# Patient Record
Sex: MALE | Race: WHITE | Employment: OTHER | URBAN - METROPOLITAN AREA
[De-identification: names, ages, dates, MRNs, and addresses within clinical notes are randomized per-mention and may not be internally consistent; named-entity substitution may affect disease eponyms.]

---

## 2017-06-20 ENCOUNTER — HOSPITAL ENCOUNTER (OUTPATIENT)
Dept: MRI IMAGING | Age: 82
Discharge: HOME | End: 2017-06-20
Attending: INTERNAL MEDICINE
Payer: MEDICARE

## 2017-06-20 DIAGNOSIS — M79.604 RIGHT LEG PAIN: ICD-10-CM

## 2017-06-20 PROCEDURE — 72148 MRI LUMBAR SPINE W/O DYE: CPT

## 2017-07-03 ENCOUNTER — HOSPITAL ENCOUNTER (OUTPATIENT)
Dept: INTERVENTIONAL RADIOLOGY/VASCULAR | Age: 82
Discharge: HOME | End: 2017-07-03
Attending: INTERNAL MEDICINE
Payer: MEDICARE

## 2017-07-03 VITALS
RESPIRATION RATE: 20 BRPM | BODY MASS INDEX: 25.33 KG/M2 | OXYGEN SATURATION: 96 % | HEART RATE: 74 BPM | DIASTOLIC BLOOD PRESSURE: 76 MMHG | HEIGHT: 65 IN | SYSTOLIC BLOOD PRESSURE: 142 MMHG | WEIGHT: 152 LBS

## 2017-07-03 DIAGNOSIS — M48.00 SPINAL STENOSIS: ICD-10-CM

## 2017-07-03 PROCEDURE — 74011636320 HC RX REV CODE- 636/320: Performed by: RADIOLOGY

## 2017-07-03 PROCEDURE — 64483 NJX AA&/STRD TFRM EPI L/S 1: CPT

## 2017-07-03 PROCEDURE — 74011000250 HC RX REV CODE- 250: Performed by: RADIOLOGY

## 2017-07-03 PROCEDURE — 74011250636 HC RX REV CODE- 250/636: Performed by: RADIOLOGY

## 2017-07-03 RX ORDER — SODIUM CHLORIDE 9 MG/ML
3 INJECTION INTRAMUSCULAR; INTRAVENOUS; SUBCUTANEOUS ONCE
Status: DISCONTINUED | OUTPATIENT
Start: 2017-07-03 | End: 2017-07-03

## 2017-07-03 RX ORDER — LIDOCAINE HYDROCHLORIDE 20 MG/ML
18 INJECTION, SOLUTION INFILTRATION; PERINEURAL ONCE
Status: COMPLETED | OUTPATIENT
Start: 2017-07-03 | End: 2017-07-03

## 2017-07-03 RX ORDER — DEXAMETHASONE SODIUM PHOSPHATE 10 MG/ML
10 INJECTION INTRAMUSCULAR; INTRAVENOUS ONCE
Status: COMPLETED | OUTPATIENT
Start: 2017-07-03 | End: 2017-07-03

## 2017-07-03 RX ORDER — LIDOCAINE HYDROCHLORIDE 10 MG/ML
2 INJECTION, SOLUTION EPIDURAL; INFILTRATION; INTRACAUDAL; PERINEURAL ONCE
Status: COMPLETED | OUTPATIENT
Start: 2017-07-03 | End: 2017-07-03

## 2017-07-03 RX ADMIN — LIDOCAINE HYDROCHLORIDE 2 ML: 10 INJECTION, SOLUTION EPIDURAL; INFILTRATION; INTRACAUDAL; PERINEURAL at 07:55

## 2017-07-03 RX ADMIN — IOPAMIDOL 3 ML: 612 INJECTION, SOLUTION INTRATHECAL at 07:55

## 2017-07-03 RX ADMIN — DEXAMETHASONE SODIUM PHOSPHATE 10 MG: 10 INJECTION, SOLUTION INTRAMUSCULAR; INTRAVENOUS at 07:55

## 2017-07-03 RX ADMIN — LIDOCAINE HYDROCHLORIDE 360 MG: 20 INJECTION, SOLUTION INFILTRATION; PERINEURAL at 07:55

## 2017-07-03 NOTE — DISCHARGE INSTRUCTIONS
[facility]  Special Procedures/Radiology Department      Steroidal Injection      Go home and rest.     No vigorous physical activity today. Be aware that numbness and/or tingling can occur up to 24 hours after the injection. No driving today. Resume your previous diet. Resume your previous medications. Depending on your job, you may return to work in 25 to 48 hours. It may take up to one week after the injection to see a change or an improvement in your symptoms. Be sure to follow up with your physician. Tell your physician if the injection helped with your symptoms or if the injection did nothing for your symptoms. For minor discomfort, you can take Tylenol, as directed on the label.       If you have any questions or concerns, please call [phone] and ask for the nurse on-call

## 2017-07-03 NOTE — IP AVS SNAPSHOT
[address] [facility] 
[phone] Patient: Maria Elena Delgado MRN: [id number] BVO:[id number] You are allergic to the following Allergen Reactions Gabapentin Other (comments) He says \"It made me crazy\" Tetanus Vaccines And Toxoid Swelling Swelling at the site Tradjenta (Linagliptin) Diarrhea  
 headache Recent Documentation Height Weight BMI Smoking Status 1.651 m 68.9 kg 25.29 kg/m2 Former Smoker Emergency Contacts Name Discharge Info Relation Home Work Mobile Sonia Bautista  Spouse [3] [phone] Sonia BARBOSA  Spouse [3] [phone] About your hospitalization You were admitted on:  July 3, 2017 You last received care in the:  [facility] RAD ANGIO IR You were discharged on:  July 3, 2017 Unit phone number:  [phone] Why you were hospitalized Your primary diagnosis was:  Not on File Providers Seen During Your Hospitalizations Provider Role Specialty Primary office phone Taty Uriostegui MD Attending Provider Internal Medicine [phone] Your Primary Care Physician (PCP) Primary Care Physician Office Phone Office Fax Meryl Patino [phone] Follow-up Information None Your Appointments Monday July 10, 2017 10:30 AM EDT FOLLOW UP 10 with MD Rosanna Herrera [facility] ([facility]) [address] [address] [phone] Current Discharge Medication List  
  
ASK your doctor about these medications Dose & Instructions Dispensing Information Comments Morning Noon Evening Bedtime  
 aspirin delayed-release 81 mg tablet Your last dose was: Your next dose is:    
   
   
 Dose:  81 mg Take 81 mg by mouth daily. Refills:  0 bisacodyl 5 mg EC tablet Commonly known as:  DULCOLAX (BISACODYL) Your last dose was: Your next dose is:    
   
   
 Dose:  5 mg Take 1 Tab by mouth daily as needed for Constipation. Quantity:  30 Tab Refills:  0  
     
   
   
   
  
 DILT- mg ER capsule Generic drug:  dilTIAZem CD Your last dose was: Your next dose is:    
   
   
 Dose:  300 mg Take 300 mg by mouth daily. Refills:  0  
     
   
   
   
  
 diphenhydrAMINE 12.5 mg/5 mL syrup Commonly known as:  BENADRYL Your last dose was: Your next dose is:    
   
   
 Dose:  12.5 mg Take 12.5 mg by mouth nightly as needed. Refills:  0  
     
   
   
   
  
 ferrous sulfate 325 mg (65 mg iron) tablet Your last dose was: Your next dose is:    
   
   
 Dose:  325 mg Take 325 mg by mouth two (2) times a day. Refills:  0  
     
   
   
   
  
 glipiZIDE 10 mg tablet Commonly known as:  Rulon Bread Your last dose was: Your next dose is:    
   
   
 Dose:  10 mg Take 10 mg by mouth two (2) times a day. Refills:  0 HYDROcodone-acetaminophen 5-325 mg per tablet Commonly known as:  [address] Your last dose was: Your next dose is:    
   
   
 Dose:  1 Tab Take 1 Tab by mouth every four (4) hours as needed for Pain. Max Daily Amount: 6 Tabs. Quantity:  20 Tab Refills:  0  
     
   
   
   
  
 insulin glargine 100 unit/mL injection Commonly known as:  LANTUS Your last dose was: Your next dose is:    
   
   
 Dose:  10 Units 10 Units by SubCUTAneous route nightly. Quantity:  1 Vial  
Refills:  0  
     
   
   
   
  
 insulin lispro 100 unit/mL injection Commonly known as:  HUMALOG Your last dose was: Your next dose is:    
   
   
 Blood Sugar (mg/dl) of :140-199=2 units;  200-249=3 units; 250-299=5 units; 300-349=7 units 350 or greater = Call MD... BEDTIME CORRECTIONAL sliding scale when scheduled: 200-249=2 units; 250-299=3 units ; 300-349=4 units; 350 or greater = Call MD  Indications: DIABETES MELLITUS Quantity:  1 Vial  
Refills:  0  
     
   
   
   
  
 meclizine 25 mg tablet Commonly known as:  ANTIVERT Your last dose was: Your next dose is:    
   
   
 Dose:  12.5 mg Take 12.5 mg by mouth three (3) times daily as needed. Refills:  0  
     
   
   
   
  
 simvastatin 20 mg tablet Commonly known as:  ZOCOR Your last dose was: Your next dose is:    
   
   
 Dose:  20 mg Take 20 mg by mouth nightly. Refills:  0  
     
   
   
   
  
 tamsulosin 0.4 mg capsule Commonly known as:  FLOMAX Your last dose was: Your next dose is:    
   
   
 Dose:  0.4 mg Take 0.4 mg by mouth daily. Refills:  0  
     
   
   
   
  
 VITAMIN B-12 1,000 mcg/mL injection Generic drug:  cyanocobalamin Your last dose was: Your next dose is:    
   
   
 Dose:  1000 mcg  
1,000 mcg by IntraMUSCular route every thirty (30) days. Refills:  0  
     
   
   
   
  
 VITAMIN D3 1,000 unit Cap Generic drug:  cholecalciferol Your last dose was: Your next dose is:    
   
   
 Dose:  1000 Units Take 1,000 Units by mouth daily. Refills:  0 WELCHOL 625 mg tablet Generic drug:  colesevelam  
   
Your last dose was: Your next dose is:    
   
   
 Dose:  1875 mg Take 1,875 mg by mouth two (2) times daily (with meals). Refills:  0 ZOFRAN (AS HYDROCHLORIDE) 4 mg tablet Generic drug:  ondansetron hcl Your last dose was: Your next dose is:    
   
   
 Dose:  4 mg Take 4 mg by mouth every eight (8) hours as needed for Nausea. Refills:  0 Discharge Instructions [facility] Special Procedures/Radiology Department Steroidal Injection Go home and rest. 
 
 No vigorous physical activity today. Be aware that numbness and/or tingling can occur up to 24 hours after the injection. No driving today. Resume your previous diet. Resume your previous medications. Depending on your job, you may return to work in 25 to 48 hours. It may take up to one week after the injection to see a change or an improvement in your symptoms. Be sure to follow up with your physician. Tell your physician if the injection helped with your symptoms or if the injection did nothing for your symptoms. For minor discomfort, you can take Tylenol, as directed on the label. If you have any questions or concerns, please call [phone] and ask for the nurse on-call Discharge Orders None Introducing [facility] SERVICES! [employer] introduces [employer] patient portal. Now you can access parts of your medical record, email your doctor's office, and request medication refills online. 1. In your internet browser, go to [email or web address] 2. Click on the First Time User? Click Here link in the Sign In box. You will see the New Member Sign Up page. 3. Enter your [employer] Access Code exactly as it appears below. You will not need to use this code after youve completed the sign-up process. If you do not sign up before the expiration date, you must request a new code. · [employer] Access Code: [email or web address] Expires: 9/17/2017  2:47 PM 
 
4. Enter the last four digits of your Social Security Number (xxxx) and Date of Birth (mm/dd/yyyy) as indicated and click Submit. You will be taken to the next sign-up page. 5. Create a [employer] ID. This will be your [employer] login ID and cannot be changed, so think of one that is secure and easy to remember. 6. Create a [employer] password. You can change your password at any time. 7. Enter your Password Reset Question and Answer.  This can be used at a later time if you forget your password. 8. Enter your e-mail address. You will receive e-mail notification when new information is available in [address] 9. Click Sign Up. You can now view and download portions of your medical record. 10. Click the Download Summary menu link to download a portable copy of your medical information. If you have questions, please visit the Frequently Asked Questions section of the [employer] website. Remember, [employer] is NOT to be used for urgent needs. For medical emergencies, dial 911. Now available from your iPhone and Android! General Information Please provide this summary of care documentation to your next provider. Patient Signature:  ____________________________________________________________ Date:  ____________________________________________________________  
  
[facility] Provider Signature:  ____________________________________________________________ Date:  ____________________________________________________________

## 2017-07-03 NOTE — ROUTINE PROCESS
Discharge instructions given and reviewed with pt and pt's wife. Both voiced complete understanding of all instructions given. Pt taken out via wheelchair and discharged to home with wife in car.

## 2017-07-03 NOTE — ROUTINE PROCESS
Dr. Brook Brewster into speak with pt and procedure explained along with risks and benefits; consent obtained.

## 2017-07-11 ENCOUNTER — HOSPITAL ENCOUNTER (OUTPATIENT)
Dept: MRI IMAGING | Age: 82
Discharge: HOME | End: 2017-07-11
Attending: INTERNAL MEDICINE
Payer: MEDICARE

## 2017-07-11 ENCOUNTER — DOCUMENTATION ONLY (OUTPATIENT)
Dept: INTERNAL MEDICINE CLINIC | Age: 82
End: 2017-07-11

## 2017-07-11 DIAGNOSIS — M25.551 RIGHT HIP PAIN: ICD-10-CM

## 2017-07-11 PROCEDURE — 73721 MRI JNT OF LWR EXTRE W/O DYE: CPT

## 2017-07-24 ENCOUNTER — HOSPITAL ENCOUNTER (OUTPATIENT)
Dept: GENERAL RADIOLOGY | Age: 82
Discharge: HOME | End: 2017-07-24
Attending: ORTHOPAEDIC SURGERY
Payer: MEDICARE

## 2017-07-24 DIAGNOSIS — M16.11 PRIMARY OSTEOARTHRITIS OF RIGHT HIP: ICD-10-CM

## 2017-07-24 PROCEDURE — 20610 DRAIN/INJ JOINT/BURSA W/O US: CPT

## 2017-07-24 PROCEDURE — 74011250636 HC RX REV CODE- 250/636: Performed by: RADIOLOGY

## 2017-07-24 PROCEDURE — 74011636320 HC RX REV CODE- 636/320: Performed by: RADIOLOGY

## 2017-07-24 PROCEDURE — 74011000250 HC RX REV CODE- 250: Performed by: RADIOLOGY

## 2017-07-24 RX ORDER — TRIAMCINOLONE ACETONIDE 40 MG/ML
40 INJECTION, SUSPENSION INTRA-ARTICULAR; INTRAMUSCULAR
Status: COMPLETED | OUTPATIENT
Start: 2017-07-24 | End: 2017-07-24

## 2017-07-24 RX ORDER — BUPIVACAINE HYDROCHLORIDE 5 MG/ML
5 INJECTION, SOLUTION EPIDURAL; INTRACAUDAL
Status: COMPLETED | OUTPATIENT
Start: 2017-07-24 | End: 2017-07-24

## 2017-07-24 RX ORDER — TRIAMCINOLONE ACETONIDE 40 MG/ML
INJECTION, SUSPENSION INTRA-ARTICULAR; INTRAMUSCULAR
Status: DISPENSED
Start: 2017-07-24 | End: 2017-07-24

## 2017-07-24 RX ADMIN — IOHEXOL 20 ML: 180 INJECTION INTRAVENOUS at 11:00

## 2017-07-24 RX ADMIN — BUPIVACAINE HYDROCHLORIDE 25 MG: 5 INJECTION, SOLUTION EPIDURAL; INTRACAUDAL; PERINEURAL at 11:00

## 2017-07-24 RX ADMIN — TRIAMCINOLONE ACETONIDE 40 MG: 40 INJECTION, SUSPENSION INTRA-ARTICULAR; INTRAMUSCULAR at 11:00

## 2017-08-18 RX ORDER — COLESEVELAM HYDROCHLORIDE 625 MG/1
TABLET, FILM COATED ORAL
Qty: 180 TAB | Refills: 11 | Status: SHIPPED | OUTPATIENT
Start: 2017-08-18 | End: 2018-08-08 | Stop reason: SDUPTHER

## 2017-09-07 ENCOUNTER — HOSPITAL ENCOUNTER (OUTPATIENT)
Dept: PREADMISSION TESTING | Age: 82
Discharge: HOME | End: 2017-09-07
Attending: NEUROLOGICAL SURGERY
Payer: MEDICARE

## 2017-09-07 VITALS
TEMPERATURE: 98 F | HEART RATE: 79 BPM | OXYGEN SATURATION: 97 % | SYSTOLIC BLOOD PRESSURE: 194 MMHG | RESPIRATION RATE: 20 BRPM | WEIGHT: 156.31 LBS | DIASTOLIC BLOOD PRESSURE: 52 MMHG | BODY MASS INDEX: 26.04 KG/M2 | HEIGHT: 65 IN

## 2017-09-07 LAB
ABO + RH BLD: NORMAL
ALBUMIN SERPL-MCNC: 3.8 G/DL (ref 3.5–5)
ALBUMIN/GLOB SERPL: 1.1 {RATIO} (ref 1.1–2.2)
ALP SERPL-CCNC: 92 U/L (ref 45–117)
ALT SERPL-CCNC: 31 U/L (ref 12–78)
ANION GAP SERPL CALC-SCNC: 8 MMOL/L (ref 5–15)
APPEARANCE UR: CLEAR
APTT PPP: 26.4 SEC (ref 22.1–32.5)
AST SERPL-CCNC: 17 U/L (ref 15–37)
BACTERIA URNS QL MICRO: NEGATIVE /HPF
BASOPHILS # BLD: 0 K/UL (ref 0–0.1)
BASOPHILS NFR BLD: 0 % (ref 0–1)
BILIRUB SERPL-MCNC: 0.4 MG/DL (ref 0.2–1)
BILIRUB UR QL: NEGATIVE
BLOOD GROUP ANTIBODIES SERPL: NORMAL
BUN SERPL-MCNC: 28 MG/DL (ref 6–20)
BUN/CREAT SERPL: 17 (ref 12–20)
CALCIUM SERPL-MCNC: 8.6 MG/DL (ref 8.5–10.1)
CHLORIDE SERPL-SCNC: 105 MMOL/L (ref 97–108)
CO2 SERPL-SCNC: 26 MMOL/L (ref 21–32)
COLOR UR: ABNORMAL
CREAT SERPL-MCNC: 1.64 MG/DL (ref 0.7–1.3)
EOSINOPHIL # BLD: 0.2 K/UL (ref 0–0.4)
EOSINOPHIL NFR BLD: 2 % (ref 0–7)
EPITH CASTS URNS QL MICRO: ABNORMAL /LPF
ERYTHROCYTE [DISTWIDTH] IN BLOOD BY AUTOMATED COUNT: 14.4 % (ref 11.5–14.5)
EST. AVERAGE GLUCOSE BLD GHB EST-MCNC: 183 MG/DL
GLOBULIN SER CALC-MCNC: 3.5 G/DL (ref 2–4)
GLUCOSE SERPL-MCNC: 133 MG/DL (ref 65–100)
GLUCOSE UR STRIP.AUTO-MCNC: NEGATIVE MG/DL
HBA1C MFR BLD: 8 % (ref 4.2–6.3)
HCT VFR BLD AUTO: 37 % (ref 36.6–50.3)
HGB BLD-MCNC: 12.2 G/DL (ref 12.1–17)
HGB UR QL STRIP: NEGATIVE
INR PPP: 1 (ref 0.9–1.1)
KETONES UR QL STRIP.AUTO: NEGATIVE MG/DL
LEUKOCYTE ESTERASE UR QL STRIP.AUTO: NEGATIVE
LYMPHOCYTES # BLD: 1.9 K/UL (ref 0.8–3.5)
LYMPHOCYTES NFR BLD: 17 % (ref 12–49)
MCH RBC QN AUTO: 29 PG (ref 26–34)
MCHC RBC AUTO-ENTMCNC: 33 G/DL (ref 30–36.5)
MCV RBC AUTO: 88.1 FL (ref 80–99)
MONOCYTES # BLD: 1.1 K/UL (ref 0–1)
MONOCYTES NFR BLD: 10 % (ref 5–13)
NEUTS SEG # BLD: 7.6 K/UL (ref 1.8–8)
NEUTS SEG NFR BLD: 71 % (ref 32–75)
NITRITE UR QL STRIP.AUTO: NEGATIVE
PH UR STRIP: 5 [PH] (ref 5–8)
PLATELET # BLD AUTO: 147 K/UL (ref 150–400)
POTASSIUM SERPL-SCNC: 4.3 MMOL/L (ref 3.5–5.1)
PROT SERPL-MCNC: 7.3 G/DL (ref 6.4–8.2)
PROT UR STRIP-MCNC: 100 MG/DL
PROTHROMBIN TIME: 10.5 SEC (ref 9–11.1)
RBC # BLD AUTO: 4.2 M/UL (ref 4.1–5.7)
RBC #/AREA URNS HPF: ABNORMAL /HPF (ref 0–5)
SODIUM SERPL-SCNC: 139 MMOL/L (ref 136–145)
SP GR UR REFRACTOMETRY: 1.01 (ref 1–1.03)
SPECIMEN EXP DATE BLD: NORMAL
THERAPEUTIC RANGE,PTTT: NORMAL SECS (ref 58–77)
UA: UC IF INDICATED,UAUC: ABNORMAL
UROBILINOGEN UR QL STRIP.AUTO: 0.2 EU/DL (ref 0.2–1)
WBC # BLD AUTO: 10.8 K/UL (ref 4.1–11.1)
WBC URNS QL MICRO: ABNORMAL /HPF (ref 0–4)

## 2017-09-07 PROCEDURE — 80053 COMPREHEN METABOLIC PANEL: CPT | Performed by: NEUROLOGICAL SURGERY

## 2017-09-07 PROCEDURE — 36415 COLL VENOUS BLD VENIPUNCTURE: CPT | Performed by: NEUROLOGICAL SURGERY

## 2017-09-07 PROCEDURE — 81001 URINALYSIS AUTO W/SCOPE: CPT | Performed by: NEUROLOGICAL SURGERY

## 2017-09-07 PROCEDURE — 86900 BLOOD TYPING SEROLOGIC ABO: CPT | Performed by: NEUROLOGICAL SURGERY

## 2017-09-07 PROCEDURE — 93005 ELECTROCARDIOGRAM TRACING: CPT

## 2017-09-07 PROCEDURE — 83036 HEMOGLOBIN GLYCOSYLATED A1C: CPT | Performed by: NEUROLOGICAL SURGERY

## 2017-09-07 PROCEDURE — 85025 COMPLETE CBC W/AUTO DIFF WBC: CPT | Performed by: NEUROLOGICAL SURGERY

## 2017-09-07 PROCEDURE — 85730 THROMBOPLASTIN TIME PARTIAL: CPT | Performed by: NEUROLOGICAL SURGERY

## 2017-09-07 PROCEDURE — 85610 PROTHROMBIN TIME: CPT | Performed by: NEUROLOGICAL SURGERY

## 2017-09-07 RX ORDER — SODIUM CHLORIDE, SODIUM LACTATE, POTASSIUM CHLORIDE, CALCIUM CHLORIDE 600; 310; 30; 20 MG/100ML; MG/100ML; MG/100ML; MG/100ML
25 INJECTION, SOLUTION INTRAVENOUS CONTINUOUS
Status: CANCELLED | OUTPATIENT
Start: 2017-09-14

## 2017-09-07 RX ORDER — POLYETHYLENE GLYCOL 3350 17 G/17G
17 POWDER, FOR SOLUTION ORAL
COMMUNITY
End: 2022-06-30

## 2017-09-07 RX ORDER — OMEPRAZOLE 20 MG/1
20 CAPSULE, DELAYED RELEASE ORAL DAILY
COMMUNITY
End: 2017-09-28

## 2017-09-07 RX ORDER — SODIUM CHLORIDE 900 MG/100ML
25 INJECTION INTRAVENOUS ONCE
Status: CANCELLED | OUTPATIENT
Start: 2017-09-14 | End: 2017-09-14

## 2017-09-07 RX ORDER — CEFAZOLIN SODIUM IN 0.9 % NACL 2 G/100 ML
2 PLASTIC BAG, INJECTION (ML) INTRAVENOUS ONCE
Status: CANCELLED | OUTPATIENT
Start: 2017-09-14 | End: 2017-09-14

## 2017-09-07 NOTE — PERIOP NOTES
[facility]  Preoperative Instructions        Surgery Date 9/14/17          Time of Arrival 1100 Contact # [phone]    1. On the day of your surgery, please report to the Surgical Services Registration Desk and sign in at your designated time. The Surgery Center is located to the right of the Emergency Room. 2. You must have someone with you to drive you home. You should not drive a car for 24 hours following surgery. Please make arrangements for a friend or family member to stay with you for the first 24 hours after your surgery. 3. Do not have anything to eat or drink (including water, gum, mints, coffee, juice) after midnight 9/13/17   . ? This may not apply to medications prescribed by your physician. ?(Please note below the special instructions with medications to take the morning of your procedure.)    4. We recommend you do not drink any alcoholic beverages for 24 hours before and after your surgery. 5. Stop all Aspirin, non-steroidal anti-inflammatory drugs (i.e. Advil, Aleve), vitamins, and supplements?as directed by your surgeon's office. ? **If you are currently taking Plavix, Coumadin, or other blood-thinning agents, contact your surgeon for instructions. **    6. Wear comfortable clothes. Wear glasses instead of contacts. Do not bring any money or jewelry. Please bring picture ID, insurance card, and any prearranged co-payment or hospital payment. Do not wear make-up, particularly mascara the morning of your surgery. Do not wear nail polish, particularly if you are having foot /hand surgery. Wear your hair loose or down, no ponytails, buns, juni pins or clips. All body piercings must be removed. Please shower with antibacterial soap for three consecutive days before and on the morning of surgery, but do not apply any lotions, powders or deodorants after the shower on the day of surgery. Please use a fresh towels after each shower.  Please sleep in clean clothes and change bed linens the night before surgery. Please do not shave for 48 hours prior to surgery. Shaving of the face is acceptable. 7. You should understand that if you do not follow these instructions your surgery may be cancelled. If your physical condition changes (I.e. fever, cold or flu) please contact your surgeon as soon as possible. 8. It is important that you be on time. If a situation occurs where you may be late, please call [phone] (OR Holding Area). 9. If you have any questions and or problems, please call [phone] (Pre-admission Testing). 10. Your surgery time may be subject to change. You will receive a phone call the evening prior if your time changes. 11.  If having outpatient surgery, you must have someone to drive you here, stay with you during the duration of your stay, and to drive you home at time of discharge. 12.   In an effort to improve the efficiency, privacy, and safety for all of our Pre-op patients visitors are not allowed in the Holding area. Once you arrive and are registered your family/visitors will be asked to remain in the waiting room. The Pre-op staff will get you from the Surgical Waiting Area and will explain to you and your family/visitors that the Pre-op phase is beginning. The staff will answer any questions and provide instructions for tracking of the patient, by use of the existing tracking number and color-coded status board in the waiting room. At this time the staff will also ask for your designated spokesperson information in the event that the physician or staff need to provide an update or obtain any pertinent information. The designated spokesperson will be notified if the physician needs to speak to family during the pre-operative phase. If at any time your family/visitors has questions or concerns they may approach the volunteer desk in the waiting area for assistance.          Special Instructions: none     MEDICATIONS TO TAKE THE MORNING OF SURGERY WITH A SIP OF WATER: Diltiazem (cartia),        I understand a pre-operative phone call will be made to verify my surgery time. In the event that I am not available, I give permission for a message to be left on my answering service and/or with another person? yes     ___________________      __________   _________    (Signature of Patient)             (Witness)                (Date and Time)      Preventing Infections Before - and After - Your Surgery  IMPORTANT INSTRUCTIONS    Please read and follow these instructions carefully. Every Night for Three (3) nights before your surgery:  1. Shower with an antibacterial soap, such as Dial, or the soap provided at your preassessment appointment. A shower is better than a bath for cleaning your skin. 2. If needed, ask someone to help you reach all areas of your body. Dont forget to clean your belly button with every shower. The night before your surgery:  1. On the night before your surgery, shower with an antibacterial soap, such as Dial, or the soap provided at your preassessment appointment. 2. With one packet of Hibiclens in hand, turn water off.  3. Apply Hibiclens antiseptic skin cleanser with a clean, freshly washed washcloth. ? Gently apply to your body from chin to toes (except the genital area) and especially the area(s) where your incision(s) will be. ? Leave Hibiclens on your skin for at least 20 seconds. CAUTION: If needed, Hibiclens may be used to clean the folds of skin of the legs (such as in the area of the groin) and on your buttocks and hips. However, do not use Hibiclens above the neck or in the genital area (your bottom) or put inside any area of your body. 4. Turn the water back on and rinse. 5. Dry gently with a clean, freshly washed towel. 6. After your shower, do not use any powder, deodorant, perfumes or lotion. 7. Use clean, freshly washed towels and washcloths every time you shower.   8. Wear clean, freshly washed pajamas to bed the night before surgery. 9. Sleep on clean, freshly washed sheets. 10. Do not allow pets to sleep in your bed with you. The Morning of your surgery:  1. Shower again thoroughly with an antibacterial soap, such as Dial or the soap provided at your preassessment appointment. If needed, ask someone for help to reach all areas of your body. Dont forget to clean your belly button! Rinse. 2. Dry gently with a clean, freshly washed towel. 3. After your shower, do not use any powder, deodorant, perfumes or lotion prior to surgery. 4. Put on clean, freshly washed clothing. Tips to help prevent infections after your surgery:  1. Protect your surgical wound from germs:  ? Hand washing is the most important thing you and your caregivers can do to prevent infections. ? Keep your bandage clean and dry! ? Do not touch your surgical wound. 2. Use clean, freshly washed towels and washcloths every time you shower; do not share bath linens with others. 3. Until your surgical wound is healed, wear clothing and sleep on bed linens each day that are clean and freshly washed. 4. Do not allow pets to sleep in your bed with you or touch your surgical wound. 5. Do not smoke - smoking delays wound healing. This may be a good time to stop smoking. 6. If you have diabetes, it is important for you to manage your blood sugar levels properly before your surgery as well as after your surgery. Poorly managed blood sugar levels slow down wound healing and prevent you from healing completely.

## 2017-09-08 LAB
ATRIAL RATE: 79 BPM
BACTERIA SPEC CULT: NORMAL
BACTERIA SPEC CULT: NORMAL
CALCULATED P AXIS, ECG09: 48 DEGREES
CALCULATED R AXIS, ECG10: -37 DEGREES
CALCULATED T AXIS, ECG11: 72 DEGREES
DIAGNOSIS, 93000: NORMAL
P-R INTERVAL, ECG05: 210 MS
Q-T INTERVAL, ECG07: 364 MS
QRS DURATION, ECG06: 84 MS
QTC CALCULATION (BEZET), ECG08: 417 MS
SERVICE CMNT-IMP: NORMAL
VENTRICULAR RATE, ECG03: 79 BPM

## 2017-09-08 NOTE — PERIOP NOTES
Called Dr English's office to follow up on CBC, CMP and HGB A1C and voice message left for Shira to call back about abnormal labs.

## 2017-09-11 ENCOUNTER — TELEPHONE (OUTPATIENT)
Dept: INTERNAL MEDICINE CLINIC | Age: 82
End: 2017-09-11

## 2017-09-11 NOTE — TELEPHONE ENCOUNTER
Patient called stating that he was told that his blood sugar was elevated from labs drawn for pre op. Per Dr Rossy May Mr Marina Courtney can increase his Glipizide to 1 1/2 tab BID, and we will follow up with him after his surgery. Patient was informed of this.

## 2017-09-11 NOTE — PERIOP NOTES
Tania at  [employer] office called and said that she is going to call pt and have them get an appt prior to surgery and review [id number] and if PCP okay with pt proceeding with surgery or needs to postpone, Jaime will keep us up to date.

## 2017-09-12 ENCOUNTER — HOSPITAL ENCOUNTER (OUTPATIENT)
Dept: DIABETES SERVICES | Age: 82
Discharge: HOME | End: 2017-09-12
Payer: MEDICARE

## 2017-09-12 PROCEDURE — G0108 DIAB MANAGE TRN  PER INDIV: HCPCS | Performed by: DIETITIAN, REGISTERED

## 2017-09-12 NOTE — DIABETES MGMT
[facility] Ortho Progress Note    Recommendations/ Comments:    1. Use 3-4 oz of protein serving at meals  2. Use MyPlate method for appropriate carbohydrate intake  3. Treat hypoglycemia using 4oz of juice or 3-4 glucose tablets - use rule of 15 to check and treat  4. Utilize Ensure supplements or smaller snacks with protein if intake is poor. Pt seen in outpt [facility] pre-op on 9/12/17. Chart reviewed and initial evaluation complete on [facility]. Patient is a 80 y.o. male  With type II DM who was seen individually pre-op ortho visit (surgery scheduled for 9/14/17). Pt currently taking 15mg glipizide 2x/daily (reported taking in the morning and evening) and also taking 2.5 mg of Onglyza in the evening. Pt tests blood sugars 1x/day (fasting) and brought logs in for review. Fasting blood sugars ranged from 95mg/dL to 181mg/dL. Pt shared that he does not miss his medication and takes his medications 100 percent of the time. Pt reported having lows in the morning and treating with cheese. Pt reported that he is unable to do any physical activity due to spinal stenosis (uses rolling walker to ambulate, unable to stand straight up). Pt is awake around 4:30 am and has 1 cup fresh fruit (cantoloupe, honeydew melon mix) with 1/2 bagel and coffee. At noon, he has 1.5 sausage, egg, cheese croissant with 1 cup fruit mix (above) and coffee. Eats 1/4 cup cashews from 4-5pm and dinner at 8-9pm of string beans, 1/2 cup ptoats with 1 bowl foster's chicken soup. Pt will drink half of an Ensure before bed most nights. Educator discussed taking Glipizide before breakfast and with dinner meal.. Educator discussed treating low blood sugars of 70 - 80mg/dL with juice or 3-4 glucose tablets and rechecking/treatment using rule of 15. Educator discussed use or protein at all meals and appropriate portion size of protein for healing and nutrition after surgery.  Educator discussed use of MyPlate at meals for healing (emphasized role of protein in healing) and nutrition as well as rationale for getting enough carbohydrates at meals for best blood sugar control. Educator reviewed use of meter and number to call for assistance as pt reported getting an error at times on his meter (pt also reported having difficulty receiving appropriate supplies from pharmacy - educator suggested using pharmacy that suits pt best). Educator reviewed having extra Ensure supplements around in case of poor appetite after surgery and reviewed some snack ideas with protein for after surgery and when po intake is poor. Educator discussed role of poor blood sugar control and risk of post op infection. A1c:   Lab Results   Component Value Date/Time    Hemoglobin A1c 8.0 09/07/2017 09:05 AM     Recent Glucose Results: No results found for: GLU, GLUPOC, GLUCPOC     Lab Results   Component Value Date/Time    Creatinine 1.64 09/07/2017 09:05 AM       Thank you.      Liz Acuna RD

## 2017-09-14 ENCOUNTER — HOSPITAL ENCOUNTER (OUTPATIENT)
Age: 82
Setting detail: OBSERVATION
Discharge: HOME HEALTH | End: 2017-09-15
Attending: NEUROLOGICAL SURGERY | Admitting: NEUROLOGICAL SURGERY
Payer: MEDICARE

## 2017-09-14 ENCOUNTER — APPOINTMENT (OUTPATIENT)
Dept: GENERAL RADIOLOGY | Age: 82
End: 2017-09-14
Attending: NEUROLOGICAL SURGERY
Payer: MEDICARE

## 2017-09-14 ENCOUNTER — ANESTHESIA (OUTPATIENT)
Dept: SURGERY | Age: 82
End: 2017-09-14
Payer: MEDICARE

## 2017-09-14 ENCOUNTER — ANESTHESIA EVENT (OUTPATIENT)
Dept: SURGERY | Age: 82
End: 2017-09-14
Payer: MEDICARE

## 2017-09-14 PROBLEM — M48.062 LUMBAR STENOSIS WITH NEUROGENIC CLAUDICATION: Status: ACTIVE | Noted: 2017-09-14

## 2017-09-14 LAB
GLUCOSE BLD STRIP.AUTO-MCNC: 139 MG/DL (ref 65–100)
GLUCOSE BLD STRIP.AUTO-MCNC: 204 MG/DL (ref 65–100)
SERVICE CMNT-IMP: ABNORMAL
SERVICE CMNT-IMP: ABNORMAL

## 2017-09-14 PROCEDURE — 77030020782 HC GWN BAIR PAWS FLX 3M -B

## 2017-09-14 PROCEDURE — 74011250636 HC RX REV CODE- 250/636

## 2017-09-14 PROCEDURE — 76060000036 HC ANESTHESIA 2.5 TO 3 HR: Performed by: NEUROLOGICAL SURGERY

## 2017-09-14 PROCEDURE — 74011250636 HC RX REV CODE- 250/636: Performed by: NEUROLOGICAL SURGERY

## 2017-09-14 PROCEDURE — 74011250636 HC RX REV CODE- 250/636: Performed by: ANESTHESIOLOGY

## 2017-09-14 PROCEDURE — 77030020061 HC IV BLD WRMR ADMIN SET 3M -B: Performed by: ANESTHESIOLOGY

## 2017-09-14 PROCEDURE — 77030008684 HC TU ET CUF COVD -B: Performed by: ANESTHESIOLOGY

## 2017-09-14 PROCEDURE — 77030018846 HC SOL IRR STRL H20 ICUM -A: Performed by: NEUROLOGICAL SURGERY

## 2017-09-14 PROCEDURE — 74011000250 HC RX REV CODE- 250

## 2017-09-14 PROCEDURE — 74011000250 HC RX REV CODE- 250: Performed by: NEUROLOGICAL SURGERY

## 2017-09-14 PROCEDURE — 77030013079 HC BLNKT BAIR HGGR 3M -A: Performed by: ANESTHESIOLOGY

## 2017-09-14 PROCEDURE — 77030026438 HC STYL ET INTUB CARD -A: Performed by: ANESTHESIOLOGY

## 2017-09-14 PROCEDURE — 74011250637 HC RX REV CODE- 250/637: Performed by: NEUROLOGICAL SURGERY

## 2017-09-14 PROCEDURE — 77030002933 HC SUT MCRYL J&J -A: Performed by: NEUROLOGICAL SURGERY

## 2017-09-14 PROCEDURE — 77030004391 HC BUR FLUT MEDT -C: Performed by: NEUROLOGICAL SURGERY

## 2017-09-14 PROCEDURE — 77030014650 HC SEAL MTRX FLOSEL BAXT -C: Performed by: NEUROLOGICAL SURGERY

## 2017-09-14 PROCEDURE — 82962 GLUCOSE BLOOD TEST: CPT

## 2017-09-14 PROCEDURE — 74011000272 HC RX REV CODE- 272: Performed by: NEUROLOGICAL SURGERY

## 2017-09-14 PROCEDURE — 77030032490 HC SLV COMPR SCD KNE COVD -B: Performed by: NEUROLOGICAL SURGERY

## 2017-09-14 PROCEDURE — 77030008771 HC TU NG SALEM SUMP -A: Performed by: ANESTHESIOLOGY

## 2017-09-14 PROCEDURE — 77030019908 HC STETH ESOPH SIMS -A: Performed by: ANESTHESIOLOGY

## 2017-09-14 PROCEDURE — 77030018836 HC SOL IRR NACL ICUM -A: Performed by: NEUROLOGICAL SURGERY

## 2017-09-14 PROCEDURE — 77030003028 HC SUT VCRL J&J -A: Performed by: NEUROLOGICAL SURGERY

## 2017-09-14 PROCEDURE — 77030003029 HC SUT VCRL J&J -B: Performed by: NEUROLOGICAL SURGERY

## 2017-09-14 PROCEDURE — 77030003666 HC NDL SPINAL BD -A: Performed by: NEUROLOGICAL SURGERY

## 2017-09-14 PROCEDURE — 77030011256 HC DRSG MEPILEX <16IN NO BORD MOLN -A

## 2017-09-14 PROCEDURE — 77030029099 HC BN WAX SSPC -A: Performed by: NEUROLOGICAL SURGERY

## 2017-09-14 PROCEDURE — 77030034849: Performed by: NEUROLOGICAL SURGERY

## 2017-09-14 PROCEDURE — 76000 FLUOROSCOPY <1 HR PHYS/QHP: CPT

## 2017-09-14 PROCEDURE — 77030002987 HC SUT PROL J&J -B: Performed by: NEUROLOGICAL SURGERY

## 2017-09-14 PROCEDURE — 99218 HC RM OBSERVATION: CPT

## 2017-09-14 PROCEDURE — 74011636637 HC RX REV CODE- 636/637

## 2017-09-14 PROCEDURE — 72020 X-RAY EXAM OF SPINE 1 VIEW: CPT

## 2017-09-14 PROCEDURE — 77030013474 HC CRD BPLR DISP ADLR -A: Performed by: NEUROLOGICAL SURGERY

## 2017-09-14 PROCEDURE — 76210000006 HC OR PH I REC 0.5 TO 1 HR: Performed by: NEUROLOGICAL SURGERY

## 2017-09-14 PROCEDURE — 76010000172 HC OR TIME 2.5 TO 3 HR INTENSV-TIER 1: Performed by: NEUROLOGICAL SURGERY

## 2017-09-14 RX ORDER — CEFAZOLIN SODIUM IN 0.9 % NACL 2 G/100 ML
2 PLASTIC BAG, INJECTION (ML) INTRAVENOUS EVERY 8 HOURS
Status: COMPLETED | OUTPATIENT
Start: 2017-09-14 | End: 2017-09-15

## 2017-09-14 RX ORDER — BUPIVACAINE HYDROCHLORIDE AND EPINEPHRINE 5; 5 MG/ML; UG/ML
INJECTION, SOLUTION EPIDURAL; INTRACAUDAL; PERINEURAL AS NEEDED
Status: DISCONTINUED | OUTPATIENT
Start: 2017-09-14 | End: 2017-09-14 | Stop reason: HOSPADM

## 2017-09-14 RX ORDER — ROCURONIUM BROMIDE 10 MG/ML
INJECTION, SOLUTION INTRAVENOUS AS NEEDED
Status: DISCONTINUED | OUTPATIENT
Start: 2017-09-14 | End: 2017-09-14 | Stop reason: HOSPADM

## 2017-09-14 RX ORDER — ONDANSETRON 2 MG/ML
4 INJECTION INTRAMUSCULAR; INTRAVENOUS AS NEEDED
Status: DISCONTINUED | OUTPATIENT
Start: 2017-09-14 | End: 2017-09-14 | Stop reason: HOSPADM

## 2017-09-14 RX ORDER — MORPHINE SULFATE 10 MG/ML
2 INJECTION, SOLUTION INTRAMUSCULAR; INTRAVENOUS
Status: DISCONTINUED | OUTPATIENT
Start: 2017-09-14 | End: 2017-09-14 | Stop reason: HOSPADM

## 2017-09-14 RX ORDER — ONDANSETRON 2 MG/ML
INJECTION INTRAMUSCULAR; INTRAVENOUS AS NEEDED
Status: DISCONTINUED | OUTPATIENT
Start: 2017-09-14 | End: 2017-09-14 | Stop reason: HOSPADM

## 2017-09-14 RX ORDER — SODIUM CHLORIDE, SODIUM LACTATE, POTASSIUM CHLORIDE, CALCIUM CHLORIDE 600; 310; 30; 20 MG/100ML; MG/100ML; MG/100ML; MG/100ML
25 INJECTION, SOLUTION INTRAVENOUS CONTINUOUS
Status: DISCONTINUED | OUTPATIENT
Start: 2017-09-14 | End: 2017-09-14 | Stop reason: HOSPADM

## 2017-09-14 RX ORDER — SODIUM CHLORIDE 0.9 % (FLUSH) 0.9 %
5-10 SYRINGE (ML) INJECTION AS NEEDED
Status: DISCONTINUED | OUTPATIENT
Start: 2017-09-14 | End: 2017-09-14 | Stop reason: HOSPADM

## 2017-09-14 RX ORDER — FENTANYL CITRATE 50 UG/ML
25 INJECTION, SOLUTION INTRAMUSCULAR; INTRAVENOUS
Status: DISCONTINUED | OUTPATIENT
Start: 2017-09-14 | End: 2017-09-14 | Stop reason: HOSPADM

## 2017-09-14 RX ORDER — DEXTROSE 50 % IN WATER (D50W) INTRAVENOUS SYRINGE
12.5-25 AS NEEDED
Status: DISCONTINUED | OUTPATIENT
Start: 2017-09-14 | End: 2017-09-14

## 2017-09-14 RX ORDER — SODIUM CHLORIDE 0.9 % (FLUSH) 0.9 %
5-10 SYRINGE (ML) INJECTION EVERY 8 HOURS
Status: DISCONTINUED | OUTPATIENT
Start: 2017-09-14 | End: 2017-09-14 | Stop reason: HOSPADM

## 2017-09-14 RX ORDER — CYCLOBENZAPRINE HCL 10 MG
5 TABLET ORAL
Status: DISCONTINUED | OUTPATIENT
Start: 2017-09-14 | End: 2017-09-15 | Stop reason: HOSPADM

## 2017-09-14 RX ORDER — ONDANSETRON 2 MG/ML
4 INJECTION INTRAMUSCULAR; INTRAVENOUS
Status: DISCONTINUED | OUTPATIENT
Start: 2017-09-14 | End: 2017-09-15 | Stop reason: HOSPADM

## 2017-09-14 RX ORDER — COLESEVELAM 180 1/1
625 TABLET ORAL 2 TIMES DAILY WITH MEALS
Status: DISCONTINUED | OUTPATIENT
Start: 2017-09-14 | End: 2017-09-15 | Stop reason: HOSPADM

## 2017-09-14 RX ORDER — HYDROMORPHONE HYDROCHLORIDE 1 MG/ML
0.2 INJECTION, SOLUTION INTRAMUSCULAR; INTRAVENOUS; SUBCUTANEOUS
Status: DISCONTINUED | OUTPATIENT
Start: 2017-09-14 | End: 2017-09-14 | Stop reason: HOSPADM

## 2017-09-14 RX ORDER — FAMOTIDINE 20 MG/1
20 TABLET, FILM COATED ORAL EVERY 12 HOURS
Status: DISCONTINUED | OUTPATIENT
Start: 2017-09-14 | End: 2017-09-15 | Stop reason: HOSPADM

## 2017-09-14 RX ORDER — DIPHENHYDRAMINE HYDROCHLORIDE 50 MG/ML
12.5 INJECTION, SOLUTION INTRAMUSCULAR; INTRAVENOUS AS NEEDED
Status: DISCONTINUED | OUTPATIENT
Start: 2017-09-14 | End: 2017-09-14 | Stop reason: HOSPADM

## 2017-09-14 RX ORDER — SUCCINYLCHOLINE CHLORIDE 20 MG/ML
INJECTION INTRAMUSCULAR; INTRAVENOUS AS NEEDED
Status: DISCONTINUED | OUTPATIENT
Start: 2017-09-14 | End: 2017-09-14 | Stop reason: HOSPADM

## 2017-09-14 RX ORDER — PROPOFOL 10 MG/ML
INJECTION, EMULSION INTRAVENOUS AS NEEDED
Status: DISCONTINUED | OUTPATIENT
Start: 2017-09-14 | End: 2017-09-14 | Stop reason: HOSPADM

## 2017-09-14 RX ORDER — MIDAZOLAM HYDROCHLORIDE 1 MG/ML
1 INJECTION, SOLUTION INTRAMUSCULAR; INTRAVENOUS AS NEEDED
Status: DISCONTINUED | OUTPATIENT
Start: 2017-09-14 | End: 2017-09-14 | Stop reason: HOSPADM

## 2017-09-14 RX ORDER — MAGNESIUM SULFATE 100 %
4 CRYSTALS MISCELLANEOUS AS NEEDED
Status: DISCONTINUED | OUTPATIENT
Start: 2017-09-14 | End: 2017-09-15 | Stop reason: HOSPADM

## 2017-09-14 RX ORDER — SODIUM CHLORIDE 9 MG/ML
50 INJECTION, SOLUTION INTRAVENOUS CONTINUOUS
Status: DISCONTINUED | OUTPATIENT
Start: 2017-09-14 | End: 2017-09-14 | Stop reason: HOSPADM

## 2017-09-14 RX ORDER — POLYETHYLENE GLYCOL 3350 17 G/17G
17 POWDER, FOR SOLUTION ORAL DAILY
Status: DISCONTINUED | OUTPATIENT
Start: 2017-09-15 | End: 2017-09-15 | Stop reason: HOSPADM

## 2017-09-14 RX ORDER — TAMSULOSIN HYDROCHLORIDE 0.4 MG/1
0.4 CAPSULE ORAL DAILY
Status: DISCONTINUED | OUTPATIENT
Start: 2017-09-15 | End: 2017-09-15 | Stop reason: HOSPADM

## 2017-09-14 RX ORDER — DILTIAZEM HYDROCHLORIDE 300 MG/1
300 CAPSULE, COATED, EXTENDED RELEASE ORAL DAILY
Status: DISCONTINUED | OUTPATIENT
Start: 2017-09-15 | End: 2017-09-15 | Stop reason: HOSPADM

## 2017-09-14 RX ORDER — CEFAZOLIN SODIUM IN 0.9 % NACL 2 G/100 ML
2 PLASTIC BAG, INJECTION (ML) INTRAVENOUS ONCE
Status: COMPLETED | OUTPATIENT
Start: 2017-09-14 | End: 2017-09-14

## 2017-09-14 RX ORDER — SODIUM CHLORIDE 0.9 % (FLUSH) 0.9 %
5-10 SYRINGE (ML) INJECTION AS NEEDED
Status: DISCONTINUED | OUTPATIENT
Start: 2017-09-14 | End: 2017-09-15 | Stop reason: HOSPADM

## 2017-09-14 RX ORDER — PANTOPRAZOLE SODIUM 40 MG/1
40 TABLET, DELAYED RELEASE ORAL
Status: DISCONTINUED | OUTPATIENT
Start: 2017-09-15 | End: 2017-09-15 | Stop reason: HOSPADM

## 2017-09-14 RX ORDER — LIDOCAINE HYDROCHLORIDE 10 MG/ML
0.1 INJECTION, SOLUTION EPIDURAL; INFILTRATION; INTRACAUDAL; PERINEURAL AS NEEDED
Status: DISCONTINUED | OUTPATIENT
Start: 2017-09-14 | End: 2017-09-14 | Stop reason: HOSPADM

## 2017-09-14 RX ORDER — FENTANYL CITRATE 50 UG/ML
INJECTION, SOLUTION INTRAMUSCULAR; INTRAVENOUS AS NEEDED
Status: DISCONTINUED | OUTPATIENT
Start: 2017-09-14 | End: 2017-09-14 | Stop reason: HOSPADM

## 2017-09-14 RX ORDER — DEXAMETHASONE SODIUM PHOSPHATE 4 MG/ML
INJECTION, SOLUTION INTRA-ARTICULAR; INTRALESIONAL; INTRAMUSCULAR; INTRAVENOUS; SOFT TISSUE AS NEEDED
Status: DISCONTINUED | OUTPATIENT
Start: 2017-09-14 | End: 2017-09-14 | Stop reason: HOSPADM

## 2017-09-14 RX ORDER — NALOXONE HYDROCHLORIDE 0.4 MG/ML
0.4 INJECTION, SOLUTION INTRAMUSCULAR; INTRAVENOUS; SUBCUTANEOUS AS NEEDED
Status: DISCONTINUED | OUTPATIENT
Start: 2017-09-14 | End: 2017-09-15 | Stop reason: HOSPADM

## 2017-09-14 RX ORDER — POTASSIUM CHLORIDE AND SODIUM CHLORIDE 450; 150 MG/100ML; MG/100ML
INJECTION, SOLUTION INTRAVENOUS CONTINUOUS
Status: DISCONTINUED | OUTPATIENT
Start: 2017-09-14 | End: 2017-09-15

## 2017-09-14 RX ORDER — ACETAMINOPHEN 325 MG/1
650 TABLET ORAL
Status: DISCONTINUED | OUTPATIENT
Start: 2017-09-14 | End: 2017-09-15 | Stop reason: HOSPADM

## 2017-09-14 RX ORDER — PRAVASTATIN SODIUM 40 MG/1
40 TABLET ORAL
Status: DISCONTINUED | OUTPATIENT
Start: 2017-09-14 | End: 2017-09-15 | Stop reason: HOSPADM

## 2017-09-14 RX ORDER — LIDOCAINE HYDROCHLORIDE 20 MG/ML
INJECTION, SOLUTION EPIDURAL; INFILTRATION; INTRACAUDAL; PERINEURAL AS NEEDED
Status: DISCONTINUED | OUTPATIENT
Start: 2017-09-14 | End: 2017-09-14 | Stop reason: HOSPADM

## 2017-09-14 RX ORDER — LABETALOL HYDROCHLORIDE 5 MG/ML
INJECTION, SOLUTION INTRAVENOUS AS NEEDED
Status: DISCONTINUED | OUTPATIENT
Start: 2017-09-14 | End: 2017-09-14 | Stop reason: HOSPADM

## 2017-09-14 RX ORDER — SODIUM CHLORIDE, SODIUM LACTATE, POTASSIUM CHLORIDE, CALCIUM CHLORIDE 600; 310; 30; 20 MG/100ML; MG/100ML; MG/100ML; MG/100ML
75 INJECTION, SOLUTION INTRAVENOUS CONTINUOUS
Status: DISCONTINUED | OUTPATIENT
Start: 2017-09-14 | End: 2017-09-14 | Stop reason: HOSPADM

## 2017-09-14 RX ORDER — DOCUSATE SODIUM 100 MG/1
100 CAPSULE, LIQUID FILLED ORAL 2 TIMES DAILY
Status: DISCONTINUED | OUTPATIENT
Start: 2017-09-14 | End: 2017-09-15 | Stop reason: HOSPADM

## 2017-09-14 RX ORDER — DIPHENHYDRAMINE HCL 25 MG
25 CAPSULE ORAL
Status: DISCONTINUED | OUTPATIENT
Start: 2017-09-14 | End: 2017-09-15 | Stop reason: HOSPADM

## 2017-09-14 RX ORDER — FENTANYL CITRATE 50 UG/ML
50 INJECTION, SOLUTION INTRAMUSCULAR; INTRAVENOUS AS NEEDED
Status: DISCONTINUED | OUTPATIENT
Start: 2017-09-14 | End: 2017-09-14 | Stop reason: HOSPADM

## 2017-09-14 RX ORDER — MIDAZOLAM HYDROCHLORIDE 1 MG/ML
0.5 INJECTION, SOLUTION INTRAMUSCULAR; INTRAVENOUS
Status: DISCONTINUED | OUTPATIENT
Start: 2017-09-14 | End: 2017-09-14 | Stop reason: HOSPADM

## 2017-09-14 RX ORDER — OXYCODONE AND ACETAMINOPHEN 5; 325 MG/1; MG/1
1 TABLET ORAL AS NEEDED
Status: DISCONTINUED | OUTPATIENT
Start: 2017-09-14 | End: 2017-09-14 | Stop reason: HOSPADM

## 2017-09-14 RX ORDER — ETOMIDATE 2 MG/ML
INJECTION INTRAVENOUS AS NEEDED
Status: DISCONTINUED | OUTPATIENT
Start: 2017-09-14 | End: 2017-09-14 | Stop reason: HOSPADM

## 2017-09-14 RX ORDER — LANOLIN ALCOHOL/MO/W.PET/CERES
325 CREAM (GRAM) TOPICAL 2 TIMES DAILY
Status: DISCONTINUED | OUTPATIENT
Start: 2017-09-14 | End: 2017-09-15 | Stop reason: HOSPADM

## 2017-09-14 RX ORDER — PHENYLEPHRINE HCL IN 0.9% NACL 0.4MG/10ML
SYRINGE (ML) INTRAVENOUS AS NEEDED
Status: DISCONTINUED | OUTPATIENT
Start: 2017-09-14 | End: 2017-09-14 | Stop reason: HOSPADM

## 2017-09-14 RX ORDER — SODIUM CHLORIDE 0.9 % (FLUSH) 0.9 %
5-10 SYRINGE (ML) INJECTION EVERY 8 HOURS
Status: DISCONTINUED | OUTPATIENT
Start: 2017-09-14 | End: 2017-09-15 | Stop reason: HOSPADM

## 2017-09-14 RX ORDER — DEXTROSE MONOHYDRATE 100 MG/ML
125-250 INJECTION, SOLUTION INTRAVENOUS AS NEEDED
Status: DISCONTINUED | OUTPATIENT
Start: 2017-09-14 | End: 2017-09-15 | Stop reason: HOSPADM

## 2017-09-14 RX ORDER — INSULIN LISPRO 100 [IU]/ML
INJECTION, SOLUTION INTRAVENOUS; SUBCUTANEOUS
Status: DISCONTINUED | OUTPATIENT
Start: 2017-09-14 | End: 2017-09-15 | Stop reason: HOSPADM

## 2017-09-14 RX ORDER — HYDROMORPHONE HYDROCHLORIDE 1 MG/ML
1 INJECTION, SOLUTION INTRAMUSCULAR; INTRAVENOUS; SUBCUTANEOUS
Status: DISCONTINUED | OUTPATIENT
Start: 2017-09-14 | End: 2017-09-15 | Stop reason: HOSPADM

## 2017-09-14 RX ORDER — POTASSIUM CHLORIDE AND SODIUM CHLORIDE 450; 150 MG/100ML; MG/100ML
INJECTION, SOLUTION INTRAVENOUS
Status: COMPLETED
Start: 2017-09-14 | End: 2017-09-14

## 2017-09-14 RX ORDER — HYDROMORPHONE HYDROCHLORIDE 2 MG/ML
INJECTION, SOLUTION INTRAMUSCULAR; INTRAVENOUS; SUBCUTANEOUS AS NEEDED
Status: DISCONTINUED | OUTPATIENT
Start: 2017-09-14 | End: 2017-09-14 | Stop reason: HOSPADM

## 2017-09-14 RX ORDER — HYDROCODONE BITARTRATE AND ACETAMINOPHEN 5; 325 MG/1; MG/1
1 TABLET ORAL
Status: DISCONTINUED | OUTPATIENT
Start: 2017-09-14 | End: 2017-09-15 | Stop reason: HOSPADM

## 2017-09-14 RX ORDER — GLIPIZIDE 5 MG/1
10 TABLET ORAL 2 TIMES DAILY
Status: DISCONTINUED | OUTPATIENT
Start: 2017-09-14 | End: 2017-09-15 | Stop reason: HOSPADM

## 2017-09-14 RX ADMIN — Medication 120 MCG: at 15:00

## 2017-09-14 RX ADMIN — ROCURONIUM BROMIDE 25 MG: 10 INJECTION, SOLUTION INTRAVENOUS at 14:21

## 2017-09-14 RX ADMIN — DEXAMETHASONE SODIUM PHOSPHATE 8 MG: 4 INJECTION, SOLUTION INTRA-ARTICULAR; INTRALESIONAL; INTRAMUSCULAR; INTRAVENOUS; SOFT TISSUE at 15:04

## 2017-09-14 RX ADMIN — FENTANYL CITRATE 25 MCG: 50 INJECTION, SOLUTION INTRAMUSCULAR; INTRAVENOUS at 16:27

## 2017-09-14 RX ADMIN — SODIUM CHLORIDE, SODIUM LACTATE, POTASSIUM CHLORIDE, AND CALCIUM CHLORIDE: 600; 310; 30; 20 INJECTION, SOLUTION INTRAVENOUS at 15:00

## 2017-09-14 RX ADMIN — LABETALOL HYDROCHLORIDE 5 MG: 5 INJECTION, SOLUTION INTRAVENOUS at 16:48

## 2017-09-14 RX ADMIN — Medication 10 ML: at 21:33

## 2017-09-14 RX ADMIN — ROCURONIUM BROMIDE 5 MG: 10 INJECTION, SOLUTION INTRAVENOUS at 14:14

## 2017-09-14 RX ADMIN — HYDROMORPHONE HYDROCHLORIDE 0.2 MG: 2 INJECTION, SOLUTION INTRAMUSCULAR; INTRAVENOUS; SUBCUTANEOUS at 16:30

## 2017-09-14 RX ADMIN — ONDANSETRON 4 MG: 2 INJECTION INTRAMUSCULAR; INTRAVENOUS at 16:25

## 2017-09-14 RX ADMIN — HYDROMORPHONE HYDROCHLORIDE 0.2 MG: 2 INJECTION, SOLUTION INTRAMUSCULAR; INTRAVENOUS; SUBCUTANEOUS at 16:35

## 2017-09-14 RX ADMIN — FENTANYL CITRATE 25 MCG: 50 INJECTION, SOLUTION INTRAMUSCULAR; INTRAVENOUS at 16:04

## 2017-09-14 RX ADMIN — Medication 80 MCG: at 15:33

## 2017-09-14 RX ADMIN — FAMOTIDINE 20 MG: 20 TABLET ORAL at 21:49

## 2017-09-14 RX ADMIN — CEFAZOLIN 2 G: 10 INJECTION, POWDER, FOR SOLUTION INTRAVENOUS; PARENTERAL at 21:32

## 2017-09-14 RX ADMIN — SODIUM CHLORIDE AND POTASSIUM CHLORIDE 100 ML/HR: 4.5; 1.49 INJECTION, SOLUTION INTRAVENOUS at 17:21

## 2017-09-14 RX ADMIN — INSULIN HUMAN 4 UNITS: 100 INJECTION, SOLUTION PARENTERAL at 17:19

## 2017-09-14 RX ADMIN — FENTANYL CITRATE 50 MCG: 50 INJECTION, SOLUTION INTRAMUSCULAR; INTRAVENOUS at 14:14

## 2017-09-14 RX ADMIN — Medication 120 MCG: at 14:51

## 2017-09-14 RX ADMIN — LABETALOL HYDROCHLORIDE 5 MG: 5 INJECTION, SOLUTION INTRAVENOUS at 16:38

## 2017-09-14 RX ADMIN — PROPOFOL 50 MG: 10 INJECTION, EMULSION INTRAVENOUS at 14:14

## 2017-09-14 RX ADMIN — ETOMIDATE 20 MG: 2 INJECTION INTRAVENOUS at 14:14

## 2017-09-14 RX ADMIN — Medication 40 MCG: at 15:23

## 2017-09-14 RX ADMIN — FERROUS SULFATE TAB 325 MG (65 MG ELEMENTAL FE) 325 MG: 325 (65 FE) TAB at 20:51

## 2017-09-14 RX ADMIN — PRAVASTATIN SODIUM 40 MG: 40 TABLET ORAL at 21:32

## 2017-09-14 RX ADMIN — DOCUSATE SODIUM 100 MG: 100 CAPSULE, LIQUID FILLED ORAL at 20:51

## 2017-09-14 RX ADMIN — CEFAZOLIN 2 G: 10 INJECTION, POWDER, FOR SOLUTION INTRAVENOUS; PARENTERAL at 14:22

## 2017-09-14 RX ADMIN — SUCCINYLCHOLINE CHLORIDE 120 MG: 20 INJECTION INTRAMUSCULAR; INTRAVENOUS at 14:14

## 2017-09-14 RX ADMIN — Medication 120 MCG: at 15:10

## 2017-09-14 RX ADMIN — Medication 40 MCG: at 15:17

## 2017-09-14 RX ADMIN — SODIUM CHLORIDE, SODIUM LACTATE, POTASSIUM CHLORIDE, AND CALCIUM CHLORIDE 25 ML/HR: 600; 310; 30; 20 INJECTION, SOLUTION INTRAVENOUS at 12:16

## 2017-09-14 RX ADMIN — LIDOCAINE HYDROCHLORIDE 70 MG: 20 INJECTION, SOLUTION EPIDURAL; INFILTRATION; INTRACAUDAL; PERINEURAL at 14:14

## 2017-09-14 RX ADMIN — Medication 120 MCG: at 15:44

## 2017-09-14 RX ADMIN — GLIPIZIDE 10 MG: 5 TABLET ORAL at 20:51

## 2017-09-14 NOTE — PROGRESS NOTES
Ortho/ NeuroSurgery NP Note    POD# 0  s/p RE EXPLORATION LUMBAR LAMINECTOMY L2, L3 AND DISCECTOMY L3-4 RIGHT   Patient was still in the OR and therefore was not seen. This note serves as a record of a chart review. Most Recent Labs:   Lab Results   Component Value Date/Time    HGB 12.2 09/07/2017 09:05 AM    INR 1.0 09/07/2017 09:05 AM    Hemoglobin A1c 8.0 09/07/2017 09:05 AM       MRSA Screen Pre-op Negative  U/A Screen Pre-Op Negative    Body mass index is 25.61 kg/(m^2). BMI greater than 30 is classified as obesity. STOP BANG Score: 3    Social History: No significant history. Plan:  1) PT BID starting tomorrow. 2) Kristine-op Antibiotics Ancef  3) Discharge planning will begin on POD #1.      Rosemary Jimenez NP

## 2017-09-14 NOTE — IP AVS SNAPSHOT
[address] [facility] 
[phone] Patient: Evonne Baker MRN: [id number] JVF:[id number] You are allergic to the following Allergen Reactions Tetanus Vaccines And Toxoid Swelling Swelling at the site Tradjenta (Linagliptin) Diarrhea  
 headache Recent Documentation Height Weight BMI Smoking Status 1.651 m 69.8 kg 25.61 kg/m2 Former Smoker Emergency Contacts Name Discharge Info Relation Home Work Mobile Sonia Bautista DISCHARGE CAREGIVER [3] Spouse [3] [phone] About your hospitalization You were admitted on:  September 14, 2017 You last received care in the:  [facility] 3 ORTHOPEDICS You were discharged on:  September 15, 2017 Unit phone number:  [phone] Why you were hospitalized Your primary diagnosis was:  Not on File Your diagnoses also included:  Lumbar Stenosis With Neurogenic Claudication Providers Seen During Your Hospitalizations Provider Role Specialty Primary office phone Jose Gary MD Attending Provider Neurosurgery [phone] Your Primary Care Physician (PCP) Primary Care Physician Office Phone Office Fax [facility] [phone] Follow-up Information Follow up With Details Comments Contact Info Jose Gary MD In 2 weeks  [address] 
[phone] Jennyfer August MD   [facility] [address] [facility] 
[phone] Current Discharge Medication List  
  
START taking these medications Dose & Instructions Dispensing Information Comments Morning Noon Evening Bedtime HYDROcodone-acetaminophen 5-325 mg per tablet Commonly known as:  Mary Grace Barragan Your last dose was: Your next dose is:    
   
   
 Dose:  1 Tab Take 1 Tab by mouth every four (4) hours as needed. Max Daily Amount: 6 Tabs. Quantity:  60 Tab Refills:  0  
     
   
   
   
  
 senna-docusate 8.6-50 mg per tablet Commonly known as:  Demaris Osmel Your last dose was: Your next dose is:    
   
   
 Dose:  1 Tab Take 1 Tab by mouth daily. Quantity:  30 Tab Refills:  0 CONTINUE these medications which have CHANGED Dose & Instructions Dispensing Information Comments Morning Noon Evening Bedtime * MIRALAX 17 gram/dose powder Generic drug:  polyethylene glycol What changed:  Another medication with the same name was added. Make sure you understand how and when to take each. Your last dose was: Your next dose is:    
   
   
 Dose:  17 g Take 17 g by mouth daily. Refills:  0  
     
   
   
   
  
 * polyethylene glycol 17 gram packet Commonly known as:  Ivette Cancel What changed: You were already taking a medication with the same name, and this prescription was added. Make sure you understand how and when to take each. Your last dose was: Your next dose is:    
   
   
 Dose:  17 g Take 1 Packet by mouth daily as needed (constipation) for up to 15 days. Quantity:  15 Packet Refills:  0  
     
   
   
   
  
 * Notice: This list has 2 medication(s) that are the same as other medications prescribed for you. Read the directions carefully, and ask your doctor or other care provider to review them with you. CONTINUE these medications which have NOT CHANGED Dose & Instructions Dispensing Information Comments Morning Noon Evening Bedtime DILT- mg ER capsule Generic drug:  dilTIAZem CD Your last dose was: Your next dose is:    
   
   
 Dose:  300 mg Take 300 mg by mouth daily. Refills:  0  
     
   
   
   
  
 ferrous sulfate 325 mg (65 mg iron) tablet Your last dose was: Your next dose is: Dose:  325 mg Take 325 mg by mouth two (2) times a day. Refills:  0  
     
   
   
   
  
 gabapentin 300 mg capsule Commonly known as:  NEURONTIN Your last dose was: Your next dose is: TAKE 1 CAPSULE BY MOUTH TWICE A DAY Quantity:  60 Cap Refills:  2  
     
   
   
   
  
 glipiZIDE 10 mg tablet Commonly known as:  Colton Seth Your last dose was: Your next dose is:    
   
   
 Dose:  10 mg Take 10 mg by mouth two (2) times a day. Refills:  0  
     
   
   
   
  
 omeprazole 20 mg capsule Commonly known as:  PRILOSEC Your last dose was: Your next dose is:    
   
   
 Dose:  20 mg Take 20 mg by mouth daily. Refills:  0 ONGLYZA 2.5 mg tablet Generic drug:  sAXagliptin Your last dose was: Your next dose is:    
   
   
 Dose:  2.5 mg Take 2.5 mg by mouth daily. Refills:  0  
     
   
   
   
  
 simvastatin 20 mg tablet Commonly known as:  ZOCOR Your last dose was: Your next dose is:    
   
   
 Dose:  20 mg Take 20 mg by mouth nightly. Refills:  0  
     
   
   
   
  
 tamsulosin 0.4 mg capsule Commonly known as:  FLOMAX Your last dose was: Your next dose is:    
   
   
 Dose:  0.4 mg Take 0.4 mg by mouth daily. Refills:  0  
     
   
   
   
  
 VITAMIN B-12 1,000 mcg/mL injection Generic drug:  cyanocobalamin Your last dose was: Your next dose is:    
   
   
 Dose:  1000 mcg  
1,000 mcg by IntraMUSCular route every thirty (30) days. Refills:  0  
     
   
   
   
  
 VITAMIN D3 1,000 unit Cap Generic drug:  cholecalciferol Your last dose was: Your next dose is:    
   
   
 Dose:  1000 Units Take 1,000 Units by mouth daily. Refills:  0 WELCHOL 625 mg tablet Generic drug:  colesevelam  
   
Your last dose was: Your next dose is: TAKE 3 TABLETS BY MOUTH TWICE A DAY Quantity:  180 Tab Refills:  11 STOP taking these medications   
 aspirin delayed-release 81 mg tablet Where to Get Your Medications Information on where to get these meds will be given to you by the nurse or doctor. ! Ask your nurse or doctor about these medications HYDROcodone-acetaminophen 5-325 mg per tablet  
 polyethylene glycol 17 gram packet  
 senna-docusate 8.6-50 mg per tablet Discharge Instructions SPINE DISCHARGE  INSTRUCTIONS Antonio Osorio M.D. What can I do? ? The only exercise you should do is walking. Start by walking twice a day for 5 minutes, then increase by  2-3 minutes every day until you reach 25 minutes twice a day. DO NOT sit for long periods of time (20 minutes at a time for the first couple of weeks, then gradually increase. ? No heavy lifting (5 lbs max); no straining, twisting, or bending. ? No driving until your physician tells you it is ok. It is, however, ok to ride short distances in a car. 
? If you are required to wear a back brace, you may remove it when you are sleeping unless your doctor has advised against it. ? If you are required to wear a cervical collar, you must sleep in it. You can remove it only for showers. What can I eat?  
? You may resume your regular home diet as tolerated. If your throat is sore, you may want to eat soft food for the first few days. When can I take a shower? ? You may shower leaving on your occlusive (waterproof) dressing on allowing water to run over the dressing. The dressing may be removed in 5 days. The small pieces of tape (steri strips)  underneath it should stay on. Let water run over them, then pat dry gently. ? Do not take baths or soak in pools. ? You may remove your brace for showering. Medications: 
?  Check with your physician before taking any anti-inflammatory medicines (Advil, Aleve, ibuprofen, aspirin). ? Take prescription medicine as directed. DO NOT take more than prescribed. Call your physician if the prescribed dose is not sufficiently controlling your pain. ? It is important to have regular bowel movements. Pain medications may cause constipation. Drink plenty of fluids, get enough fiber in your diet, and use stool softeners and drink prune juice to help prevent constipation. Do not take laxatives if at all possible except in severe situations. It can result in a vicious cycle of constipation and diarrhea. ? Do not put any ointments or creams on your incision unless directed to do so by your physician. ? Tobacco products should be avoided during the postoperative phase. When do I see the physician again? ? Please call your physicians office as soon as you get home to schedule your 1st post operative appointment. You should be seen approximately two weeks after your surgery. At this appointment you will see your doctors Assistant for a wound check and to answer any questions you may have. 
? You will see your physician approximately six weeks after your surgery. ? If you had a fusion, you will need to get an order for xrays to be taken prior to the six week appointment. These should be done on the day of the appointment or 1-2 days before. ? If you need the number to your doctors office, please request it from your nurse or see below. NOTIFY YOUR PHYSICIAN OF ANY OF THE FOLLOWING: 
 
? Fever above 101º for 24 hrs. 
? Nausea or vomiting. 
? Inability to urinate ? Loss of bowel or bladder function (sudden onset of incontinence) ? Changes in sensation (numbness, tingling, color change) in your extremities ? Pain not relieved by your medicine. ? Redness, swelling or drainage from your incision ? Persistent pain in the calf of either leg ? Development of severe pain FOR APPOINTMENTS OR QUESTIONS CALL: 
 
[facility] 
 [place] [address] 
[phone] Discharge Orders None Introducing [facility]! [facility] introduces Surphace patient portal. Now you can access parts of your medical record, email your doctor's office, and request medication refills online. 1. In your internet browser, go to [email or web address] WhoSay/[employer] 2. Click on the First Time User? Click Here link in the Sign In box. You will see the New Member Sign Up page. 3. Enter your Surphace Access Code exactly as it appears below. You will not need to use this code after youve completed the sign-up process. If you do not sign up before the expiration date, you must request a new code. · Surphace Access Code: [email or web address] Expires: 9/17/2017  2:47 PM 
 
4. Enter the last four digits of your Social Security Number (xxxx) and Date of Birth (mm/dd/yyyy) as indicated and click Submit. You will be taken to the next sign-up page. 5. Create a Surphace ID. This will be your Surphace login ID and cannot be changed, so think of one that is secure and easy to remember. 6. Create a Surphace password. You can change your password at any time. 7. Enter your Password Reset Question and Answer. This can be used at a later time if you forget your password. 8. Enter your e-mail address. You will receive e-mail notification when new information is available in [address] 9. Click Sign Up. You can now view and download portions of your medical record. 10. Click the Download Summary menu link to download a portable copy of your medical information. If you have questions, please visit the Frequently Asked Questions section of the Surphace website. Remember, Surphace is NOT to be used for urgent needs. For medical emergencies, dial 911. Now available from your iPhone and Android! General Information Please provide this summary of care documentation to your next provider. Patient Signature:  ____________________________________________________________ Date:  ____________________________________________________________  
  
Elizabethann Glad Provider Signature:  ____________________________________________________________ Date:  ____________________________________________________________

## 2017-09-14 NOTE — PERIOP NOTES
1150:  Attempt IV to Left forearm 18 g. without success. After withdrawing large purple bruise appeared under skin. Pressure held for 5 minutes. Dr. Janelle Hutchinson made aware. 2nd attempt to left wrist unsuccessful and HAYDEN Mcnamara primary nurse made aware. 1245:  Patient resting comfortably in bed. He has his call bell but states he does not want to watch TV at this time.   He denies any needs    1305:  CRNA in to see the patient

## 2017-09-14 NOTE — ANESTHESIA PREPROCEDURE EVALUATION
Anesthetic History   No history of anesthetic complications            Review of Systems / Medical History  Patient summary reviewed, nursing notes reviewed and pertinent labs reviewed    Pulmonary                Comments: Former smoker   Neuro/Psych             Comments: Chronic pain  frequent falls  confusion Cardiovascular    Hypertension          CAD and CABG    Exercise tolerance: <4 METS     GI/Hepatic/Renal         Renal disease: CRI       Endo/Other    Diabetes    Anemia     Other Findings   Comments: thrombocytopenia           Physical Exam    Airway  Mallampati: II  TM Distance: > 6 cm  Neck ROM: decreased range of motion   Mouth opening: Normal    Comments: No neck pain Cardiovascular    Rhythm: regular  Rate: normal        Comments: tachcardia Dental    Dentition: Caps/crowns  Comments: chipped   Pulmonary  Breath sounds clear to auscultation               Abdominal  GI exam deferred       Other Findings            Anesthetic Plan    ASA: 4  Anesthesia type: general          Induction: Intravenous  Anesthetic plan and risks discussed with: Patient and Spouse      Likely postoperative confusion

## 2017-09-14 NOTE — PERIOP NOTES
Handoff Report from Operating Room to PACU    Report received from Shania Davis RN and [address] [place] [address], CRNA regarding Saundra Madird. Surgeon(s):  Renée Tyson MD  And Procedure(s) (LRB):  RE EXPLORATION LUMBAR LAMINECTOMY L2, L3 AND DISCECTOMY L3-4 RIGHT (Right)  confirmed   with allergies, drains and dressings discussed. Anesthesia type, drugs, patient history, complications, estimated blood loss, vital signs, intake and output, and last pain medication, lines, reversal medications and temperature were reviewed.

## 2017-09-14 NOTE — IP AVS SNAPSHOT
[address] [facility] 
[phone] Patient: Tammi Sebastian MRN: [id number] Current Discharge Medication List  
  
START taking these medications Dose & Instructions Dispensing Information Comments Morning Noon Evening Bedtime HYDROcodone-acetaminophen 5-325 mg per tablet Commonly known as:  Sohail Mohit Your last dose was: Your next dose is:    
   
   
 Dose:  1 Tab Take 1 Tab by mouth every four (4) hours as needed. Max Daily Amount: 6 Tabs. Quantity:  60 Tab Refills:  0  
     
   
   
   
  
 senna-docusate 8.6-50 mg per tablet Commonly known as:  Rodolph Jeans Your last dose was: Your next dose is:    
   
   
 Dose:  1 Tab Take 1 Tab by mouth daily. Quantity:  30 Tab Refills:  0 CONTINUE these medications which have CHANGED Dose & Instructions Dispensing Information Comments Morning Noon Evening Bedtime * MIRALAX 17 gram/dose powder Generic drug:  polyethylene glycol What changed:  Another medication with the same name was added. Make sure you understand how and when to take each. Your last dose was: Your next dose is:    
   
   
 Dose:  17 g Take 17 g by mouth daily. Refills:  0  
     
   
   
   
  
 * polyethylene glycol 17 gram packet Commonly known as:  Cyrilla Brash What changed: You were already taking a medication with the same name, and this prescription was added. Make sure you understand how and when to take each. Your last dose was: Your next dose is:    
   
   
 Dose:  17 g Take 1 Packet by mouth daily as needed (constipation) for up to 15 days. Quantity:  15 Packet Refills:  0  
     
   
   
   
  
 * Notice: This list has 2 medication(s) that are the same as other medications prescribed for you.  Read the directions carefully, and ask your doctor or other care provider to review them with you. CONTINUE these medications which have NOT CHANGED Dose & Instructions Dispensing Information Comments Morning Noon Evening Bedtime DILT- mg ER capsule Generic drug:  dilTIAZem CD Your last dose was: Your next dose is:    
   
   
 Dose:  300 mg Take 300 mg by mouth daily. Refills:  0  
     
   
   
   
  
 ferrous sulfate 325 mg (65 mg iron) tablet Your last dose was: Your next dose is:    
   
   
 Dose:  325 mg Take 325 mg by mouth two (2) times a day. Refills:  0  
     
   
   
   
  
 gabapentin 300 mg capsule Commonly known as:  NEURONTIN Your last dose was: Your next dose is: TAKE 1 CAPSULE BY MOUTH TWICE A DAY Quantity:  60 Cap Refills:  2  
     
   
   
   
  
 glipiZIDE 10 mg tablet Commonly known as:  Teodoro [place] Your last dose was: Your next dose is:    
   
   
 Dose:  10 mg Take 10 mg by mouth two (2) times a day. Refills:  0  
     
   
   
   
  
 omeprazole 20 mg capsule Commonly known as:  PRILOSEC Your last dose was: Your next dose is:    
   
   
 Dose:  20 mg Take 20 mg by mouth daily. Refills:  0 ONGLYZA 2.5 mg tablet Generic drug:  sAXagliptin Your last dose was: Your next dose is:    
   
   
 Dose:  2.5 mg Take 2.5 mg by mouth daily. Refills:  0  
     
   
   
   
  
 simvastatin 20 mg tablet Commonly known as:  ZOCOR Your last dose was: Your next dose is:    
   
   
 Dose:  20 mg Take 20 mg by mouth nightly. Refills:  0  
     
   
   
   
  
 tamsulosin 0.4 mg capsule Commonly known as:  FLOMAX Your last dose was: Your next dose is:    
   
   
 Dose:  0.4 mg Take 0.4 mg by mouth daily. Refills:  0  
     
   
   
   
  
 VITAMIN B-12 1,000 mcg/mL injection Generic drug:  cyanocobalamin Your last dose was: Your next dose is:    
   
   
 Dose:  1000 mcg  
1,000 mcg by IntraMUSCular route every thirty (30) days. Refills:  0  
     
   
   
   
  
 VITAMIN D3 1,000 unit Cap Generic drug:  cholecalciferol Your last dose was: Your next dose is:    
   
   
 Dose:  1000 Units Take 1,000 Units by mouth daily. Refills:  0 WELCHOL 625 mg tablet Generic drug:  colesevelam  
   
Your last dose was: Your next dose is: TAKE 3 TABLETS BY MOUTH TWICE A DAY Quantity:  180 Tab Refills:  11 STOP taking these medications   
 aspirin delayed-release 81 mg tablet Where to Get Your Medications Information on where to get these meds will be given to you by the nurse or doctor. ! Ask your nurse or doctor about these medications HYDROcodone-acetaminophen 5-325 mg per tablet  
 polyethylene glycol 17 gram packet  
 senna-docusate 8.6-50 mg per tablet

## 2017-09-14 NOTE — PERIOP NOTES
Called Marcelino (pharmacist) and ask to have gabapentin removed from patient list of allergies due to pt stated that he is not allergic to gabapentin, it is a medication he takes everyday. 1325 - took pt walker and eyeglasses to wife, also notified wife that Dr. German Cross is running behind    1400 - notified wife that Dr. German Cross is running behind, and ask if she wants to come back to sit with pt until he comes. Wife brought back to room to sit with pt.

## 2017-09-14 NOTE — ANESTHESIA POSTPROCEDURE EVALUATION
Post-Anesthesia Evaluation and Assessment    Patient: Dionne Carey MRN: [id number]  SSN: [id number]    YOB: 1935  Age: 80 y.o. Sex: male       Cardiovascular Function/Vital Signs  Visit Vitals    /58 (BP 1 Location: Left arm, BP Patient Position: At rest)    Pulse 65    Temp 36.9 °C (98.5 °F)    Resp 10    Ht 5' 5\" (1.651 m)    Wt 69.8 kg (153 lb 14.1 oz)    SpO2 99%    BMI 25.61 kg/m2       Patient is status post general anesthesia for Procedure(s):  RE EXPLORATION LUMBAR LAMINECTOMY L2, L3 AND DISCECTOMY L3-4 RIGHT. Nausea/Vomiting: None    Postoperative hydration reviewed and adequate. Pain:  Pain Scale 1: Visual (09/14/17 1659)  Pain Intensity 1: 0 (09/14/17 1659)   Managed    Neurological Status:   Neuro (WDL): Exceptions to WDL (09/14/17 1659)  Neuro  Neurologic State: Drowsy; Eyes open spontaneously (09/14/17 1732)  Orientation Level: Oriented to person;Oriented to place;Oriented to situation (09/14/17 1732)  Cognition: Appropriate decision making; Appropriate for age attention/concentration; Appropriate safety awareness; Follows commands (09/14/17 1732)  Speech: Clear (09/14/17 1732)  LUE Motor Response: Purposeful;Weak (09/14/17 1732)  LLE Motor Response: Purposeful;Weak (09/14/17 1732)  RUE Motor Response: Purposeful;Weak (09/14/17 1732)  RLE Motor Response: Purposeful;Weak (09/14/17 1732)   At baseline    Mental Status and Level of Consciousness: Arousable    Pulmonary Status:   O2 Device: Room air (09/14/17 1732)   Adequate oxygenation and airway patent    Complications related to anesthesia: None    Post-anesthesia assessment completed.  No concerns    Signed By: Darshan Agustin MD     September 14, 2017

## 2017-09-14 NOTE — PERIOP NOTES
TRANSFER - OUT REPORT:    Verbal report given to Anand Khalil RN(name) on Prateek Banks  being transferred to [facility](unit) for routine progression of care       Report consisted of patients Situation, Background, Assessment and   Recommendations(SBAR). Information from the following report(s) SBAR, OR Summary, Procedure Summary, Intake/Output and MAR was reviewed with the receiving nurse. Opportunity for questions and clarification was provided.       Patient transported with:   [employer]

## 2017-09-14 NOTE — BRIEF OP NOTE
BRIEF OPERATIVE NOTE    Date of Procedure: 9/14/2017   Preoperative Diagnosis: LUMBAR STENOSIS, LUMBAR HERNIATED DISC  Postoperative Diagnosis: LUMBAR STENOSIS, LUMBAR HERNIATED DISC    Procedure(s):  RE EXPLORATION LUMBAR LAMINECTOMY L2, L3 AND DISCECTOMY L3-4 RIGHT  Surgeon(s) and Role:     * Leida Jones MD - Primary         Assistant Staff:       Surgical Staff:  Leticia Collins: Sandy Lau  Radiology Technician: Anshul John RT; Renita Henry  Scrub Tech-1: Jesse Lane  Surg Asst-1: Ahmet Napier; Alyssia Montilla  Event Time In   Incision Start 1451   Incision Close      Anesthesia: General   Estimated Blood Loss: none  Specimens: * No specimens in log *   Findings: herniated scarred-in disc at L3,4 on right and recurrent stenosis   Complications: none  Implants: * No implants in log *

## 2017-09-14 NOTE — IP AVS SNAPSHOT
Summary of Care Report The Summary of Care report has been created to help improve care coordination. Users with access to [employer] or [address] (Web-based application) may access additional patient information including the Discharge Summary. If you are not currently a [address] user and need more information, please call the number listed below in the [place] [address] section and ask to be connected with Medical Records. Facility Information Name Address Phone [address] [address] [phone] Patient Information Patient Name Sex JUAN A Aguilar ([id number]) Male 1935 Discharge Information Admitting Provider Service Area Unit Cherelle Pat MD / [phone] [facility] 3 Orthopedics  [phone] Discharge Provider Discharge Date/Time Discharge Disposition Destination (none) 9/15/2017 (Pending) [place] (none) Patient Language Language ENGLISH [13] Hospital Problems as of 9/15/2017  Never Reviewed Class Noted - Resolved Last Modified POA Active Problems Lumbar stenosis with neurogenic claudication  2017 - Present 2017 by Cherelle Pat MD Unknown Entered by Cherelle Pat MD  
  
Non-Hospital Problems as of 9/15/2017  Never Reviewed Class Noted - Resolved Last Modified Active Problems   Diabetes ([facility] [address]  2015 - Present 2015 by Pastor Bonilla MD  
  Entered by Pastor Bonilla MD  
  Hypertension  2015 - Present 2015 by Pastor Bonilla MD  
  Entered by Pastor Bonilla MD  
  Hypercholesterolemia  2015 - Present 2015 by Pastor Bonilla MD  
  Entered by Pastor Bonilla MD  
  Chronic kidney disease  2015 - Present 2015 by Lavelle Hernandez Rossy May MD  
  Entered by Lauro Aleman MD  
  Overview Signed 9/24/2015  7:21 AM by Luaro Aleman MD  
   hx of elevated blood levels CAD (coronary artery disease)  9/24/2015 - Present 9/24/2015 by Lauro Aleman MD  
  Entered by Lauro Aleman MD  
  Overview Signed 9/24/2015  7:21 AM by Lauro Aleman MD  
   mi Enlarged prostate  9/24/2015 - Present 9/24/2015 by Lauro Aleman MD  
  Entered by Lauro Aleman MD  
  Lumbar spinal stenosis  9/24/2015 - Present 9/24/2015 by Lauro Aleman MD  
  Entered by Lauro Aleman MD  
  Spinal stenosis  9/24/2015 - Present 9/24/2015 by Katarina Brand MD  
  Entered by Katarina Brand MD  
  
You are allergic to the following Allergen Reactions Tetanus Vaccines And Toxoid Swelling Swelling at the site Tradjenta (Linagliptin) Diarrhea  
 headache Current Discharge Medication List  
  
START taking these medications Dose & Instructions Dispensing Information Comments HYDROcodone-acetaminophen 5-325 mg per tablet Commonly known as:  Kathie Abby Dose:  1 Tab Take 1 Tab by mouth every four (4) hours as needed. Max Daily Amount: 6 Tabs. Quantity:  60 Tab Refills:  0  
   
 senna-docusate 8.6-50 mg per tablet Commonly known as:  Mary Porter Dose:  1 Tab Take 1 Tab by mouth daily. Quantity:  30 Tab Refills:  0 CONTINUE these medications which have CHANGED Dose & Instructions Dispensing Information Comments * MIRALAX 17 gram/dose powder Generic drug:  polyethylene glycol What changed:  Another medication with the same name was added. Make sure you understand how and when to take each. Dose:  17 g Take 17 g by mouth daily. Refills:  0  
   
 * polyethylene glycol 17 gram packet Commonly known as:  Temi Plane What changed:   You were already taking a medication with the same name, and this prescription was added. Make sure you understand how and when to take each. Dose:  17 g Take 1 Packet by mouth daily as needed (constipation) for up to 15 days. Quantity:  15 Packet Refills:  0  
   
 * Notice: This list has 2 medication(s) that are the same as other medications prescribed for you. Read the directions carefully, and ask your doctor or other care provider to review them with you. CONTINUE these medications which have NOT CHANGED Dose & Instructions Dispensing Information Comments DILT- mg ER capsule Generic drug:  dilTIAZem CD Dose:  300 mg Take 300 mg by mouth daily. Refills:  0  
   
 ferrous sulfate 325 mg (65 mg iron) tablet Dose:  325 mg Take 325 mg by mouth two (2) times a day. Refills:  0  
   
 gabapentin 300 mg capsule Commonly known as:  NEURONTIN  
 TAKE 1 CAPSULE BY MOUTH TWICE A DAY Quantity:  60 Cap Refills:  2  
   
 glipiZIDE 10 mg tablet Commonly known as:  Narvis Fiddler Dose:  10 mg Take 10 mg by mouth two (2) times a day. Refills:  0  
   
 omeprazole 20 mg capsule Commonly known as:  PRILOSEC Dose:  20 mg Take 20 mg by mouth daily. Refills:  0 ONGLYZA 2.5 mg tablet Generic drug:  sAXagliptin Dose:  2.5 mg Take 2.5 mg by mouth daily. Refills:  0  
   
 simvastatin 20 mg tablet Commonly known as:  ZOCOR Dose:  20 mg Take 20 mg by mouth nightly. Refills:  0  
   
 tamsulosin 0.4 mg capsule Commonly known as:  FLOMAX Dose:  0.4 mg Take 0.4 mg by mouth daily. Refills:  0  
   
 VITAMIN B-12 1,000 mcg/mL injection Generic drug:  cyanocobalamin Dose:  1000 mcg  
1,000 mcg by IntraMUSCular route every thirty (30) days. Refills:  0  
   
 VITAMIN D3 1,000 unit Cap Generic drug:  cholecalciferol Dose:  1000 Units Take 1,000 Units by mouth daily. Refills:  0 WELCHOL 625 mg tablet Generic drug:  colesevelam  
 TAKE 3 TABLETS BY MOUTH TWICE A DAY  
 Quantity:  180 Tab Refills:  11 STOP taking these medications Comments  
 aspirin delayed-release 81 mg tablet Current Immunizations Name Date Influenza Vaccine (Quad) PF 10/3/2015 Surgery Information ID Date/Time Status Primary Surgeon All Procedures Location [id number] 9/14/2017 Miriam Ford MD RE EXPLORATION LUMBAR LAMINECTOMY L2, L3 AND DISCECTOMY L3-4 RIGHT [facility] MAIN OR Follow-up Information Follow up With Details Comments Contact Info Joshua Pantoja MD In 2 weeks  [address] [place] [address] 
[phone] Manpreet Kirkpatrick MD   [facility] [address] [phone] Discharge Instructions SPINE DISCHARGE  INSTRUCTIONS Brielle Perdomo M.D. What can I do? ? The only exercise you should do is walking. Start by walking twice a day for 5 minutes, then increase by  2-3 minutes every day until you reach 25 minutes twice a day. DO NOT sit for long periods of time (20 minutes at a time for the first couple of weeks, then gradually increase. ? No heavy lifting (5 lbs max); no straining, twisting, or bending. ? No driving until your physician tells you it is ok. It is, however, ok to ride short distances in a car. 
? If you are required to wear a back brace, you may remove it when you are sleeping unless your doctor has advised against it. ? If you are required to wear a cervical collar, you must sleep in it. You can remove it only for showers. What can I eat?  
? You may resume your regular home diet as tolerated. If your throat is sore, you may want to eat soft food for the first few days. When can I take a shower? ? You may shower leaving on your occlusive (waterproof) dressing on allowing water to run over the dressing. The dressing may be removed in 5 days.   The small pieces of tape (steri strips)  underneath it should stay on.  Let water run over them, then pat dry gently. ? Do not take baths or soak in pools. ? You may remove your brace for showering. Medications: 
? Check with your physician before taking any anti-inflammatory medicines (Advil, Aleve, ibuprofen, aspirin). ? Take prescription medicine as directed. DO NOT take more than prescribed. Call your physician if the prescribed dose is not sufficiently controlling your pain. ? It is important to have regular bowel movements. Pain medications may cause constipation. Drink plenty of fluids, get enough fiber in your diet, and use stool softeners and drink prune juice to help prevent constipation. Do not take laxatives if at all possible except in severe situations. It can result in a vicious cycle of constipation and diarrhea. ? Do not put any ointments or creams on your incision unless directed to do so by your physician. ? Tobacco products should be avoided during the postoperative phase. When do I see the physician again? ? Please call your physicians office as soon as you get home to schedule your 1st post operative appointment. You should be seen approximately two weeks after your surgery. At this appointment you will see your doctors Assistant for a wound check and to answer any questions you may have. 
? You will see your physician approximately six weeks after your surgery. ? If you had a fusion, you will need to get an order for xrays to be taken prior to the six week appointment. These should be done on the day of the appointment or 1-2 days before. ? If you need the number to your doctors office, please request it from your nurse or see below. NOTIFY YOUR PHYSICIAN OF ANY OF THE FOLLOWING: 
 
? Fever above 101º for 24 hrs. 
? Nausea or vomiting. 
? Inability to urinate ? Loss of bowel or bladder function (sudden onset of incontinence) ? Changes in sensation (numbness, tingling, color change) in your extremities ? Pain not relieved by your medicine. ? Redness, swelling or drainage from your incision ? Persistent pain in the calf of either leg ? Development of severe pain FOR APPOINTMENTS OR QUESTIONS CALL: 
 
[facility], [address] [place] [address] 
[phone] Chart Review Routing History Recipient Method Report Sent By Erick Veras MD  
Phone: [phone] In Basket IP Auto Routed Notes Lucy Bah MD [[id number]] 9/30/2015  7:16 AM 09/30/2015 Av Veras MD  
Phone: [phone] In Basket IP Auto Routed Notes Lucy Bah MD [[id number]] 9/30/2015  3:15 PM 09/30/2015 Av Veras MD  
Phone: [phone] In Basket IP Auto Routed Notes Lucy Bah MD [[id number]] 10/12/2015 10:20 AM 10/12/2015

## 2017-09-15 VITALS
DIASTOLIC BLOOD PRESSURE: 59 MMHG | HEART RATE: 65 BPM | OXYGEN SATURATION: 96 % | BODY MASS INDEX: 25.64 KG/M2 | RESPIRATION RATE: 16 BRPM | HEIGHT: 65 IN | TEMPERATURE: 98.7 F | WEIGHT: 153.88 LBS | SYSTOLIC BLOOD PRESSURE: 143 MMHG

## 2017-09-15 LAB
ANION GAP SERPL CALC-SCNC: 9 MMOL/L (ref 5–15)
BUN SERPL-MCNC: 29 MG/DL (ref 6–20)
BUN/CREAT SERPL: 18 (ref 12–20)
CALCIUM SERPL-MCNC: 7.9 MG/DL (ref 8.5–10.1)
CHLORIDE SERPL-SCNC: 107 MMOL/L (ref 97–108)
CO2 SERPL-SCNC: 21 MMOL/L (ref 21–32)
CREAT SERPL-MCNC: 1.61 MG/DL (ref 0.7–1.3)
GLUCOSE BLD STRIP.AUTO-MCNC: 180 MG/DL (ref 65–100)
GLUCOSE BLD STRIP.AUTO-MCNC: 229 MG/DL (ref 65–100)
GLUCOSE SERPL-MCNC: 204 MG/DL (ref 65–100)
POTASSIUM SERPL-SCNC: 5.4 MMOL/L (ref 3.5–5.1)
SERVICE CMNT-IMP: ABNORMAL
SERVICE CMNT-IMP: ABNORMAL
SODIUM SERPL-SCNC: 137 MMOL/L (ref 136–145)

## 2017-09-15 PROCEDURE — 74011250636 HC RX REV CODE- 250/636: Performed by: INTERNAL MEDICINE

## 2017-09-15 PROCEDURE — 74011636637 HC RX REV CODE- 636/637: Performed by: NEUROLOGICAL SURGERY

## 2017-09-15 PROCEDURE — 82962 GLUCOSE BLOOD TEST: CPT

## 2017-09-15 PROCEDURE — 80048 BASIC METABOLIC PNL TOTAL CA: CPT | Performed by: NEUROLOGICAL SURGERY

## 2017-09-15 PROCEDURE — 36415 COLL VENOUS BLD VENIPUNCTURE: CPT | Performed by: NEUROLOGICAL SURGERY

## 2017-09-15 PROCEDURE — 74011250636 HC RX REV CODE- 250/636: Performed by: NEUROLOGICAL SURGERY

## 2017-09-15 PROCEDURE — 74011250637 HC RX REV CODE- 250/637: Performed by: NEUROLOGICAL SURGERY

## 2017-09-15 PROCEDURE — 99218 HC RM OBSERVATION: CPT

## 2017-09-15 RX ORDER — AMOXICILLIN 250 MG
1 CAPSULE ORAL DAILY
Qty: 30 TAB | Refills: 0 | Status: SHIPPED | OUTPATIENT
Start: 2017-09-15 | End: 2017-09-28

## 2017-09-15 RX ORDER — POLYETHYLENE GLYCOL 3350 17 G/17G
17 POWDER, FOR SOLUTION ORAL
Qty: 15 PACKET | Refills: 0 | Status: SHIPPED | OUTPATIENT
Start: 2017-09-15 | End: 2017-09-28

## 2017-09-15 RX ORDER — SODIUM CHLORIDE 9 MG/ML
50 INJECTION, SOLUTION INTRAVENOUS CONTINUOUS
Status: DISCONTINUED | OUTPATIENT
Start: 2017-09-15 | End: 2017-09-15 | Stop reason: HOSPADM

## 2017-09-15 RX ORDER — HYDROCODONE BITARTRATE AND ACETAMINOPHEN 5; 325 MG/1; MG/1
1 TABLET ORAL
Qty: 60 TAB | Refills: 0 | Status: SHIPPED | OUTPATIENT
Start: 2017-09-15 | End: 2017-09-28

## 2017-09-15 RX ADMIN — SODIUM CHLORIDE 50 ML/HR: 900 INJECTION, SOLUTION INTRAVENOUS at 07:57

## 2017-09-15 RX ADMIN — POLYETHYLENE GLYCOL 3350 17 G: 17 POWDER, FOR SOLUTION ORAL at 10:11

## 2017-09-15 RX ADMIN — PANTOPRAZOLE SODIUM 40 MG: 40 TABLET, DELAYED RELEASE ORAL at 07:12

## 2017-09-15 RX ADMIN — FERROUS SULFATE TAB 325 MG (65 MG ELEMENTAL FE) 325 MG: 325 (65 FE) TAB at 10:11

## 2017-09-15 RX ADMIN — ACETAMINOPHEN 650 MG: 325 TABLET ORAL at 10:16

## 2017-09-15 RX ADMIN — DILTIAZEM HYDROCHLORIDE 300 MG: 300 CAPSULE, COATED, EXTENDED RELEASE ORAL at 10:12

## 2017-09-15 RX ADMIN — TAMSULOSIN HYDROCHLORIDE 0.4 MG: 0.4 CAPSULE ORAL at 10:12

## 2017-09-15 RX ADMIN — Medication 10 ML: at 03:24

## 2017-09-15 RX ADMIN — FAMOTIDINE 20 MG: 20 TABLET ORAL at 10:16

## 2017-09-15 RX ADMIN — DOCUSATE SODIUM 100 MG: 100 CAPSULE, LIQUID FILLED ORAL at 10:11

## 2017-09-15 RX ADMIN — INSULIN LISPRO 2 UNITS: 100 INJECTION, SOLUTION INTRAVENOUS; SUBCUTANEOUS at 10:03

## 2017-09-15 RX ADMIN — INSULIN LISPRO 3 UNITS: 100 INJECTION, SOLUTION INTRAVENOUS; SUBCUTANEOUS at 13:01

## 2017-09-15 RX ADMIN — COLESEVELAM HYDROCHLORIDE 625 MG: 625 TABLET, FILM COATED ORAL at 10:11

## 2017-09-15 RX ADMIN — CEFAZOLIN 2 G: 10 INJECTION, POWDER, FOR SOLUTION INTRAVENOUS; PARENTERAL at 05:44

## 2017-09-15 RX ADMIN — GLIPIZIDE 10 MG: 5 TABLET ORAL at 10:11

## 2017-09-15 RX ADMIN — SODIUM CHLORIDE AND POTASSIUM CHLORIDE: 4.5; 1.49 INJECTION, SOLUTION INTRAVENOUS at 04:31

## 2017-09-15 NOTE — ROUTINE PROCESS
Bedside and Verbal shift change report given to Jenn RN (oncoming nurse) by Daylin Emery RN (offgoing nurse). Report included the following information SBAR, Kardex, Intake/Output, MAR and Recent Results.

## 2017-09-15 NOTE — DISCHARGE SUMMARY
[address]   DISCHARGE SUMMARY       Name:  Catherine Ybarra   MR#:  [id number]   :  1935   Account #:  [de-identified]        Date of Adm:  2017       HISTORY: 61-year-old man with new disk herniation L3-4 and stenosis   from L2-3 through L3-4 with severe right leg pain. He was status post   lumbar laminectomy, who did well after his initial surgery but then   presented back with recurrent pain. Imaging studies revealed a new   problem with disk herniation at L3-4 and progressive stenosis up to the   L2-3 level. He was taken to the operating room, where he underwent   laminectomy at L2 and L3, removal of the disk herniation at L3-4,   decompression of the canal at those levels from L2 through L4. He had   an excellent postoperative recovery. On the day following surgery, his   leg pain was resolved. He was ambulating and voiding on his own. Incision was healing well. He had stable vital signs and was afebrile. He was discharged with instructions to follow up with me in my office. He also will follow up with Dylan Cortes. Heber Blair MD from a medical   standpoint.         MD BENJI Durbin / AS   D:  09/15/2017   08:16   T:  09/15/2017   14:47   Job #:  [id number]

## 2017-09-15 NOTE — PROGRESS NOTES
Bedside shift change report given to Elijah RN (oncoming nurse) by Jenn RN (offgoing nurse). Report included the following information SBAR, Kardex, OR Summary, Intake/Output, MAR and Recent Results.

## 2017-09-15 NOTE — OP NOTES
[address], [address]   OP NOTE       Name:  Francisco Piedra   MR#:  [id number]   :  1935   Account #:  [de-identified]    Surgery Date:  2017   Date of Adm:  2017       PREOPERATIVE DIAGNOS IS:  Recurrent lumbar stenosis L3-4, with   disk herniation L3-4. POSTOPERATIVE DIAGNOS IS:  Recurrent stenosis L3-L4 with disk   herniation L3-4, and stenosis L2-3. PROCEDURES PERFORMED:  Two level laminectomy, L2 and L3 on   the right, foraminotomy at L3-4 over the L4 root, and diskectomy L3-4,   and medial facetectomy, L3-4. ESTIMATED BLOOD LOSS:  Minimal.    SPECIMENS REMOVED:  None. ANESTHESIA:  General.    SURGEON: Yamileth Silva MD    COMPLICATIONS: None. DESCRIPTION OF PROCEDURE: After review of appropriate imaging   studies in this patient who had initially done well after lumbar   decompressive laminectomy, but returned with severe right leg pain   and back pain, with signs and symptoms of recurrent stenosis at L3-4   and a disk herniation at L3-4, he was taken to the operating room,   where he was induced under general endotracheal anesthesia, placed   on the operating table in the prone position on chest rolls. The lumbar   region was scrubbed, prepped and draped in the usual sterile fashion. A time-out was performed to identify the correct patient and procedure. Appropriate antibiotics preoperatively were administered prior to   making the incision. Sequential stockings were applied for DVT   prophylaxis. A needle was placed in the midline at the superior portion   of the old incision to localize the planned incision over the L3-4   interspace level. After instilling Marcaine with epinephrine, a small   incision was made. The muscle was taken down in a subperiosteal   fashion.  It was noted that there was a significant amount of scar tissue   at the operative site all the way down to the facet joints, and in order to   identify more normal-appearing tissue so as to avoid a potential spinal   fluid leak or nerve root injury, I went up to L2 to more normal-appearing   tissue, performed laminectomy there to expose the dura and work   inferiorly. There was a significant amount of hypertrophy at the L3-4   interspace level, corresponding to the imaging studies, and the   ligamentum flavum was removed down from L2 through L3, the dorsal   aspect and the lateral aspect of the canal was well decompressed and   using fluoroscopic guidance with x-rays, the L3-L4 interspace was   eventually identified. It was quite scarred in and adherent to the dura. The disk space was quite adherent, but I was able to separate it with a   Juan Frater, dissect it from the overlying dura and protect the neural   contents. A Love nerve root retractor was used to retract the dura and   the L5 root, and under loupe magnification, the L3-4 interspace, which   was noted to be herniated posteriorly, was incised with a #11 blade   scalpel and multiple fragments of disk material that had migrated out   of the disk space to work, but were still contained within some scar   tissue and annulus, were removed until the nerve root and thecal sac   were seen to occupy more normal anatomical shape and position. The   length of the dura appeared to be well decompressed. There was no   other evidence of stenosis. The area was irrigated copiously with   antibiotic irrigation. Some FloSeal placed over the laminectomy sites. The incision closed in layers. The fascia with 0 Vicryl, the subcuticular   layer with 3-0 interrupted inverted Vicryl sutures and a running 4-0   Monocryl. Steri-Strips were applied to the skin edges, and a sterile   dressing was applied. The needle, sponge and instrument counts were   correct at the end of the procedure.         MD Alphonso Bustillo / Cordell Champion   D:  09/14/2017   16:46   T:  09/14/2017   20:43   Job #:  [id number]

## 2017-09-15 NOTE — PROGRESS NOTES
looks and feels well post op  Leg pain resolved   voiding on own  Will mobilize, get OOB  And likely send home later this PM   f/u in office

## 2017-09-15 NOTE — DIABETES MGMT
[facility] Ortho Consult Note    Recommendations/ Comments:  Pt seen in outpt [facility] pre-op on 9/12/17. Please refer to outpt visit documentation for additional information. Chart reviewed and initial evaluation complete on [place]. Met with patient. Wife asleep in room. Stressed the importance of good glycemic control post op. Patient in agreement and will contact MD if he sees his numbers increasing. States prior to steroids, his A1C was always WNL. A1c:   Lab Results   Component Value Date/Time    Hemoglobin A1c 8.0 09/07/2017 09:05 AM     Recent Glucose Results:   Lab Results   Component Value Date/Time     (H) 09/15/2017 03:19 AM    GLUCPOC 229 (H) 09/15/2017 12:10 PM    GLUCPOC 180 (H) 09/15/2017 07:42 AM    GLUCPOC 204 (H) 09/14/2017 05:08 PM        Lab Results   Component Value Date/Time    Creatinine 1.61 09/15/2017 03:19 AM     Estimated Creatinine Clearance: 30.8 mL/min (based on Cr of 1.61). Active Orders   Diet    DIET DIABETIC CONSISTENT CARB Regular        PO intake: No data found. Will continue to follow as needed. Thank you.   Deejay Wright, [address], [address] [place] [address]  Office:  [phone]

## 2017-09-15 NOTE — PROGRESS NOTES
Problem: Falls - Risk of  Goal: *Absence of Falls  Document Mal Fall Risk and appropriate interventions in the flowsheet. Outcome: Resolved/Met Date Met:  09/15/17  Fall Risk Interventions:  Mobility Interventions: Communicate number of staff needed for ambulation/transfer, Patient to call before getting OOB           Medication Interventions: Patient to call before getting OOB, Teach patient to arise slowly     Elimination Interventions: Call light in reach, Patient to call for help with toileting needs, Urinal in reach     History of Falls Interventions: Door open when patient unattended, Room close to nurse's station           Problem: Pressure Injury - Risk of  Goal: *Prevention of pressure ulcer  Outcome: Resolved/Met Date Met:  09/15/17  No pressure areas noted.

## 2017-09-15 NOTE — PROGRESS NOTES
PCP Courtesy    Looks great after surgery. BS's OK and can work on diabetes more as outpt,. Would continue present Rx for now. Renal function baseline. K+ high and have removed KCl from IV's. BP good. Will be on vacation starting this afternoon. Group available if needed. Will see in 2 weeks in office.

## 2017-09-15 NOTE — PROGRESS NOTES
Discharge instructions reviewed with the patient and the wife. Both able to verbalize an understanding. The prescriptions given to the wife. The wife with all personal belongings.

## 2017-09-15 NOTE — PROGRESS NOTES
Primary Nurse Jenn Parikh, MARIA A and Andie Dyer., RN performed a dual skin assessment on this patient No impairment noted  Kosta score is 20

## 2017-09-15 NOTE — PROGRESS NOTES
Dressing to lower back saturated with blood. Dressing changed at 1030. Incision well approximated. Surrounding skin pink. Steri-strips came off. There is active serosanguinous drainage at the distal incision.

## 2017-09-15 NOTE — DISCHARGE INSTRUCTIONS
Lindsey Oliva M.D. What can I do?  The only exercise you should do is walking. Start by walking twice a day for 5 minutes, then increase by  2-3 minutes every day until you reach 25 minutes twice a day. DO NOT sit for long periods of time (20 minutes at a time for the first couple of weeks, then gradually increase.  No heavy lifting (5 lbs max); no straining, twisting, or bending.  No driving until your physician tells you it is ok. It is, however, ok to ride short distances in a car.  If you are required to wear a back brace, you may remove it when you are sleeping unless your doctor has advised against it.  If you are required to wear a cervical collar, you must sleep in it. You can remove it only for showers. What can I eat?  You may resume your regular home diet as tolerated. If your throat is sore, you may want to eat soft food for the first few days. When can I take a shower?  You may shower leaving on your occlusive (waterproof) dressing on allowing water to run over the dressing. The dressing may be removed in 5 days. The small pieces of tape (steri strips)  underneath it should stay on. Let water run over them, then pat dry gently.  Do not take baths or soak in pools.  You may remove your brace for showering. Medications:   Check with your physician before taking any anti-inflammatory medicines (Advil, Aleve, ibuprofen, aspirin).  Take prescription medicine as directed. DO NOT take more than prescribed. Call your physician if the prescribed dose is not sufficiently controlling your pain.  It is important to have regular bowel movements. Pain medications may cause constipation. Drink plenty of fluids, get enough fiber in your diet, and use stool softeners and drink prune juice to help prevent constipation. Do not take laxatives if at all possible except in severe situations.   It can result in a vicious cycle of constipation and diarrhea.  Do not put any ointments or creams on your incision unless directed to do so by your physician.  Tobacco products should be avoided during the postoperative phase. When do I see the physician again?  Please call your physicians office as soon as you get home to schedule your 1st post operative appointment. You should be seen approximately two weeks after your surgery. At this appointment you will see your doctors Assistant for a wound check and to answer any questions you may have.  You will see your physician approximately six weeks after your surgery.  If you had a fusion, you will need to get an order for xrays to be taken prior to the six week appointment. These should be done on the day of the appointment or 1-2 days before.  If you need the number to your doctors office, please request it from your nurse or see below. NOTIFY YOUR PHYSICIAN OF ANY OF THE FOLLOWING:     Fever above 101º for 24 hrs.  Nausea or vomiting.  Inability to urinate   Loss of bowel or bladder function (sudden onset of incontinence)   Changes in sensation (numbness, tingling, color change) in your extremities   Pain not relieved by your medicine.    Redness, swelling or drainage from your incision   Persistent pain in the calf of either leg   Development of severe pain      FOR APPOINTMENTS OR QUESTIONS CALL:    [facility] [address] [place] [address]  [phone]

## 2017-09-28 ENCOUNTER — OFFICE VISIT (OUTPATIENT)
Dept: INTERNAL MEDICINE CLINIC | Age: 82
End: 2017-09-28

## 2017-09-28 VITALS
RESPIRATION RATE: 18 BRPM | TEMPERATURE: 98.2 F | BODY MASS INDEX: 26.06 KG/M2 | OXYGEN SATURATION: 94 % | HEIGHT: 65 IN | DIASTOLIC BLOOD PRESSURE: 71 MMHG | HEART RATE: 79 BPM | WEIGHT: 156.4 LBS | SYSTOLIC BLOOD PRESSURE: 167 MMHG

## 2017-09-28 DIAGNOSIS — I67.9 CVD (CEREBROVASCULAR DISEASE): ICD-10-CM

## 2017-09-28 DIAGNOSIS — N18.30 CKD (CHRONIC KIDNEY DISEASE) STAGE 3, GFR 30-59 ML/MIN (HCC): ICD-10-CM

## 2017-09-28 DIAGNOSIS — E11.9 TYPE 2 DIABETES MELLITUS WITHOUT COMPLICATION, WITHOUT LONG-TERM CURRENT USE OF INSULIN (HCC): ICD-10-CM

## 2017-09-28 DIAGNOSIS — Z23 ENCOUNTER FOR IMMUNIZATION: Primary | ICD-10-CM

## 2017-09-28 DIAGNOSIS — I10 ESSENTIAL HYPERTENSION: ICD-10-CM

## 2017-09-28 DIAGNOSIS — E53.8 VITAMIN B 12 DEFICIENCY: ICD-10-CM

## 2017-09-28 PROBLEM — E55.9 VITAMIN D DEFICIENCY: Status: ACTIVE | Noted: 2017-09-28

## 2017-09-28 PROBLEM — Z00.00 PE (PHYSICAL EXAM), ANNUAL: Status: ACTIVE | Noted: 2017-09-28

## 2017-09-28 PROBLEM — D35.00 ADRENAL ADENOMA: Status: ACTIVE | Noted: 2017-09-28

## 2017-09-28 PROBLEM — J30.9 ALLERGIC RHINITIS: Status: ACTIVE | Noted: 2017-09-28

## 2017-09-28 PROBLEM — I25.10 ASCVD (ARTERIOSCLEROTIC CARDIOVASCULAR DISEASE): Status: ACTIVE | Noted: 2017-09-28

## 2017-09-28 PROBLEM — G31.84 MCI (MILD COGNITIVE IMPAIRMENT): Status: ACTIVE | Noted: 2017-09-28

## 2017-09-28 PROBLEM — K14.3 BLACK HAIRY TONGUE: Status: ACTIVE | Noted: 2017-09-28

## 2017-09-28 PROBLEM — K14.6 BURNING MOUTH SYNDROME: Status: ACTIVE | Noted: 2017-09-28

## 2017-09-28 PROBLEM — Z87.19 HISTORY OF GASTRITIS: Status: ACTIVE | Noted: 2017-09-28

## 2017-09-28 RX ORDER — HYDRALAZINE HYDROCHLORIDE 25 MG/1
25 TABLET, FILM COATED ORAL 3 TIMES DAILY
Qty: 60 TAB | Refills: 0 | Status: SHIPPED | OUTPATIENT
Start: 2017-09-28 | End: 2017-10-31 | Stop reason: SDDI

## 2017-09-28 RX ORDER — ASPIRIN 81 MG/1
81 TABLET ORAL DAILY
COMMUNITY

## 2017-09-28 RX ORDER — CYANOCOBALAMIN 1000 UG/ML
1000 INJECTION, SOLUTION INTRAMUSCULAR; SUBCUTANEOUS ONCE
Qty: 1 ML | Refills: 0 | Status: SHIPPED | COMMUNITY
Start: 2017-09-28 | End: 2017-09-28

## 2017-09-28 NOTE — PROGRESS NOTES
Subjective:   Yordy Silvestre is a 80 y.o. male      Chief Complaint   Patient presents with    Back Surgery     f/u    Injection B12        History of present illness: Mr. Petrona Cam returns after his recent hospitalization for back surgery. He has recovered very well from this and no longer has pain in his back and/or leg. Blood sugar was marginally elevated while he was in the hospital and he is following up with regard to this. His blood pressure is also elevated and remains elevated here today, so he needs attention to that as well. He denies any new cardiorespiratory symptoms, including chest pain, shortness of breath, increasing lower extremity edema. He denies symptoms of hyper or hypoglycemia. His energy level is returning. He is ambulating well. We are going to try some Hydralazine for his blood pressure as the choices are difficult with his renal function. We will also check labs relative to his blood sugar and make decisions from there. After he left the hospital we did increase his Glipizide, however. I am amazed how well he is doing given his age and the fact that this is his second back surgery, but he is truly without pain and feeling well.         Patient Active Problem List    Diagnosis Date Noted    Lumbar stenosis with neurogenic claudication 09/14/2017     Priority: 1 - One    Diabetes ([address] 09/24/2015     Priority: 1 - One    Hypertension 09/24/2015     Priority: 1 - One    Hypercholesterolemia 09/24/2015     Priority: 1 - One    ASCVD (arteriosclerotic cardiovascular disease) 09/28/2017     Priority: 2 - Two    CKD (chronic kidney disease) stage 3, GFR 30-59 ml/min 09/24/2015     Priority: 2 - Two    CAD (coronary artery disease) 09/24/2015     Priority: 2 - Two    B12 deficiency 09/28/2017     Priority: 3 - Three    History of gastritis 09/28/2017     Priority: 3 - Three    CVD (cerebrovascular disease) 09/28/2017     Priority: 3 - Three    Benign non-nodular prostatic hyperplasia with lower urinary tract symptoms 09/24/2015     Priority: 3 - Three    Adrenal adenoma 09/28/2017     Priority: 4 - Four    MCI (mild cognitive impairment) 09/28/2017     Priority: 4 - Four    Allergic rhinitis 09/28/2017     Priority: 5 - Five    Black hairy tongue 09/28/2017     Priority: 5 - Five    Burning mouth syndrome 09/28/2017     Priority: 5 - Five    Vitamin D deficiency 09/28/2017     Priority: 5 - Five    PE (physical exam), annual 09/28/2017      Past Surgical History:   Procedure Laterality Date    CARDIAC SURG PROCEDURE UNLIST      bypass(1985), HOYRSI(7929, 2004)    HX CHOLECYSTECTOMY      HX LUMBAR LAMINECTOMY  09/2017    HX ORTHOPAEDIC  09/23/2015    back surgery     [place] COLONOSCOPY FLX DX W/COLLJ SPEC WHEN PFRMD  8/12/2013         [place] EGD TRANSORAL BIOPSY SINGLE/MULTIPLE  6/28/2012         VASCULAR SURGERY PROCEDURE UNLIST  2/1996    left carotid      Allergies   Allergen Reactions    Tetanus Vaccines And Toxoid Swelling     Swelling at the site    Tradjenta [Linagliptin] Diarrhea     headache      Family History   Problem Relation Age of Onset    Heart Disease Mother     Heart Disease Father       Social History     Social History    Marital status:      Spouse name: N/A    Number of children: N/A    Years of education: N/A     Occupational History    Not on file. Social History Main Topics    Smoking status: Former Smoker     Quit date: 6/3/1967    Smokeless tobacco: Never Used    Alcohol use No    Drug use: No    Sexual activity: Not on file     Other Topics Concern    Not on file     Social History Narrative     Outpatient Prescriptions Marked as Taking for the 9/28/17 encounter (Office Visit) with Jeremy Swain MD   Medication Sig Dispense Refill    aspirin delayed-release 81 mg tablet Take 81 mg by mouth daily.  hydrALAZINE (APRESOLINE) 25 mg tablet Take 1 Tab by mouth three (3) times daily.  60 Tab 0    polyethylene glycol (MIRALAX) 17 gram/dose powder Take 17 g by mouth daily.  sAXagliptin (ONGLYZA) 2.5 mg tablet Take 2.5 mg by mouth daily.  gabapentin (NEURONTIN) 300 mg capsule TAKE 1 CAPSULE BY MOUTH TWICE A DAY 60 Cap 2    WELCHOL 625 mg tablet TAKE 3 TABLETS BY MOUTH TWICE A  Tab 11    simvastatin (ZOCOR) 20 mg tablet Take 20 mg by mouth nightly.  Cholecalciferol, Vitamin D3, (VITAMIN D3) 1,000 unit cap Take 1,000 Units by mouth daily.  cyanocobalamin (VITAMIN B-12) 1,000 mcg/mL injection 1,000 mcg by IntraMUSCular route every thirty (30) days.  ferrous sulfate 325 mg (65 mg iron) tablet Take 325 mg by mouth two (2) times a day.  tamsulosin (FLOMAX) 0.4 mg capsule Take 0.4 mg by mouth daily.  glipiZIDE (GLUCOTROL) 10 mg tablet Take 15 mg by mouth two (2) times a day.  diltiazem CD (DILT-CD) 300 mg ER capsule Take 300 mg by mouth daily. Review of Systems              Constitutional:  He denies fever, weight loss, sweats or fatigue. HEENT:  No blurred or double vision, headache or dizziness. No difficulty with swallowing, taste, speech or smell. Respiratory:  No cough, wheezing or shortness of breath. No sputum production. Cardiac:  Denies chest pain, palpitations, unexplained indigestion, syncope, edema, PND or orthopnea. GI:  No changes in bowel movements, no abdominal pain, no bloating, anorexia, nausea, vomiting or heartburn. :  No frequency or dysuria. Denies incontinence. Extremities:  No joint pain, stiffness or swelling. Skin:  No recent rashes or mole changes. Neurological:  No numbness, tingling, burning paresthesias or loss of motor strength. No syncope, dizziness, frequent headaches or memory loss.       Objective:     Vitals:    09/28/17 1507   BP: 167/71   Pulse: 79   Resp: 18   Temp: 98.2 °F (36.8 °C)   TempSrc: Oral   SpO2: 94%   Weight: 156 lb 6.4 oz (70.9 kg)   Height: 5' 5\" (1.651 m)   PainSc:   0 - No pain       Body mass index is 26.03 kg/(m^2). Physical Examination:              General Appearance:  Well-developed, well-nourished, no acute  distress. HEENT:      Ears:  The TMs and ear canals were clear. Eyes:  The pupillary responses were normal.  Extraocular muscle function intact. Lids and conjunctiva not injected. Neck:  Supple without thyromegaly or adenopathy. No JVD noted. Lungs:  Clear to auscultation and percussion. Cardiac:  Regular rate and rhythm without murmur. GI: nontender w/o mass. Normal BS's. Extremities:  No clubbing, cyanosis or edema. Skin:  No rash or unusual mole changes noted. Neurological:  Grossly normal.            Assessment/Plan:   Impressions:      ICD-10-CM ICD-9-CM    1. Encounter for immunization Z23 V03.89 INFLUENZA VIRUS VACCINE, HIGH DOSE SEASONAL, PRESERVATIVE FREE   2. Type 2 diabetes mellitus without complication, without long-term current use of insulin (HCC) E11.9 250.00 AMB POC COMPREHENSIVE METABOLIC PANEL      AMB POC HEMOGLOBIN A1C   3. Essential hypertension I10 401.9 AMB POC COMPREHENSIVE METABOLIC PANEL   4. CKD (chronic kidney disease) stage 3, GFR 30-59 ml/min N18.3 585.3 AMB POC COMPLETE CBC,AUTOMATED ENTER      AMB POC COMPREHENSIVE METABOLIC PANEL        Plan:  1. Continue present meds  2. Lifestyle modifications including Na restriction, low carb/fat diet, weight reduction and exercise (at least a walking program). Follow-up Disposition:  Return in about 1 month (around 10/28/2017). Orders Placed This Encounter    Influenza virus vaccine (FLUZONE HIGH-DOSE) 65 years and older    AMB POC COMPLETE CBC,AUTOMATED ENTER    AMB POC COMPREHENSIVE METABOLIC PANEL    AMB POC HEMOGLOBIN A1C    hydrALAZINE (APRESOLINE) 25 mg tablet     Sig: Take 1 Tab by mouth three (3) times daily.      Dispense:  60 Tab     Refill:  0       Velna Cabot, MD

## 2017-09-28 NOTE — PROGRESS NOTES
Chief Complaint   Patient presents with    Back Surgery     f/u     1. Have you been to the ER, urgent care clinic since your last visit? Hospitalized since your last visit? No    2. Have you seen or consulted any other health care providers outside of the [address] [place] [address] since your last visit? Include any pap smears or colon screening.  No

## 2017-10-01 RX ORDER — CALCIUM CITRATE/VITAMIN D3 200MG-6.25
TABLET ORAL
Qty: 100 STRIP | Refills: 0 | Status: SHIPPED | OUTPATIENT
Start: 2017-10-01 | End: 2018-01-14 | Stop reason: SDUPTHER

## 2017-10-02 LAB
ALBUMIN SERPL-MCNC: 3.9 G/DL (ref 3.9–5.4)
ALKALINE PHOS POC: 101 U/L (ref 38–126)
ALT SERPL-CCNC: 26 U/L (ref 9–52)
AST SERPL-CCNC: 17 U/L (ref 14–36)
BUN BLD-MCNC: 27 MG/DL (ref 9–20)
CALCIUM BLD-MCNC: 8.9 MG/DL (ref 8.4–10.2)
CHLORIDE BLD-SCNC: 108 MMOL/L (ref 98–107)
CO2 POC: 25 MMOL/L (ref 22–32)
CREAT BLD-MCNC: 1.5 MG/DL (ref 0.8–1.5)
EGFR (POC): 42.7
GLUCOSE POC: 189 MG/DL (ref 75–110)
GRAN# POC: 7.1 K/UL (ref 2–7.8)
GRAN% POC: 74.4 % (ref 37–92)
HBA1C MFR BLD HPLC: 8.2 % (ref 4.5–5.7)
HCT VFR BLD CALC: 33.2 % (ref 37–51)
HGB BLD-MCNC: 11.2 G/DL (ref 12–18)
LY# POC: 1.6 K/UL (ref 0.6–4.1)
LY% POC: 18.1 % (ref 10–58.5)
MCH RBC QN: 29.5 PG (ref 26–32)
MCHC RBC-ENTMCNC: 33.6 G/DL (ref 30–36)
MCV RBC: 88 FL (ref 80–97)
MID #, POC: 0.6 K/UL (ref 0–1.8)
MID% POC: 7.5 % (ref 0.1–24)
PLATELET # BLD: 165 K/UL (ref 140–440)
POTASSIUM SERPL-SCNC: 4.1 MMOL/L (ref 3.6–5)
PROT SERPL-MCNC: 6.4 G/DL (ref 6.3–8.2)
RBC # BLD: 3.78 M/UL (ref 4.2–6.3)
SODIUM SERPL-SCNC: 144 MMOL/L (ref 137–145)
TOTAL BILIRUBIN POC: 0.4 MG/DL (ref 0.2–1.3)
WBC # BLD: 9.3 K/UL (ref 4.1–10.9)

## 2017-10-03 NOTE — PROGRESS NOTES
I left a message for the patient to return my call and provided with contact information [phone].

## 2017-10-03 NOTE — PROGRESS NOTES
Hgb stable. Kidney's stable. A1C 8.2 a bit high but we just increased his glipizide. Watch diet. Follow up as scheduled.

## 2017-10-03 NOTE — PROGRESS NOTES
Patient verified name and , advise patient of lab results. Patient verbalize understanding and did not any questions or concerns.

## 2017-10-18 RX ORDER — LANCETS
EACH MISCELLANEOUS
Qty: 100 EACH | Refills: 3 | Status: SHIPPED | OUTPATIENT
Start: 2017-10-18 | End: 2022-06-30

## 2017-10-18 NOTE — TELEPHONE ENCOUNTER
Patient needs an order for lancets to use with his Fastclix lancing device. Patient last seen 9/28/17, with follow up scheduled 10/31/17.

## 2017-10-31 ENCOUNTER — OFFICE VISIT (OUTPATIENT)
Dept: INTERNAL MEDICINE CLINIC | Age: 82
End: 2017-10-31

## 2017-10-31 VITALS
TEMPERATURE: 97.9 F | HEART RATE: 73 BPM | RESPIRATION RATE: 16 BRPM | OXYGEN SATURATION: 96 % | SYSTOLIC BLOOD PRESSURE: 111 MMHG | WEIGHT: 155.6 LBS | HEIGHT: 65 IN | DIASTOLIC BLOOD PRESSURE: 65 MMHG | BODY MASS INDEX: 25.92 KG/M2

## 2017-10-31 DIAGNOSIS — N18.30 CKD (CHRONIC KIDNEY DISEASE) STAGE 3, GFR 30-59 ML/MIN (HCC): ICD-10-CM

## 2017-10-31 DIAGNOSIS — I10 ESSENTIAL HYPERTENSION: ICD-10-CM

## 2017-10-31 DIAGNOSIS — E11.9 TYPE 2 DIABETES MELLITUS WITHOUT COMPLICATION, WITHOUT LONG-TERM CURRENT USE OF INSULIN (HCC): Primary | ICD-10-CM

## 2017-10-31 LAB
BUN BLD-MCNC: 25 MG/DL (ref 9–20)
CALCIUM BLD-MCNC: 8.8 MG/DL (ref 8.4–10.2)
CHLORIDE BLD-SCNC: 107 MMOL/L (ref 98–107)
CO2 POC: 25 MMOL/L (ref 22–32)
CREAT BLD-MCNC: 1.7 MG/DL (ref 0.8–1.5)
EGFR (POC): 36.7
GLUCOSE POC: 220 MG/DL (ref 75–110)
GRAN# POC: 4.8 K/UL (ref 2–7.8)
GRAN% POC: 66.2 % (ref 37–92)
HCT VFR BLD CALC: 32 % (ref 37–51)
HGB BLD-MCNC: 10.8 G/DL (ref 12–18)
LY# POC: 1.8 K/UL (ref 0.6–4.1)
LY% POC: 26.2 % (ref 10–58.5)
MCH RBC QN: 29.7 PG (ref 26–32)
MCHC RBC-ENTMCNC: 33.8 G/DL (ref 30–36)
MCV RBC: 88 FL (ref 80–97)
MID #, POC: 0.5 K/UL (ref 0–1.8)
MID% POC: 7.6 % (ref 0.1–24)
PLATELET # BLD: 172 K/UL (ref 140–440)
POTASSIUM SERPL-SCNC: 5 MMOL/L (ref 3.6–5)
RBC # BLD: 3.63 M/UL (ref 4.2–6.3)
SODIUM SERPL-SCNC: 143 MMOL/L (ref 137–145)
WBC # BLD: 7.1 K/UL (ref 4.1–10.9)

## 2017-10-31 RX ORDER — HYDRALAZINE HYDROCHLORIDE 25 MG/1
25 TABLET, FILM COATED ORAL 3 TIMES DAILY
Qty: 60 TAB | Refills: 5 | Status: SHIPPED | OUTPATIENT
Start: 2017-10-31 | End: 2017-10-31

## 2017-10-31 RX ORDER — HYDRALAZINE HYDROCHLORIDE 25 MG/1
25 TABLET, FILM COATED ORAL 2 TIMES DAILY
Qty: 60 TAB | Refills: 5 | Status: SHIPPED | OUTPATIENT
Start: 2017-10-31 | End: 2018-05-08 | Stop reason: SDUPTHER

## 2017-10-31 NOTE — PROGRESS NOTES
Subjective:   Annalisa Lee is a 80 y.o. male      Chief Complaint   Patient presents with    Follow-up     1 Month F/up        History of present illness:  Mr. Nemesio Hankins returns in follow up. He is returning for a blood pressure check. His last visit here we prescribed Hydralazine when his blood pressure was in the 784E systolic. Unfortunately I meant to give it to him twice a day, but it got sent through as three times a day and he ran out of the medicine a few days ago. His blood pressure is improved, but not fully back in the normal range as yet. I'm going to restart the Hydralazine, but only as a b.i.d. dosing. He is due in December for fasting lipids, A1c, etc.  Last time we checked his hemoglobin it was borderline anemic subsequent to his surgery. He also needs follow up regarding his chronic kidney disease. He denies new cardiorespiratory, GI or  symptoms. Overall he is feeling well. He continues to recover well from his back surgery and is not having much in the way of pain other than some mild arthritic symptoms in his lower extremities at times. He's in good humor today. He denies chest pain, shortness of breath, lower extremity edema. He denies diarrhea, nausea or vomiting. He's had no particular  symptoms. I think he should remain on his regular medications and try to follow his diabetic diet and we'll see where he stands in December.         Patient Active Problem List    Diagnosis Date Noted    Lumbar stenosis with neurogenic claudication 09/14/2017     Priority: 1 - One    Diabetes ([address] 09/24/2015     Priority: 1 - One    Hypertension 09/24/2015     Priority: 1 - One    Hypercholesterolemia 09/24/2015     Priority: 1 - One    ASCVD (arteriosclerotic cardiovascular disease) 09/28/2017     Priority: 2 - Two    CKD (chronic kidney disease) stage 3, GFR 30-59 ml/min 09/24/2015     Priority: 2 - Two    CAD (coronary artery disease) 09/24/2015     Priority: 2 - Two    B12 deficiency 09/28/2017     Priority: 3 - Three    History of gastritis 09/28/2017     Priority: 3 - Three    CVD (cerebrovascular disease) 09/28/2017     Priority: 3 - Three    Benign non-nodular prostatic hyperplasia with lower urinary tract symptoms 09/24/2015     Priority: 3 - Three    Adrenal adenoma 09/28/2017     Priority: 4 - Four    MCI (mild cognitive impairment) 09/28/2017     Priority: 4 - Four    Allergic rhinitis 09/28/2017     Priority: 5 - Five    Black hairy tongue 09/28/2017     Priority: 5 - Five    Burning mouth syndrome 09/28/2017     Priority: 5 - Five    Vitamin D deficiency 09/28/2017     Priority: 5 - Five    PE (physical exam), annual 09/28/2017      Past Surgical History:   Procedure Laterality Date    CARDIAC SURG PROCEDURE UNLIST      bypass(1985), UDYRGB(6628, 2004)    HX CHOLECYSTECTOMY      HX LUMBAR LAMINECTOMY  09/2017    HX ORTHOPAEDIC  09/23/2015    back surgery     ND COLONOSCOPY FLX DX W/COLLJ SPEC WHEN PFRMD  8/12/2013         ND EGD TRANSORAL BIOPSY SINGLE/MULTIPLE  6/28/2012         VASCULAR SURGERY PROCEDURE UNLIST  2/1996    left carotid      Allergies   Allergen Reactions    Tetanus Vaccines And Toxoid Swelling     Swelling at the site    Tradjenta [Linagliptin] Diarrhea     headache      Family History   Problem Relation Age of Onset    Heart Disease Mother     Heart Disease Father       Social History     Social History    Marital status:      Spouse name: N/A    Number of children: N/A    Years of education: N/A     Occupational History    Not on file.      Social History Main Topics    Smoking status: Former Smoker     Quit date: 6/3/1967    Smokeless tobacco: Never Used    Alcohol use No    Drug use: No    Sexual activity: Not on file     Other Topics Concern    Not on file     Social History Narrative     Outpatient Prescriptions Marked as Taking for the 10/31/17 encounter (Office Visit) with Shama Najera MD   Medication Sig Dispense Refill    hydrALAZINE (APRESOLINE) 25 mg tablet Take 1 Tab by mouth two (2) times a day. 60 Tab 5    Lancets misc Use with Fastclix lancing device daily to test blood sugar. 100 Each 3    ACCU-CHEK FASTCLIX misc USE AS DIRECTED TO MONITOR BLOOD SUGAR 100 Each 3    TRUE METRIX GLUCOSE TEST STRIP strip USE 1 STRIP TO TEST BLOOD SUGAR EVERY  Strip 0    aspirin delayed-release 81 mg tablet Take 81 mg by mouth daily.  polyethylene glycol (MIRALAX) 17 gram/dose powder Take 17 g by mouth daily.  sAXagliptin (ONGLYZA) 2.5 mg tablet Take 2.5 mg by mouth daily.  gabapentin (NEURONTIN) 300 mg capsule TAKE 1 CAPSULE BY MOUTH TWICE A DAY 60 Cap 2    WELCHOL 625 mg tablet TAKE 3 TABLETS BY MOUTH TWICE A  Tab 11    simvastatin (ZOCOR) 20 mg tablet Take 20 mg by mouth nightly.  Cholecalciferol, Vitamin D3, (VITAMIN D3) 1,000 unit cap Take 1,000 Units by mouth daily.  cyanocobalamin (VITAMIN B-12) 1,000 mcg/mL injection 1,000 mcg by IntraMUSCular route every thirty (30) days.  ferrous sulfate 325 mg (65 mg iron) tablet Take 325 mg by mouth two (2) times a day.  tamsulosin (FLOMAX) 0.4 mg capsule Take 0.4 mg by mouth daily.  glipiZIDE (GLUCOTROL) 10 mg tablet Take 15 mg by mouth two (2) times a day.  diltiazem CD (DILT-CD) 300 mg ER capsule Take 300 mg by mouth daily. Review of Systems              Constitutional:  He denies fever, weight loss, sweats or fatigue. HEENT:  No blurred or double vision, headache or dizziness. No difficulty with swallowing, taste, speech or smell. Respiratory:  No cough, wheezing or shortness of breath. No sputum production. Cardiac:  Denies chest pain, palpitations, unexplained indigestion, syncope, edema, PND or orthopnea. GI:  No changes in bowel movements, no abdominal pain, no bloating, anorexia, nausea, vomiting or heartburn. :  No frequency or dysuria. Denies incontinence.   Extremities:  No joint pain, stiffness or swelling. Skin:  No recent rashes or mole changes. Neurological:  No numbness, tingling, burning paresthesias or loss of motor strength. No syncope, dizziness, frequent headaches or memory loss. Objective:     Vitals:    10/31/17 1529   BP: 111/65   Pulse: 73   Resp: 16   Temp: 97.9 °F (36.6 °C)   TempSrc: Oral   SpO2: 96%   Weight: 155 lb 9.6 oz (70.6 kg)   Height: 5' 5\" (1.651 m)   PainSc:   0 - No pain       Body mass index is 25.89 kg/(m^2). Physical Examination:              General Appearance:  Well-developed, well-nourished, no acute  distress. HEENT:      Ears:  The TMs and ear canals were clear. Eyes:  The pupillary responses were normal.  Extraocular muscle function intact. Lids and conjunctiva not injected. Neck:  Supple without thyromegaly or adenopathy. No JVD noted. Lungs:  Clear to auscultation and percussion. Cardiac:  Regular rate and rhythm without murmur. GI: nontender w/o mass. Normal BS's. Extremities:  No clubbing, cyanosis or edema. Skin:  No rash or unusual mole changes noted. Neurological:  Grossly normal.            Assessment/Plan:   Impressions:      ICD-10-CM ICD-9-CM    1. Type 2 diabetes mellitus without complication, without long-term current use of insulin (formerly [facility]) E11.9 250.00 AMB POC BASIC METABOLIC PANEL   2. CKD (chronic kidney disease) stage 3, GFR 30-59 ml/min N18.3 585.3 AMB POC COMPLETE CBC,AUTOMATED ENTER      AMB POC BASIC METABOLIC PANEL   3. Essential hypertension I10 401.9 AMB POC COMPLETE CBC,AUTOMATED ENTER      AMB POC BASIC METABOLIC PANEL        Plan:  1. Continue present meds  2. Lifestyle modifications including Na restriction, low carb/fat diet, weight reduction and exercise (at least a walking program). Follow-up Disposition:  Return in about 6 weeks (around 12/12/2017) for Fasting, lipids, Diabetes, BP check.       Orders Placed This Encounter    AMB POC COMPLETE CBC,AUTOMATED ENTER    AMB POC BASIC METABOLIC PANEL    DISCONTD: hydrALAZINE (APRESOLINE) 25 mg tablet     Sig: Take 1 Tab by mouth three (3) times daily. Dispense:  60 Tab     Refill:  5    hydrALAZINE (APRESOLINE) 25 mg tablet     Sig: Take 1 Tab by mouth two (2) times a day.      Dispense:  60 Tab     Refill:  5       Shanika Claire MD

## 2017-10-31 NOTE — PROGRESS NOTES
Shanita Keith is a 80 y.o. male      Chief Complaint   Patient presents with    Follow-up     1 Month F/up       1. Have you been to the ER, urgent care clinic since your last visit? Hospitalized since your last visit? No    2. Have you seen or consulted any other health care providers outside of the [address] [place] [address] since your last visit? Include any pap smears or colon screening.  No

## 2017-11-01 RX ORDER — CYANOCOBALAMIN 1000 UG/ML
INJECTION, SOLUTION INTRAMUSCULAR; SUBCUTANEOUS
Qty: 90 ML | Refills: 2 | Status: SHIPPED | OUTPATIENT
Start: 2017-11-01 | End: 2018-11-30 | Stop reason: SDUPTHER

## 2017-11-06 ENCOUNTER — CLINICAL SUPPORT (OUTPATIENT)
Dept: INTERNAL MEDICINE CLINIC | Age: 82
End: 2017-11-06

## 2017-11-06 DIAGNOSIS — E53.8 VITAMIN B 12 DEFICIENCY: Primary | ICD-10-CM

## 2017-11-06 RX ORDER — CYANOCOBALAMIN 1000 UG/ML
1000 INJECTION, SOLUTION INTRAMUSCULAR; SUBCUTANEOUS ONCE
Qty: 1 ML | Refills: 0 | Status: SHIPPED | COMMUNITY
Start: 2017-11-06 | End: 2017-11-06

## 2017-11-06 NOTE — PROGRESS NOTES
Mauro Ingram who present for routine B-12 shot. He denies any symptoms , reactions or allergies that would exclude them from being injected today. Risks and adverse reactions were discussed. All questions were addressed. He was observed for 15 min post injection. There were no reactions observed.     Frankey Kaska, LPN

## 2017-11-07 RX ORDER — GLIPIZIDE 10 MG/1
15 TABLET ORAL 2 TIMES DAILY
Qty: 270 TAB | Refills: 3 | Status: SHIPPED | OUTPATIENT
Start: 2017-11-07 | End: 2018-11-06 | Stop reason: SDUPTHER

## 2017-11-07 NOTE — TELEPHONE ENCOUNTER
Requested Prescriptions     Pending Prescriptions Disp Refills    glipiZIDE (GLUCOTROL) 10 mg tablet       Sig: Take 1.5 Tabs by mouth two (2) times a day.

## 2017-12-04 RX ORDER — SAXAGLIPTIN 2.5 MG/1
TABLET, FILM COATED ORAL
Qty: 30 TAB | Refills: 0 | Status: SHIPPED | OUTPATIENT
Start: 2017-12-04 | End: 2018-01-01 | Stop reason: SDUPTHER

## 2017-12-14 ENCOUNTER — OFFICE VISIT (OUTPATIENT)
Dept: INTERNAL MEDICINE CLINIC | Age: 82
End: 2017-12-14

## 2017-12-14 VITALS
HEIGHT: 65 IN | HEART RATE: 65 BPM | BODY MASS INDEX: 26.69 KG/M2 | TEMPERATURE: 97.8 F | WEIGHT: 160.2 LBS | OXYGEN SATURATION: 95 % | SYSTOLIC BLOOD PRESSURE: 147 MMHG | RESPIRATION RATE: 18 BRPM | DIASTOLIC BLOOD PRESSURE: 71 MMHG

## 2017-12-14 DIAGNOSIS — E78.00 HYPERCHOLESTEROLEMIA: ICD-10-CM

## 2017-12-14 DIAGNOSIS — I10 ESSENTIAL HYPERTENSION: ICD-10-CM

## 2017-12-14 DIAGNOSIS — N18.30 TYPE 2 DIABETES MELLITUS WITH STAGE 3 CHRONIC KIDNEY DISEASE, WITHOUT LONG-TERM CURRENT USE OF INSULIN (HCC): ICD-10-CM

## 2017-12-14 DIAGNOSIS — E11.22 TYPE 2 DIABETES MELLITUS WITH STAGE 3 CHRONIC KIDNEY DISEASE, WITHOUT LONG-TERM CURRENT USE OF INSULIN (HCC): ICD-10-CM

## 2017-12-14 DIAGNOSIS — D64.9 ANEMIA, UNSPECIFIED TYPE: ICD-10-CM

## 2017-12-14 DIAGNOSIS — B37.2 MONILIAL INTERTRIGO: ICD-10-CM

## 2017-12-14 DIAGNOSIS — E11.21 TYPE 2 DIABETES MELLITUS WITH NEPHROPATHY (HCC): Primary | ICD-10-CM

## 2017-12-14 LAB
ALBUMIN SERPL-MCNC: 4.3 G/DL (ref 3.9–5.4)
ALKALINE PHOS POC: 107 U/L (ref 38–126)
ALT SERPL-CCNC: 26 U/L (ref 9–52)
AST SERPL-CCNC: 21 U/L (ref 14–36)
BUN BLD-MCNC: 33 MG/DL (ref 9–20)
CALCIUM BLD-MCNC: 9.5 MG/DL (ref 8.4–10.2)
CHLORIDE BLD-SCNC: 108 MMOL/L (ref 98–107)
CHOLEST SERPL-MCNC: 114 MG/DL (ref 0–200)
CO2 POC: 26 MMOL/L (ref 22–32)
CREAT BLD-MCNC: 1.9 MG/DL (ref 0.8–1.5)
EGFR (POC): 32.1
GLUCOSE POC: 120 MG/DL (ref 75–110)
GRAN# POC: 5.4 K/UL (ref 2–7.8)
GRAN% POC: 65.4 % (ref 37–92)
HBA1C MFR BLD HPLC: 7.4 % (ref 4.5–5.7)
HCT VFR BLD CALC: 33.9 % (ref 37–51)
HDLC SERPL-MCNC: 56 MG/DL (ref 35–130)
HGB BLD-MCNC: 11.5 G/DL (ref 12–18)
LDL CHOLESTEROL POC: 44.2 MG/DL (ref 0–130)
LY# POC: 2.2 K/UL (ref 0.6–4.1)
LY% POC: 27 % (ref 10–58.5)
MCH RBC QN: 29.7 PG (ref 26–32)
MCHC RBC-ENTMCNC: 33.9 G/DL (ref 30–36)
MCV RBC: 88 FL (ref 80–97)
MID #, POC: 0.6 K/UL (ref 0–1.8)
MID% POC: 7.6 % (ref 0.1–24)
PLATELET # BLD: 163 K/UL (ref 140–440)
POTASSIUM SERPL-SCNC: 5.5 MMOL/L (ref 3.6–5)
PROT SERPL-MCNC: 7 G/DL (ref 6.3–8.2)
RBC # BLD: 3.87 M/UL (ref 4.2–6.3)
SODIUM SERPL-SCNC: 144 MMOL/L (ref 137–145)
TCHOL/HDL RATIO (POC): 2 (ref 0–4)
TOTAL BILIRUBIN POC: 0.7 MG/DL (ref 0.2–1.3)
TRIGL SERPL-MCNC: 69 MG/DL (ref 0–200)
VLDLC SERPL CALC-MCNC: 13.8 MG/DL
WBC # BLD: 8.2 K/UL (ref 4.1–10.9)

## 2017-12-14 RX ORDER — CLOTRIMAZOLE AND BETAMETHASONE DIPROPIONATE 10; .64 MG/G; MG/G
CREAM TOPICAL
Qty: 15 G | Refills: 0 | Status: SHIPPED | OUTPATIENT
Start: 2017-12-14 | End: 2018-06-12

## 2017-12-14 RX ORDER — NITROGLYCERIN 0.4 MG/1
TABLET SUBLINGUAL
COMMUNITY
End: 2018-06-12 | Stop reason: SDUPTHER

## 2017-12-14 NOTE — PROGRESS NOTES
Identified pt with two pt identifiers(name and ). Reviewed record in preparation for visit and have obtained necessary documentation. Chief Complaint   Patient presents with    Hypertension     6 weeks fasting        Health Maintenance Due   Topic    FOOT EXAM Q1     MICROALBUMIN Q1     EYE EXAM RETINAL OR DILATED Q1     DTaP/Tdap/Td series (1 - Tdap)    ZOSTER VACCINE AGE 60>     GLAUCOMA SCREENING Q2Y     Pneumococcal 65+ Low/Medium Risk (1 of 2 - PCV13)    MEDICARE YEARLY EXAM     LIPID PANEL Q1        Coordination of Care Questionnaire:  :   1) Have you been to an emergency room, urgent care clinic since your last visit? no   Hospitalized since your last visit? no             2. Have seen or consulted any other health care provider since your last visit? NO  If yes, where when, and reason for visit? 3) Do you have an Advanced Directive/ Living Will in place? NO  If yes, do we have a copy on file NO  If no, would you like information NO    Patient is accompanied by self I have received verbal consent from Kylee Callahan to discuss any/all medical information while they are present in the room.

## 2018-01-01 RX ORDER — GABAPENTIN 300 MG/1
CAPSULE ORAL
Qty: 60 CAP | Refills: 0 | Status: SHIPPED | OUTPATIENT
Start: 2018-01-01 | End: 2018-02-10 | Stop reason: SDUPTHER

## 2018-01-01 RX ORDER — SAXAGLIPTIN 2.5 MG/1
TABLET, FILM COATED ORAL
Qty: 30 TAB | Refills: 0 | Status: SHIPPED | OUTPATIENT
Start: 2018-01-01 | End: 2018-02-10 | Stop reason: SDUPTHER

## 2018-01-09 ENCOUNTER — CLINICAL SUPPORT (OUTPATIENT)
Dept: INTERNAL MEDICINE CLINIC | Age: 83
End: 2018-01-09

## 2018-01-09 DIAGNOSIS — E53.8 VITAMIN B 12 DEFICIENCY: Primary | ICD-10-CM

## 2018-01-09 RX ORDER — CYANOCOBALAMIN 1000 UG/ML
1000 INJECTION, SOLUTION INTRAMUSCULAR; SUBCUTANEOUS ONCE
Qty: 1 ML | Refills: 0 | Status: SHIPPED | COMMUNITY
Start: 2018-01-09 | End: 2018-01-09

## 2018-01-15 RX ORDER — CALCIUM CITRATE/VITAMIN D3 200MG-6.25
TABLET ORAL
Qty: 100 STRIP | Refills: 0 | Status: SHIPPED | OUTPATIENT
Start: 2018-01-15 | End: 2018-05-14 | Stop reason: SDUPTHER

## 2018-01-30 ENCOUNTER — LAB ONLY (OUTPATIENT)
Dept: INTERNAL MEDICINE CLINIC | Age: 83
End: 2018-01-30

## 2018-01-30 DIAGNOSIS — R35.0 FREQUENCY OF URINATION: Primary | ICD-10-CM

## 2018-01-30 LAB
BACTERIA UA POCT, BACTPOCT: NORMAL
BILIRUB UR QL STRIP: NEGATIVE
CASTS UA POCT: NORMAL
CLUE CELLS, CLUEPOCT: NEGATIVE
CRYSTALS UA POCT, CRYSPOCT: NEGATIVE
EPITHELIAL CELLS POCT: NORMAL
GLUCOSE UR-MCNC: NEGATIVE MG/DL
KETONES P FAST UR STRIP-MCNC: NEGATIVE MG/DL
MUCUS UA POCT, MUCPOCT: NORMAL
PH UR STRIP: 6 [PH] (ref 5–7)
PROT UR QL STRIP: NORMAL
RBC UA POCT, RBCPOCT: NORMAL
SP GR UR STRIP: 1.01 (ref 1.01–1.02)
TRICH UA POCT, TRICHPOC: NEGATIVE
UA UROBILINOGEN AMB POC: NORMAL (ref 0.2–1)
URINALYSIS CLARITY POC: CLEAR
URINALYSIS COLOR POC: YELLOW
URINE BLOOD POC: NORMAL
URINE CULT COMMENT, POCT: NORMAL
URINE LEUKOCYTES POC: NEGATIVE
URINE NITRITES POC: NEGATIVE
WBC UA POCT, WBCPOCT: 0
YEAST UA POCT, YEASTPOC: NEGATIVE

## 2018-02-01 LAB — BACTERIA UR CULT: NO GROWTH

## 2018-02-10 RX ORDER — TAMSULOSIN HYDROCHLORIDE 0.4 MG/1
CAPSULE ORAL
Qty: 90 CAP | Refills: 0 | Status: SHIPPED | OUTPATIENT
Start: 2018-02-10 | End: 2018-05-08 | Stop reason: SDUPTHER

## 2018-02-10 RX ORDER — GABAPENTIN 300 MG/1
CAPSULE ORAL
Qty: 60 CAP | Refills: 0 | Status: SHIPPED | OUTPATIENT
Start: 2018-02-10 | End: 2018-03-12 | Stop reason: SDUPTHER

## 2018-02-10 RX ORDER — SAXAGLIPTIN 2.5 MG/1
TABLET, FILM COATED ORAL
Qty: 30 TAB | Refills: 0 | Status: SHIPPED | OUTPATIENT
Start: 2018-02-10 | End: 2018-03-12 | Stop reason: SDUPTHER

## 2018-03-12 RX ORDER — SAXAGLIPTIN 2.5 MG/1
TABLET, FILM COATED ORAL
Qty: 30 TAB | Refills: 0 | Status: SHIPPED | OUTPATIENT
Start: 2018-03-12 | End: 2018-04-10 | Stop reason: SDUPTHER

## 2018-03-12 RX ORDER — GABAPENTIN 300 MG/1
CAPSULE ORAL
Qty: 60 CAP | Refills: 0 | Status: SHIPPED | OUTPATIENT
Start: 2018-03-12 | End: 2018-04-10 | Stop reason: SDUPTHER

## 2018-03-14 ENCOUNTER — OFFICE VISIT (OUTPATIENT)
Dept: INTERNAL MEDICINE CLINIC | Age: 83
End: 2018-03-14

## 2018-03-14 VITALS
DIASTOLIC BLOOD PRESSURE: 61 MMHG | BODY MASS INDEX: 26.66 KG/M2 | SYSTOLIC BLOOD PRESSURE: 137 MMHG | HEART RATE: 79 BPM | OXYGEN SATURATION: 93 % | WEIGHT: 160 LBS | TEMPERATURE: 97.6 F | RESPIRATION RATE: 18 BRPM | HEIGHT: 65 IN

## 2018-03-14 DIAGNOSIS — N18.30 TYPE 2 DIABETES MELLITUS WITH STAGE 3 CHRONIC KIDNEY DISEASE, WITHOUT LONG-TERM CURRENT USE OF INSULIN (HCC): ICD-10-CM

## 2018-03-14 DIAGNOSIS — E11.21 TYPE 2 DIABETES MELLITUS WITH NEPHROPATHY (HCC): Primary | ICD-10-CM

## 2018-03-14 DIAGNOSIS — E11.40 TYPE 2 DIABETES MELLITUS WITH DIABETIC NEUROPATHY, WITHOUT LONG-TERM CURRENT USE OF INSULIN (HCC): ICD-10-CM

## 2018-03-14 DIAGNOSIS — E78.00 HYPERCHOLESTEROLEMIA: ICD-10-CM

## 2018-03-14 DIAGNOSIS — I10 ESSENTIAL HYPERTENSION: ICD-10-CM

## 2018-03-14 DIAGNOSIS — E11.22 TYPE 2 DIABETES MELLITUS WITH STAGE 3 CHRONIC KIDNEY DISEASE, WITHOUT LONG-TERM CURRENT USE OF INSULIN (HCC): ICD-10-CM

## 2018-03-14 DIAGNOSIS — D64.9 ANEMIA, UNSPECIFIED TYPE: ICD-10-CM

## 2018-03-14 DIAGNOSIS — N18.30 CKD (CHRONIC KIDNEY DISEASE) STAGE 3, GFR 30-59 ML/MIN (HCC): ICD-10-CM

## 2018-03-14 DIAGNOSIS — E53.8 VITAMIN B 12 DEFICIENCY: ICD-10-CM

## 2018-03-14 LAB
BUN BLD-MCNC: 30 MG/DL (ref 9–20)
CALCIUM BLD-MCNC: 9.2 MG/DL (ref 8.4–10.2)
CHLORIDE BLD-SCNC: 108 MMOL/L (ref 98–107)
CO2 POC: 25 MMOL/L (ref 22–32)
CREAT BLD-MCNC: 1.8 MG/DL (ref 0.8–1.5)
EGFR (POC): 34.3
GLUCOSE POC: 224 MG/DL (ref 75–110)
GRAN# POC: 5.1 K/UL (ref 2–7.8)
GRAN% POC: 69.7 % (ref 37–92)
HBA1C MFR BLD HPLC: 7.4 % (ref 4.5–5.7)
HCT VFR BLD CALC: 38.7 % (ref 37–51)
HGB BLD-MCNC: 12.4 G/DL (ref 12–18)
LY# POC: 1.7 K/UL (ref 0.6–4.1)
LY% POC: 23.7 % (ref 10–58.5)
MCH RBC QN: 27.9 PG (ref 26–32)
MCHC RBC-ENTMCNC: 32 G/DL (ref 30–36)
MCV RBC: 87 FL (ref 80–97)
MID #, POC: 0.4 K/UL (ref 0–1.8)
MID% POC: 6.6 % (ref 0.1–24)
PLATELET # BLD: 165 K/UL (ref 140–440)
POTASSIUM SERPL-SCNC: 5.3 MMOL/L (ref 3.6–5)
RBC # BLD: 4.43 M/UL (ref 4.2–6.3)
SODIUM SERPL-SCNC: 138 MMOL/L (ref 137–145)
WBC # BLD: 7.2 K/UL (ref 4.1–10.9)

## 2018-03-14 RX ORDER — CYANOCOBALAMIN 1000 UG/ML
1000 INJECTION, SOLUTION INTRAMUSCULAR; SUBCUTANEOUS ONCE
Qty: 1 ML | Refills: 0 | Status: SHIPPED | COMMUNITY
Start: 2018-03-14 | End: 2018-03-14

## 2018-03-14 NOTE — PROGRESS NOTES
Mulu Hogan is a 80 y.o. male    Chief Complaint   Patient presents with    Hypertension     3 mo f/u    Diabetes     3 mo f/u    Cholesterol Problem     3 mo f/u     1. Have you been to the ER, urgent care clinic since your last visit? Hospitalized since your last visit? No    2. Have you seen or consulted any other health care providers outside of the [address] [place] [address] since your last visit? Include any pap smears or colon screening.  No

## 2018-03-14 NOTE — PROGRESS NOTES
Subjective:   Toney Dial is a 80 y.o. male      Chief Complaint   Patient presents with    Hypertension     3 mo f/u    Diabetes     3 mo f/u    Cholesterol Problem     3 mo f/u        History of present illness:  Mr. Amado Tolliver returns in follow up. He feels he's been doing well relative to his blood sugars. His 14 day average and 30 day average range between 114 and 135. He denies any new CR symptoms. He still has issues with his legs after walking any distance that they tend to tire out, however if he uses a walker he can walk probably four times the distance as usual.  I'm sure this relates to the position that he gets into. He's not having the back pain that he's had before and is much more mobile overall. He is satisfied with the results from the surgery. He denies other new issues today. He's been prudent with regard to his diet. His BMI is only minimally elevated. He'll continue to watch his diet and take his medicines appropriately. We'll see him in three months time for his comprehensive examination.         Patient Active Problem List    Diagnosis Date Noted    Type 2 diabetes mellitus with nephropathy ([facility] [address] 12/14/2017     Priority: 1 - One    Lumbar stenosis with neurogenic claudication 09/14/2017     Priority: 1 - One    Diabetes ([facility] [address] 09/24/2015     Priority: 1 - One    Hypertension 09/24/2015     Priority: 1 - One    Hypercholesterolemia 09/24/2015     Priority: 1 - One    ASCVD (arteriosclerotic cardiovascular disease) 09/28/2017     Priority: 2 - Two    CKD (chronic kidney disease) stage 3, GFR 30-59 ml/min 09/24/2015     Priority: 2 - Two    CAD (coronary artery disease) 09/24/2015     Priority: 2 - Two    B12 deficiency 09/28/2017     Priority: 3 - Three    History of gastritis 09/28/2017     Priority: 3 - Three    CVD (cerebrovascular disease) 09/28/2017     Priority: 3 - Three    Benign non-nodular prostatic hyperplasia with lower urinary tract symptoms 09/24/2015 Priority: 3 - Three    Adrenal adenoma 09/28/2017     Priority: 4 - Four    MCI (mild cognitive impairment) 09/28/2017     Priority: 4 - Four    Allergic rhinitis 09/28/2017     Priority: 5 - Five    Black hairy tongue 09/28/2017     Priority: 5 - Five    Burning mouth syndrome 09/28/2017     Priority: 5 - Five    Vitamin D deficiency 09/28/2017     Priority: 5 - Five    Type 2 diabetes mellitus with diabetic neuropathy ([facility] [address] 03/14/2018    Anemia 12/14/2017    PE (physical exam), annual 09/28/2017      Past Surgical History:   Procedure Laterality Date    CARDIAC SURG PROCEDURE UNLIST      bypass(1985), [id number], 2004)    HX CHOLECYSTECTOMY      HX LUMBAR LAMINECTOMY  09/2017    HX ORTHOPAEDIC  09/23/2015    back surgery     TN COLONOSCOPY FLX DX W/COLLJ SPEC WHEN PFRMD  8/12/2013         TN EGD TRANSORAL BIOPSY SINGLE/MULTIPLE  6/28/2012         VASCULAR SURGERY PROCEDURE UNLIST  2/1996    left carotid      Allergies   Allergen Reactions    Tetanus Vaccines And Toxoid Swelling     Swelling at the site    Tradjenta [Linagliptin] Diarrhea     headache      Family History   Problem Relation Age of Onset    Heart Disease Mother     Heart Disease Father       Social History     Social History    Marital status:      Spouse name: N/A    Number of children: N/A    Years of education: N/A     Occupational History    Not on file.      Social History Main Topics    Smoking status: Former Smoker     Quit date: 6/3/1967    Smokeless tobacco: Never Used    Alcohol use No    Drug use: No    Sexual activity: Not on file     Other Topics Concern    Not on file     Social History Narrative     Outpatient Prescriptions Marked as Taking for the 3/14/18 encounter (Office Visit) with Lenoard Harada, MD   Medication Sig Dispense Refill    gabapentin (NEURONTIN) 300 mg capsule TAKE 1 CAPSULE BY MOUTH TWICE DAILY 60 Cap 0    ONGLYZA 2.5 mg tablet TAKE 1 TABLET BY MOUTH EVERY DAY 30 Tab 0    tamsulosin (FLOMAX) 0.4 mg capsule TAKE 1 CAPSULE BY MOUTH EVERY DAY 90 Cap 0    TRUE METRIX GLUCOSE TEST STRIP strip USE 1 STRIP TO TEST BLOOD SUGAR EVERY  Strip 0    clotrimazole-betamethasone (LOTRISONE) topical cream Apply  to affected area two (2) times daily as needed for Skin Irritation. 15 g 0    glipiZIDE (GLUCOTROL) 10 mg tablet Take 1.5 Tabs by mouth two (2) times a day. 270 Tab 3    cyanocobalamin (VITAMIN B12) 1,000 mcg/mL injection INJECT 1 ML SUBCUTANEOUS EVERY MONTH 90 mL 2    hydrALAZINE (APRESOLINE) 25 mg tablet Take 1 Tab by mouth two (2) times a day. 60 Tab 5    Lancets misc Use with Fastclix lancing device daily to test blood sugar. 100 Each 3    ACCU-CHEK FASTCLIX misc USE AS DIRECTED TO MONITOR BLOOD SUGAR 100 Each 3    aspirin delayed-release 81 mg tablet Take 81 mg by mouth daily.  polyethylene glycol (MIRALAX) 17 gram/dose powder Take 17 g by mouth daily.  WELCHOL 625 mg tablet TAKE 3 TABLETS BY MOUTH TWICE A  Tab 11    simvastatin (ZOCOR) 20 mg tablet Take 20 mg by mouth nightly.  Cholecalciferol, Vitamin D3, (VITAMIN D3) 1,000 unit cap Take 1,000 Units by mouth daily.  ferrous sulfate 325 mg (65 mg iron) tablet Take 325 mg by mouth two (2) times a day.  diltiazem CD (DILT-CD) 300 mg ER capsule Take 300 mg by mouth daily. Review of Systems              Constitutional:  He denies fever, weight loss, sweats or fatigue. HEENT:  No blurred or double vision, headache or dizziness. No difficulty with swallowing, taste, speech or smell. Respiratory:  No cough, wheezing or shortness of breath. No sputum production. Cardiac:  Denies chest pain, palpitations, unexplained indigestion, syncope, edema, PND or orthopnea. GI:  No changes in bowel movements, no abdominal pain, no bloating, anorexia, nausea, vomiting or heartburn. :  No frequency or dysuria. Denies incontinence. Extremities:  No joint pain, stiffness or swelling.   Skin: No recent rashes or mole changes. Neurological:  No numbness, tingling, burning paresthesias or loss of motor strength. No syncope, dizziness, frequent headaches or memory loss. Objective:     Vitals:    03/14/18 1314   BP: 137/61   Pulse: 79   Resp: 18   Temp: 97.6 °F (36.4 °C)   TempSrc: Oral   SpO2: 93%   Weight: 160 lb (72.6 kg)   Height: 5' 5\" (1.651 m)   PainSc:   0 - No pain       Body mass index is 26.63 kg/(m^2). Physical Examination:              General Appearance:  Well-developed, well-nourished, no acute  distress. HEENT:     Ears:  The TMs and ear canals were clear. Eyes:  The pupillary responses were normal.  Extraocular muscle function intact. Lids and conjunctiva not injected. Neck:  Supple without thyromegaly or adenopathy. No JVD noted. Lungs:  Clear to auscultation and percussion. Cardiac:  Regular rate and rhythm without murmur. GI: nontender w/o mass. Normal BS's. Extremities:  No clubbing, cyanosis or edema. Skin:  No rash or unusual mole changes noted. Neurological:  Grossly normal.            Assessment/Plan:   Impressions:      ICD-10-CM ICD-9-CM    1. Type 2 diabetes mellitus with nephropathy ([facility]) E11.21 250.40 AMB POC HEMOGLOBIN A1C     583.81    2. Type 2 diabetes mellitus with stage 3 chronic kidney disease, without long-term current use of insulin ([facility]) E11.22 250.40 AMB POC HEMOGLOBIN A1C    N18.3 585.3    3. Type 2 diabetes mellitus with diabetic neuropathy, without long-term current use of insulin ([facility]) E11.40 250.60 AMB POC HEMOGLOBIN A1C     357.2    4. Essential hypertension I10 401.9 AMB POC BASIC METABOLIC PANEL   5. Hypercholesterolemia E78.00 272.0 AMB POC BASIC METABOLIC PANEL      AMB POC HEMOGLOBIN A1C   6. CKD (chronic kidney disease) stage 3, GFR 30-59 ml/min N18.3 585.3 AMB POC COMPLETE CBC,AUTOMATED ENTER      AMB POC BASIC METABOLIC PANEL   7.  Anemia, unspecified type D64.9 285.9 AMB POC COMPLETE CBC,AUTOMATED ENTER Plan:  1. Continue present meds  2. Discussed lifestyle modifications including Na restriction, low carb/fat diet, weight reduction and exercise (at least a walking program). Follow-up Disposition:  Return in about 3 months (around 6/14/2018) for CPE.       Orders Placed This Encounter    AMB POC COMPLETE CBC,AUTOMATED ENTER    AMB POC BASIC METABOLIC PANEL    AMB POC HEMOGLOBIN A1C       Arjun St MD

## 2018-03-14 NOTE — PROGRESS NOTES
After obtaining consent, and per orders of Dr. Truman Ray, injection of R-03 given by Tomer Sterling LPN. Patient instructed to remain in clinic for 20 minutes afterwards, and to report any adverse reaction to me immediately.  Right arm

## 2018-03-19 NOTE — PROGRESS NOTES
Patient informed that Hgb better/normal 12.4. A1C stable 7.4. Kidney's stable as well. No change in Rx.

## 2018-04-10 RX ORDER — SAXAGLIPTIN 2.5 MG/1
TABLET, FILM COATED ORAL
Qty: 30 TAB | Refills: 0 | Status: SHIPPED | OUTPATIENT
Start: 2018-04-10 | End: 2018-05-08 | Stop reason: SDUPTHER

## 2018-04-10 RX ORDER — GABAPENTIN 300 MG/1
CAPSULE ORAL
Qty: 60 CAP | Refills: 0 | Status: SHIPPED | OUTPATIENT
Start: 2018-04-10 | End: 2018-05-08 | Stop reason: SDUPTHER

## 2018-04-24 ENCOUNTER — CLINICAL SUPPORT (OUTPATIENT)
Dept: INTERNAL MEDICINE CLINIC | Age: 83
End: 2018-04-24

## 2018-04-24 DIAGNOSIS — D64.9 ANEMIA, UNSPECIFIED TYPE: Primary | ICD-10-CM

## 2018-04-24 RX ORDER — CYANOCOBALAMIN 1000 UG/ML
1000 INJECTION, SOLUTION INTRAMUSCULAR; SUBCUTANEOUS ONCE
Qty: 1 ML | Refills: 0
Start: 2018-04-24 | End: 2018-04-24

## 2018-05-08 RX ORDER — TAMSULOSIN HYDROCHLORIDE 0.4 MG/1
CAPSULE ORAL
Qty: 90 CAP | Refills: 0 | Status: SHIPPED | OUTPATIENT
Start: 2018-05-08 | End: 2018-08-08 | Stop reason: SDUPTHER

## 2018-05-08 RX ORDER — HYDRALAZINE HYDROCHLORIDE 25 MG/1
TABLET, FILM COATED ORAL
Qty: 60 TAB | Refills: 0 | Status: SHIPPED | OUTPATIENT
Start: 2018-05-08 | End: 2018-06-09 | Stop reason: SDUPTHER

## 2018-05-08 RX ORDER — SAXAGLIPTIN 2.5 MG/1
TABLET, FILM COATED ORAL
Qty: 30 TAB | Refills: 0 | Status: SHIPPED | OUTPATIENT
Start: 2018-05-08 | End: 2018-07-01 | Stop reason: SDUPTHER

## 2018-05-08 RX ORDER — GABAPENTIN 300 MG/1
CAPSULE ORAL
Qty: 60 CAP | Refills: 0 | Status: SHIPPED | OUTPATIENT
Start: 2018-05-08 | End: 2018-06-09 | Stop reason: SDUPTHER

## 2018-05-14 RX ORDER — CALCIUM CITRATE/VITAMIN D3 200MG-6.25
TABLET ORAL
Qty: 100 STRIP | Refills: 0 | Status: SHIPPED | OUTPATIENT
Start: 2018-05-14 | End: 2018-08-16 | Stop reason: SDUPTHER

## 2018-06-05 ENCOUNTER — CLINICAL SUPPORT (OUTPATIENT)
Dept: INTERNAL MEDICINE CLINIC | Age: 83
End: 2018-06-05

## 2018-06-05 ENCOUNTER — LAB ONLY (OUTPATIENT)
Dept: INTERNAL MEDICINE CLINIC | Age: 83
End: 2018-06-05

## 2018-06-05 DIAGNOSIS — Z79.899 LONG TERM USE OF DRUG: ICD-10-CM

## 2018-06-05 DIAGNOSIS — D64.9 ANEMIA, UNSPECIFIED TYPE: Primary | ICD-10-CM

## 2018-06-05 DIAGNOSIS — Z00.00 PE (PHYSICAL EXAM), ANNUAL: Primary | ICD-10-CM

## 2018-06-05 DIAGNOSIS — E78.00 HYPERCHOLESTEROLEMIA: ICD-10-CM

## 2018-06-05 DIAGNOSIS — E11.40 TYPE 2 DIABETES MELLITUS WITH DIABETIC NEUROPATHY, WITHOUT LONG-TERM CURRENT USE OF INSULIN (HCC): ICD-10-CM

## 2018-06-05 LAB
ALBUMIN SERPL-MCNC: 3.9 G/DL (ref 3.9–5.4)
ALKALINE PHOS POC: 94 U/L (ref 38–126)
ALT SERPL-CCNC: 36 U/L (ref 9–52)
AST SERPL-CCNC: 24 U/L (ref 14–36)
BACTERIA UA POCT, BACTPOCT: NORMAL
BILIRUB UR QL STRIP: NEGATIVE
BUN BLD-MCNC: 36 MG/DL (ref 9–20)
CALCIUM BLD-MCNC: 9 MG/DL (ref 8.4–10.2)
CASTS UA POCT: 0
CHLORIDE BLD-SCNC: 107 MMOL/L (ref 98–107)
CHOLEST SERPL-MCNC: 101 MG/DL (ref 0–200)
CK (CPK) POC: 176 U/L (ref 30–135)
CLUE CELLS, CLUEPOCT: NEGATIVE
CO2 POC: 24 MMOL/L (ref 22–32)
CREAT BLD-MCNC: 2.2 MG/DL (ref 0.8–1.5)
CRYSTALS UA POCT, CRYSPOCT: NEGATIVE
EGFR (POC): 26.7
EPITHELIAL CELLS POCT: NORMAL
GLUCOSE POC: 135 MG/DL (ref 75–110)
GLUCOSE UR-MCNC: NEGATIVE MG/DL
GRAN# POC: 6.4 K/UL (ref 2–7.8)
GRAN% POC: 67.5 % (ref 37–92)
HBA1C MFR BLD HPLC: 7.1 % (ref 4.5–5.7)
HCT VFR BLD CALC: 35.1 % (ref 37–51)
HDLC SERPL-MCNC: 47 MG/DL (ref 35–130)
HGB BLD-MCNC: 11.8 G/DL (ref 12–18)
IRON POC: 41 UG/DL (ref 49–181)
IRON SATURATION POC: 13 % (ref 15–55)
KETONES P FAST UR STRIP-MCNC: NEGATIVE MG/DL
LDL CHOLESTEROL POC: 18 MG/DL (ref 0–130)
LY# POC: 2.2 K/UL (ref 0.6–4.1)
LY% POC: 23.5 % (ref 10–58.5)
MCH RBC QN: 29.6 PG (ref 26–32)
MCHC RBC-ENTMCNC: 33.7 G/DL (ref 30–36)
MCV RBC: 88 FL (ref 80–97)
MICROALBUMIN UR TEST STR-MCNC: 100 MG/L (ref 0–20)
MID #, POC: 0.8 K/UL (ref 0–1.8)
MID% POC: 9 % (ref 0.1–24)
MUCUS UA POCT, MUCPOCT: NORMAL
PH UR STRIP: 5 [PH] (ref 5–7)
PLATELET # BLD: 174 K/UL (ref 140–440)
POTASSIUM SERPL-SCNC: 5 MMOL/L (ref 3.6–5)
PROT SERPL-MCNC: 6.7 G/DL (ref 6.3–8.2)
PROT UR QL STRIP: NORMAL
PSA SERPL-MCNC: 3.1 NG/ML (ref 0–4)
RBC # BLD: 3.99 M/UL (ref 4.2–6.3)
RBC UA POCT, RBCPOCT: NORMAL
SODIUM SERPL-SCNC: 142 MMOL/L (ref 137–145)
SP GR UR STRIP: 1.02 (ref 1.01–1.02)
TCHOL/HDL RATIO (POC): 2.1 (ref 0–4)
TIBC POC: 308 UG/DL (ref 260–462)
TOTAL BILIRUBIN POC: 0.5 MG/DL (ref 0.2–1.3)
TRICH UA POCT, TRICHPOC: NEGATIVE
TRIGL SERPL-MCNC: 180 MG/DL (ref 0–200)
TSH BLD-ACNC: 2.58 UIU/ML (ref 0.4–4.2)
UA UROBILINOGEN AMB POC: NORMAL (ref 0.2–1)
URINALYSIS CLARITY POC: CLEAR
URINALYSIS COLOR POC: NORMAL
URINE BLOOD POC: NEGATIVE
URINE CULT COMMENT, POCT: NORMAL
URINE LEUKOCYTES POC: NEGATIVE
URINE NITRITES POC: NEGATIVE
VITAMIN D POC: 36.1 NG/ML (ref 30–96)
VLDLC SERPL CALC-MCNC: 36 MG/DL
WBC # BLD: 9.4 K/UL (ref 4.1–10.9)
WBC UA POCT, WBCPOCT: NORMAL
YEAST UA POCT, YEASTPOC: NEGATIVE

## 2018-06-05 RX ORDER — CYANOCOBALAMIN 1000 UG/ML
1000 INJECTION, SOLUTION INTRAMUSCULAR; SUBCUTANEOUS ONCE
Qty: 90 ML | Refills: 2 | Status: CANCELLED
Start: 2018-06-05 | End: 2018-06-05

## 2018-06-05 RX ORDER — CYANOCOBALAMIN 1000 UG/ML
1000 INJECTION, SOLUTION INTRAMUSCULAR; SUBCUTANEOUS ONCE
Qty: 1 ML | Refills: 0
Start: 2018-06-05 | End: 2018-06-05

## 2018-06-05 NOTE — TELEPHONE ENCOUNTER
Requested Prescriptions     Pending Prescriptions Disp Refills    cyanocobalamin (VITAMIN B12) 1,000 mcg/mL injection 90 mL 2

## 2018-06-09 RX ORDER — GABAPENTIN 300 MG/1
CAPSULE ORAL
Qty: 60 CAP | Refills: 0 | Status: SHIPPED | OUTPATIENT
Start: 2018-06-09 | End: 2018-07-10 | Stop reason: SDUPTHER

## 2018-06-09 RX ORDER — HYDRALAZINE HYDROCHLORIDE 25 MG/1
TABLET, FILM COATED ORAL
Qty: 60 TAB | Refills: 0 | Status: SHIPPED | OUTPATIENT
Start: 2018-06-09 | End: 2018-07-10 | Stop reason: SDUPTHER

## 2018-06-12 ENCOUNTER — OFFICE VISIT (OUTPATIENT)
Dept: INTERNAL MEDICINE CLINIC | Age: 83
End: 2018-06-12

## 2018-06-12 VITALS
HEIGHT: 65 IN | RESPIRATION RATE: 16 BRPM | HEART RATE: 77 BPM | TEMPERATURE: 98.2 F | SYSTOLIC BLOOD PRESSURE: 161 MMHG | OXYGEN SATURATION: 94 % | WEIGHT: 156.4 LBS | BODY MASS INDEX: 26.06 KG/M2 | DIASTOLIC BLOOD PRESSURE: 73 MMHG

## 2018-06-12 DIAGNOSIS — I67.9 CVD (CEREBROVASCULAR DISEASE): ICD-10-CM

## 2018-06-12 DIAGNOSIS — N18.30 TYPE 2 DIABETES MELLITUS WITH STAGE 3 CHRONIC KIDNEY DISEASE, WITHOUT LONG-TERM CURRENT USE OF INSULIN (HCC): ICD-10-CM

## 2018-06-12 DIAGNOSIS — Z87.19 HISTORY OF GASTRITIS: ICD-10-CM

## 2018-06-12 DIAGNOSIS — E11.40 TYPE 2 DIABETES MELLITUS WITH DIABETIC NEUROPATHY, WITHOUT LONG-TERM CURRENT USE OF INSULIN (HCC): ICD-10-CM

## 2018-06-12 DIAGNOSIS — D64.9 ANEMIA, UNSPECIFIED TYPE: ICD-10-CM

## 2018-06-12 DIAGNOSIS — G31.84 MCI (MILD COGNITIVE IMPAIRMENT): ICD-10-CM

## 2018-06-12 DIAGNOSIS — E11.22 TYPE 2 DIABETES MELLITUS WITH STAGE 3 CHRONIC KIDNEY DISEASE, WITHOUT LONG-TERM CURRENT USE OF INSULIN (HCC): ICD-10-CM

## 2018-06-12 DIAGNOSIS — E78.00 HYPERCHOLESTEROLEMIA: ICD-10-CM

## 2018-06-12 DIAGNOSIS — Z23 IMMUNIZATION DUE: ICD-10-CM

## 2018-06-12 DIAGNOSIS — I25.10 CORONARY ARTERY DISEASE INVOLVING NATIVE HEART WITHOUT ANGINA PECTORIS, UNSPECIFIED VESSEL OR LESION TYPE: ICD-10-CM

## 2018-06-12 DIAGNOSIS — D35.00 ADRENAL ADENOMA, UNSPECIFIED LATERALITY: ICD-10-CM

## 2018-06-12 DIAGNOSIS — I10 ESSENTIAL HYPERTENSION: ICD-10-CM

## 2018-06-12 DIAGNOSIS — N18.30 CKD (CHRONIC KIDNEY DISEASE) STAGE 3, GFR 30-59 ML/MIN (HCC): ICD-10-CM

## 2018-06-12 DIAGNOSIS — E53.8 B12 DEFICIENCY: ICD-10-CM

## 2018-06-12 DIAGNOSIS — E11.21 TYPE 2 DIABETES MELLITUS WITH NEPHROPATHY (HCC): ICD-10-CM

## 2018-06-12 DIAGNOSIS — Z00.00 PE (PHYSICAL EXAM), ANNUAL: Primary | ICD-10-CM

## 2018-06-12 DIAGNOSIS — R01.1 HEART MURMUR: ICD-10-CM

## 2018-06-12 DIAGNOSIS — I25.10 ASCVD (ARTERIOSCLEROTIC CARDIOVASCULAR DISEASE): ICD-10-CM

## 2018-06-12 DIAGNOSIS — M48.062 LUMBAR STENOSIS WITH NEUROGENIC CLAUDICATION: ICD-10-CM

## 2018-06-12 DIAGNOSIS — N40.1 BENIGN NON-NODULAR PROSTATIC HYPERPLASIA WITH LOWER URINARY TRACT SYMPTOMS: ICD-10-CM

## 2018-06-12 RX ORDER — ACETAMINOPHEN 500 MG
TABLET ORAL
COMMUNITY

## 2018-06-12 RX ORDER — NITROGLYCERIN 0.4 MG/1
0.4 TABLET SUBLINGUAL
Qty: 25 TAB | Refills: 3 | Status: SHIPPED | OUTPATIENT
Start: 2018-06-12 | End: 2021-02-04 | Stop reason: SDUPTHER

## 2018-06-12 RX ORDER — CLOTRIMAZOLE AND BETAMETHASONE DIPROPIONATE 10; .5 MG/ML; MG/ML
LOTION TOPICAL 2 TIMES DAILY
COMMUNITY
End: 2019-04-30

## 2018-06-12 NOTE — PROGRESS NOTES
Mr. Mart Parker is an 80year old, ,  male who comes to the office today for annual comprehensive personal healthcare examination. History of Present Illness (Problem List): This patient has multiple medical problems. These include:  1. OHD, S/P CABG in 1985 with subsequent RCA stenting in 2004. He's had no recent angina or symptoms of CHF. 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 2. New heart murmur. 3. History of cerebrovascular disease, S/P left carotid endarterectomy, current questionable bruits versus a transmitted murmur. 4. CKD stage 3.  5. Type 2 diabetes. 6. History of chronic anemia associated with B12 and iron deficiency, improved. 7. BPH with stable PSA. 8. History of gastritis, H. Pylori positive and treated. 9. Hyperlipidemia. 10. Hypertension. 11. Vasomotor and allergic rhinitis. 12. Vitamin D deficiency. 13. History of benign adrenal adenomas. 14. Mild memory impairment. 15. History of burning mouth syndrome and black hairy tongue, resolved. 16. History of weight loss, improved. 17. Lumbar spinal stenosis with severe L3-4 stenosis, S/P lumbar laminectomy. 18. 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 At the time of the visit he was doing reasonably well. He denied any new CR, GI or  symptoms. He was still having some issues with right hip pain, but nothing severe and that limiting to him. He was much more active than usual.     Past Medical/Surgical History:  Otherwise remarkable for a previous cholecystectomy, the left carotid endarterectomy, the laminectomy, bypass surgery and bilateral cataract extractions. Allergies:  Questionably to tetanus. Current Medications:  1. Diltiazem 300 mg daily. 2. Glipizide 10 mg, 1 1/2 tablets twice a day. 3. Simvastatin 20 mg daily. 4. Tamsulosin 0.4 mg, two a day. 5. Onglyza 2.5 mg daily.   6. Gabapentin 300 mg twice a day.  7. Miralax daily as needed. 8. Nitrostat 0.4 mg SL as needed. 9. Vitamin D 1,000 IU daily. 10. Hydralazine 25 mg twice a day. 11. Lotrisone cream as needed. 12. Welchol 625 mg three twice a day. 13. Vitamin B12 1,000 mcg IM monthly. 14. Ferrous sulfate 325 mg twice a day. 15. Aspirin 81 mg daily. 16. Extra Strength Tylenol 500 mg every six hours as needed. Social History:  He is retired. He is not currently smoking, though was a former smoker. He does not use alcohol. Diet is prudent. Family History:  Brother  with prostate cancer. He also had known cardiovascular disease. His mother's side of the family is strongly positive for heart disease and stroke. A sister  at age 70 from liver cancer. Review of Systems:  CONSTITUTIONAL:  Denies fevers, weight loss, sweats, or fatigue. HEENT:  No blurred or double vision, headaches or dizziness. No difficulty with swallowing, taste, speech or smell. Decreased auditory acuity. RESPIRATORY:  No cough, wheezing or shortness of breath, or sputum production. CARDIAC:  Denies chest pain, palpitations, unexplained indigestion, syncope, edema, PND or orthopnea. GI:  No changes in bowel habits, abdominal pain, no bloating, anorexia, nausea, vomiting or heartburn. :  Denies frequency, nocturia, stranguria, dysuria or sexual dysfunction. EXTREMITIES:  Right hip and to some degree groin pain. No swelling. MSK:  Intermittent back pain. SKIN:  No recent rash or mole changes. NEUROLOGICAL:  No numbness, tingling, burning paresthesias or loss of motor strength. No syncope, dizziness, frequent headaches or memory loss. Physical Examination:  GENERAL:  Elderly white male, no acute distress. VITAL SIGNS:  BP: 161/73. P: 77.  R: 16.  T: 98.2. HT: 5'5\". WT: 156 lbs. BMI: 26.03. VISION:  Deferred to ophthalmologist.    HEARING:  Shows some moderate decrease across all frequencies. He usually wears hearing aids.   HEENT:  Ears: The TMs and ear canals were clear. Eyes:  The pupillary responses were normal.  Extraocular muscle function intact. Lids and conjunctiva not injected. Funduscopic exam revealed sharp disc margins. Pharynx:  Clear with teeth in good repair. No masses were noted. NECK:  Well healed left carotid endarterectomy incision. Questionable bilateral carotid bruits versus transmitted heart sounds/murmur. LUNGS: Clear to auscultation and percussion. CARDIAC:  Regular rate and rhythm. Grade 8-4/8 systolic murmur. Well healed median sternotomy incision. PMI not displaced. No gallop, rub or click. ABDOMEN:  Flat, soft, non-tender without palpable organomegaly or mass. No pulsatile mass was felt, and no bruit was heard. Bowel sounds were active. :  Circumcised male. Both testes descended and no masses felt. RECTAL EXAM:  Normal sphincter tone. Prostate is firm and enlarged, but non nodular and non tender. No rectal masses felt. Stool brown and heme negative. EXTREMITIES:  No clubbing, cyanosis or edema. Mild venous varicosities, no evidence for callus formation or ulceration. Sensation fully intact. Pulses are intact. SKIN:  No unusual rash or mole changes noted. LYMPH NODES:  None felt in the cervical, supraclavicular, axillary or inguinal region. NEUROLOGICAL:  Cranial nerves II-XII grossly intact. Motor strength 5/5. DTRs 2+ and symmetric. Station and gait normal.     Diabetic foot exam:     Left Foot:   Visual Exam: normal    Pulse DP: 2+ (normal)   Filament test: normal sensation    Vibratory sensation: normal      Right Foot:   Visual Exam: normal    Pulse DP: 2+ (normal)   Filament test: normal sensation    Vibratory sensation: normal    Laboratory:  Hemoglobin is 11.8. White blood count is 9,400. Blood sugar is 134, A1c 7.1. BUN 36. Creatinine 2.2. Liver function is normal.  Electrolytes are normal.  Iron is 41. PSA is 3.1.  TSH is 2.58. Urine for microalbumin 100.   Vitamin D level is 36.1. Lipid profile reveals a total cholesterol of 101, triglycerides of 180, HDL cholesterol of 47 and an LDL cholesterol of 18. Health Maintenance Issues and Ancillary Studies:  1. TDAP (pertussis and tetanus) vaccine was given in June, 2012. 2. Pneumovax 23 (PPSV23) was given in November, 2007. 3. Seasonal influenza vaccine was given in September, 2017. 4. Prevnar 13 (PCV13) was given in October, 2015. 5. Zostavax given in June of 2012. 6. Rx for two doses of Shingrix given on the day of the visit. 7. Diabetic eye examination revealed no diabetic retinopathy in April, 2018. 8. CT scan of the pelvis in November, 2015 revealed an enlarged prostate. 9. MRI of the lumbosacral spine in August, 2015 revealed disc extrusion and severe stenosis at L3-4. He subsequently had a laminectomy at this level. 10. CT scan of the abdomen and pelvis in September, 2014 revealed an enlarged prostate, diverticulosis and stable adrenal adenomas. 11. MRI of the brain revealed normal IACs in July of 2014. 12. Right hip xray a year ago revealed arthritis. 15. Upper endoscopy in June of 2012 revealed gastritis. Biopsy was positive for H. Pylori and he was treated for this. 14. MRI of the abdomen in November, 2009 revealed no evidence for adrenal cancer. 15. Capsule endoscopy in October, 2013 revealed only the gastritis. 16. Colonoscopy in August, 2013 revealed diverticulosis. 17. Carotid dopplers in August, 2013 revealed 10-49% stenosis bilaterally. 18. Ultrasound of the abdomen was negative, S/P cholecystectomy in July of 2009. 19. PVRs were normal in April, 2012.  20. Last echocardiogram was normal with ejection fraction of 55% in June of 2012. 21. MRI of the brain in March, 2013 revealed some microvascular changes. 22. CT scan of the head was negative in October, 2012. 23. Lexiscan stress testing revealed no evidence for ischemia.   There was evidence for an apical infarct and ejection fraction was 56%.  24. MRI of the right hip in July of 2017 revealed degenerative joint disease and possibly a labral tear. 25. EKG in the office revealed an old anterior myocardial infarction, unchanged from previous. 26. Pulmonary function studies were normal.  27. Health screening assessments were essentially normal.    Impression:  1. OHD, S/P CABG and stent of RCA with stable angina. 2. CVD, S/P left carotid endarterectomy. 3. New heart murmur. 4. Diabetes, adequate control. 5. CKD stage 3, slightly worse. 6. History of anemia. 7. History of gastritis. 8. Hyperlipidemia. 9. Hypertension. 10. Lumbar spinal stenosis, S/P laminectomy. 11. Vasomotor and allergic rhinitis. 12. Benign adrenal adenomas. 13. Vitamin D deficiency. 14. History of burning mouth syndrome  15. Mild memory impairment. 16. Weight loss previously that has stabilized. 16. S/P multiple surgeries. Plan:  1. Continue present medications. 2. Referred for echocardiogram and carotid dopplers. 3. He needs to push fluids relative to his renal function. 4. Recheck here six weeks time. Will recheck his blood pressure, as well as renal function, and make adjustments from there. We should have the information back on his echocardiogram and carotids at that point as well. 5. He is appropriately treated for his ten year coronary heart disease risk. 6. He is up to date otherwise on most health maintenance issues. All screenings were reviewed and results discussed with the patient, who verbalized understand and agreement with the plans. A copy of the after visit summary with a personalized health plan was provided to the patient on the day of the visit. This is a Subsequent Medicare Annual Wellness Exam (AWV) (Performed 12 months after IPPE or effective date of Medicare Part B enrollment)    I have reviewed the patient's medical history in detail and updated the computerized patient record as noted above.        Depression Risk Factor Screening:     PHQ over the last two weeks 6/12/2018   Little interest or pleasure in doing things Not at all   Feeling down, depressed or hopeless -   Total Score PHQ 2 -     Alcohol Risk Factor Screening: You do not drink alcohol or very rarely. Functional Ability and Level of Safety:   Hearing Loss  The patient wears hearing aids. Activities of Daily Living  The home contains: no safety equipment. Patient does total self care    Fall Risk  Fall Risk Assessment, last 12 mths 6/12/2018   Able to walk? Yes   Fall in past 12 months? No   Fall with injury? -   Number of falls in past 12 months -   Fall Risk Score -       Abuse Screen  Patient is not abused    Cognitive Screening   Evaluation of Cognitive Function:  Has your family/caregiver stated any concerns about your memory: no  Normal    Patient Care Team   Patient Care Team:  Shay Ibarra MD as PCP - General (Internal Medicine)    Assessment/Plan   Education and counseling provided:  Are appropriate based on today's review and evaluation    Diagnoses and all orders for this visit:    1. PE (physical exam), annual  -     AMB POC EKG ROUTINE W/ 12 LEADS, INTER & REP  -     AMB POC SPIROMETRY (NO CHARGE)    2. Immunization due    3. Type 2 diabetes mellitus with stage 3 chronic kidney disease, without long-term current use of insulin ([facility])  -      DIABETES FOOT EXAM    4. Type 2 diabetes mellitus with nephropathy ([facility] [address]    5. Type 2 diabetes mellitus with diabetic neuropathy, without long-term current use of insulin ([facility] [address]    6. Essential hypertension    7. Hypercholesterolemia    8. Lumbar stenosis with neurogenic claudication    9. CKD (chronic kidney disease) stage 3, GFR 30-59 ml/min    10. Coronary artery disease involving native heart without angina pectoris, unspecified vessel or lesion type    11. ASCVD (arteriosclerotic cardiovascular disease)    12. Anemia, unspecified type    13.  Benign non-nodular prostatic hyperplasia with lower urinary tract symptoms    14. B12 deficiency    15. History of gastritis    16. CVD (cerebrovascular disease)  -     DUPLEX CAROTID BILATERAL; Future    17. Adrenal adenoma, unspecified laterality    18. MCI (mild cognitive impairment)    19. Heart murmur  -     2D ECHO COMPLETE ADULT (TTE) W OR WO CONTR; Future    Other orders  -     nitroglycerin (NITROSTAT) 0.4 mg SL tablet; 1 Tab by SubLINGual route every five (5) minutes as needed for Chest Pain.  -     varicella-zoster recombinant, PF, (SHINGRIX, PF,) 50 mcg/0.5 mL susr injection; 0.5 mL by IntraMUSCular route once for 1 dose. Other problems as listed above. There are no preventive care reminders to display for this patient. Orders Placed This Encounter    AMB POC SPIROMETRY (NO CHARGE)    DUPLEX CAROTID BILATERAL     Standing Status:   Future     Standing Expiration Date:   2019    AMB POC EKG ROUTINE W/ 12 LEADS, INTER & REP     Order Specific Question:   Reason for Exam:     Answer:   CPE    2D ECHO COMPLETE ADULT (TTE) W OR WO CONTR     Standing Status:   Future     Standing Expiration Date:   2018     Order Specific Question:   Reason for Exam:     Answer:   heart murmur     Order Specific Question:   Contrast Enhancement (Bubble Study, Definity, Optison) may be used if criteria listed in established evidence-based protocol has been identified. Answer: Yes    HM DIABETES FOOT EXAM    nitroglycerin (NITROSTAT) 0.4 mg SL tablet     Si Tab by SubLINGual route every five (5) minutes as needed for Chest Pain. Dispense:  25 Tab     Refill:  3    varicella-zoster recombinant, PF, (SHINGRIX, PF,) 50 mcg/0.5 mL susr injection     Si.5 mL by IntraMUSCular route once for 1 dose.      Dispense:  0.5 mL     Refill:  1       Ariel Whittaker MD

## 2018-06-12 NOTE — PROGRESS NOTES
Chief Complaint   Patient presents with    Complete Physical         1. Have you been to the ER, urgent care clinic since your last visit? Hospitalized since your last visit? no    2. Have you seen or consulted any other health care providers outside of the [facility] since your last visit? Include any pap smears or colon screening.   no

## 2018-07-02 RX ORDER — SAXAGLIPTIN 2.5 MG/1
TABLET, FILM COATED ORAL
Qty: 30 TAB | Refills: 0 | Status: SHIPPED | OUTPATIENT
Start: 2018-07-02 | End: 2018-07-30 | Stop reason: SDUPTHER

## 2018-07-03 ENCOUNTER — HOSPITAL ENCOUNTER (OUTPATIENT)
Dept: NON INVASIVE DIAGNOSTICS | Age: 83
Discharge: HOME | End: 2018-07-03
Attending: INTERNAL MEDICINE
Payer: MEDICARE

## 2018-07-03 ENCOUNTER — HOSPITAL ENCOUNTER (OUTPATIENT)
Dept: VASCULAR SURGERY | Age: 83
Discharge: HOME | End: 2018-07-03
Attending: INTERNAL MEDICINE
Payer: MEDICARE

## 2018-07-03 DIAGNOSIS — R01.1 HEART MURMUR: ICD-10-CM

## 2018-07-03 DIAGNOSIS — I67.9 CVD (CEREBROVASCULAR DISEASE): ICD-10-CM

## 2018-07-03 PROCEDURE — 93880 EXTRACRANIAL BILAT STUDY: CPT

## 2018-07-03 PROCEDURE — 93306 TTE W/DOPPLER COMPLETE: CPT

## 2018-07-03 NOTE — PROCEDURES
[facility]  *** FINAL REPORT ***    Name: Pancho Morin  MRN: [id number]    Outpatient  : 16 May 1935  HIS Order #: [id number]  [id number] [facility] Visit #: [id number]  Date: 2018    TYPE OF TEST: Cerebrovascular Duplex    REASON FOR TEST  Known carotid stenosis    Right Carotid:-             Proximal               Mid                 Distal  cm/s  Systolic  Diastolic  Systolic  Diastolic  Systolic  Diastolic  CCA:     03.2      11.0                            71.0      14.0  Bulb:  ECA:     98.0       0.0  ICA:    150.0      38.0      139.0      42.0      117.0      27.0  ICA/CCA:  2.1       2.7    ICA Stenosis: 50-69%    Right Vertebral:-  Finding: Antegrade  Sys:       40.0  Yasmine:        8.0    Right Subclavian: Normal    Left Carotid:-            Proximal                Mid                 Distal  cm/s  Systolic  Diastolic  Systolic  Diastolic  Systolic  Diastolic  CCA:     02.3      20.0                            88.0      22.0  Bulb:  ECA:    151.0       0.0  ICA:     86.0       9.0       86.0      22.0       59.0      19.0  ICA/CCA:  1.0       0.4    ICA Stenosis: <50%    Left Vertebral:-  Finding: Antegrade  Sys:       51.0  Yasmine:        7.0    Left Subclavian: Normal    INTERPRETATION/FINDINGS  PROCEDURE:  Carotid Duplex Examination using B-mode, color and  spectral Doppler of the extracranial cerebrovascular arteries. 1. 50-69% stenosis in the right internal carotid artery. 2. <50% stenosis in the left internal carotid artery (S/P CEA). 3. No significant stenosis in the external carotid arteries  bilaterally. 4. Antegrade flow in both vertebral arteries. 5. Normal flow in both subclavian arteries. Plaque Morphology:  1. Calcific plaque in the bulb and right ICA. 2. Calcific plaque in the bulb and left ICA. ADDITIONAL COMMENTS    I have personally reviewed the data relevant to the interpretation of  this  study.     TECHNOLOGIST: John Perales RVT  Signed: 2018 02:05 PM    PHYSICIAN: Chadd Mendenhall.  Alison Delong MD  Signed: 07/05/2018 05:12 PM

## 2018-07-10 RX ORDER — GABAPENTIN 300 MG/1
CAPSULE ORAL
Qty: 60 CAP | Refills: 0 | Status: SHIPPED | OUTPATIENT
Start: 2018-07-10 | End: 2018-08-08 | Stop reason: SDUPTHER

## 2018-07-10 RX ORDER — HYDRALAZINE HYDROCHLORIDE 25 MG/1
TABLET, FILM COATED ORAL
Qty: 60 TAB | Refills: 0 | Status: SHIPPED | OUTPATIENT
Start: 2018-07-10 | End: 2018-08-08 | Stop reason: SDUPTHER

## 2018-07-24 ENCOUNTER — OFFICE VISIT (OUTPATIENT)
Dept: INTERNAL MEDICINE CLINIC | Age: 83
End: 2018-07-24

## 2018-07-24 VITALS
SYSTOLIC BLOOD PRESSURE: 138 MMHG | BODY MASS INDEX: 25.99 KG/M2 | HEIGHT: 65 IN | WEIGHT: 156 LBS | RESPIRATION RATE: 18 BRPM | DIASTOLIC BLOOD PRESSURE: 78 MMHG | OXYGEN SATURATION: 95 %

## 2018-07-24 DIAGNOSIS — N18.30 TYPE 2 DIABETES MELLITUS WITH STAGE 3 CHRONIC KIDNEY DISEASE, WITHOUT LONG-TERM CURRENT USE OF INSULIN (HCC): ICD-10-CM

## 2018-07-24 DIAGNOSIS — I10 ESSENTIAL HYPERTENSION: ICD-10-CM

## 2018-07-24 DIAGNOSIS — E11.22 TYPE 2 DIABETES MELLITUS WITH STAGE 3 CHRONIC KIDNEY DISEASE, WITHOUT LONG-TERM CURRENT USE OF INSULIN (HCC): ICD-10-CM

## 2018-07-24 DIAGNOSIS — E53.8 B12 DEFICIENCY: Primary | ICD-10-CM

## 2018-07-24 DIAGNOSIS — N18.30 CKD (CHRONIC KIDNEY DISEASE) STAGE 3, GFR 30-59 ML/MIN (HCC): ICD-10-CM

## 2018-07-24 NOTE — PROGRESS NOTES
Faby Aocsta is a 80 y.o. male  Chief Complaint   Patient presents with    Diabetes     3 month foolowup     There were no vitals taken for this visit. 1. Have you been to the ER, urgent care clinic since your last visit? Hospitalized since your last visit?no    2. Have you seen or consulted any other health care providers outside of the [facility] since your last visit? Include any pap smears or colon screening. no    After obtaining verbal order from Dr. Deandra Argueta, patient received Diane B 12 injection given by Mackenzie Vincent LPN in right Deltoid. Cyanocobalamin 1.0  mL IM now. Patient was observed for 10 minutes post injection. Patient tolerated injection well.     Mackenzie Vincent LPN

## 2018-07-24 NOTE — PROGRESS NOTES
Subjective:   Aggie Duran is a 80 y.o. male      Chief Complaint   Patient presents with    Diabetes     3 month foolowup        History of present illness:  Mr. Hedy Penn returns in follow up. After his last visit here we suggested that he really needed to push fluids as his creatinine had risen to 2.2 for his physical.  According to his wife, he really hasn't done that. He's not much of a water drinker and primarily just drinks 3-4 cups of coffee a day. He'll have about 6 oz of a diet soda as well. He's felt well otherwise, however. His back pain is stable. He denies dizziness or significant weakness. He denies any chest pain or increasing shortness of breath. He's noted no lower extremity edema, orthopnea or PND. I suspect he will still need to continue to push fluids. We'll recheck his BMP today and let him know about that. Otherwise he should continue his present medications. I gave him some suggestions about calorie free drinks he might enjoy better than water since he's not really a water drinker. Hopefully he'll pursue those. We'll see him back in six weeks time as he'll be due for A1c, etc., at that point.         Patient Active Problem List    Diagnosis Date Noted    Type 2 diabetes mellitus with diabetic neuropathy ([facility] 03/14/2018     Priority: 1 - One    Type 2 diabetes mellitus with nephropathy ([facility] 12/14/2017     Priority: 1 - One    Lumbar stenosis with neurogenic claudication 09/14/2017     Priority: 1 - One    Diabetes ([facility] 09/24/2015     Priority: 1 - One    Hypertension 09/24/2015     Priority: 1 - One    Hypercholesterolemia 09/24/2015     Priority: 1 - One    Anemia 12/14/2017     Priority: 2 - Two    ASCVD (arteriosclerotic cardiovascular disease) 09/28/2017     Priority: 2 - Two    CKD (chronic kidney disease) stage 3, GFR 30-59 ml/min 09/24/2015     Priority: 2 - Two    CAD (coronary artery disease) 09/24/2015     Priority: 2 - Two    B12 deficiency 09/28/2017 Priority: 3 - Three    History of gastritis 09/28/2017     Priority: 3 - Three    CVD (cerebrovascular disease) 09/28/2017     Priority: 3 - Three    Benign non-nodular prostatic hyperplasia with lower urinary tract symptoms 09/24/2015     Priority: 3 - Three    Adrenal adenoma 09/28/2017     Priority: 4 - Four    MCI (mild cognitive impairment) 09/28/2017     Priority: 4 - Four    Allergic rhinitis 09/28/2017     Priority: 5 - Five    Black hairy tongue 09/28/2017     Priority: 5 - Five    Burning mouth syndrome 09/28/2017     Priority: 5 - Five    Vitamin D deficiency 09/28/2017     Priority: 5 - Five    Heart murmur 06/12/2018    PE (physical exam), annual 09/28/2017      Past Surgical History:   Procedure Laterality Date    CARDIAC SURG PROCEDURE UNLIST      bypass(1985), OFLXBI(8810, 2004)    HX CATARACT REMOVAL Bilateral     HX CHOLECYSTECTOMY      HX LUMBAR LAMINECTOMY  09/2017    HX ORTHOPAEDIC  09/23/2015    back surgery     ND COLONOSCOPY FLX DX W/COLLJ SPEC WHEN PFRMD  8/12/2013         ND EGD TRANSORAL BIOPSY SINGLE/MULTIPLE  6/28/2012         VASCULAR SURGERY PROCEDURE UNLIST  2/1996    left carotid      Allergies   Allergen Reactions    Tetanus Vaccines And Toxoid Swelling     Swelling at the site    Tradjenta [Linagliptin] Diarrhea     headache      Family History   Problem Relation Age of Onset    Heart Disease Mother     Heart Disease Father       Social History     Social History    Marital status:      Spouse name: N/A    Number of children: N/A    Years of education: N/A     Occupational History    Not on file.      Social History Main Topics    Smoking status: Former Smoker     Quit date: 6/3/1967    Smokeless tobacco: Never Used    Alcohol use No    Drug use: No    Sexual activity: Not on file     Other Topics Concern    Not on file     Social History Narrative     Outpatient Prescriptions Marked as Taking for the 7/24/18 encounter (Office Visit) with Marie Espinoza MD   Medication Sig Dispense Refill    gabapentin (NEURONTIN) 300 mg capsule TAKE 1 CAPSULE BY MOUTH TWICE DAILY 60 Cap 0    hydrALAZINE (APRESOLINE) 25 mg tablet TAKE 1 TABLET BY MOUTH TWICE DAILY 60 Tab 0    ONGLYZA 2.5 mg tablet TAKE 1 TABLET BY MOUTH EVERY DAY 30 Tab 0    nitroglycerin (NITROSTAT) 0.4 mg SL tablet 1 Tab by SubLINGual route every five (5) minutes as needed for Chest Pain. 25 Tab 3    acetaminophen (TYLENOL EXTRA STRENGTH) 500 mg tablet Take  by mouth every six (6) hours as needed for Pain.  clotrimazole-betamethasone (LOTRISONE) 1-0.05 % lotion Apply  to affected area two (2) times a day.  TRUE METRIX GLUCOSE TEST STRIP strip USE 1 STRIP TO TEST BLOOD SUGAR EVERY  Strip 0    tamsulosin (FLOMAX) 0.4 mg capsule TAKE 1 CAPSULE BY MOUTH EVERY DAY (Patient taking differently: TAKE 2 CAPSULE BY MOUTH EVERY DAY) 90 Cap 0    glipiZIDE (GLUCOTROL) 10 mg tablet Take 1.5 Tabs by mouth two (2) times a day. 270 Tab 3    cyanocobalamin (VITAMIN B12) 1,000 mcg/mL injection INJECT 1 ML SUBCUTANEOUS EVERY MONTH 90 mL 2    Lancets misc Use with Fastclix lancing device daily to test blood sugar. 100 Each 3    ACCU-CHEK FASTCLIX misc USE AS DIRECTED TO MONITOR BLOOD SUGAR 100 Each 3    aspirin delayed-release 81 mg tablet Take 81 mg by mouth daily.  WELCHOL 625 mg tablet TAKE 3 TABLETS BY MOUTH TWICE A  Tab 11    simvastatin (ZOCOR) 20 mg tablet Take 20 mg by mouth nightly.  Cholecalciferol, Vitamin D3, (VITAMIN D3) 1,000 unit cap Take 1,000 Units by mouth daily.  ferrous sulfate 325 mg (65 mg iron) tablet Take 325 mg by mouth two (2) times a day.  diltiazem CD (DILT-CD) 300 mg ER capsule Take 300 mg by mouth daily. Review of Systems              Constitutional:  He denies fever, weight loss, sweats or fatigue. HEENT:  No blurred or double vision, headache or dizziness.   No difficulty with swallowing, taste, speech or smell.  Respiratory:  No cough, wheezing or shortness of breath. No sputum production. Cardiac:  Denies chest pain, palpitations, unexplained indigestion, syncope, edema, PND or orthopnea. GI:  No changes in bowel movements, no abdominal pain, no bloating, anorexia, nausea, vomiting or heartburn. :  No frequency or dysuria. Denies incontinence. Extremities:  No joint pain, stiffness or swelling. Skin:  No recent rashes or mole changes. Neurological:  No numbness, tingling, burning paresthesias or loss of motor strength. No syncope, dizziness, frequent headaches or memory loss. Objective:     Vitals:    07/24/18 1534   BP: 138/78   Resp: 18   SpO2: 95%   Weight: 156 lb (70.8 kg)   Height: 5' 5\" (1.651 m)   PainSc:   0 - No pain       Body mass index is 25.96 kg/(m^2). Physical Examination:              General Appearance:  Well-developed, well-nourished, no acute  distress. HEENT:     Ears:  The TMs and ear canals were clear. Eyes:  The pupillary responses were normal.  Extraocular muscle function intact. Lids and conjunctiva not injected. Neck:  Supple without thyromegaly or adenopathy. No JVD noted. Lungs:  Clear to auscultation and percussion. Cardiac:  Regular rate and rhythm without murmur. GI: nontender w/o mass. Normal BS's. Extremities:  No clubbing, cyanosis or edema. Skin:  No rash or unusual mole changes noted. Neurological:  Grossly normal.            Assessment/Plan:   Impressions:      ICD-10-CM ICD-9-CM    1. B12 deficiency E53.8 266.2 VITAMIN B12 INJECTION      METABOLIC PANEL, BASIC   2. Type 2 diabetes mellitus with stage 3 chronic kidney disease, without long-term current use of insulin ([facility]) E11.22 250.40 VITAMIN B12 INJECTION    T72.2 126.6 METABOLIC PANEL, BASIC   3. CKD (chronic kidney disease) stage 3, GFR 30-59 ml/min N18.3 585.3 VITAMIN B12 INJECTION      METABOLIC PANEL, BASIC   4.  Essential hypertension I10 401.9 VITAMIN B12 INJECTION METABOLIC PANEL, BASIC        Plan:  1. Continue present meds  2. Discussed lifestyle modifications including Na restriction, low carb/fat diet, weight reduction and exercise (at least a walking program). 3. Needs to push fluids        Follow-up Disposition:  Return in about 6 weeks (around 9/4/2018). Orders Placed This Encounter    METABOLIC PANEL, BASIC    VITAMIN B12 INJECTION     Order Specific Question:   Dose     Answer:   1000 mcg/ml     Order Specific Question:   Site     Answer:   LEFT DELTOID     Order Specific Question:   Expiration Date     Answer:   1/24/2019     Order Specific Question:   Lot#     Answer:   [id number]     Order Specific Question:        Answer:   [id number]     Order Specific Question:   Charge Quantity? Answer:   1     Order Specific Question:   Perfomed by/Witnessed by:      Answer:   Delaine Angelucci, MD

## 2018-07-25 LAB
BUN SERPL-MCNC: 30 MG/DL (ref 8–27)
BUN/CREAT SERPL: 16 (ref 10–24)
CALCIUM SERPL-MCNC: 8.9 MG/DL (ref 8.6–10.2)
CHLORIDE SERPL-SCNC: 105 MMOL/L (ref 96–106)
CO2 SERPL-SCNC: 20 MMOL/L (ref 20–29)
CREAT SERPL-MCNC: 1.89 MG/DL (ref 0.76–1.27)
GLUCOSE SERPL-MCNC: 181 MG/DL (ref 65–99)
POTASSIUM SERPL-SCNC: 5.1 MMOL/L (ref 3.5–5.2)
SODIUM SERPL-SCNC: 141 MMOL/L (ref 134–144)

## 2018-07-30 RX ORDER — SAXAGLIPTIN 2.5 MG/1
TABLET, FILM COATED ORAL
Qty: 30 TAB | Refills: 0 | Status: SHIPPED | OUTPATIENT
Start: 2018-07-30 | End: 2018-08-30 | Stop reason: SDUPTHER

## 2018-08-08 RX ORDER — HYDRALAZINE HYDROCHLORIDE 25 MG/1
TABLET, FILM COATED ORAL
Qty: 60 TAB | Refills: 0 | Status: SHIPPED | OUTPATIENT
Start: 2018-08-08 | End: 2018-09-07 | Stop reason: SDUPTHER

## 2018-08-08 RX ORDER — GABAPENTIN 300 MG/1
CAPSULE ORAL
Qty: 60 CAP | Refills: 0 | Status: SHIPPED | OUTPATIENT
Start: 2018-08-08 | End: 2018-09-07 | Stop reason: SDUPTHER

## 2018-08-08 RX ORDER — TAMSULOSIN HYDROCHLORIDE 0.4 MG/1
CAPSULE ORAL
Qty: 90 CAP | Refills: 0 | Status: SHIPPED | OUTPATIENT
Start: 2018-08-08 | End: 2018-09-04

## 2018-08-08 RX ORDER — COLESEVELAM 180 1/1
TABLET ORAL
Qty: 180 TAB | Refills: 0 | Status: SHIPPED | OUTPATIENT
Start: 2018-08-08 | End: 2018-09-07 | Stop reason: SDUPTHER

## 2018-08-17 RX ORDER — CALCIUM CITRATE/VITAMIN D3 200MG-6.25
TABLET ORAL
Qty: 100 STRIP | Refills: 0 | Status: SHIPPED | OUTPATIENT
Start: 2018-08-17 | End: 2018-11-24 | Stop reason: SDUPTHER

## 2018-08-29 ENCOUNTER — CLINICAL SUPPORT (OUTPATIENT)
Dept: INTERNAL MEDICINE CLINIC | Age: 83
End: 2018-08-29

## 2018-08-29 DIAGNOSIS — E53.8 VITAMIN B 12 DEFICIENCY: Primary | ICD-10-CM

## 2018-08-29 DIAGNOSIS — N40.0 BENIGN LOCALIZED HYPERPLASIA OF PROSTATE: Primary | ICD-10-CM

## 2018-08-29 DIAGNOSIS — N40.0 BENIGN LOCALIZED HYPERPLASIA OF PROSTATE: ICD-10-CM

## 2018-08-29 RX ORDER — TAMSULOSIN HYDROCHLORIDE 0.4 MG/1
0.8 CAPSULE ORAL DAILY
Qty: 90 CAP | Refills: 0 | Status: CANCELLED | OUTPATIENT
Start: 2018-08-29

## 2018-08-29 RX ORDER — CYANOCOBALAMIN 1000 UG/ML
1000 INJECTION, SOLUTION INTRAMUSCULAR; SUBCUTANEOUS ONCE
Qty: 1 ML | Refills: 0 | Status: SHIPPED | COMMUNITY
Start: 2018-08-29 | End: 2018-08-29

## 2018-08-29 RX ORDER — TAMSULOSIN HYDROCHLORIDE 0.4 MG/1
0.8 CAPSULE ORAL DAILY
Qty: 1 CAP | Refills: 2 | Status: SHIPPED | OUTPATIENT
Start: 2018-08-29 | End: 2018-09-04 | Stop reason: SDUPTHER

## 2018-08-29 NOTE — PROGRESS NOTES
After obtaining consent, and per orders of Dr. Cdaen Tolliver, injection of B-12 given by Gabrielle Fay LPN. Patient instructed to remain in clinic for 10 minutes afterwards, and to report any adverse reaction to me immediately. No adverse reactions noted or reported.

## 2018-08-29 NOTE — TELEPHONE ENCOUNTER
Requested Prescriptions     Pending Prescriptions Disp Refills    tamsulosin (FLOMAX) 0.4 mg capsule 1 Cap 2     Sig: Take 2 Caps by mouth daily.

## 2018-08-30 RX ORDER — SAXAGLIPTIN 2.5 MG/1
TABLET, FILM COATED ORAL
Qty: 30 TAB | Refills: 0 | Status: SHIPPED | OUTPATIENT
Start: 2018-08-30 | End: 2018-09-04 | Stop reason: SDUPTHER

## 2018-09-04 ENCOUNTER — OFFICE VISIT (OUTPATIENT)
Dept: INTERNAL MEDICINE CLINIC | Age: 83
End: 2018-09-04

## 2018-09-04 VITALS
DIASTOLIC BLOOD PRESSURE: 58 MMHG | HEART RATE: 68 BPM | BODY MASS INDEX: 26.59 KG/M2 | HEIGHT: 65 IN | SYSTOLIC BLOOD PRESSURE: 146 MMHG | RESPIRATION RATE: 16 BRPM | OXYGEN SATURATION: 94 % | TEMPERATURE: 98.3 F | WEIGHT: 159.6 LBS

## 2018-09-04 DIAGNOSIS — E11.21 TYPE 2 DIABETES MELLITUS WITH NEPHROPATHY (HCC): Primary | ICD-10-CM

## 2018-09-04 DIAGNOSIS — N18.30 CKD (CHRONIC KIDNEY DISEASE) STAGE 3, GFR 30-59 ML/MIN (HCC): ICD-10-CM

## 2018-09-04 DIAGNOSIS — D64.9 ANEMIA, UNSPECIFIED TYPE: ICD-10-CM

## 2018-09-04 DIAGNOSIS — L60.0 INGROWN TOENAIL OF LEFT FOOT WITH INFECTION: ICD-10-CM

## 2018-09-04 DIAGNOSIS — I10 ESSENTIAL HYPERTENSION: ICD-10-CM

## 2018-09-04 DIAGNOSIS — N40.0 BENIGN LOCALIZED HYPERPLASIA OF PROSTATE: ICD-10-CM

## 2018-09-04 LAB
GRAN# POC: 7.3 K/UL (ref 2–7.8)
GRAN% POC: 68.6 % (ref 37–92)
HCT VFR BLD CALC: 37.4 % (ref 37–51)
HGB BLD-MCNC: 12.2 G/DL (ref 12–18)
LY# POC: 2.6 K/UL (ref 0.6–4.1)
LY% POC: 24.9 % (ref 10–58.5)
MCH RBC QN: 28.8 PG (ref 26–32)
MCHC RBC-ENTMCNC: 32.7 G/DL (ref 30–36)
MCV RBC: 88 FL (ref 80–97)
MID #, POC: 0.6 K/UL (ref 0–1.8)
MID% POC: 6.5 % (ref 0.1–24)
PLATELET # BLD: 178 K/UL (ref 140–440)
RBC # BLD: 4.25 M/UL (ref 4.2–6.3)
WBC # BLD: 10.5 K/UL (ref 4.1–10.9)

## 2018-09-04 RX ORDER — TAMSULOSIN HYDROCHLORIDE 0.4 MG/1
0.8 CAPSULE ORAL DAILY
Qty: 180 CAP | Refills: 3 | Status: SHIPPED | OUTPATIENT
Start: 2018-09-04 | End: 2019-08-23 | Stop reason: SDUPTHER

## 2018-09-04 NOTE — PROGRESS NOTES
Subjective:   Aleyda Givens is a 80 y.o. male      Chief Complaint   Patient presents with    Diabetes     6 week f/u    Chronic Kidney Disease        History of present illness:  Mr. Herson Smart returns in follow up. It's been three months since his last A1c and that will be checked today regarding his diabetes. We are also following up on his renal function, which had deteriorated slightly. I asked him to push fluids as much as possible to see if that would improve his situation. On last check it was improving, but not back to his baseline. Since last visit he has developed painful left great toenail and this appears to be an ingrown toenail. There is no purulent drainage. It is tender to touch, but not particularly painful otherwise. His pulses are poorly palpable and I would like to see PVRs before we operate on the toe. He denies other new CR, GI or  symptoms. He remains on all his usual medications with some of them refilled today. We talked again about pushing fluids. He has no symptoms of congestive heart failure and he should be safe to do this. We will see where his labs stand and follow up as scheduled.         Patient Active Problem List    Diagnosis Date Noted    Type 2 diabetes mellitus with diabetic neuropathy ([facility] 03/14/2018     Priority: 1 - One    Type 2 diabetes mellitus with nephropathy ([facility] 12/14/2017     Priority: 1 - One    Lumbar stenosis with neurogenic claudication 09/14/2017     Priority: 1 - One    Diabetes ([facility] 09/24/2015     Priority: 1 - One    Hypertension 09/24/2015     Priority: 1 - One    Hypercholesterolemia 09/24/2015     Priority: 1 - One    Anemia 12/14/2017     Priority: 2 - Two    ASCVD (arteriosclerotic cardiovascular disease) 09/28/2017     Priority: 2 - Two    CKD (chronic kidney disease) stage 3, GFR 30-59 ml/min 09/24/2015     Priority: 2 - Two    CAD (coronary artery disease) 09/24/2015     Priority: 2 - Two    B12 deficiency 09/28/2017 Priority: 3 - Three    History of gastritis 09/28/2017     Priority: 3 - Three    CVD (cerebrovascular disease) 09/28/2017     Priority: 3 - Three    Benign non-nodular prostatic hyperplasia with lower urinary tract symptoms 09/24/2015     Priority: 3 - Three    Adrenal adenoma 09/28/2017     Priority: 4 - Four    MCI (mild cognitive impairment) 09/28/2017     Priority: 4 - Four    Allergic rhinitis 09/28/2017     Priority: 5 - Five    Black hairy tongue 09/28/2017     Priority: 5 - Five    Burning mouth syndrome 09/28/2017     Priority: 5 - Five    Vitamin D deficiency 09/28/2017     Priority: 5 - Five    Heart murmur 06/12/2018    PE (physical exam), annual 09/28/2017      Past Surgical History:   Procedure Laterality Date    CARDIAC SURG PROCEDURE UNLIST      bypass(1985), HAUMXN(8520, 2004)    HX CATARACT REMOVAL Bilateral     HX CHOLECYSTECTOMY      HX LUMBAR LAMINECTOMY  09/2017    HX ORTHOPAEDIC  09/23/2015    back surgery     MA COLONOSCOPY FLX DX W/COLLJ SPEC WHEN PFRMD  8/12/2013         MA EGD TRANSORAL BIOPSY SINGLE/MULTIPLE  6/28/2012         VASCULAR SURGERY PROCEDURE UNLIST  2/1996    left carotid      Allergies   Allergen Reactions    Tetanus Vaccines And Toxoid Swelling     Swelling at the site    Tradjenta [Linagliptin] Diarrhea     headache      Family History   Problem Relation Age of Onset    Heart Disease Mother     Heart Disease Father       Social History     Social History    Marital status:      Spouse name: N/A    Number of children: N/A    Years of education: N/A     Occupational History    Not on file.      Social History Main Topics    Smoking status: Former Smoker     Quit date: 6/3/1967    Smokeless tobacco: Never Used    Alcohol use No    Drug use: No    Sexual activity: Not on file     Other Topics Concern    Not on file     Social History Narrative     Outpatient Prescriptions Marked as Taking for the 9/4/18 encounter (Office Visit) with Tamela Reynolds Neyda Ga MD   Medication Sig Dispense Refill    tamsulosin (FLOMAX) 0.4 mg capsule Take 2 Caps by mouth daily. 180 Cap 3    sAXagliptin (ONGLYZA) 2.5 mg tablet Take 1 Tab by mouth daily. 90 Tab 3    TRUE METRIX GLUCOSE TEST STRIP strip USE 1 STRIP TO TEST BLOOD SUGAR EVERY  Strip 0    colesevelam (WELCHOL) 625 mg tablet TAKE 3 TABLETS BY MOUTH TWICE DAILY 180 Tab 0    gabapentin (NEURONTIN) 300 mg capsule TAKE 1 CAPSULE BY MOUTH TWICE DAILY 60 Cap 0    hydrALAZINE (APRESOLINE) 25 mg tablet TAKE 1 TABLET BY MOUTH TWICE DAILY 60 Tab 0    nitroglycerin (NITROSTAT) 0.4 mg SL tablet 1 Tab by SubLINGual route every five (5) minutes as needed for Chest Pain. 25 Tab 3    acetaminophen (TYLENOL EXTRA STRENGTH) 500 mg tablet Take  by mouth every six (6) hours as needed for Pain.  clotrimazole-betamethasone (LOTRISONE) 1-0.05 % lotion Apply  to affected area two (2) times a day.  glipiZIDE (GLUCOTROL) 10 mg tablet Take 1.5 Tabs by mouth two (2) times a day. 270 Tab 3    cyanocobalamin (VITAMIN B12) 1,000 mcg/mL injection INJECT 1 ML SUBCUTANEOUS EVERY MONTH 90 mL 2    Lancets misc Use with Fastclix lancing device daily to test blood sugar. 100 Each 3    ACCU-CHEK FASTCLIX misc USE AS DIRECTED TO MONITOR BLOOD SUGAR 100 Each 3    aspirin delayed-release 81 mg tablet Take 81 mg by mouth daily.  polyethylene glycol (MIRALAX) 17 gram/dose powder Take 17 g by mouth daily as needed.  simvastatin (ZOCOR) 20 mg tablet Take 20 mg by mouth nightly.  Cholecalciferol, Vitamin D3, (VITAMIN D3) 1,000 unit cap Take 1,000 Units by mouth daily.  ferrous sulfate 325 mg (65 mg iron) tablet Take 325 mg by mouth two (2) times a day.  diltiazem CD (DILT-CD) 300 mg ER capsule Take 300 mg by mouth daily. Review of Systems              Constitutional:  He denies fever, weight loss, sweats or fatigue. HEENT:  No blurred or double vision, headache or dizziness.   No difficulty with swallowing, taste, speech or smell. Respiratory:  No cough, wheezing or shortness of breath. No sputum production. Cardiac:  Denies chest pain, palpitations, unexplained indigestion, syncope, edema, PND or orthopnea. GI:  No changes in bowel movements, no abdominal pain, no bloating, anorexia, nausea, vomiting or heartburn. :  No frequency or dysuria. Denies incontinence. Extremities: ingrown toenail left great toe  Skin:  No recent rashes or mole changes. Neurological:  No numbness, tingling, burning paresthesias or loss of motor strength. No syncope, dizziness, frequent headaches or memory loss. Objective:     Vitals:    09/04/18 1308   BP: 146/58   Pulse: 68   Resp: 16   Temp: 98.3 °F (36.8 °C)   TempSrc: Oral   SpO2: 94%   Weight: 159 lb 9.6 oz (72.4 kg)   Height: 5' 5\" (1.651 m)   PainSc:   0 - No pain       Body mass index is 26.56 kg/(m^2). Physical Examination:              General Appearance:  Well-developed, well-nourished, no acute  distress. HEENT:     Ears:  The TMs and ear canals were clear. Eyes:  The pupillary responses were normal.  Extraocular muscle function intact. Lids and conjunctiva not injected. Neck:  Supple without thyromegaly or adenopathy. No JVD noted. Lungs:  Clear to auscultation and percussion. Cardiac:  Regular rate and rhythm without murmur. GI: nontender w/o mass. Normal BS's. Extremities:  ingrown nail lateral aspect left great toe. Pedal pulses diminished  Skin:  No rash or unusual mole changes noted. Neurological:  Grossly normal.            Assessment/Plan:   Impressions:      ICD-10-CM ICD-9-CM    1. Type 2 diabetes mellitus with nephropathy (HCC) E11.21 250.40 sAXagliptin (ONGLYZA) 2.5 mg tablet     818.48 METABOLIC PANEL, BASIC      HEMOGLOBIN A1C WITH EAG   2. Benign localized hyperplasia of prostate N40.0 600.20 tamsulosin (FLOMAX) 0.4 mg capsule   3. Essential hypertension T01 567.6 METABOLIC PANEL, BASIC   4.  CKD (chronic kidney disease) stage 3, GFR 30-59 ml/min H20.0 863.9 METABOLIC PANEL, BASIC   5. Anemia, unspecified type D64.9 285.9 AMB POC COMPLETE CBC,AUTOMATED ENTER   6. Ingrown toenail of left foot with infection L60.0 703.0 DUPLEX LOWER EXT ARTERY BILAT        Plan:  1. Continue present meds  2. Discussed lifestyle modifications including Na restriction, low carb/fat diet, weight reduction and exercise (at least a walking program). 3. Push fluids        Follow-up Disposition:  Return for After procedure (PVR's.). Orders Placed This Encounter    DUPLEX LOWER EXT ARTERY BILAT     Standing Status:   Future     Standing Expiration Date:   35/6/9035    METABOLIC PANEL, BASIC    HEMOGLOBIN A1C WITH EAG    AMB POC COMPLETE CBC,AUTOMATED ENTER    tamsulosin (FLOMAX) 0.4 mg capsule     Sig: Take 2 Caps by mouth daily. Dispense:  180 Cap     Refill:  3    sAXagliptin (ONGLYZA) 2.5 mg tablet     Sig: Take 1 Tab by mouth daily.      Dispense:  90 Tab     Refill:  3       Key Perez MD

## 2018-09-04 NOTE — PROGRESS NOTES
1. Have you been to the ER, urgent care clinic since your last visit? Hospitalized since your last visit? No    2. Have you seen or consulted any other health care providers outside of the [address] [place] [address] since your last visit? Include any pap smears or colon screening.  No     Chief Complaint   Patient presents with    Diabetes     6 week f/u    Chronic Kidney Disease

## 2018-09-05 LAB
BUN SERPL-MCNC: 29 MG/DL (ref 8–27)
BUN/CREAT SERPL: 16 (ref 10–24)
CALCIUM SERPL-MCNC: 9 MG/DL (ref 8.6–10.2)
CHLORIDE SERPL-SCNC: 105 MMOL/L (ref 96–106)
CO2 SERPL-SCNC: 21 MMOL/L (ref 20–29)
CREAT SERPL-MCNC: 1.78 MG/DL (ref 0.76–1.27)
EST. AVERAGE GLUCOSE BLD GHB EST-MCNC: 157 MG/DL
GLUCOSE SERPL-MCNC: 179 MG/DL (ref 65–99)
HBA1C MFR BLD: 7.1 % (ref 4.8–5.6)
POTASSIUM SERPL-SCNC: 5.2 MMOL/L (ref 3.5–5.2)
SODIUM SERPL-SCNC: 141 MMOL/L (ref 134–144)

## 2018-09-05 NOTE — PROGRESS NOTES
Called patient's wife and verified . Advised of lab results and the patient's wife stated she understood.

## 2018-09-06 ENCOUNTER — HOSPITAL ENCOUNTER (OUTPATIENT)
Dept: VASCULAR SURGERY | Age: 83
Discharge: HOME | End: 2018-09-06
Attending: INTERNAL MEDICINE
Payer: MEDICARE

## 2018-09-06 DIAGNOSIS — I73.9 PERIPHERAL VASCULAR DISEASE, UNSPECIFIED (HCC): ICD-10-CM

## 2018-09-06 PROCEDURE — 93923 UPR/LXTR ART STDY 3+ LVLS: CPT

## 2018-09-07 RX ORDER — COLESEVELAM 180 1/1
TABLET ORAL
Qty: 180 TAB | Refills: 0 | Status: SHIPPED | OUTPATIENT
Start: 2018-09-07 | End: 2018-10-08 | Stop reason: SDUPTHER

## 2018-09-07 RX ORDER — HYDRALAZINE HYDROCHLORIDE 25 MG/1
TABLET, FILM COATED ORAL
Qty: 60 TAB | Refills: 0 | Status: SHIPPED | OUTPATIENT
Start: 2018-09-07 | End: 2018-10-08 | Stop reason: SDUPTHER

## 2018-09-07 RX ORDER — GABAPENTIN 300 MG/1
CAPSULE ORAL
Qty: 60 CAP | Refills: 0 | Status: SHIPPED | OUTPATIENT
Start: 2018-09-07 | End: 2018-10-08 | Stop reason: SDUPTHER

## 2018-09-08 NOTE — PROGRESS NOTES
Circulation study shows mild decrease in circulation c/w age and diabetes but it's certainly adequate to treat toenail.

## 2018-09-10 ENCOUNTER — OFFICE VISIT (OUTPATIENT)
Dept: INTERNAL MEDICINE CLINIC | Age: 83
End: 2018-09-10

## 2018-09-10 VITALS
TEMPERATURE: 98.4 F | WEIGHT: 159.8 LBS | HEIGHT: 65 IN | HEART RATE: 71 BPM | DIASTOLIC BLOOD PRESSURE: 55 MMHG | OXYGEN SATURATION: 93 % | SYSTOLIC BLOOD PRESSURE: 136 MMHG | BODY MASS INDEX: 26.62 KG/M2

## 2018-09-10 DIAGNOSIS — L60.0 INGROWN LEFT BIG TOENAIL: Primary | ICD-10-CM

## 2018-09-10 NOTE — PROGRESS NOTES
Subjective:     Mr. Nury Hidalgo comes to the office today having completed his PVRs, which revealed adequate circulation. He still has the ingrown toenail of the left great toe and returns for partial avulsion of the nail. Procedure was explained to him and verbal consent obtained and the procedure carried out as detailed below. Will check it again in two weeks' time. We can then determine routine follow up.         Patient Active Problem List    Diagnosis Date Noted    Type 2 diabetes mellitus with diabetic neuropathy ([facility] [address] 03/14/2018     Priority: 1 - One    Type 2 diabetes mellitus with nephropathy ([facility] [address] 12/14/2017     Priority: 1 - One    Lumbar stenosis with neurogenic claudication 09/14/2017     Priority: 1 - One    Diabetes ([facility] [address] 09/24/2015     Priority: 1 - One    Hypertension 09/24/2015     Priority: 1 - One    Hypercholesterolemia 09/24/2015     Priority: 1 - One    Anemia 12/14/2017     Priority: 2 - Two    ASCVD (arteriosclerotic cardiovascular disease) 09/28/2017     Priority: 2 - Two    CKD (chronic kidney disease) stage 3, GFR 30-59 ml/min 09/24/2015     Priority: 2 - Two    CAD (coronary artery disease) 09/24/2015     Priority: 2 - Two    B12 deficiency 09/28/2017     Priority: 3 - Three    History of gastritis 09/28/2017     Priority: 3 - Three    CVD (cerebrovascular disease) 09/28/2017     Priority: 3 - Three    Benign non-nodular prostatic hyperplasia with lower urinary tract symptoms 09/24/2015     Priority: 3 - Three    Adrenal adenoma 09/28/2017     Priority: 4 - Four    MCI (mild cognitive impairment) 09/28/2017     Priority: 4 - Four    Allergic rhinitis 09/28/2017     Priority: 5 - Five    Black hairy tongue 09/28/2017     Priority: 5 - Five    Burning mouth syndrome 09/28/2017     Priority: 5 - Five    Vitamin D deficiency 09/28/2017     Priority: 5 - Five    Heart murmur 06/12/2018    PE (physical exam), annual 09/28/2017     Past Surgical History:   Procedure Laterality Date   [facility] CARDIAC SURG PROCEDURE UNLIST      [id number]), [facility]([id number], 2004)    HX CATARACT REMOVAL Bilateral     HX CHOLECYSTECTOMY      HX LUMBAR LAMINECTOMY  09/2017    HX ORTHOPAEDIC  09/23/2015    back surgery     NH COLONOSCOPY FLX DX W/COLLJ SPEC WHEN PFRMD  8/12/2013         NH EGD TRANSORAL BIOPSY SINGLE/MULTIPLE  6/28/2012         VASCULAR SURGERY PROCEDURE UNLIST  2/1996    left carotid      Social History   Substance Use Topics    Smoking status: Former Smoker     Quit date: 6/3/1967    Smokeless tobacco: Never Used    Alcohol use No     Allergies   Allergen Reactions    Tetanus Vaccines And Toxoid Swelling     Swelling at the site    Tradjenta [Linagliptin] Diarrhea     headache     Outpatient Prescriptions Marked as Taking for the 9/10/18 encounter (Office Visit) with Tawnya Gaspar MD   Medication Sig Dispense Refill    colesevelam (WELCHOL) 625 mg tablet TAKE 3 TABLETS BY MOUTH TWICE DAILY 180 Tab 0    hydrALAZINE (APRESOLINE) 25 mg tablet TAKE 1 TABLET BY MOUTH TWICE DAILY 60 Tab 0    gabapentin (NEURONTIN) 300 mg capsule TAKE 1 CAPSULE BY MOUTH TWICE DAILY 60 Cap 0    tamsulosin (FLOMAX) 0.4 mg capsule Take 2 Caps by mouth daily. 180 Cap 3    sAXagliptin (ONGLYZA) 2.5 mg tablet Take 1 Tab by mouth daily. 90 Tab 3    TRUE METRIX GLUCOSE TEST STRIP strip USE 1 STRIP TO TEST BLOOD SUGAR EVERY  Strip 0    nitroglycerin (NITROSTAT) 0.4 mg SL tablet 1 Tab by SubLINGual route every five (5) minutes as needed for Chest Pain. 25 Tab 3    acetaminophen (TYLENOL EXTRA STRENGTH) 500 mg tablet Take  by mouth every six (6) hours as needed for Pain.  clotrimazole-betamethasone (LOTRISONE) 1-0.05 % lotion Apply  to affected area two (2) times a day.  glipiZIDE (GLUCOTROL) 10 mg tablet Take 1.5 Tabs by mouth two (2) times a day.  270 Tab 3    cyanocobalamin (VITAMIN B12) 1,000 mcg/mL injection INJECT 1 ML SUBCUTANEOUS EVERY MONTH 90 mL 2    Lancets misc Use with Fastclix lancing device daily to test blood sugar. 100 Each 3    ACCU-CHEK FASTCLIX misc USE AS DIRECTED TO MONITOR BLOOD SUGAR 100 Each 3    aspirin delayed-release 81 mg tablet Take 81 mg by mouth daily.  polyethylene glycol (MIRALAX) 17 gram/dose powder Take 17 g by mouth daily as needed.  simvastatin (ZOCOR) 20 mg tablet Take 20 mg by mouth nightly.  Cholecalciferol, Vitamin D3, (VITAMIN D3) 1,000 unit cap Take 1,000 Units by mouth daily.  ferrous sulfate 325 mg (65 mg iron) tablet Take 325 mg by mouth two (2) times a day.  diltiazem CD (DILT-CD) 300 mg ER capsule Take 300 mg by mouth daily. Review of Systems:  GEN: no weight loss, weight gain, fatigue or night sweats  CV: no PND, orthopnea, or palpitations  Resp: no dyspnea on exertion, no cough  Abd: no nausea, vomiting or diarrhea  Endocrine: no hair loss, excessive thirst or polyuria  Neurological ROS: no TIA or stroke symptoms      Objective:     Visit Vitals    /55 (BP 1 Location: Left arm, BP Patient Position: Sitting)    Pulse 71    Temp 98.4 °F (36.9 °C) (Oral)    Ht 5' 5\" (1.651 m)    Wt 159 lb 12.8 oz (72.5 kg)    SpO2 93%    BMI 26.59 kg/m2     General:   alert, cooperative and no distress   Eyes: conjunctivae/sclerae clear. PERRL   Mouth:  No oral lesions, no pharyngeal erythema, no exudates   Skin: Ingrown toenail left great toe laterally   Heart: S1 and S2 normal,no murmurs noted    Lungs: Clear to auscultation bilaterally, no increased work of breathing   Abdomen: Soft, nontender. Normal bowel sounds   Extremities: No edema or cyanosis                                Assessment/Plan:       ICD-10-CM ICD-9-CM    1. Ingrown left big toenail L60.0 703.0        After verbal consent obtained, the left great toenail was blocked with 1% lidocaine after sterile prep with betadine. The lateral aspect of the toenail was bluntly dissected from the nail margin and sharply excised.   Appropriate bleeding encountered and controlled with pressure. Antibiotic ointment was applied and a bulky dressing overlaid. Wound care instructions were given. Follow-up Disposition:  Return in about 2 weeks (around 9/24/2018) for If sxs worsen or fail to improve; or as scheduled.

## 2018-09-10 NOTE — PROGRESS NOTES
Reviewed record in preparation for visit and have obtained necessary documentation. Identified pt with two pt identifiers(name and ). Chief Complaint   Patient presents with    Ingrown Toenail        Coordination of Care Questionnaire:  :     1) Have you been to an emergency room, urgent care clinic since your last visit? No   Hospitalized since your last visit? No               2) Have you seen or consulted any other health care providers outside of [address] [place] [address] since your last visit?  No

## 2018-09-27 ENCOUNTER — OFFICE VISIT (OUTPATIENT)
Dept: INTERNAL MEDICINE CLINIC | Age: 83
End: 2018-09-27

## 2018-09-27 VITALS
TEMPERATURE: 98 F | HEART RATE: 75 BPM | SYSTOLIC BLOOD PRESSURE: 140 MMHG | WEIGHT: 160.8 LBS | DIASTOLIC BLOOD PRESSURE: 58 MMHG | OXYGEN SATURATION: 94 % | HEIGHT: 65 IN | BODY MASS INDEX: 26.79 KG/M2

## 2018-09-27 DIAGNOSIS — Z23 ENCOUNTER FOR IMMUNIZATION: ICD-10-CM

## 2018-09-27 DIAGNOSIS — I10 ESSENTIAL HYPERTENSION: Primary | ICD-10-CM

## 2018-09-27 DIAGNOSIS — N18.30 CKD (CHRONIC KIDNEY DISEASE) STAGE 3, GFR 30-59 ML/MIN (HCC): ICD-10-CM

## 2018-09-27 DIAGNOSIS — L60.0 INGROWN LEFT BIG TOENAIL: ICD-10-CM

## 2018-09-27 DIAGNOSIS — E11.21 TYPE 2 DIABETES MELLITUS WITH NEPHROPATHY (HCC): ICD-10-CM

## 2018-09-27 LAB
BUN BLD-MCNC: 31 MG/DL (ref 9–20)
CALCIUM BLD-MCNC: 8.9 MG/DL (ref 8.4–10.2)
CHLORIDE BLD-SCNC: 110 MMOL/L (ref 98–107)
CO2 POC: 22 MMOL/L (ref 22–32)
CREAT BLD-MCNC: 1.7 MG/DL (ref 0.8–1.5)
EGFR (POC): 36.5
GLUCOSE POC: 232 MG/DL (ref 75–110)
POTASSIUM SERPL-SCNC: 5.1 MMOL/L (ref 3.6–5)
SODIUM SERPL-SCNC: 143 MMOL/L (ref 137–145)

## 2018-09-27 NOTE — PROGRESS NOTES
Present for routine immunizations. Patient denies any symptoms , reactions or allergies that would exclude them from being immunized today. Risks and adverse reactions were discussed and the VIS was given to them. All questions were addressed. patient was observed for 10 min post injection. There were no reactions observed.  
 
Whitney Noe LPN

## 2018-09-27 NOTE — PROGRESS NOTES
Reviewed record in preparation for visit and have obtained necessary documentation. Identified pt with two pt identifiers(name and ). Chief Complaint Patient presents with  Follow-up 2 weeks Coordination of Care Questionnaire: 
:  
 
1) Have you been to an emergency room, urgent care clinic since your last visit? No  
 
Hospitalized since your last visit? No  
 
 
     
 
2) Have you seen or consulted any other health care providers outside of [address] [place] [address] since your last visit?  No

## 2018-09-27 NOTE — PATIENT INSTRUCTIONS
Vaccine Information Statement Influenza (Flu) Vaccine (Inactivated or Recombinant): What you need to know Many Vaccine Information Statements are available in Bengali and other languages. See www.immunize.org/vis Hojas de Información Sobre Vacunas están disponibles en Español y en muchos otros idiomas. Visite www.immunize.org/vis 1. Why get vaccinated? Influenza (flu) is a contagious disease that spreads around the [place] every year, usually between October and May. Flu is caused by influenza viruses, and is spread mainly by coughing, sneezing, and close contact. Anyone can get flu. Flu strikes suddenly and can last several days. Symptoms vary by age, but can include: 
 fever/chills  sore throat  muscle aches  fatigue  cough  headache  runny or stuffy nose Flu can also lead to pneumonia and blood infections, and cause diarrhea and seizures in children. If you have a medical condition, such as heart or lung disease, flu can make it worse. Flu is more dangerous for some people. Infants and young children, people 72years of age and older, pregnant women, and people with certain health conditions or a weakened immune system are at greatest risk. Each year thousands of people in the [facility] die from flu, and many more are hospitalized. Flu vaccine can: 
 keep you from getting flu, 
 make flu less severe if you do get it, and 
 keep you from spreading flu to your family and other people. 2. Inactivated and recombinant flu vaccines A dose of flu vaccine is recommended every flu season. Children 6 months through 6years of age may need two doses during the same flu season. Everyone else needs only one dose each flu season.   
 
 
Some inactivated flu vaccines contain a very small amount of a mercury-based preservative called thimerosal. Studies have not shown thimerosal in vaccines to be harmful, but flu vaccines that do not contain thimerosal are available. There is no live flu virus in flu shots. They cannot cause the flu. There are many flu viruses, and they are always changing. Each year a new flu vaccine is made to protect against three or four viruses that are likely to cause disease in the upcoming flu season. But even when the vaccine doesnt exactly match these viruses, it may still provide some protection Flu vaccine cannot prevent: 
 flu that is caused by a virus not covered by the vaccine, or 
 illnesses that look like flu but are not. It takes about 2 weeks for protection to develop after vaccination, and protection lasts through the flu season. 3. Some people should not get this vaccine Tell the person who is giving you the vaccine:  If you have any severe, life-threatening allergies. If you ever had a life-threatening allergic reaction after a dose of flu vaccine, or have a severe allergy to any part of this vaccine, you may be advised not to get vaccinated. Most, but not all, types of flu vaccine contain a small amount of egg protein.  If you ever had Guillain-Barré Syndrome (also called GBS). Some people with a history of GBS should not get this vaccine. This should be discussed with your doctor.  If you are not feeling well. It is usually okay to get flu vaccine when you have a mild illness, but you might be asked to come back when you feel better. 4. Risks of a vaccine reaction With any medicine, including vaccines, there is a chance of reactions. These are usually mild and go away on their own, but serious reactions are also possible. Most people who get a flu shot do not have any problems with it. Minor problems following a flu shot include:  
 soreness, redness, or swelling where the shot was given  hoarseness  sore, red or itchy eyes  cough  fever  aches  headache  itching  fatigue If these problems occur, they usually begin soon after the shot and last 1 or 2 days. More serious problems following a flu shot can include the following:  There may be a small increased risk of Guillain-Barré Syndrome (GBS) after inactivated flu vaccine. This risk has been estimated at 1 or 2 additional cases per million people vaccinated. This is much lower than the risk of severe complications from flu, which can be prevented by flu vaccine.  Young children who get the flu shot along with pneumococcal vaccine (PCV13) and/or DTaP vaccine at the same time might be slightly more likely to have a seizure caused by fever. Ask your doctor for more information. Tell your doctor if a child who is getting flu vaccine has ever had a seizure. Problems that could happen after any injected vaccine:  People sometimes faint after a medical procedure, including vaccination. Sitting or lying down for about 15 minutes can help prevent fainting, and injuries caused by a fall. Tell your doctor if you feel dizzy, or have vision changes or ringing in the ears.  Some people get severe pain in the shoulder and have difficulty moving the arm where a shot was given. This happens very rarely.  Any medication can cause a severe allergic reaction. Such reactions from a vaccine are very rare, estimated at about 1 in a million doses, and would happen within a few minutes to a few hours after the vaccination. As with any medicine, there is a very remote chance of a vaccine causing a serious injury or death. The safety of vaccines is always being monitored. For more information, visit: www.cdc.gov/vaccinesafety/ 
 
5. What if there is a serious reaction? What should I look for?  Look for anything that concerns you, such as signs of a severe allergic reaction, very high fever, or unusual behavior.  
 
Signs of a severe allergic reaction can include hives, swelling of the face and throat, difficulty breathing, a fast heartbeat, dizziness, and weakness  usually within a few minutes to a few hours after the vaccination. What should I do?  If you think it is a severe allergic reaction or other emergency that cant wait, call 9-1-1 and get the person to the nearest hospital. Otherwise, call your doctor.  Reactions should be reported to the Vaccine Adverse Event Reporting System (VAERS). Your doctor should file this report, or you can do it yourself through  the VAERS web site at www.vaers. [facility].gov, or by calling 7-340.973.4044. VAERS does not give medical advice. 6. The National Vaccine Injury Compensation Program 
 
The [facility] Vaccine Injury Compensation Program (VICP) is a federal program that was created to compensate people who may have been injured by certain vaccines. Persons who believe they may have been injured by a vaccine can learn about the program and about filing a claim by calling 2-621.749.3640 or visiting the [facility] website at www.[facility]/vaccinecompensation. There is a time limit to file a claim for compensation. 7. How can I learn more?  Ask your healthcare provider. He or she can give you the vaccine package insert or suggest other sources of information.  Call your local or state health department.  Contact the Centers for Disease Control and Prevention (CDC): 
- Call 1-787.557.5219 (1-800-CDC-INFO) or 
- Visit CDCs website at www.cdc.gov/flu Vaccine Information Statement Inactivated Influenza Vaccine 8/7/2015 
[address] [facility] [id number]-[id number] [facility] and Symptify Centers for Disease Control and Prevention Office Use Only Ingrown Toenail: Care Instructions Your Care Instructions An ingrown toenail often occurs because a nail is not trimmed correctly or because shoes are too tight. An ingrown nail can cause an infection. If your toe is infected, your doctor may prescribe antibiotics.  Most ingrown toenails can be treated at home. You should trim toenails straight across, so the ends of the nail grow over the skin and not into it. Good nail care can prevent ingrown toenails. Follow-up care is a key part of your treatment and safety. Be sure to make and go to all appointments, and call your doctor if you are having problems. It's also a good idea to know your test results and keep a list of the medicines you take. How can you care for yourself at home? · Trim the nails straight across. Leave the corners a little longer so they do not cut into the skin. To do this when you have an ingrown nail: 
¨ Soak your foot in warm water for about 15 minutes to soften the nail. ¨ Wedge a small piece of wet cotton under the corner of the nail to cushion the nail and lift it slightly. This keeps it from cutting the skin. ¨ Repeat daily until the nail has grown out and can be trimmed. · Do not use manicure scissors to dig under the ingrown nail. You might stab your toe, which could get infected. · Do not trim your toenails too short. · Check with your doctor before trimming your own toenails if you have been diagnosed with diabetes or peripheral arterial disease. These conditions increase the risk of an infection, because you may have decreased sensation in your toes and cut yourself without knowing it. · Wear roomy, comfortable shoes. · If your doctor prescribed antibiotics, take them as directed. Do not stop taking them just because you feel better. You need to take the full course of antibiotics. When should you call for help? Call your doctor now or seek immediate medical care if: 
  · You have signs of infection, such as: 
¨ Increased pain, swelling, warmth, or redness. ¨ Red streaks leading from the toe. ¨ Pus draining from the toe. ¨ A fever.  
 Watch closely for changes in your health, and be sure to contact your doctor if: 
  · You do not get better as expected. Where can you learn more? Go to http://rosa isela-brenton.info/. Enter R135 in the search box to learn more about \"Ingrown Toenail: Care Instructions. \" Current as of: October 5, 2017 Content Version: 11.7 © 2316-2650 [employer], [employer]. Care instructions adapted under license by [employer] (which disclaims liability or warranty for this information). If you have questions about a medical condition or this instruction, always ask your healthcare professional. [address] any warranty or liability for your use of this information.

## 2018-09-27 NOTE — MR AVS SNAPSHOT
[facility] 
 
 
 [address] [id number] [phone] Patient: Nila Monae MRN: [id number] RK Visit Information Date & Time Provider Department Dept. Phone Encounter #  
 2018  2:00 PM Jason Mott MD [place] [id number] [phone] [id number] Follow-up Instructions Return in about 3 months (around 2018) for Fasting. Your Appointments 2018  2:00 PM  
FOLLOW UP 10 with MD ALENA Kelsey [facility] ([facility]) Appt Note: 3 Mnth F/U  
 [address] [id number] [address] [facility] [id number] Upcoming Health Maintenance Date Due Shingrix Vaccine Age 50> (1 of 2) 1985 Influenza Age 5 to Adult 2018 HEMOGLOBIN A1C Q6M 3/4/2019 EYE EXAM RETINAL OR DILATED Q1 2019 MICROALBUMIN Q1 2019 LIPID PANEL Q1 2019 FOOT EXAM Q1 2019 MEDICARE YEARLY EXAM 2019 GLAUCOMA SCREENING Q2Y 2020 DTaP/Tdap/Td series (2 - Td) 2022 Allergies as of 2018  Review Complete On: 2018 By: Hermelinda Chand Severity Noted Reaction Type Reactions Tetanus Vaccines And Toxoid  2012    Swelling Swelling at the site Tradjenta [Linagliptin]  2015    Diarrhea  
 headache Current Immunizations  Reviewed on 2018 Name Date Influenza High Dose Vaccine PF 2017 Influenza Vaccine (Quad) PF 10/3/2015  8:17 PM  
 Influenza Vaccine (Tri) Adjuvanted  Incomplete Pneumococcal Conjugate (PCV-13) 10/23/2015 Pneumococcal Polysaccharide (PPSV-23) 2007 Pneumococcal Vaccine (Unspecified Type) 2007 Tdap 2012 Zoster Vaccine, Live 2012 Not reviewed this visit You Were Diagnosed With   
  
 Codes Comments Essential hypertension    -  Primary ICD-10-CM: I10 
ICD-9-CM: 401.9 Encounter for immunization     ICD-10-CM: P92 ICD-9-CM: V03.89 Type 2 diabetes mellitus with nephropathy (HCC)     ICD-10-CM: E11.21 
ICD-9-CM: 250.40, 583.81   
 CKD (chronic kidney disease) stage 3, GFR 30-59 ml/min     ICD-10-CM: N18.3 ICD-9-CM: 124. 3 Ingrown left big toenail     ICD-10-CM: L60.0 ICD-9-CM: 703.0 Vitals BP Pulse Temp Height(growth percentile) Weight(growth percentile) SpO2  
 140/58 (BP 1 Location: Left arm, BP Patient Position: Sitting) 75 98 °F (36.7 °C) (Oral) 5' 5\" (1.651 m) 160 lb 12.8 oz (72.9 kg) 94% BMI Smoking Status 26.76 kg/m2 Former Smoker Vitals History BMI and BSA Data Body Mass Index Body Surface Area  
 26.76 kg/m 2 1.83 m 2 Preferred Pharmacy Pharmacy Name Phone [facility] [address] [facility], [address] [place] [address] AT [address] [place] [address] [phone] Your Updated Medication List  
  
   
This list is accurate as of 9/27/18  2:58 PM.  Always use your most recent med list.  
  
  
  
  
 * ACCU-CHEK FASTCLIX LANCING DEV Misc Generic drug:  Lancets USE AS DIRECTED TO MONITOR BLOOD SUGAR * Lancets Misc Use with Fastclix lancing device daily to test blood sugar. aspirin delayed-release 81 mg tablet Take 81 mg by mouth daily. clotrimazole-betamethasone 1-0.05 % lotion Commonly known as:  [place] Apply  to affected area two (2) times a day. colesevelam 625 mg tablet Commonly known as:  WELCHOL  
TAKE 3 TABLETS BY MOUTH TWICE DAILY  
  
 cyanocobalamin 1,000 mcg/mL injection Commonly known as:  VITAMIN B12 INJECT 1 ML SUBCUTANEOUS EVERY MONTH  
  
 DILT- mg ER capsule Generic drug:  dilTIAZem CD Take 300 mg by mouth daily. ferrous sulfate 325 mg (65 mg iron) tablet Take 325 mg by mouth two (2) times a day.  
  
 gabapentin 300 mg capsule Commonly known as:  NEURONTIN  
TAKE 1 CAPSULE BY MOUTH TWICE DAILY  
  
 glipiZIDE 10 mg tablet Commonly known as:  Kittitian Gamaliel Take 1.5 Tabs by mouth two (2) times a day. hydrALAZINE 25 mg tablet Commonly known as:  APRESOLINE  
TAKE 1 TABLET BY MOUTH TWICE DAILY MIRALAX 17 gram/dose powder Generic drug:  polyethylene glycol Take 17 g by mouth daily as needed. nitroglycerin 0.4 mg SL tablet Commonly known as:  NITROSTAT  
1 Tab by SubLINGual route every five (5) minutes as needed for Chest Pain. sAXagliptin 2.5 mg tablet Commonly known as:  ONGLYZA Take 1 Tab by mouth daily. simvastatin 20 mg tablet Commonly known as:  ZOCOR Take 20 mg by mouth nightly. tamsulosin 0.4 mg capsule Commonly known as:  FLOMAX Take 2 Caps by mouth daily. TRUE METRIX GLUCOSE TEST STRIP strip Generic drug:  glucose blood VI test strips USE 1 STRIP TO TEST BLOOD SUGAR EVERY DAY  
  
 TYLENOL EXTRA STRENGTH 500 mg tablet Generic drug:  acetaminophen Take  by mouth every six (6) hours as needed for Pain. VITAMIN D3 1,000 unit Cap Generic drug:  cholecalciferol Take 1,000 Units by mouth daily. * Notice: This list has 2 medication(s) that are the same as other medications prescribed for you. Read the directions carefully, and ask your doctor or other care provider to review them with you. We Performed the Following ADMIN INFLUENZA VIRUS VAC [ HCPCS] AMB POC BASIC METABOLIC PANEL [74629 CPT(R)] INFLUENZA VACCINE INACTIVATED (IIV), SUBUNIT, ADJUVANTED, IM [id number] CPT(R)] Follow-up Instructions Return in about 3 months (around 12/27/2018) for Fasting. Patient Instructions Vaccine Information Statement Influenza (Flu) Vaccine (Inactivated or Recombinant): What you need to know Many Vaccine Information Statements are available in Slovak and other languages. See www.immunize.org/vis Hojas de Información Sobre Vacunas están disponibles en Español y en muchos otros idiomas. Visite www.immunize.org/vis 1. Why get vaccinated? Influenza (flu) is a contagious disease that spreads around the [place] every year, usually between October and May. Flu is caused by influenza viruses, and is spread mainly by coughing, sneezing, and close contact. Anyone can get flu. Flu strikes suddenly and can last several days. Symptoms vary by age, but can include: 
 fever/chills  sore throat  muscle aches  fatigue  cough  headache  runny or stuffy nose Flu can also lead to pneumonia and blood infections, and cause diarrhea and seizures in children. If you have a medical condition, such as heart or lung disease, flu can make it worse. Flu is more dangerous for some people. Infants and young children, people 72years of age and older, pregnant women, and people with certain health conditions or a weakened immune system are at greatest risk. Each year thousands of people in the [facility] die from flu, and many more are hospitalized. Flu vaccine can: 
 keep you from getting flu, 
 make flu less severe if you do get it, and 
 keep you from spreading flu to your family and other people. 2. Inactivated and recombinant flu vaccines A dose of flu vaccine is recommended every flu season. Children 6 months through 6years of age may need two doses during the same flu season. Everyone else needs only one dose each flu season. Some inactivated flu vaccines contain a very small amount of a mercury-based preservative called thimerosal. Studies have not shown thimerosal in vaccines to be harmful, but flu vaccines that do not contain thimerosal are available. There is no live flu virus in flu shots. They cannot cause the flu. There are many flu viruses, and they are always changing.  Each year a new flu vaccine is made to protect against three or four viruses that are likely to cause disease in the upcoming flu season. But even when the vaccine doesnt exactly match these viruses, it may still provide some protection Flu vaccine cannot prevent: 
 flu that is caused by a virus not covered by the vaccine, or 
 illnesses that look like flu but are not. It takes about 2 weeks for protection to develop after vaccination, and protection lasts through the flu season. 3. Some people should not get this vaccine Tell the person who is giving you the vaccine:  If you have any severe, life-threatening allergies. If you ever had a life-threatening allergic reaction after a dose of flu vaccine, or have a severe allergy to any part of this vaccine, you may be advised not to get vaccinated. Most, but not all, types of flu vaccine contain a small amount of egg protein.  If you ever had Guillain-Barré Syndrome (also called GBS). Some people with a history of GBS should not get this vaccine. This should be discussed with your doctor.  If you are not feeling well. It is usually okay to get flu vaccine when you have a mild illness, but you might be asked to come back when you feel better. 4. Risks of a vaccine reaction With any medicine, including vaccines, there is a chance of reactions. These are usually mild and go away on their own, but serious reactions are also possible. Most people who get a flu shot do not have any problems with it. Minor problems following a flu shot include:  
 soreness, redness, or swelling where the shot was given  hoarseness  sore, red or itchy eyes  cough  fever  aches  headache  itching  fatigue If these problems occur, they usually begin soon after the shot and last 1 or 2 days. More serious problems following a flu shot can include the following:  There may be a small increased risk of Guillain-Barré Syndrome (GBS) after inactivated flu vaccine. This risk has been estimated at 1 or 2 additional cases per million people vaccinated. This is much lower than the risk of severe complications from flu, which can be prevented by flu vaccine.  Young children who get the flu shot along with pneumococcal vaccine (PCV13) and/or DTaP vaccine at the same time might be slightly more likely to have a seizure caused by fever. Ask your doctor for more information. Tell your doctor if a child who is getting flu vaccine has ever had a seizure. Problems that could happen after any injected vaccine:  People sometimes faint after a medical procedure, including vaccination. Sitting or lying down for about 15 minutes can help prevent fainting, and injuries caused by a fall. Tell your doctor if you feel dizzy, or have vision changes or ringing in the ears.  Some people get severe pain in the shoulder and have difficulty moving the arm where a shot was given. This happens very rarely.  Any medication can cause a severe allergic reaction. Such reactions from a vaccine are very rare, estimated at about 1 in a million doses, and would happen within a few minutes to a few hours after the vaccination. As with any medicine, there is a very remote chance of a vaccine causing a serious injury or death. The safety of vaccines is always being monitored. For more information, visit: www.cdc.gov/vaccinesafety/ 
 
5. What if there is a serious reaction? What should I look for?  Look for anything that concerns you, such as signs of a severe allergic reaction, very high fever, or unusual behavior. Signs of a severe allergic reaction can include hives, swelling of the face and throat, difficulty breathing, a fast heartbeat, dizziness, and weakness  usually within a few minutes to a few hours after the vaccination. What should I do?  
 
 If you think it is a severe allergic reaction or other emergency that cant wait, call 9-1-1 and get the person to the nearest hospital. Otherwise, call your doctor.  Reactions should be reported to the Vaccine Adverse Event Reporting System (VAERS). Your doctor should file this report, or you can do it yourself through  the VAERS web site at www.vaers. [facility].gov, or by calling 3-527.261.5258. VAERS does not give medical advice. 6. The National Vaccine Injury Compensation Program 
 
The [facility] Vaccine Injury Compensation Program (VICP) is a federal program that was created to compensate people who may have been injured by certain vaccines. Persons who believe they may have been injured by a vaccine can learn about the program and about filing a claim by calling 3-995.118.9104 or visiting the [employer] website at www.[facility].gov/vaccinecompensation. There is a time limit to file a claim for compensation. 7. How can I learn more?  Ask your healthcare provider. He or she can give you the vaccine package insert or suggest other sources of information.  Call your local or state health department.  Contact the Centers for Disease Control and Prevention (CDC): 
- Call 0-834.529.1277 (1-800-CDC-INFO) or 
- Visit CDCs website at www.cdc.gov/flu Vaccine Information Statement Inactivated Influenza Vaccine 8/7/2015 
[address] [facility] and [employer] Centers for Disease Control and Prevention Office Use Only Ingrown Toenail: Care Instructions Your Care Instructions An ingrown toenail often occurs because a nail is not trimmed correctly or because shoes are too tight. An ingrown nail can cause an infection. If your toe is infected, your doctor may prescribe antibiotics. Most ingrown toenails can be treated at home. You should trim toenails straight across, so the ends of the nail grow over the skin and not into it. Good nail care can prevent ingrown toenails. Follow-up care is a key part of your treatment and safety. Be sure to make and go to all appointments, and call your doctor if you are having problems. It's also a good idea to know your test results and keep a list of the medicines you take. How can you care for yourself at home? · Trim the nails straight across. Leave the corners a little longer so they do not cut into the skin. To do this when you have an ingrown nail: 
¨ Soak your foot in warm water for about 15 minutes to soften the nail. ¨ Wedge a small piece of wet cotton under the corner of the nail to cushion the nail and lift it slightly. This keeps it from cutting the skin. ¨ Repeat daily until the nail has grown out and can be trimmed. · Do not use manicure scissors to dig under the ingrown nail. You might stab your toe, which could get infected. · Do not trim your toenails too short. · Check with your doctor before trimming your own toenails if you have been diagnosed with diabetes or peripheral arterial disease. These conditions increase the risk of an infection, because you may have decreased sensation in your toes and cut yourself without knowing it. · Wear roomy, comfortable shoes. · If your doctor prescribed antibiotics, take them as directed. Do not stop taking them just because you feel better. You need to take the full course of antibiotics. When should you call for help? Call your doctor now or seek immediate medical care if: 
  · You have signs of infection, such as: 
¨ Increased pain, swelling, warmth, or redness. ¨ Red streaks leading from the toe. ¨ Pus draining from the toe. ¨ A fever.  
 Watch closely for changes in your health, and be sure to contact your doctor if: 
  · You do not get better as expected. Where can you learn more? Go to http://rosa isela-brenton.info/. Enter R135 in the search box to learn more about \"Ingrown Toenail: Care Instructions. \" Current as of: October 5, 2017 Content Version: 11.7 © 7192-6183 [employer]. Care instructions adapted under license by [employer] (which disclaims liability or warranty for this information). If you have questions about a medical condition or this instruction, always ask your healthcare professional. [address] any warranty or liability for your use of this information. Introducing [facility]! [employer] introduces Gone! patient portal. Now you can access parts of your medical record, email your doctor's office, and request medication refills online. 1. In your internet browser, go to [email or web address] 2. Click on the First Time User? Click Here link in the Sign In box. You will see the New Member Sign Up page. 3. Enter your Gone! Access Code exactly as it appears below. You will not need to use this code after youve completed the sign-up process. If you do not sign up before the expiration date, you must request a new code. · Gone! Access Code: [email or web address] Expires: 12/26/2018  2:20 PM 
 
4. Enter the last four digits of your Social Security Number (xxxx) and Date of Birth (mm/dd/yyyy) as indicated and click Submit. You will be taken to the next sign-up page. 5. Create a Gone! ID. This will be your Gone! login ID and cannot be changed, so think of one that is secure and easy to remember. 6. Create a Gone! password. You can change your password at any time. 7. Enter your Password Reset Question and Answer. This can be used at a later time if you forget your password. 8. Enter your e-mail address. You will receive e-mail notification when new information is available in [address] 9. Click Sign Up. You can now view and download portions of your medical record. 10. Click the Download Summary menu link to download a portable copy of your medical information. If you have questions, please visit the Frequently Asked Questions section of the [email or web address] website. Remember, Vapotherm is NOT to be used for urgent needs. For medical emergencies, dial 911. Now available from your iPhone and Android! Please provide this summary of care documentation to your next provider. Your primary care clinician is listed as Mary Swartz. If you have any questions after today's visit, please call [phone].

## 2018-09-28 RX ORDER — SAXAGLIPTIN 2.5 MG/1
TABLET, FILM COATED ORAL
Qty: 30 TAB | Refills: 0 | Status: SHIPPED | OUTPATIENT
Start: 2018-09-28 | End: 2018-10-29 | Stop reason: SDUPTHER

## 2018-10-08 RX ORDER — COLESEVELAM 180 1/1
TABLET ORAL
Qty: 180 TAB | Refills: 0 | Status: SHIPPED | OUTPATIENT
Start: 2018-10-08 | End: 2018-11-07 | Stop reason: SDUPTHER

## 2018-10-08 RX ORDER — HYDRALAZINE HYDROCHLORIDE 25 MG/1
TABLET, FILM COATED ORAL
Qty: 60 TAB | Refills: 0 | Status: SHIPPED | OUTPATIENT
Start: 2018-10-08 | End: 2018-11-07 | Stop reason: SDUPTHER

## 2018-10-08 RX ORDER — GABAPENTIN 300 MG/1
CAPSULE ORAL
Qty: 60 CAP | Refills: 0 | Status: SHIPPED | OUTPATIENT
Start: 2018-10-08 | End: 2018-11-07 | Stop reason: SDUPTHER

## 2018-10-29 RX ORDER — SAXAGLIPTIN 2.5 MG/1
TABLET, FILM COATED ORAL
Qty: 90 TAB | Refills: 3 | Status: SHIPPED | OUTPATIENT
Start: 2018-10-29 | End: 2019-10-24 | Stop reason: SDUPTHER

## 2018-10-29 NOTE — TELEPHONE ENCOUNTER
Requested Prescriptions     Pending Prescriptions Disp Refills    ONGLYZA 2.5 mg tablet [Pharmacy Med Name: ONGLYZA 2.5MG TABLETS] 90 Tab 3     Sig: TAKE 1 TABLET BY MOUTH EVERY DAY

## 2018-11-06 RX ORDER — GLIPIZIDE 10 MG/1
TABLET ORAL
Qty: 270 TAB | Refills: 0 | Status: SHIPPED | OUTPATIENT
Start: 2018-11-06 | End: 2019-02-08 | Stop reason: SDUPTHER

## 2018-11-07 RX ORDER — HYDRALAZINE HYDROCHLORIDE 25 MG/1
TABLET, FILM COATED ORAL
Qty: 60 TAB | Refills: 0 | Status: SHIPPED | OUTPATIENT
Start: 2018-11-07 | End: 2018-12-04 | Stop reason: SDUPTHER

## 2018-11-07 RX ORDER — GABAPENTIN 300 MG/1
CAPSULE ORAL
Qty: 60 CAP | Refills: 0 | Status: SHIPPED | OUTPATIENT
Start: 2018-11-07 | End: 2018-12-04 | Stop reason: SDUPTHER

## 2018-11-07 RX ORDER — COLESEVELAM 180 1/1
TABLET ORAL
Qty: 180 TAB | Refills: 0 | Status: SHIPPED | OUTPATIENT
Start: 2018-11-07 | End: 2018-12-10 | Stop reason: SDUPTHER

## 2018-11-09 NOTE — TELEPHONE ENCOUNTER
Patient Last Seen:  09- with labs    Last labs done: NA    Next appointment:  12-      Requested Prescriptions     Pending Prescriptions Disp Refills    colesevelam (WELCHOL) 625 mg tablet [Pharmacy Med Name: COLESEVELAM 625MG TABLETS] 180 Tab 1     Sig: TAKE 3 TABLETS BY MOUTH TWICE DAILY    glipiZIDE (GLUCOTROL) 10 mg tablet [Pharmacy Med Name: GLIPIZIDE 10MG TABLETS] 270 Tab 1     Sig: TAKE 1 AND 1/2 TABLETS BY MOUTH TWICE DAILY    gabapentin (NEURONTIN) 300 mg capsule [Pharmacy Med Name: GABAPENTIN 300MG CAPSULES] 180 Cap 1     Sig: TAKE 1 CAPSULE BY MOUTH TWICE DAILY    hydrALAZINE (APRESOLINE) 25 mg tablet [Pharmacy Med Name: HYDRALAZINE  25MG TABLETS(ORANGE)] 180 Tab 1     Sig: TAKE 1 TABLET BY MOUTH TWICE DAILY

## 2018-11-11 RX ORDER — COLESEVELAM 180 1/1
TABLET ORAL
Qty: 180 TAB | Refills: 1 | Status: SHIPPED | OUTPATIENT
Start: 2018-11-11 | End: 2020-03-22 | Stop reason: SDUPTHER

## 2018-11-11 RX ORDER — HYDRALAZINE HYDROCHLORIDE 25 MG/1
TABLET, FILM COATED ORAL
Qty: 180 TAB | Refills: 1 | Status: SHIPPED | OUTPATIENT
Start: 2018-11-11 | End: 2020-03-11 | Stop reason: SDUPTHER

## 2018-11-11 RX ORDER — GLIPIZIDE 10 MG/1
TABLET ORAL
Qty: 270 TAB | Refills: 1 | Status: SHIPPED | OUTPATIENT
Start: 2018-11-11 | End: 2018-12-20 | Stop reason: SDUPTHER

## 2018-11-11 RX ORDER — GABAPENTIN 300 MG/1
CAPSULE ORAL
Qty: 180 CAP | Refills: 1 | Status: SHIPPED | OUTPATIENT
Start: 2018-11-11 | End: 2019-04-02

## 2018-11-26 RX ORDER — CALCIUM CITRATE/VITAMIN D3 200MG-6.25
TABLET ORAL
Qty: 100 STRIP | Refills: 0 | Status: SHIPPED | OUTPATIENT
Start: 2018-11-26 | End: 2019-02-21 | Stop reason: SDUPTHER

## 2018-11-26 RX ORDER — LANCETS
EACH MISCELLANEOUS
Qty: 1 EACH | Refills: 5 | Status: SHIPPED | OUTPATIENT
Start: 2018-11-26 | End: 2020-02-20 | Stop reason: SDUPTHER

## 2018-11-30 ENCOUNTER — CLINICAL SUPPORT (OUTPATIENT)
Dept: INTERNAL MEDICINE CLINIC | Age: 83
End: 2018-11-30

## 2018-11-30 VITALS
SYSTOLIC BLOOD PRESSURE: 152 MMHG | HEART RATE: 87 BPM | OXYGEN SATURATION: 97 % | RESPIRATION RATE: 16 BRPM | DIASTOLIC BLOOD PRESSURE: 80 MMHG

## 2018-11-30 DIAGNOSIS — E53.8 VITAMIN B 12 DEFICIENCY: Primary | ICD-10-CM

## 2018-11-30 RX ORDER — CYANOCOBALAMIN 1000 UG/ML
INJECTION, SOLUTION INTRAMUSCULAR; SUBCUTANEOUS
Qty: 90 ML | Refills: 0 | Status: SHIPPED | OUTPATIENT
Start: 2018-11-30 | End: 2020-02-20 | Stop reason: SDUPTHER

## 2018-11-30 RX ORDER — CYANOCOBALAMIN 1000 UG/ML
1000 INJECTION, SOLUTION INTRAMUSCULAR; SUBCUTANEOUS ONCE
Qty: 1 ML | Refills: 0 | Status: SHIPPED | COMMUNITY
Start: 2018-11-30 | End: 2018-11-30

## 2018-11-30 NOTE — PROGRESS NOTES
Per verbal order of Dr. Eder Wei, patient was given 1ml of B12 Injection on Left Deltoid. Patient tolerated injection well. Monitored patient 15 minutes post injection. No side effects or reactions noted.

## 2018-12-05 RX ORDER — HYDRALAZINE HYDROCHLORIDE 25 MG/1
TABLET, FILM COATED ORAL
Qty: 60 TAB | Refills: 0 | Status: SHIPPED | OUTPATIENT
Start: 2018-12-05 | End: 2018-12-20 | Stop reason: SDUPTHER

## 2018-12-05 RX ORDER — GABAPENTIN 300 MG/1
CAPSULE ORAL
Qty: 60 CAP | Refills: 0 | Status: SHIPPED | OUTPATIENT
Start: 2018-12-05 | End: 2018-12-20 | Stop reason: SDUPTHER

## 2018-12-10 RX ORDER — COLESEVELAM 180 1/1
TABLET ORAL
Qty: 180 TAB | Refills: 0 | Status: SHIPPED | OUTPATIENT
Start: 2018-12-10 | End: 2019-01-06 | Stop reason: SDUPTHER

## 2018-12-19 PROBLEM — Z79.899 LONG-TERM CURRENT USE OF HIGH RISK MEDICATION OTHER THAN ANTICOAGULANT: Status: ACTIVE | Noted: 2018-12-19

## 2018-12-20 ENCOUNTER — OFFICE VISIT (OUTPATIENT)
Dept: INTERNAL MEDICINE CLINIC | Age: 83
End: 2018-12-20

## 2018-12-20 VITALS
SYSTOLIC BLOOD PRESSURE: 136 MMHG | HEART RATE: 82 BPM | BODY MASS INDEX: 27.32 KG/M2 | OXYGEN SATURATION: 95 % | WEIGHT: 164 LBS | DIASTOLIC BLOOD PRESSURE: 72 MMHG | HEIGHT: 65 IN

## 2018-12-20 DIAGNOSIS — E11.21 TYPE II DIABETES MELLITUS WITH NEPHROPATHY (HCC): ICD-10-CM

## 2018-12-20 DIAGNOSIS — E78.00 HYPERCHOLESTEROLEMIA: ICD-10-CM

## 2018-12-20 DIAGNOSIS — Z79.899 LONG-TERM CURRENT USE OF HIGH RISK MEDICATION OTHER THAN ANTICOAGULANT: ICD-10-CM

## 2018-12-20 DIAGNOSIS — I10 ESSENTIAL HYPERTENSION: ICD-10-CM

## 2018-12-20 DIAGNOSIS — D64.9 ANEMIA, UNSPECIFIED TYPE: ICD-10-CM

## 2018-12-20 DIAGNOSIS — E11.40 TYPE 2 DIABETES MELLITUS WITH DIABETIC NEUROPATHY, WITHOUT LONG-TERM CURRENT USE OF INSULIN (HCC): Primary | ICD-10-CM

## 2018-12-20 PROBLEM — M48.062 LUMBAR STENOSIS WITH NEUROGENIC CLAUDICATION: Status: RESOLVED | Noted: 2017-09-14 | Resolved: 2018-12-20

## 2018-12-20 LAB
ALBUMIN SERPL-MCNC: 4.1 G/DL (ref 3.9–5.4)
ALKALINE PHOS POC: 104 U/L (ref 38–126)
ALT SERPL-CCNC: 35 U/L (ref 9–52)
AST SERPL-CCNC: 22 U/L (ref 14–36)
BUN BLD-MCNC: 36 MG/DL (ref 9–20)
CALCIUM BLD-MCNC: 9.4 MG/DL (ref 8.4–10.2)
CHLORIDE BLD-SCNC: 112 MMOL/L (ref 98–107)
CHOLEST SERPL-MCNC: 100 MG/DL (ref 0–200)
CK (CPK) POC: 87 U/L (ref 30–135)
CO2 POC: 21 MMOL/L (ref 22–32)
CREAT BLD-MCNC: 2 MG/DL (ref 0.8–1.5)
EGFR (POC): 30
GLUCOSE POC: 162 MG/DL (ref 75–110)
GRAN# POC: 6 K/UL (ref 2–7.8)
GRAN% POC: 69.9 % (ref 37–92)
HCT VFR BLD CALC: 35.3 % (ref 37–51)
HDLC SERPL-MCNC: 49 MG/DL (ref 35–130)
HGB BLD-MCNC: 11.8 G/DL (ref 12–18)
LDL CHOLESTEROL POC: 16.8 MG/DL (ref 0–130)
LY# POC: 2 K/UL (ref 0.6–4.1)
LY% POC: 24 % (ref 10–58.5)
MCH RBC QN: 29.6 PG (ref 26–32)
MCHC RBC-ENTMCNC: 33.5 G/DL (ref 30–36)
MCV RBC: 88 FL (ref 80–97)
MID #, POC: 0.5 K/UL (ref 0–1.8)
MID% POC: 6.1 % (ref 0.1–24)
PLATELET # BLD: 164 K/UL (ref 140–440)
POTASSIUM SERPL-SCNC: 5.2 MMOL/L (ref 3.6–5)
PROT SERPL-MCNC: 7.2 G/DL (ref 6.3–8.2)
RBC # BLD: 3.99 M/UL (ref 4.2–6.3)
SODIUM SERPL-SCNC: 145 MMOL/L (ref 137–145)
TCHOL/HDL RATIO (POC): 2 (ref 0–4)
TOTAL BILIRUBIN POC: 0.7 MG/DL (ref 0.2–1.3)
TRIGL SERPL-MCNC: 171 MG/DL (ref 0–200)
VLDLC SERPL CALC-MCNC: 34.2 MG/DL
WBC # BLD: 8.5 K/UL (ref 4.1–10.9)

## 2018-12-21 LAB — HBA1C MFR BLD HPLC: 7.3 % (ref 4.5–5.7)

## 2019-01-02 RX ORDER — HYDRALAZINE HYDROCHLORIDE 25 MG/1
TABLET, FILM COATED ORAL
Qty: 60 TAB | Refills: 0 | Status: CANCELLED | OUTPATIENT
Start: 2019-01-02

## 2019-01-02 RX ORDER — GABAPENTIN 300 MG/1
CAPSULE ORAL
Qty: 60 CAP | Refills: 0 | Status: CANCELLED | OUTPATIENT
Start: 2019-01-02

## 2019-01-02 RX ORDER — HYDRALAZINE HYDROCHLORIDE 25 MG/1
TABLET, FILM COATED ORAL
Qty: 180 TAB | Refills: 1 | Status: SHIPPED | OUTPATIENT
Start: 2019-01-02 | End: 2019-03-08 | Stop reason: SDUPTHER

## 2019-01-02 RX ORDER — GABAPENTIN 300 MG/1
CAPSULE ORAL
Qty: 180 CAP | Refills: 1 | Status: SHIPPED | OUTPATIENT
Start: 2019-01-02 | End: 2019-06-25 | Stop reason: SDUPTHER

## 2019-01-02 NOTE — TELEPHONE ENCOUNTER
Patient Last Seen:  12- with labs    Last labs done: NA    Next appointment:  03-    Requested Prescriptions     Pending Prescriptions Disp Refills    gabapentin (NEURONTIN) 300 mg capsule [Pharmacy Med Name: GABAPENTIN 300MG CAPSULES] 180 Cap 1     Sig: TAKE 1 CAPSULE BY MOUTH TWICE DAILY    hydrALAZINE (APRESOLINE) 25 mg tablet [Pharmacy Med Name: HYDRALAZINE  25MG TABLETS(ORANGE)] 180 Tab 1     Sig: TAKE 1 TABLET BY MOUTH TWICE DAILY

## 2019-01-06 RX ORDER — COLESEVELAM 180 1/1
TABLET ORAL
Qty: 180 TAB | Refills: 0 | Status: SHIPPED | OUTPATIENT
Start: 2019-01-06 | End: 2019-02-07 | Stop reason: SDUPTHER

## 2019-01-11 RX ORDER — HYDRALAZINE HYDROCHLORIDE 25 MG/1
TABLET, FILM COATED ORAL
Qty: 180 TAB | Refills: 3 | Status: SHIPPED | OUTPATIENT
Start: 2019-01-11 | End: 2019-03-08 | Stop reason: SDUPTHER

## 2019-01-11 NOTE — TELEPHONE ENCOUNTER
Patient Last Seen:  12- with labs    Last labs done: NA    Next appointment:  03-    Requested Prescriptions     Pending Prescriptions Disp Refills    hydrALAZINE (APRESOLINE) 25 mg tablet [Pharmacy Med Name: HYDRALAZINE  25MG TABLETS(ORANGE)] 180 Tab 3     Sig: TAKE 1 TABLET BY MOUTH TWICE DAILY

## 2019-01-31 ENCOUNTER — CLINICAL SUPPORT (OUTPATIENT)
Dept: INTERNAL MEDICINE CLINIC | Age: 84
End: 2019-01-31

## 2019-01-31 VITALS
HEART RATE: 78 BPM | BODY MASS INDEX: 27.29 KG/M2 | HEIGHT: 65 IN | DIASTOLIC BLOOD PRESSURE: 72 MMHG | SYSTOLIC BLOOD PRESSURE: 138 MMHG | OXYGEN SATURATION: 95 %

## 2019-01-31 DIAGNOSIS — E53.8 VITAMIN B 12 DEFICIENCY: Primary | ICD-10-CM

## 2019-01-31 RX ORDER — CYANOCOBALAMIN 1000 UG/ML
1000 INJECTION, SOLUTION INTRAMUSCULAR; SUBCUTANEOUS ONCE
Qty: 1 ML | Refills: 0 | Status: SHIPPED | COMMUNITY
Start: 2019-01-31 | End: 2019-01-31

## 2019-01-31 NOTE — PROGRESS NOTES
After obtaining consent, and per orders of Dr. Ailin Piedra, injection of Vitamin B-12 given by Kanika Sullivan LPN. Patient instructed to remain in clinic for 20 minutes afterwards, and to report any adverse reaction to me immediately.

## 2019-02-07 RX ORDER — COLESEVELAM 180 1/1
TABLET ORAL
Qty: 180 TAB | Refills: 0 | Status: SHIPPED | OUTPATIENT
Start: 2019-02-07 | End: 2019-03-07 | Stop reason: SDUPTHER

## 2019-02-08 RX ORDER — GLIPIZIDE 10 MG/1
TABLET ORAL
Qty: 270 TAB | Refills: 0 | Status: SHIPPED | OUTPATIENT
Start: 2019-02-08 | End: 2019-03-08 | Stop reason: SDUPTHER

## 2019-02-12 RX ORDER — GLIPIZIDE 10 MG/1
TABLET ORAL
Qty: 270 TAB | Refills: 0 | Status: SHIPPED | OUTPATIENT
Start: 2019-02-12 | End: 2019-06-12

## 2019-02-21 RX ORDER — CALCIUM CITRATE/VITAMIN D3 200MG-6.25
TABLET ORAL
Qty: 100 STRIP | Refills: 0 | Status: SHIPPED | OUTPATIENT
Start: 2019-02-21 | End: 2019-05-29 | Stop reason: SDUPTHER

## 2019-02-28 RX ORDER — INSULIN PUMP SYRINGE, 3 ML
EACH MISCELLANEOUS
Qty: 1 KIT | Refills: 0 | Status: SHIPPED | OUTPATIENT
Start: 2019-02-28

## 2019-02-28 NOTE — TELEPHONE ENCOUNTER
Patient's insurance did not accept the AccuChek Mari Plus test strips. PCP: Carine Aparicio MD    Last appt: 1/31/2019  Future Appointments   Date Time Provider Marcos Joiner   3/26/2019  2:00 PM Carine Aparicio MD [address]       Requested Prescriptions     Pending Prescriptions Disp Refills    Blood-Glucose Meter monitoring kit 1 Kit 0     Sig: Use to test blood sugar once daily.  glucose blood VI test strips (ACCU-CHEK SMARTVIEW TEST STRIP) strip 100 Strip 3     Sig: Use to check blood sugar once daily.        Prior labs and Blood pressures:  BP Readings from Last 3 Encounters:   01/31/19 138/72   12/20/18 136/72   11/30/18 152/80     Lab Results   Component Value Date/Time    Sodium 141 09/04/2018 02:02 PM    Potassium 5.2 09/04/2018 02:02 PM    Chloride 105 09/04/2018 02:02 PM    CO2 21 09/04/2018 02:02 PM    Anion gap 9 09/15/2017 03:19 AM    Glucose 179 (H) 09/04/2018 02:02 PM    BUN 29 (H) 09/04/2018 02:02 PM    Creatinine 1.78 (H) 09/04/2018 02:02 PM    BUN/Creatinine ratio 16 09/04/2018 02:02 PM    GFR est AA 40 (L) 09/04/2018 02:02 PM    GFR est non-AA 35 (L) 09/04/2018 02:02 PM    Calcium 9.0 09/04/2018 02:02 PM     Lab Results   Component Value Date/Time    Hemoglobin A1c 7.1 (H) 09/04/2018 02:02 PM    Hemoglobin A1c (POC) 7.3 (A) 12/20/2018 03:18 PM     Lab Results   Component Value Date/Time    Cholesterol, total 159 03/10/2010 08:27 AM    Cholesterol (POC) 100.0 12/20/2018 03:18 PM    HDL Cholesterol 46 03/10/2010 08:27 AM    HDL Cholesterol (POC) 49.0 12/20/2018 03:18 PM    LDL Cholesterol (POC) 16.8 12/20/2018 03:18 PM    LDL, calculated 82.4 03/10/2010 08:27 AM    VLDL, calculated 30.6 03/10/2010 08:27 AM    Triglyceride 153 03/10/2010 08:27 AM    Triglycerides (POC) 171.0 12/20/2018 03:18 PM    CHOL/HDL Ratio 3.5 03/10/2010 08:27 AM     No results found for: VITD3, XQVID2, XQVID3, XQVID, VD3RIA    No results found for: TSH, TSH2, TSH3, TSHP, TSHEXT

## 2019-03-07 ENCOUNTER — TELEPHONE (OUTPATIENT)
Dept: INTERNAL MEDICINE CLINIC | Age: 84
End: 2019-03-07

## 2019-03-07 RX ORDER — COLESEVELAM 180 1/1
TABLET ORAL
Qty: 180 TAB | Refills: 0 | Status: SHIPPED | OUTPATIENT
Start: 2019-03-07 | End: 2019-03-08 | Stop reason: SDUPTHER

## 2019-03-07 NOTE — TELEPHONE ENCOUNTER
Patient called with c/o a knot in his left side groin area approximately 1\" long. He states he just noticed it last night. Appointment scheduled for patient to see Dr. Marjorie Reed tomorrow.

## 2019-03-08 ENCOUNTER — OFFICE VISIT (OUTPATIENT)
Dept: INTERNAL MEDICINE CLINIC | Age: 84
End: 2019-03-08

## 2019-03-08 VITALS
WEIGHT: 160 LBS | DIASTOLIC BLOOD PRESSURE: 82 MMHG | HEART RATE: 78 BPM | OXYGEN SATURATION: 96 % | BODY MASS INDEX: 26.66 KG/M2 | TEMPERATURE: 97.9 F | HEIGHT: 65 IN | SYSTOLIC BLOOD PRESSURE: 148 MMHG

## 2019-03-08 DIAGNOSIS — E53.8 VITAMIN B 12 DEFICIENCY: Primary | ICD-10-CM

## 2019-03-08 DIAGNOSIS — K40.90 LEFT INGUINAL HERNIA: ICD-10-CM

## 2019-03-08 DIAGNOSIS — I10 ESSENTIAL HYPERTENSION: ICD-10-CM

## 2019-03-08 RX ORDER — CYANOCOBALAMIN 1000 UG/ML
1000 INJECTION, SOLUTION INTRAMUSCULAR; SUBCUTANEOUS ONCE
Qty: 1 ML | Refills: 0 | Status: SHIPPED | COMMUNITY
Start: 2019-03-08 | End: 2019-03-08

## 2019-03-08 NOTE — PROGRESS NOTES
Subjective:   Prateek Banks is a 80 y.o. male      Chief Complaint   Patient presents with    Cyst        History of present illness:  Mr. Jovanni Soto comes to the office today having noted a lump or nodule or knot in his left groin two days ago. It was fairly sizable when he first noted it. Yesterday it was smaller and today he cannot find it. He has noted no associated fever, chills, pain or tenderness in the area. He has had no difficulties voiding or with his bowel movements. He denies any antecedent trauma. He has a very tiny left inguinal hernia on examination today, but I cannot find any lymphadenopathy or other mass in the area. I told him if it should recur he needs to come in immediately for our evaluation. If it keeps coming and going we may need a CT scan eventually, but for now I would just observe and reassure him.         Patient Active Problem List    Diagnosis Date Noted    Type II diabetes mellitus with nephropathy ([facility] 12/20/2018     Priority: 1 - One    Type 2 diabetes mellitus with diabetic neuropathy ([facility] 03/14/2018     Priority: 1 - One    Hypertension 09/24/2015     Priority: 1 - One    Hypercholesterolemia 09/24/2015     Priority: 1 - One    Anemia 12/14/2017     Priority: 2 - Two    ASCVD (arteriosclerotic cardiovascular disease) 09/28/2017     Priority: 2 - Two    CKD (chronic kidney disease) stage 3, GFR 30-59 ml/min ([facility] 09/24/2015     Priority: 2 - Two    CAD (coronary artery disease) 09/24/2015     Priority: 2 - Two    Long-term current use of high risk medication other than anticoagulant 12/19/2018     Priority: 3 - Three    B12 deficiency 09/28/2017     Priority: 3 - Three    History of gastritis 09/28/2017     Priority: 3 - Three    CVD (cerebrovascular disease) 09/28/2017     Priority: 3 - Three    Benign non-nodular prostatic hyperplasia with lower urinary tract symptoms 09/24/2015     Priority: 3 - Three    Left inguinal hernia 03/08/2019     Priority: 4 - Four    Adrenal adenoma 2017     Priority: 4 - Four    MCI (mild cognitive impairment) 2017     Priority: 4 - Four    Allergic rhinitis 2017     Priority: 5 - Five    Black hairy tongue 2017     Priority: 5 - Five    Burning mouth syndrome 2017     Priority: 5 - Five    Vitamin D deficiency 2017     Priority: 5 - Five    Heart murmur 2018    PE (physical exam), annual 2017      Past Surgical History:   Procedure Laterality Date    CARDIAC SURG PROCEDURE UNLIST      bypass(), UOVVAS(, )    HX CATARACT REMOVAL Bilateral     HX CHOLECYSTECTOMY      HX LUMBAR LAMINECTOMY  2017    HX ORTHOPAEDIC  2015    back surgery     [place] COLONOSCOPY FLX DX W/COLLJ SPEC WHEN PFRMD  2013         [place] EGD TRANSORAL BIOPSY SINGLE/MULTIPLE  2012         VASCULAR SURGERY PROCEDURE UNLIST  1996    left carotid      Allergies   Allergen Reactions    Tetanus Vaccines And Toxoid Swelling     Swelling at the site    Tradjenta [Linagliptin] Diarrhea     headache      Family History   Problem Relation Age of Onset    Heart Disease Mother     Heart Disease Father       Social History     Socioeconomic History    Marital status:      Spouse name: Not on file    Number of children: Not on file    Years of education: Not on file    Highest education level: Not on file   Social Needs    Financial resource strain: Not on file    Food insecurity - worry: Not on file    Food insecurity - inability: Not on file   [employer] needs - medical: Not on file   [employer] needs - non-medical: Not on file   Occupational History    Not on file   Tobacco Use    Smoking status: Former Smoker     Last attempt to quit: 6/3/1967     Years since quittin.7    Smokeless tobacco: Never Used   Substance and Sexual Activity    Alcohol use: No    Drug use: No    Sexual activity: Not on file   Other Topics Concern    Not on file   Social History Narrative    Not on file     Outpatient Medications Marked as Taking for the 3/8/19 encounter (Office Visit) with Rehana Morley MD   Medication Sig Dispense Refill    cyanocobalamin (VITAMIN B-12) 1,000 mcg/mL injection 1 mL by IntraMUSCular route once for 1 dose. 1 mL 0    Blood-Glucose Meter monitoring kit Use to test blood sugar once daily. 1 Kit 0    glucose blood VI test strips (ACCU-CHEK SMARTVIEW TEST STRIP) strip Use to check blood sugar once daily. 100 Strip 3    TRUE METRIX GLUCOSE TEST STRIP strip USE 1 STRIP TO TEST BLOOD SUGAR EVERY  Strip 0    glipiZIDE (GLUCOTROL) 10 mg tablet TAKE 1 AND 1/2 TABLETS BY MOUTH TWICE DAILY 270 Tab 0    gabapentin (NEURONTIN) 300 mg capsule TAKE 1 CAPSULE BY MOUTH TWICE DAILY 180 Cap 1    cyanocobalamin (VITAMIN B12) 1,000 mcg/mL injection INJECT 1 ML SUBCUTANEOUS EVERY MONTH 90 mL 0    ACCU-CHEK FASTCLIX LANCET DRUM misc USE AS DIRECTED TO MONITOR BLOOD SUGAR 1 Each 5    colesevelam (WELCHOL) 625 mg tablet TAKE 3 TABLETS BY MOUTH TWICE DAILY 180 Tab 1    gabapentin (NEURONTIN) 300 mg capsule TAKE 1 CAPSULE BY MOUTH TWICE DAILY 180 Cap 1    hydrALAZINE (APRESOLINE) 25 mg tablet TAKE 1 TABLET BY MOUTH TWICE DAILY 180 Tab 1    ONGLYZA 2.5 mg tablet TAKE 1 TABLET BY MOUTH EVERY DAY 90 Tab 3    tamsulosin (FLOMAX) 0.4 mg capsule Take 2 Caps by mouth daily. 180 Cap 3    nitroglycerin (NITROSTAT) 0.4 mg SL tablet 1 Tab by SubLINGual route every five (5) minutes as needed for Chest Pain. 25 Tab 3    acetaminophen (TYLENOL EXTRA STRENGTH) 500 mg tablet Take  by mouth every six (6) hours as needed for Pain.  Lancets misc Use with Fastclix lancing device daily to test blood sugar. 100 Each 3    ACCU-CHEK FASTCLIX misc USE AS DIRECTED TO MONITOR BLOOD SUGAR 100 Each 3    aspirin delayed-release 81 mg tablet Take 81 mg by mouth daily.  polyethylene glycol (MIRALAX) 17 gram/dose powder Take 17 g by mouth daily as needed.       simvastatin (ZOCOR) 20 mg tablet Take 20 mg by mouth nightly.  Cholecalciferol, Vitamin D3, (VITAMIN D3) 1,000 unit cap Take 1,000 Units by mouth daily.  ferrous sulfate 325 mg (65 mg iron) tablet Take 325 mg by mouth two (2) times a day.  diltiazem CD (DILT-CD) 300 mg ER capsule Take 300 mg by mouth daily. Review of Systems              Constitutional:  He denies fever, weight loss, sweats or fatigue. HEENT:  No blurred or double vision, headache or dizziness. No difficulty with swallowing, taste, speech or smell. Respiratory:  No cough, wheezing or shortness of breath. No sputum production. Cardiac:  Denies chest pain, palpitations, unexplained indigestion, syncope, edema, PND or orthopnea. GI:  No changes in bowel movements, no abdominal pain, no bloating, anorexia, nausea, vomiting or heartburn. :  No frequency or dysuria. Denies incontinence. Extremities:  No joint pain, stiffness or swelling. Skin:  No recent rashes or mole changes. Neurological:  No numbness, tingling, burning paresthesias or loss of motor strength. No syncope, dizziness, frequent headaches or memory loss. Objective:     Vitals:    03/08/19 1114 03/08/19 1136   BP: 166/71 148/82   Pulse: 78    Temp: 97.9 °F (36.6 °C)    TempSrc: Oral    SpO2: 96%    Weight: 160 lb (72.6 kg)    Height: 5' 5\" (1.651 m)    PainSc:   0 - No pain        Body mass index is 26.63 kg/m². Physical Examination:              General Appearance:  Well-developed, well-nourished, no acute  distress. HEENT:     Ears:  The TMs and ear canals were clear. Eyes:  The pupillary responses were normal.  Extraocular muscle function intact. Lids and conjunctiva not injected. Neck:  Supple without thyromegaly or adenopathy. No JVD noted. Lungs:  Clear to auscultation and percussion. Cardiac:  Regular rate and rhythm without murmur. GI: nontender w/o mass. Normal BS's.   : left ing hernia (small)  Extremities: No clubbing, cyanosis or edema. Skin:  No rash or unusual mole changes noted. Neurological:  Grossly normal.            Assessment/Plan:   Impressions:      ICD-10-CM ICD-9-CM    1. Vitamin B 12 deficiency E53.8 266.2 MO THER/PROPH/DIAG INJECTION, SUBCUT/IM      cyanocobalamin (VITAMIN B-12) 1,000 mcg/mL injection   2. Essential hypertension I10 401.9    3. Left inguinal hernia K40.90 550.90         Plan:  1. Continue present meds  2. Discussed lifestyle modifications including Na restriction, low carb/fat diet, weight reduction and exercise (at least a walking program). 3. BP elevated but has FU in April and will recheck        Follow-up Disposition:  Return for As scheduled. Orders Placed This Encounter    THER/PROPH/DIAG INJECTION, SUBCUT/IM    cyanocobalamin (VITAMIN B-12) 1,000 mcg/mL injection     Si mL by IntraMUSCular route once for 1 dose.      Dispense:  1 mL     Refill:  0       Roopa Prescott MD

## 2019-03-08 NOTE — PROGRESS NOTES
Reviewed record in preparation for visit and have obtained necessary documentation. Identified pt with two pt identifiers(name and ). Chief Complaint   Patient presents with    Cyst        Coordination of Care Questionnaire:  :     1) Have you been to an emergency room, urgent care clinic since your last visit? No   Hospitalized since your last visit? No               2) Have you seen or consulted any other health care providers outside of [address] [place] [address] since your last visit?  No

## 2019-03-08 NOTE — PROGRESS NOTES
Per read back verbal order of Dr. Marjorie Reed , patient was given 1ml of B12 Injection in Left Deltoid. Patient tolerated injection well. Monitored patient 15 minutes post injection. No side effects or reactions noted.

## 2019-04-02 ENCOUNTER — OFFICE VISIT (OUTPATIENT)
Dept: INTERNAL MEDICINE CLINIC | Age: 84
End: 2019-04-02

## 2019-04-02 VITALS
TEMPERATURE: 97.9 F | BODY MASS INDEX: 27.06 KG/M2 | HEART RATE: 85 BPM | WEIGHT: 162.4 LBS | DIASTOLIC BLOOD PRESSURE: 68 MMHG | HEIGHT: 65 IN | SYSTOLIC BLOOD PRESSURE: 157 MMHG | OXYGEN SATURATION: 96 %

## 2019-04-02 DIAGNOSIS — I10 ESSENTIAL HYPERTENSION: ICD-10-CM

## 2019-04-02 DIAGNOSIS — I42.0 DILATED CARDIOMYOPATHY (HCC): ICD-10-CM

## 2019-04-02 DIAGNOSIS — N18.30 CKD (CHRONIC KIDNEY DISEASE) STAGE 3, GFR 30-59 ML/MIN (HCC): ICD-10-CM

## 2019-04-02 DIAGNOSIS — D64.9 ANEMIA, UNSPECIFIED TYPE: ICD-10-CM

## 2019-04-02 DIAGNOSIS — M48.061 SPINAL STENOSIS OF LUMBAR REGION WITHOUT NEUROGENIC CLAUDICATION: ICD-10-CM

## 2019-04-02 DIAGNOSIS — E11.21 TYPE II DIABETES MELLITUS WITH NEPHROPATHY (HCC): Primary | ICD-10-CM

## 2019-04-02 LAB
ANION GAP SERPL CALC-SCNC: 16 MMOL/L
BUN SERPL-MCNC: 33 MG/DL (ref 9–20)
CALCIUM SERPL-MCNC: 8.8 MG/DL (ref 8.4–10.2)
CHLORIDE SERPL-SCNC: 105 MMOL/L (ref 98–107)
CO2 SERPL-SCNC: 21 MMOL/L (ref 22–32)
CREAT SERPL-MCNC: 2 MG/DL (ref 0.8–1.5)
ERYTHROCYTE [DISTWIDTH] IN BLOOD BY AUTOMATED COUNT: 12.8 %
GLUCOSE SERPL-MCNC: 232 MG/DL (ref 75–110)
HCT VFR BLD AUTO: 35.2 % (ref 37–51)
HGB BLD-MCNC: 12.3 G/DL (ref 12–18)
LYMPHOCYTES ABSOLUTE: 1.7 K/UL (ref 0.6–4.1)
LYMPHOCYTES NFR BLD: 21.1 % (ref 10–58.5)
MCH RBC QN AUTO: 31.3 PG (ref 26–32)
MCHC RBC AUTO-ENTMCNC: 34.9 G/DL (ref 30–36)
MCV RBC AUTO: 89.5 FL (ref 80–97)
MONOCYTES ABS-DIF,2141: 0.7 K/UL (ref 0–1.8)
MONOCYTES NFR BLD: 8.6 % (ref 0.1–24)
NEUTROPHILS # BLD: 70.3 % (ref 37–92)
NEUTROPHILS ABS,2156: 5.8 K/UL (ref 2–7.8)
PLATELET # BLD AUTO: 143 K/UL (ref 140–440)
PMV BLD AUTO: 9.6 FL
POTASSIUM SERPL-SCNC: 5.4 MMOL/L (ref 3.6–5)
RBC # BLD AUTO: 3.93 M/UL (ref 4.2–6.3)
SODIUM SERPL-SCNC: 142 MMOL/L (ref 137–145)
WBC # BLD AUTO: 8.2 K/UL (ref 4.1–10.9)

## 2019-04-02 RX ORDER — PREDNISONE 10 MG/1
10 TABLET ORAL SEE ADMIN INSTRUCTIONS
Qty: 21 TAB | Refills: 0 | Status: SHIPPED | OUTPATIENT
Start: 2019-04-02 | End: 2019-04-30 | Stop reason: ALTCHOICE

## 2019-04-02 RX ORDER — FLUTICASONE PROPIONATE 50 MCG
SPRAY, SUSPENSION (ML) NASAL
COMMUNITY
Start: 2019-03-15

## 2019-04-02 NOTE — PROGRESS NOTES
Chief Complaint Patient presents with  Diabetes  Hypertension Depression Risk Factor Screening:  
 
3 most recent PHQ Screens 2019 Little interest or pleasure in doing things Not at all Feeling down, depressed, irritable, or hopeless Not at all Total Score PHQ 2 0 Functional Ability and Level of Safety: Activities of Daily Living ADL Assessment 2018 Feeding yourself No Help Needed Getting from bed to chair No Help Needed Getting dressed No Help Needed Bathing or showering No Help Needed Walk across the room (includes cane/walker) No Help Needed Using the telphone No Help Needed Taking your medications No Help Needed Preparing meals No Help Needed Managing money (expenses/bills) No Help Needed Moderately strenuous housework (laundry) No Help Needed Shopping for personal items (toiletries/medicines) No Help Needed Shopping for groceries No Help Needed Driving No Help Needed Climbing a flight of stairs No Help Needed Getting to places beyond walking distances No Help Needed Fall Risk Fall Risk Assessment, last 12 mths 2018 Able to walk? Yes Fall in past 12 months? No  
Fall with injury? -  
Number of falls in past 12 months - Fall Risk Score -  
 
 
Abuse Screen Abuse Screening Questionnaire 2018 Do you ever feel afraid of your partner? Cindy Michi Are you in a relationship with someone who physically or mentally threatens you? Cindy Michi Is it safe for you to go home? Dipesh Travis Reviewed record in preparation for visit and have obtained necessary documentation. Identified pt with two pt identifiers(name and ). Chief Complaint Patient presents with  Diabetes  Hypertension Wt Readings from Last 3 Encounters:  
19 162 lb 6.4 oz (73.7 kg) 19 160 lb (72.6 kg) 18 164 lb (74.4 kg) Temp Readings from Last 3 Encounters:  
19 97.9 °F (36.6 °C) (Oral) 19 97.9 °F (36.6 °C) (Oral) 09/27/18 98 °F (36.7 °C) (Oral) BP Readings from Last 3 Encounters:  
04/02/19 157/68  
03/08/19 148/82  
01/31/19 138/72 Pulse Readings from Last 3 Encounters:  
04/02/19 85  
03/08/19 78  
01/31/19 78 Coordination of Care Questionnaire: 
:  
 
1) Have you been to an emergency room, urgent care clinic since your last visit? No  
 
Hospitalized since your last visit? No  
 
 
     
 
2) Have you seen or consulted any other health care providers outside of [address] [place] [address] since your last visit? No  
 
 
 
 
 
Patient Care Team  
Patient Care Team: 
Aliya Silverman MD as PCP - General (Internal Medicine)

## 2019-04-02 NOTE — PROGRESS NOTES
Subjective:  
Lesly Scott is a 80 y.o. male Chief Complaint Patient presents with  Diabetes  Hypertension History of present illness:  Mr. Alan Hamm returns in follow up. He has developed recurrent low back pain and is wondering whether he should see Dr. Magdaleno Gutierrez. This has only occurred in the last couple weeks' time, however. He recalls no antecedent trauma. I think we can try prednisone dose pack first to see how he does. His blood pressure is elevated today and we will have to see him back within a month anyway. At this time I am not going to increase his medication, though we clearly have room to move with his Hydralazine. We did discuss his echocardiogram that was done after his last physical and this showed some mild reduction in EF consistent with hypertensive/dilated cardiomyopathy and/or age. It is not severe and he has no symptoms of CHF. The echo was actually done for what was perceived as a heart murmur, but no significant valvular disease was noted. Diabetes is being checked today, as well as his chronic anemia. We will let him know results of the lab. Will try a prednisone pack. We will see him back in a month's time regarding his blood pressure. Patient Active Problem List  
 Diagnosis Date Noted  Type II diabetes mellitus with nephropathy ([facility] 12/20/2018 Priority: 1 - One  Type 2 diabetes mellitus with diabetic neuropathy ([facility] 03/14/2018 Priority: 1 - One  
 Hypertension 09/24/2015 Priority: 1 - One  
 Hypercholesterolemia 09/24/2015 Priority: 1 - One  Anemia 12/14/2017 Priority: 2 - Two  ASCVD (arteriosclerotic cardiovascular disease) 09/28/2017 Priority: 2 - Two  CKD (chronic kidney disease) stage 3, GFR 30-59 ml/min ([facility]) 09/24/2015 Priority: 2 - Two  CAD (coronary artery disease) 09/24/2015 Priority: 2 - Two  Long-term current use of high risk medication other than anticoagulant 12/19/2018 Priority: 3 - Three  B12 deficiency 09/28/2017 Priority: 3 - Three  History of gastritis 09/28/2017 Priority: 3 - Three  CVD (cerebrovascular disease) 09/28/2017 Priority: 3 - Three  Spinal stenosis of lumbar region without neurogenic claudication 09/14/2017 Priority: 3 - Three  Benign non-nodular prostatic hyperplasia with lower urinary tract symptoms 09/24/2015 Priority: 3 - Three  Left inguinal hernia 03/08/2019 Priority: 4 - Four  Adrenal adenoma 09/28/2017 Priority: 4 - Four  MCI (mild cognitive impairment) 09/28/2017 Priority: 4 - Four  Allergic rhinitis 09/28/2017 Priority: 5 - Five  Black hairy tongue 09/28/2017 Priority: 5 - Five  Burning mouth syndrome 09/28/2017 Priority: 5 - Five  Vitamin D deficiency 09/28/2017 Priority: 5 - Five  Heart murmur 06/12/2018  PE (physical exam), annual 09/28/2017 Past Surgical History:  
Procedure Laterality Date Mamie Kinjal CARDIAC SURG PROCEDURE UNLIST    
 bypass(1985), [id number], [id number])  HX CATARACT REMOVAL Bilateral   
 HX CHOLECYSTECTOMY  HX LUMBAR LAMINECTOMY  09/2017  HX ORTHOPAEDIC  09/23/2015  
 back surgery  [place] COLONOSCOPY FLX DX W/COLLJ SPEC WHEN PFRMD  8/12/2013  [place] EGD TRANSORAL BIOPSY SINGLE/MULTIPLE  6/28/2012  VASCULAR SURGERY PROCEDURE UNLIST  2/1996  
 left carotid Allergies Allergen Reactions  Tetanus Vaccines And Toxoid Swelling Swelling at the site  Tradjenta [Linagliptin] Diarrhea  
  headache Family History Problem Relation Age of Onset  Heart Disease Mother  Heart Disease Father Social History Socioeconomic History  Marital status:  Spouse name: Not on file  Number of children: Not on file  Years of education: Not on file  Highest education level: Not on file Occupational History  Not on file Social Needs  Financial resource strain: Not on file  Food insecurity:  
  Worry: Not on file Inability: Not on file  Transportation needs:  
  Medical: Not on file Non-medical: Not on file Tobacco Use  Smoking status: Former Smoker Last attempt to quit: 6/3/1967 Years since quittin.8  Smokeless tobacco: Never Used Substance and Sexual Activity  Alcohol use: No  
 Drug use: No  
 Sexual activity: Not on file Lifestyle  Physical activity:  
  Days per week: Not on file Minutes per session: Not on file  Stress: Not on file Relationships  Social connections:  
  Talks on phone: Not on file Gets together: Not on file Attends Bahai service: Not on file Active member of club or organization: Not on file Attends meetings of clubs or organizations: Not on file Relationship status: Not on file  Intimate partner violence:  
  Fear of current or ex partner: Not on file Emotionally abused: Not on file Physically abused: Not on file Forced sexual activity: Not on file Other Topics Concern  Not on file Social History Narrative  Not on file Outpatient Medications Marked as Taking for the 19 encounter (Office Visit) with Joana Grullon MD  
Medication Sig Dispense Refill  fluticasone propionate (FLONASE) 50 mcg/actuation nasal spray  Blood-Glucose Meter monitoring kit Use to test blood sugar once daily. 1 Kit 0  
 glucose blood VI test strips (ACCU-CHEK SMARTVIEW TEST STRIP) strip Use to check blood sugar once daily. 100 Strip 3  
 TRUE METRIX GLUCOSE TEST STRIP strip USE 1 STRIP TO TEST BLOOD SUGAR EVERY  Strip 0  
 glipiZIDE (GLUCOTROL) 10 mg tablet TAKE 1 AND 1/2 TABLETS BY MOUTH TWICE DAILY 270 Tab 0  
 gabapentin (NEURONTIN) 300 mg capsule TAKE 1 CAPSULE BY MOUTH TWICE DAILY 180 Cap 1  cyanocobalamin (VITAMIN B12) 1,000 mcg/mL injection INJECT 1 ML SUBCUTANEOUS EVERY MONTH 90 mL 0  
  ACCU-CHEK FASTCLIX LANCET DRUM misc USE AS DIRECTED TO MONITOR BLOOD SUGAR 1 Each 5  
 colesevelam (WELCHOL) 625 mg tablet TAKE 3 TABLETS BY MOUTH TWICE DAILY 180 Tab 1  
 hydrALAZINE (APRESOLINE) 25 mg tablet TAKE 1 TABLET BY MOUTH TWICE DAILY 180 Tab 1  
 ONGLYZA 2.5 mg tablet TAKE 1 TABLET BY MOUTH EVERY DAY 90 Tab 3  
 tamsulosin (FLOMAX) 0.4 mg capsule Take 2 Caps by mouth daily. 180 Cap 3  
 nitroglycerin (NITROSTAT) 0.4 mg SL tablet 1 Tab by SubLINGual route every five (5) minutes as needed for Chest Pain. 25 Tab 3  
 acetaminophen (TYLENOL EXTRA STRENGTH) 500 mg tablet Take  by mouth every six (6) hours as needed for Pain.  clotrimazole-betamethasone (LOTRISONE) 1-0.05 % lotion Apply  to affected area two (2) times a day.  Lancets misc Use with Fastclix lancing device daily to test blood sugar. 100 Each 3  
 ACCU-CHEK FASTCLIX misc USE AS DIRECTED TO MONITOR BLOOD SUGAR 100 Each 3  
 aspirin delayed-release 81 mg tablet Take 81 mg by mouth daily.  polyethylene glycol (MIRALAX) 17 gram/dose powder Take 17 g by mouth daily as needed.  simvastatin (ZOCOR) 20 mg tablet Take 20 mg by mouth nightly.  Cholecalciferol, Vitamin D3, (VITAMIN D3) 1,000 unit cap Take 1,000 Units by mouth daily.  ferrous sulfate 325 mg (65 mg iron) tablet Take 325 mg by mouth two (2) times a day.  diltiazem CD (DILT-CD) 300 mg ER capsule Take 300 mg by mouth daily. Review of Systems Constitutional:  He denies fever, weight loss, sweats or fatigue. HEENT:  No blurred or double vision, headache or dizziness. No difficulty with swallowing, taste, speech or smell. Respiratory:  No cough, wheezing or shortness of breath. No sputum production. Cardiac:  Denies chest pain, palpitations, unexplained indigestion, syncope, edema, PND or orthopnea. GI:  No changes in bowel movements, no abdominal pain, no bloating, anorexia, nausea, vomiting or heartburn. :  No frequency or dysuria. Denies incontinence. Extremities:  No joint pain, stiffness or swelling. Skin:  No recent rashes or mole changes. Neurological:  No numbness, tingling, burning paresthesias or loss of motor strength. No syncope, dizziness, frequent headaches or memory loss. Back:  2 weeks LBP without leg pain or weakness Objective:  
 
Vitals:  
 04/02/19 1419 BP: 157/68 Pulse: 85 Temp: 97.9 °F (36.6 °C) TempSrc: Oral  
SpO2: 96% Weight: 162 lb 6.4 oz (73.7 kg) Height: 5' 5\" (1.651 m) PainSc:   0 - No pain Body mass index is 27.02 kg/m². Physical Examination:  
           General Appearance:  Well-developed, well-nourished, no acute  distress. HEENT:   
 Ears:  The TMs and ear canals were clear. Eyes:  The pupillary responses were normal.  Extraocular muscle function intact. Lids and conjunctiva not injected. Neck:  Supple without thyromegaly or adenopathy. No JVD noted. Lungs:  Clear to auscultation and percussion. Cardiac:  Regular rate and rhythm with grade 2/6 systolic murmur. GI: nontender w/o mass. Normal BS's. Extremities:  No clubbing, cyanosis or edema. Skin:  No rash or unusual mole changes noted. Neurological:  Grossly normal.     
 
 
 
Assessment/Plan:  
Impressions: ICD-10-CM ICD-9-CM 1. Type II diabetes mellitus with nephropathy ([facility]) D26.92 370.76 METABOLIC PANEL, BASIC  
  583.81 HEMOGLOBIN A1C WITH EAG 2. Essential hypertension O39 144.5 METABOLIC PANEL, BASIC 3. CKD (chronic kidney disease) stage 3, GFR 30-59 ml/min ([facility]) Y75.2 816.9 METABOLIC PANEL, BASIC 4. Anemia, unspecified type D64.9 285.9 CBC WITH AUTOMATED DIFF 5. Spinal stenosis of lumbar region without neurogenic claudication M48.061 724.02 Plan: 1. Continue present meds 2. Discussed lifestyle modifications including Na restriction, low carb/fat diet, weight reduction and exercise (at least a walking program). 3. May need PT for back Follow-up and Dispositions · Return in about 1 month (around 4/30/2019). Orders Placed This Encounter  CBC WITH AUTOMATED DIFF ([facility] In-House)  METABOLIC PANEL, BASIC ([facility])  HEMOGLOBIN A1C WITH BRANDIE Gibbons MD

## 2019-04-03 LAB
EST. AVERAGE GLUCOSE BLD GHB EST-MCNC: 174 MG/DL
HBA1C MFR BLD: 7.7 % (ref 4.8–5.6)

## 2019-04-04 ENCOUNTER — TELEPHONE (OUTPATIENT)
Dept: INTERNAL MEDICINE CLINIC | Age: 84
End: 2019-04-04

## 2019-04-04 NOTE — TELEPHONE ENCOUNTER
----- Message from Leticia Abdullahi MD sent at 4/3/2019  8:10 AM EDT -----  A1C higher 7.7. Increase glipizide 2 tabs bid.   Watch diet and push fluids

## 2019-04-04 NOTE — TELEPHONE ENCOUNTER
PHI verified. Patient informed that Dr. Jose Castellon has reviewed lab results and stated A1C higher 7.7.  Increase glipizide 2 tabs bid.  Watch diet and push fluids. Patient acknowledged understanding of instructions.

## 2019-04-06 RX ORDER — COLESEVELAM 180 1/1
TABLET ORAL
Qty: 180 TAB | Refills: 0 | Status: SHIPPED | OUTPATIENT
Start: 2019-04-06 | End: 2019-04-23 | Stop reason: SDUPTHER

## 2019-04-23 ENCOUNTER — OFFICE VISIT (OUTPATIENT)
Dept: INTERNAL MEDICINE CLINIC | Age: 84
End: 2019-04-23

## 2019-04-23 VITALS
HEIGHT: 65 IN | OXYGEN SATURATION: 95 % | DIASTOLIC BLOOD PRESSURE: 65 MMHG | HEART RATE: 90 BPM | BODY MASS INDEX: 27.49 KG/M2 | SYSTOLIC BLOOD PRESSURE: 139 MMHG | TEMPERATURE: 98.7 F | WEIGHT: 165 LBS

## 2019-04-23 DIAGNOSIS — R05.9 COUGH: ICD-10-CM

## 2019-04-23 DIAGNOSIS — R68.89 FLU-LIKE SYMPTOMS: Primary | ICD-10-CM

## 2019-04-23 DIAGNOSIS — J40 BRONCHITIS: ICD-10-CM

## 2019-04-23 LAB
FLUAV RNA SPEC QL NAA+PROBE: NEGATIVE
FLUBV RNA SPEC QL NAA+PROBE: NEGATIVE

## 2019-04-23 RX ORDER — CEFUROXIME AXETIL 250 MG/1
250 TABLET ORAL 2 TIMES DAILY
Qty: 14 TAB | Refills: 0 | Status: SHIPPED | OUTPATIENT
Start: 2019-04-23 | End: 2019-06-12 | Stop reason: ALTCHOICE

## 2019-04-23 NOTE — PROGRESS NOTES
Chuy Celeste  Identified pt with two pt identifiers(name and ). Chief Complaint   Patient presents with    Fatigue    Fever     100.5    Cough    Generalized Body Aches   Patient of Dr Omaira Martin here with complaint of a fever on & off for a week, body aches, fatigue & a slight cough. Fever at home has been 100.5 at highest.    1. Have you been to the ER, urgent care clinic since your last visit? Hospitalized since your last visit? no    2. Have you seen or consulted any other health care providers outside of the [address] [place] [address] since your last visit? Include any pap smears or colon screening. no      Medication reconciliation up to date and corrected with patient at this time. Today's provider has been notified of reason for visit, vitals and flowsheets obtained on patients. Reviewed record in preparation for visit, huddled with provider and have obtained necessary documentation.         Depression Risk Factor Screening:     3 most recent PHQ Screens 2019   Little interest or pleasure in doing things Not at all   Feeling down, depressed, irritable, or hopeless Not at all   Total Score PHQ 2 0       Functional Ability and Level of Safety:     Activities of Daily Living  ADL Assessment 2018   Feeding yourself No Help Needed   Getting from bed to chair No Help Needed   Getting dressed No Help Needed   Bathing or showering No Help Needed   Walk across the room (includes cane/walker) No Help Needed   Using the telphone No Help Needed   Taking your medications No Help Needed   Preparing meals No Help Needed   Managing money (expenses/bills) No Help Needed   Moderately strenuous housework (laundry) No Help Needed   Shopping for personal items (toiletries/medicines) No Help Needed   Shopping for groceries No Help Needed   Driving No Help Needed   Climbing a flight of stairs No Help Needed   Getting to places beyond walking distances No Help Needed       Fall Risk  Fall Risk Assessment, last 12 mths 7/24/2018   Able to walk? Yes   Fall in past 12 months? No   Fall with injury? -   Number of falls in past 12 months -   Fall Risk Score -       Abuse Screen  Abuse Screening Questionnaire 7/24/2018   Do you ever feel afraid of your partner? N   Are you in a relationship with someone who physically or mentally threatens you? N   Is it safe for you to go home?  Y           Health Maintenance Due   Topic    Shingrix Vaccine Age 50> (1 of 2)       Wt Readings from Last 3 Encounters:   04/23/19 165 lb (74.8 kg)   04/02/19 162 lb 6.4 oz (73.7 kg)   03/08/19 160 lb (72.6 kg)     Temp Readings from Last 3 Encounters:   04/23/19 98.7 °F (37.1 °C) (Oral)   04/02/19 97.9 °F (36.6 °C) (Oral)   03/08/19 97.9 °F (36.6 °C) (Oral)     BP Readings from Last 3 Encounters:   04/23/19 139/65   04/02/19 157/68   03/08/19 148/82     Pulse Readings from Last 3 Encounters:   04/23/19 90   04/02/19 85   03/08/19 78     Vitals:    04/23/19 1540   BP: 139/65   Pulse: 90   Temp: 98.7 °F (37.1 °C)   TempSrc: Oral   SpO2: 93%   Weight: 165 lb (74.8 kg)   Height: 5' 5\" (1.651 m)         Patient Care Team   Patient Care Team:  Sarai Bal MD as PCP - General (Internal Medicine)

## 2019-04-23 NOTE — PATIENT INSTRUCTIONS
Bronchitis: Care Instructions  Your Care Instructions    Bronchitis is inflammation of the bronchial tubes, which carry air to the lungs. The tubes swell and produce mucus, or phlegm. The mucus and inflamed bronchial tubes make you cough. You may have trouble breathing. Most cases of bronchitis are caused by viruses like those that cause colds. Antibiotics usually do not help and they may be harmful. Bronchitis usually develops rapidly and lasts about 2 to 3 weeks in otherwise healthy people. Follow-up care is a key part of your treatment and safety. Be sure to make and go to all appointments, and call your doctor if you are having problems. It's also a good idea to know your test results and keep a list of the medicines you take. How can you care for yourself at home? · Take all medicines exactly as prescribed. Call your doctor if you think you are having a problem with your medicine. · Get some extra rest.  · Take an over-the-counter pain medicine, such as acetaminophen (Tylenol), ibuprofen (Advil, Motrin), or naproxen (Aleve) to reduce fever and relieve body aches. Read and follow all instructions on the label. · Do not take two or more pain medicines at the same time unless the doctor told you to. Many pain medicines have acetaminophen, which is Tylenol. Too much acetaminophen (Tylenol) can be harmful. · Take an over-the-counter cough medicine that contains dextromethorphan to help quiet a dry, hacking cough so that you can sleep. Avoid cough medicines that have more than one active ingredient. Read and follow all instructions on the label. · Breathe moist air from a humidifier, hot shower, or sink filled with hot water. The heat and moisture will thin mucus so you can cough it out. · Do not smoke. Smoking can make bronchitis worse. If you need help quitting, talk to your doctor about stop-smoking programs and medicines. These can increase your chances of quitting for good.   When should you call for help? Call 911 anytime you think you may need emergency care. For example, call if:    · You have severe trouble breathing.    Call your doctor now or seek immediate medical care if:    · You have new or worse trouble breathing.     · You cough up dark brown or bloody mucus (sputum).     · You have a new or higher fever.     · You have a new rash.    Watch closely for changes in your health, and be sure to contact your doctor if:    · You cough more deeply or more often, especially if you notice more mucus or a change in the color of your mucus.     · You are not getting better as expected. Where can you learn more? Go to http://rosa isela-brenton.info/. Enter H333 in the search box to learn more about \"Bronchitis: Care Instructions. \"  Current as of: September 5, 2018  Content Version: 11.9  © 0015-4074 [employer]. Care instructions adapted under license by [employer] (which disclaims liability or warranty for this information). If you have questions about a medical condition or this instruction, always ask your healthcare professional. [address] any warranty or liability for your use of this information.

## 2019-04-23 NOTE — PROGRESS NOTES
Subjective:  Mr. Kimmie Duval is an 80year old gentleman who comes in today with his wife in attendance. He is a patient of Dr. Charlyne Leventhal. He comes in with a one week history of what started out as body aches and fatigue and since then he has had a fever of 100.5 mostly at night. Now he has developed a slight cough. He denies shortness of breath, wheezing or hemoptysis. He denies nausea or vomiting. Denies urinary symptoms. Past Medical History:   Diagnosis Date    CAD (coronary artery disease)     mi    Chronic kidney disease     hx of elevated blood levels    Chronic pain     lower back    Diabetes ([facility] [address]     Enlarged prostate     Hypercholesterolemia     Hypertension     Other unknown and unspecified cause of morbidity or mortality     hayfever     Past Surgical History:   Procedure Laterality Date    CARDIAC SURG PROCEDURE UNLIST      bypass(1985), OFTGTO(0194, 2004)    HX CATARACT REMOVAL Bilateral     HX CHOLECYSTECTOMY      HX LUMBAR LAMINECTOMY  09/2017    HX ORTHOPAEDIC  09/23/2015    back surgery     VT COLONOSCOPY FLX DX W/COLLJ SPEC WHEN PFRMD  8/12/2013         VT EGD TRANSORAL BIOPSY SINGLE/MULTIPLE  6/28/2012         VASCULAR SURGERY PROCEDURE UNLIST  2/1996    left carotid       Current Outpatient Medications on File Prior to Visit   Medication Sig Dispense Refill    fluticasone propionate (FLONASE) 50 mcg/actuation nasal spray       Blood-Glucose Meter monitoring kit Use to test blood sugar once daily. 1 Kit 0    glucose blood VI test strips (ACCU-CHEK SMARTVIEW TEST STRIP) strip Use to check blood sugar once daily.  100 Strip 3    TRUE METRIX GLUCOSE TEST STRIP strip USE 1 STRIP TO TEST BLOOD SUGAR EVERY  Strip 0    glipiZIDE (GLUCOTROL) 10 mg tablet TAKE 1 AND 1/2 TABLETS BY MOUTH TWICE DAILY (Patient taking differently: 04-04-19 - Per Dr. Charlyne Leventhal, patient to increase dose to 2 tablets twice a day.) 270 Tab 0    gabapentin (NEURONTIN) 300 mg capsule TAKE 1 CAPSULE BY MOUTH TWICE DAILY 180 Cap 1    cyanocobalamin (VITAMIN B12) 1,000 mcg/mL injection INJECT 1 ML SUBCUTANEOUS EVERY MONTH 90 mL 0    ACCU-CHEK FASTCLIX LANCET DRUM misc USE AS DIRECTED TO MONITOR BLOOD SUGAR 1 Each 5    colesevelam (WELCHOL) 625 mg tablet TAKE 3 TABLETS BY MOUTH TWICE DAILY 180 Tab 1    hydrALAZINE (APRESOLINE) 25 mg tablet TAKE 1 TABLET BY MOUTH TWICE DAILY 180 Tab 1    ONGLYZA 2.5 mg tablet TAKE 1 TABLET BY MOUTH EVERY DAY 90 Tab 3    tamsulosin (FLOMAX) 0.4 mg capsule Take 2 Caps by mouth daily. 180 Cap 3    nitroglycerin (NITROSTAT) 0.4 mg SL tablet 1 Tab by SubLINGual route every five (5) minutes as needed for Chest Pain. 25 Tab 3    acetaminophen (TYLENOL EXTRA STRENGTH) 500 mg tablet Take  by mouth every six (6) hours as needed for Pain.  Lancets misc Use with Fastclix lancing device daily to test blood sugar. 100 Each 3    ACCU-CHEK FASTCLIX misc USE AS DIRECTED TO MONITOR BLOOD SUGAR 100 Each 3    aspirin delayed-release 81 mg tablet Take 81 mg by mouth daily.  polyethylene glycol (MIRALAX) 17 gram/dose powder Take 17 g by mouth daily as needed.  simvastatin (ZOCOR) 20 mg tablet Take 20 mg by mouth nightly.  Cholecalciferol, Vitamin D3, (VITAMIN D3) 1,000 unit cap Take 1,000 Units by mouth daily.  ferrous sulfate 325 mg (65 mg iron) tablet Take 325 mg by mouth two (2) times a day.  diltiazem CD (DILT-CD) 300 mg ER capsule Take 300 mg by mouth daily.  predniSONE (STERAPRED DS) 10 mg dose pack Take 1 Tab by mouth See Admin Instructions. 21 Tab 0    clotrimazole-betamethasone (LOTRISONE) 1-0.05 % lotion Apply  to affected area two (2) times a day. No current facility-administered medications on file prior to visit.       Allergies   Allergen Reactions    Tetanus Vaccines And Toxoid Swelling     Swelling at the site    Tradjenta [Linagliptin] Diarrhea     headache   Physical Examination:  GENERAL:  Pleasant gentleman in no acute distress. He is alert and oriented. VITALS:  BP: 139/65. P: 90.  T: 98.7. O2 sat: initially 93, repeated by myself 95. T: 98.7. HEENT:   TMs normal.  He does wear bilateral hearing aids. Mouth mucosa pink. Tongue midline. Pharynx normal.  No sinus tenderness. NECK:  Supple without adenopathy. CHEST:  Lungs clear to auscultation, no rales or wheezes. Good chest excursion. CV:  Heart regular rhythm without murmur. EXTREMITIES:  No edema or calf tenderness. Distal pulses were present. Studies:  Two views of the chest failed to reveal any pneumonia. Rapid influenza is negative for both A and B. Impression:  1. Acute bronchitis. Plan:  1. It was opted to start Ceftin 250 mg twice daily for seven days. 2. He is to increase fluids and rest.   3. They certainly will contact us should he fail to improve or if his condition worsens.

## 2019-04-30 ENCOUNTER — OFFICE VISIT (OUTPATIENT)
Dept: INTERNAL MEDICINE CLINIC | Age: 84
End: 2019-04-30

## 2019-04-30 VITALS
HEIGHT: 65 IN | OXYGEN SATURATION: 94 % | DIASTOLIC BLOOD PRESSURE: 56 MMHG | BODY MASS INDEX: 25.72 KG/M2 | WEIGHT: 154.4 LBS | HEART RATE: 87 BPM | SYSTOLIC BLOOD PRESSURE: 120 MMHG | TEMPERATURE: 98.3 F

## 2019-04-30 DIAGNOSIS — N18.30 CKD (CHRONIC KIDNEY DISEASE) STAGE 3, GFR 30-59 ML/MIN (HCC): ICD-10-CM

## 2019-04-30 DIAGNOSIS — M48.061 SPINAL STENOSIS OF LUMBAR REGION WITHOUT NEUROGENIC CLAUDICATION: ICD-10-CM

## 2019-04-30 DIAGNOSIS — E11.21 TYPE II DIABETES MELLITUS WITH NEPHROPATHY (HCC): ICD-10-CM

## 2019-04-30 DIAGNOSIS — E53.8 VITAMIN B 12 DEFICIENCY: ICD-10-CM

## 2019-04-30 DIAGNOSIS — I10 ESSENTIAL HYPERTENSION: Primary | ICD-10-CM

## 2019-04-30 LAB
ANION GAP SERPL CALC-SCNC: 11 MMOL/L
BUN SERPL-MCNC: 38 MG/DL (ref 9–20)
CALCIUM SERPL-MCNC: 8.8 MG/DL (ref 8.4–10.2)
CHLORIDE SERPL-SCNC: 109 MMOL/L (ref 98–107)
CO2 SERPL-SCNC: 21 MMOL/L (ref 22–32)
CREAT SERPL-MCNC: 2.2 MG/DL (ref 0.8–1.5)
GLUCOSE SERPL-MCNC: 152 MG/DL (ref 75–110)
POTASSIUM SERPL-SCNC: 5.3 MMOL/L (ref 3.6–5)
SODIUM SERPL-SCNC: 141 MMOL/L (ref 137–145)

## 2019-04-30 RX ORDER — CYANOCOBALAMIN 1000 UG/ML
1000 INJECTION, SOLUTION INTRAMUSCULAR; SUBCUTANEOUS ONCE
Qty: 1 ML | Refills: 0 | Status: SHIPPED | COMMUNITY
Start: 2019-04-30 | End: 2019-04-30

## 2019-04-30 NOTE — PROGRESS NOTES
Chief Complaint   Patient presents with    Blood Pressure Check       Depression Risk Factor Screening:     3 most recent PHQ Screens 2019   Little interest or pleasure in doing things Not at all   Feeling down, depressed, irritable, or hopeless Not at all   Total Score PHQ 2 0       Functional Ability and Level of Safety:     Activities of Daily Living  ADL Assessment 2018   Feeding yourself No Help Needed   Getting from bed to chair No Help Needed   Getting dressed No Help Needed   Bathing or showering No Help Needed   Walk across the room (includes cane/walker) No Help Needed   Using the telphone No Help Needed   Taking your medications No Help Needed   Preparing meals No Help Needed   Managing money (expenses/bills) No Help Needed   Moderately strenuous housework (laundry) No Help Needed   Shopping for personal items (toiletries/medicines) No Help Needed   Shopping for groceries No Help Needed   Driving No Help Needed   Climbing a flight of stairs No Help Needed   Getting to places beyond walking distances No Help Needed       Fall Risk  Fall Risk Assessment, last 12 mths 2018   Able to walk? Yes   Fall in past 12 months? No   Fall with injury? -   Number of falls in past 12 months -   Fall Risk Score -       Abuse Screen  Abuse Screening Questionnaire 2018   Do you ever feel afraid of your partner? N   Are you in a relationship with someone who physically or mentally threatens you? N   Is it safe for you to go home? Y     Reviewed record in preparation for visit and have obtained necessary documentation. Identified pt with two pt identifiers(name and ).     Chief Complaint   Patient presents with    Blood Pressure Check      Wt Readings from Last 3 Encounters:   19 154 lb 6.4 oz (70 kg)   19 165 lb (74.8 kg)   19 162 lb 6.4 oz (73.7 kg)     Temp Readings from Last 3 Encounters:   19 98.3 °F (36.8 °C) (Oral)   19 98.7 °F (37.1 °C) (Oral)   19 97.9 °F (36.6 °C) (Oral)     BP Readings from Last 3 Encounters:   04/30/19 120/56   04/23/19 139/65   04/02/19 157/68     Pulse Readings from Last 3 Encounters:   04/30/19 87   04/23/19 90   04/02/19 85       Coordination of Care Questionnaire:  :     1) Have you been to an emergency room, urgent care clinic since your last visit? No     Hospitalized since your last visit? No               2) Have you seen or consulted any other health care providers outside of [address] [place] [address] since your last visit?  No             Patient Care Team   Patient Care Team:  Drake Wolfe MD as PCP - General (Internal Medicine)

## 2019-04-30 NOTE — PROGRESS NOTES
After patient consent and per read back verbal order of Dr. Kiki Apple , patient was given 1ml of B12 Injection in Right Deltoid. Patient tolerated injection well. Monitored patient 15 minutes post injection. No side effects or reactions noted.

## 2019-04-30 NOTE — PROGRESS NOTES
Subjective:   Anastacia Guerrero is a 80 y.o. male      Chief Complaint   Patient presents with    Blood Pressure Check        History of present illness:  Mr. Flor León returns in follow up. He is here primarily for a blood pressure check and follow up on his kidney function, which has been experiencing some slight decline recently. His blood sugar was also more elevated with an A1c up to 7.7 recently and we increased his Glipizide. It is not time to check that as yet, but I want to make sure the changes have not caused any further difficulties with his blood work. In addition, he is having some increasing difficulties with low back pain and occasional left hip pain. I have asked him to see Dr. Tommy Guerrero again to begin with. It may be that he is going to need a noninvasive approach and Dr. Lilton Meckel may be a better option for us eventually. For now I would like to see what Dr. Tommy Guerrero has to say and whether he needs re-imaging, etc.  He denies chest pain or shortness of breath. He has noted no lower extremity edema. He is ambulatory, though does use his walker to some degree and that allows for less back pain. His back pain is intermittent and not constant. He does have some days where he is comfortable. He has been tolerant of the increase in medication relative to his diabetes. He has experienced no symptoms suggestive of hypoglycemia. He denies nausea, vomiting, diarrhea, change in bowel habits. He is not really having any  symptoms either. We will check his BMP today. We will ask him to come back in six weeks' time to try and wrap up some of these loose ends, including how to proceed with regard to his back pain if it persists.         Patient Active Problem List    Diagnosis Date Noted    Type II diabetes mellitus with nephropathy ([facility] 12/20/2018     Priority: 1 - One    Type 2 diabetes mellitus with diabetic neuropathy ([facility] 03/14/2018     Priority: 1 - One    Hypertension 09/24/2015 Priority: 1 - One    Hypercholesterolemia 09/24/2015     Priority: 1 - One    Dilated cardiomyopathy ([facility] 04/02/2019     Priority: 2 - Two    Anemia 12/14/2017     Priority: 2 - Two    ASCVD (arteriosclerotic cardiovascular disease) 09/28/2017     Priority: 2 - Two    CKD (chronic kidney disease) stage 3, GFR 30-59 ml/min ([facility]) 09/24/2015     Priority: 2 - Two    CAD (coronary artery disease) 09/24/2015     Priority: 2 - Two    Long-term current use of high risk medication other than anticoagulant 12/19/2018     Priority: 3 - Three    B12 deficiency 09/28/2017     Priority: 3 - Three    History of gastritis 09/28/2017     Priority: 3 - Three    CVD (cerebrovascular disease) 09/28/2017     Priority: 3 - Three    Spinal stenosis of lumbar region without neurogenic claudication 09/14/2017     Priority: 3 - Three    Benign non-nodular prostatic hyperplasia with lower urinary tract symptoms 09/24/2015     Priority: 3 - Three    Left inguinal hernia 03/08/2019     Priority: 4 - Four    Adrenal adenoma 09/28/2017     Priority: 4 - Four    MCI (mild cognitive impairment) 09/28/2017     Priority: 4 - Four    Allergic rhinitis 09/28/2017     Priority: 5 - Five    Black hairy tongue 09/28/2017     Priority: 5 - Five    Burning mouth syndrome 09/28/2017     Priority: 5 - Five    Vitamin D deficiency 09/28/2017     Priority: 5 - Five    PE (physical exam), annual 09/28/2017      Past Surgical History:   Procedure Laterality Date    CARDIAC SURG PROCEDURE UNLIST      bypass(1985), [facility]([id number], 2004)    HX CATARACT REMOVAL Bilateral     HX CHOLECYSTECTOMY      HX LUMBAR LAMINECTOMY  09/2017    HX ORTHOPAEDIC  09/23/2015    back surgery     GA COLONOSCOPY FLX DX W/COLLJ SPEC WHEN PFRMD  8/12/2013         GA EGD TRANSORAL BIOPSY SINGLE/MULTIPLE  6/28/2012         VASCULAR SURGERY PROCEDURE UNLIST  2/1996    left carotid      Allergies   Allergen Reactions    Tetanus Vaccines And Toxoid Swelling Swelling at the site    Tradjenta [Linagliptin] Diarrhea     headache      Family History   Problem Relation Age of Onset    Heart Disease Mother     Heart Disease Father       Social History     Socioeconomic History    Marital status:      Spouse name: Not on file    Number of children: Not on file    Years of education: Not on file    Highest education level: Not on file   Occupational History    Not on file   Social Needs    Financial resource strain: Not on file    Food insecurity:     Worry: Not on file     Inability: Not on file    Transportation needs:     Medical: Not on file     Non-medical: Not on file   Tobacco Use    Smoking status: Former Smoker     Last attempt to quit: 6/3/1967     Years since quittin.9    Smokeless tobacco: Never Used   Substance and Sexual Activity    Alcohol use: No    Drug use: No    Sexual activity: Not on file   Lifestyle    Physical activity:     Days per week: Not on file     Minutes per session: Not on file    Stress: Not on file   Relationships    Social connections:     Talks on phone: Not on file     Gets together: Not on file     Attends Nondenominational service: Not on file     Active member of club or organization: Not on file     Attends meetings of clubs or organizations: Not on file     Relationship status: Not on file    Intimate partner violence:     Fear of current or ex partner: Not on file     Emotionally abused: Not on file     Physically abused: Not on file     Forced sexual activity: Not on file   Other Topics Concern    Not on file   Social History Narrative    Not on file     Outpatient Medications Marked as Taking for the 19 encounter (Office Visit) with Anthony Ornelas MD   Medication Sig Dispense Refill    cyanocobalamin (VITAMIN B-12) 1,000 mcg/mL injection 1 mL by IntraMUSCular route once for 1 dose. 1 mL 0    cefUROXime (CEFTIN) 250 mg tablet Take 1 Tab by mouth two (2) times a day.  14 Tab 0    fluticasone propionate (FLONASE) 50 mcg/actuation nasal spray       Blood-Glucose Meter monitoring kit Use to test blood sugar once daily. 1 Kit 0    glucose blood VI test strips (ACCU-CHEK SMARTVIEW TEST STRIP) strip Use to check blood sugar once daily. 100 Strip 3    TRUE METRIX GLUCOSE TEST STRIP strip USE 1 STRIP TO TEST BLOOD SUGAR EVERY  Strip 0    glipiZIDE (GLUCOTROL) 10 mg tablet TAKE 1 AND 1/2 TABLETS BY MOUTH TWICE DAILY (Patient taking differently: 04-04-19 - Per Dr. Melissa Levin, patient to increase dose to 2 tablets twice a day.) 270 Tab 0    gabapentin (NEURONTIN) 300 mg capsule TAKE 1 CAPSULE BY MOUTH TWICE DAILY 180 Cap 1    cyanocobalamin (VITAMIN B12) 1,000 mcg/mL injection INJECT 1 ML SUBCUTANEOUS EVERY MONTH 90 mL 0    ACCU-CHEK FASTCLIX LANCET DRUM misc USE AS DIRECTED TO MONITOR BLOOD SUGAR 1 Each 5    colesevelam (WELCHOL) 625 mg tablet TAKE 3 TABLETS BY MOUTH TWICE DAILY 180 Tab 1    hydrALAZINE (APRESOLINE) 25 mg tablet TAKE 1 TABLET BY MOUTH TWICE DAILY 180 Tab 1    ONGLYZA 2.5 mg tablet TAKE 1 TABLET BY MOUTH EVERY DAY 90 Tab 3    tamsulosin (FLOMAX) 0.4 mg capsule Take 2 Caps by mouth daily. 180 Cap 3    nitroglycerin (NITROSTAT) 0.4 mg SL tablet 1 Tab by SubLINGual route every five (5) minutes as needed for Chest Pain. 25 Tab 3    acetaminophen (TYLENOL EXTRA STRENGTH) 500 mg tablet Take  by mouth every six (6) hours as needed for Pain.  Lancets misc Use with Fastclix lancing device daily to test blood sugar. 100 Each 3    ACCU-CHEK FASTCLIX misc USE AS DIRECTED TO MONITOR BLOOD SUGAR 100 Each 3    aspirin delayed-release 81 mg tablet Take 81 mg by mouth daily.  polyethylene glycol (MIRALAX) 17 gram/dose powder Take 17 g by mouth daily as needed.  simvastatin (ZOCOR) 20 mg tablet Take 20 mg by mouth nightly.  Cholecalciferol, Vitamin D3, (VITAMIN D3) 1,000 unit cap Take 1,000 Units by mouth daily.       ferrous sulfate 325 mg (65 mg iron) tablet Take 325 mg by mouth two (2) times a day.  diltiazem CD (DILT-CD) 300 mg ER capsule Take 300 mg by mouth daily. Review of Systems              Constitutional:  He denies fever, weight loss, sweats or fatigue. HEENT:  No blurred or double vision, headache or dizziness. No difficulty with swallowing, taste, speech or smell. Respiratory:  No cough, wheezing or shortness of breath. No sputum production. Cardiac:  Denies chest pain, palpitations, unexplained indigestion, syncope, edema, PND or orthopnea. GI:  No changes in bowel movements, no abdominal pain, no bloating, anorexia, nausea, vomiting or heartburn. :  No frequency or dysuria. Denies incontinence. Extremities:  No joint pain, stiffness or swelling. Skin:  No recent rashes or mole changes. Neurological:  No numbness, tingling, burning paresthesias or loss of motor strength. No syncope, dizziness, frequent headaches or memory loss. Objective:     Vitals:    04/30/19 1045   BP: 120/56   Pulse: 87   Temp: 98.3 °F (36.8 °C)   TempSrc: Oral   SpO2: 94%   Weight: 154 lb 6.4 oz (70 kg)   Height: 5' 5\" (1.651 m)   PainSc:   0 - No pain       Body mass index is 25.69 kg/m². Physical Examination:              General Appearance:  Well-developed, well-nourished, no acute  distress. HEENT:     Ears:  The TMs and ear canals were clear. Eyes:  The pupillary responses were normal.  Extraocular muscle function intact. Lids and conjunctiva not injected. Neck:  Supple without thyromegaly or adenopathy. No JVD noted. Lungs:  Clear to auscultation and percussion. Cardiac:  Regular rate and rhythm without murmur. GI: nontender w/o mass. Normal BS's. Extremities:  No clubbing, cyanosis or edema. Skin:  No rash or unusual mole changes noted. Neurological:  Grossly normal.            Assessment/Plan:   Impressions:      ICD-10-CM ICD-9-CM    1. Essential hypertension V48 422.3 METABOLIC PANEL, BASIC   2.  Type II diabetes mellitus with nephropathy ([facility] B21.64 220.97 METABOLIC PANEL, BASIC     583.81    3. CKD (chronic kidney disease) stage 3, GFR 30-59 ml/min ([facility]) L75.9 127.0 METABOLIC PANEL, BASIC   4. Spinal stenosis of lumbar region without neurogenic claudication M48.061 724.02 REFERRAL TO NEUROSURGERY   5. Vitamin B 12 deficiency E53.8 266.2 [place] THER/PROPH/DIAG INJECTION, SUBCUT/IM      cyanocobalamin (VITAMIN B-12) 1,000 mcg/mL injection        Plan:  1. Continue present meds  2. Discussed lifestyle modifications including Na restriction, low carb/fat diet, weight reduction and exercise (at least a walking program). Follow-up and Dispositions    · Return in about 6 weeks (around 2019). Orders Placed This Encounter    METABOLIC PANEL, BASIC ([facility])    REFERRAL TO NEUROSURGERY     Referral Priority:   Routine     Referral Type:   Consultation     Referral Reason:   Specialty Services Required     Referred to Provider:   Donna Jaimes MD     Number of Visits Requested:   1    THER/PROPH/DIAG INJECTION, SUBCUT/IM    cyanocobalamin (VITAMIN B-12) 1,000 mcg/mL injection     Si mL by IntraMUSCular route once for 1 dose.      Dispense:  1 mL     Refill:  0     Order Specific Question:   Site     Answer:   RIGHT DELTOID     Order Specific Question:   Expiration Date     Answer:   3/1/2020     Order Specific Question:   Lot#     Answer:   [id number]     Order Specific Question:        Answer:   [facility]     Order Specific Question:   NDC#     Answer:   [id number]     Order Specific Question:   Route     Answer:   PATRIA Watters MD

## 2019-05-01 NOTE — PROGRESS NOTES
PHI verified. Patient's wife, Jesús Olson,  informed that Dr. Kaylin Astudillo has reviewed lab results and stated Kidney function slowly getting worse. He neds to drink at least 64 oz fluid a day!! Mrs. Flor León states she will give her  the message.

## 2019-05-04 RX ORDER — COLESEVELAM 180 1/1
TABLET ORAL
Qty: 180 TAB | Refills: 0 | Status: SHIPPED | OUTPATIENT
Start: 2019-05-04 | End: 2019-06-12 | Stop reason: SDUPTHER

## 2019-05-05 RX ORDER — COLESEVELAM 180 1/1
TABLET ORAL
Qty: 180 TAB | Refills: 0 | Status: SHIPPED | OUTPATIENT
Start: 2019-05-05 | End: 2019-06-02 | Stop reason: SDUPTHER

## 2019-05-29 RX ORDER — BLOOD SUGAR DIAGNOSTIC
STRIP MISCELLANEOUS
Qty: 100 STRIP | Refills: 0 | Status: SHIPPED | OUTPATIENT
Start: 2019-05-29 | End: 2020-05-15 | Stop reason: SDUPTHER

## 2019-06-02 RX ORDER — COLESEVELAM 180 1/1
TABLET ORAL
Qty: 180 TAB | Refills: 0 | Status: SHIPPED | OUTPATIENT
Start: 2019-06-02 | End: 2019-07-31 | Stop reason: SDUPTHER

## 2019-06-04 ENCOUNTER — HOSPITAL ENCOUNTER (OUTPATIENT)
Dept: MRI IMAGING | Age: 84
Discharge: HOME | End: 2019-06-04
Attending: NEUROLOGICAL SURGERY
Payer: MEDICARE

## 2019-06-04 DIAGNOSIS — M54.50 LUMBAGO: ICD-10-CM

## 2019-06-04 PROCEDURE — 74011250636 HC RX REV CODE- 250/636: Performed by: NEUROLOGICAL SURGERY

## 2019-06-04 PROCEDURE — A9575 INJ GADOTERATE MEGLUMI 0.1ML: HCPCS | Performed by: NEUROLOGICAL SURGERY

## 2019-06-04 PROCEDURE — 72158 MRI LUMBAR SPINE W/O & W/DYE: CPT

## 2019-06-04 RX ORDER — GADOTERATE MEGLUMINE 376.9 MG/ML
13 INJECTION INTRAVENOUS
Status: COMPLETED | OUTPATIENT
Start: 2019-06-04 | End: 2019-06-04

## 2019-06-04 RX ADMIN — GADOTERATE MEGLUMINE 13 ML: 376.9 INJECTION INTRAVENOUS at 17:21

## 2019-06-12 ENCOUNTER — OFFICE VISIT (OUTPATIENT)
Dept: INTERNAL MEDICINE CLINIC | Age: 84
End: 2019-06-12

## 2019-06-12 VITALS
RESPIRATION RATE: 20 BRPM | DIASTOLIC BLOOD PRESSURE: 70 MMHG | OXYGEN SATURATION: 97 % | HEIGHT: 66 IN | TEMPERATURE: 97.1 F | BODY MASS INDEX: 24.94 KG/M2 | WEIGHT: 155.2 LBS | HEART RATE: 66 BPM | SYSTOLIC BLOOD PRESSURE: 138 MMHG

## 2019-06-12 DIAGNOSIS — E11.21 TYPE II DIABETES MELLITUS WITH NEPHROPATHY (HCC): ICD-10-CM

## 2019-06-12 DIAGNOSIS — I10 ESSENTIAL HYPERTENSION: Primary | ICD-10-CM

## 2019-06-12 DIAGNOSIS — N18.30 CKD (CHRONIC KIDNEY DISEASE) STAGE 3, GFR 30-59 ML/MIN (HCC): ICD-10-CM

## 2019-06-12 DIAGNOSIS — E53.8 VITAMIN B 12 DEFICIENCY: ICD-10-CM

## 2019-06-12 LAB
ANION GAP SERPL CALC-SCNC: 6 MMOL/L
BUN SERPL-MCNC: 25 MG/DL (ref 9–20)
CALCIUM SERPL-MCNC: 9 MG/DL (ref 8.4–10.2)
CHLORIDE SERPL-SCNC: 109 MMOL/L (ref 98–107)
CO2 SERPL-SCNC: 23 MMOL/L (ref 22–32)
CREAT SERPL-MCNC: 2 MG/DL (ref 0.8–1.5)
GLUCOSE SERPL-MCNC: 169 MG/DL (ref 75–110)
POTASSIUM SERPL-SCNC: 5.7 MMOL/L (ref 3.6–5)
SODIUM SERPL-SCNC: 138 MMOL/L (ref 137–145)

## 2019-06-12 RX ORDER — CYANOCOBALAMIN 1000 UG/ML
1000 INJECTION, SOLUTION INTRAMUSCULAR; SUBCUTANEOUS ONCE
Qty: 1 VIAL | Refills: 0
Start: 2019-06-12 | End: 2019-06-12

## 2019-06-12 RX ORDER — GLIPIZIDE 10 MG/1
20 TABLET ORAL 2 TIMES DAILY
COMMUNITY
End: 2019-06-12 | Stop reason: SDUPTHER

## 2019-06-12 RX ORDER — TRAMADOL HYDROCHLORIDE 50 MG/1
50 TABLET ORAL 2 TIMES DAILY
COMMUNITY
End: 2020-01-02

## 2019-06-12 NOTE — PROGRESS NOTES
After patient consent and per read back verbal order of Dr. Kyleigh Ace , patient was given 1ml of B12 Injection in Left Deltoid. Patient tolerated injection well. Requested patient remain in office 15 minutes post injection. No side effects or reactions noted.

## 2019-06-12 NOTE — PROGRESS NOTES
Subjective:   Fatoumata Renee is a 80 y.o. male      Chief Complaint   Patient presents with    Hypertension     6 week f/u        History of present illness:  Mr. Petra Crandall returns in follow up. He is returning primarily relative to his declining renal function. He has tried to push fluids since his last visit here. We increased his Glipizide because of his diabetes not being as well controlled recently. It is not time to recheck an A1c, however. In addition, he has seen Dr. Dontrell Dalton again regarding his increased problems with his back pain. He is now on Tramadol twice a day per Dr. Dontrell Dalton and over the last 24-48 hours since he saw him he has noted improvement in his back. He is now walking without the aid of a walker and feels reasonably well. He denies any new CR, GI or  symptoms otherwise. We will check his BMP today and determine follow up, including whether he needs to see the nephrologist.  In the meantime he will continue the Tramadol per Dr. Dontrell Dalton and he has an appointment to see him in six weeks time.         Patient Active Problem List    Diagnosis Date Noted    Type II diabetes mellitus with nephropathy ([facility] 12/20/2018     Priority: 1 - One    Type 2 diabetes mellitus with diabetic neuropathy ([facility] 03/14/2018     Priority: 1 - One    Hypertension 09/24/2015     Priority: 1 - One    Hypercholesterolemia 09/24/2015     Priority: 1 - One    Dilated cardiomyopathy ([facility] 04/02/2019     Priority: 2 - Two    Anemia 12/14/2017     Priority: 2 - Two    ASCVD (arteriosclerotic cardiovascular disease) 09/28/2017     Priority: 2 - Two    CKD (chronic kidney disease) stage 3, GFR 30-59 ml/min ([facility] 09/24/2015     Priority: 2 - Two    CAD (coronary artery disease) 09/24/2015     Priority: 2 - Two    Long-term current use of high risk medication other than anticoagulant 12/19/2018     Priority: 3 - Three    B12 deficiency 09/28/2017     Priority: 3 - Three    History of gastritis 09/28/2017 Priority: 3 - Three    CVD (cerebrovascular disease) 09/28/2017     Priority: 3 - Three    Spinal stenosis of lumbar region without neurogenic claudication 09/14/2017     Priority: 3 - Three    Benign non-nodular prostatic hyperplasia with lower urinary tract symptoms 09/24/2015     Priority: 3 - Three    Left inguinal hernia 03/08/2019     Priority: 4 - Four    Adrenal adenoma 09/28/2017     Priority: 4 - Four    MCI (mild cognitive impairment) 09/28/2017     Priority: 4 - Four    Allergic rhinitis 09/28/2017     Priority: 5 - Five    Black hairy tongue 09/28/2017     Priority: 5 - Five    Burning mouth syndrome 09/28/2017     Priority: 5 - Five    Vitamin D deficiency 09/28/2017     Priority: 5 - Five    PE (physical exam), annual 09/28/2017      Past Surgical History:   Procedure Laterality Date    CARDIAC SURG PROCEDURE UNLIST      bypass(1985), [facility]([id number], 2004)    HX CATARACT REMOVAL Bilateral     HX CHOLECYSTECTOMY      HX LUMBAR LAMINECTOMY  09/2017    HX ORTHOPAEDIC  09/23/2015    back surgery     UT COLONOSCOPY FLX DX W/COLLJ SPEC WHEN PFRMD  8/12/2013         UT EGD TRANSORAL BIOPSY SINGLE/MULTIPLE  6/28/2012         VASCULAR SURGERY PROCEDURE UNLIST  2/1996    left carotid      Allergies   Allergen Reactions    Tetanus Vaccines And Toxoid Swelling     Swelling at the site    Tradjenta [Linagliptin] Diarrhea     headache      Family History   Problem Relation Age of Onset    Heart Disease Mother     Heart Disease Father       Social History     Socioeconomic History    Marital status:      Spouse name: Not on file    Number of children: Not on file    Years of education: Not on file    Highest education level: Not on file   Occupational History    Not on file   Social Needs    Financial resource strain: Not on file    Food insecurity:     Worry: Not on file     Inability: Not on file    Transportation needs:     Medical: Not on file     Non-medical: Not on file Tobacco Use    Smoking status: Former Smoker     Last attempt to quit: 6/3/1967     Years since quittin.0    Smokeless tobacco: Never Used   Substance and Sexual Activity    Alcohol use: No    Drug use: No    Sexual activity: Not on file   Lifestyle    Physical activity:     Days per week: Not on file     Minutes per session: Not on file    Stress: Not on file   Relationships    Social connections:     Talks on phone: Not on file     Gets together: Not on file     Attends Rastafarian service: Not on file     Active member of club or organization: Not on file     Attends meetings of clubs or organizations: Not on file     Relationship status: Not on file    Intimate partner violence:     Fear of current or ex partner: Not on file     Emotionally abused: Not on file     Physically abused: Not on file     Forced sexual activity: Not on file   Other Topics Concern    Not on file   Social History Narrative    Not on file     Outpatient Medications Marked as Taking for the 19 encounter (Office Visit) with Leta Correia MD   Medication Sig Dispense Refill    glipiZIDE (GLUCOTROL) 10 mg tablet Take 20 mg by mouth two (2) times a day.  traMADol (ULTRAM) 50 mg tablet Take 50 mg by mouth two (2) times a day.  ACCU-CHEK GUIDE strip USE 1 STRIP TO TEST BLOOD SUGAR EVERY  Strip 0    glipiZIDE (GLUCOTROL) 10 mg tablet Take 2 Tabs by mouth two (2) times a day. Take 2 tablets by mouth twice a day. 360 Tab 3    fluticasone propionate (FLONASE) 50 mcg/actuation nasal spray       Blood-Glucose Meter monitoring kit Use to test blood sugar once daily. 1 Kit 0    glucose blood VI test strips (ACCU-CHEK SMARTVIEW TEST STRIP) strip Use to check blood sugar once daily.  100 Strip 3    gabapentin (NEURONTIN) 300 mg capsule TAKE 1 CAPSULE BY MOUTH TWICE DAILY 180 Cap 1    ACCU-CHEK FASTCLIX LANCET DRUM misc USE AS DIRECTED TO MONITOR BLOOD SUGAR 1 Each 5    colesevelam (WELCHOL) 625 mg tablet TAKE 3 TABLETS BY MOUTH TWICE DAILY 180 Tab 1    hydrALAZINE (APRESOLINE) 25 mg tablet TAKE 1 TABLET BY MOUTH TWICE DAILY 180 Tab 1    ONGLYZA 2.5 mg tablet TAKE 1 TABLET BY MOUTH EVERY DAY 90 Tab 3    tamsulosin (FLOMAX) 0.4 mg capsule Take 2 Caps by mouth daily. 180 Cap 3    nitroglycerin (NITROSTAT) 0.4 mg SL tablet 1 Tab by SubLINGual route every five (5) minutes as needed for Chest Pain. 25 Tab 3    acetaminophen (TYLENOL EXTRA STRENGTH) 500 mg tablet Take  by mouth every six (6) hours as needed for Pain.  Lancets misc Use with Fastclix lancing device daily to test blood sugar. 100 Each 3    ACCU-CHEK FASTCLIX misc USE AS DIRECTED TO MONITOR BLOOD SUGAR 100 Each 3    aspirin delayed-release 81 mg tablet Take 81 mg by mouth daily.  polyethylene glycol (MIRALAX) 17 gram/dose powder Take 17 g by mouth daily as needed.  simvastatin (ZOCOR) 20 mg tablet Take 20 mg by mouth nightly.  Cholecalciferol, Vitamin D3, (VITAMIN D3) 1,000 unit cap Take 1,000 Units by mouth daily.  ferrous sulfate 325 mg (65 mg iron) tablet Take 325 mg by mouth two (2) times a day.  diltiazem CD (DILT-CD) 300 mg ER capsule Take 300 mg by mouth daily. Review of Systems              Constitutional:  He denies fever, weight loss, sweats or fatigue. HEENT:  No blurred or double vision, headache or dizziness. No difficulty with swallowing, taste, speech or smell. Respiratory:  No cough, wheezing or shortness of breath. No sputum production. Cardiac:  Denies chest pain, palpitations, unexplained indigestion, syncope, edema, PND or orthopnea. GI:  No changes in bowel movements, no abdominal pain, no bloating, anorexia, nausea, vomiting or heartburn. :  No frequency or dysuria. Denies incontinence. Extremities:  No joint pain, stiffness or swelling. Skin:  No recent rashes or mole changes. Neurological:  No numbness, tingling, burning paresthesias or loss of motor strength.   No syncope, dizziness, frequent headaches or memory loss. Objective:     Vitals:    06/12/19 1320 06/12/19 1340   BP: 142/60 138/70   Pulse: 66    Resp: 20    Temp: 97.1 °F (36.2 °C)    TempSrc: Oral    SpO2: 97%    Weight: 155 lb 3.2 oz (70.4 kg)    Height: 5' 6\" (1.676 m)    PainSc:   0 - No pain        Body mass index is 25.05 kg/m². Physical Examination:              General Appearance:  Well-developed, well-nourished, no acute  distress. HEENT:     Ears:  The TMs and ear canals were clear. Eyes:  The pupillary responses were normal.  Extraocular muscle function intact. Lids and conjunctiva not injected. Neck:  Supple without thyromegaly or adenopathy. No JVD noted. Lungs:  Clear to auscultation and percussion. Cardiac:  Regular rate and rhythm without murmur. GI: nontender w/o mass. Normal BS's. Extremities:  No clubbing, cyanosis or edema. Skin:  No rash or unusual mole changes noted. Neurological:  Grossly normal.            Assessment/Plan:   Impressions:      ICD-10-CM ICD-9-CM    1. Essential hypertension Z52 040.8 METABOLIC PANEL, BASIC   2. Type II diabetes mellitus with nephropathy ([facility]) W77.77 373.14 METABOLIC PANEL, BASIC     583.81    3. CKD (chronic kidney disease) stage 3, GFR 30-59 ml/min ([facility]) O80.1 287.7 METABOLIC PANEL, BASIC        Plan:  1. Continue present meds  2. Discussed lifestyle modifications including Na restriction, low carb/fat diet, weight reduction and exercise (at least a walking program). Follow-up and Dispositions    · Return for To be determined.            Orders Placed This Encounter    METABOLIC PANEL, BASIC (Eboni Cochran MD

## 2019-06-12 NOTE — PROGRESS NOTES
Kidney function slightly better. He needs to see Brant in 2 months to recheck. Would also like him to see Dr. Mauricio Velez for renal consult.

## 2019-06-12 NOTE — PROGRESS NOTES
Lyndsey Ramirez  Identified pt with two pt identifiers(name and ). Chief Complaint   Patient presents with    Hypertension     6 week f/u       1. Have you been to the ER, urgent care clinic since your last visit? Hospitalized since your last visit? No    2. Have you seen or consulted any other health care providers outside of the [address] [place] [address] [place] since your last visit? Include any pap smears or colon screening. No      Medication reconciliation up to date and corrected with patient at this time. Today's provider has been notified of reason for visit, vitals and flowsheets obtained on patients. Reviewed record in preparation for visit, huddled with provider and have obtained necessary documentation. Depression Risk Factor Screening:     3 most recent PHQ Screens 2019   Little interest or pleasure in doing things Not at all   Feeling down, depressed, irritable, or hopeless Not at all   Total Score PHQ 2 0       Functional Ability and Level of Safety:     Activities of Daily Living  ADL Assessment 2018   Feeding yourself No Help Needed   Getting from bed to chair No Help Needed   Getting dressed No Help Needed   Bathing or showering No Help Needed   Walk across the room (includes cane/walker) No Help Needed   Using the telphone No Help Needed   Taking your medications No Help Needed   Preparing meals No Help Needed   Managing money (expenses/bills) No Help Needed   Moderately strenuous housework (laundry) No Help Needed   Shopping for personal items (toiletries/medicines) No Help Needed   Shopping for groceries No Help Needed   Driving No Help Needed   Climbing a flight of stairs No Help Needed   Getting to places beyond walking distances No Help Needed       Fall Risk  Fall Risk Assessment, last 12 mths 2018   Able to walk? Yes   Fall in past 12 months?  No   Fall with injury? -   Number of falls in past 12 months -   Fall Risk Score -       Abuse Screen  Abuse Screening Questionnaire 7/24/2018   Do you ever feel afraid of your partner? N   Are you in a relationship with someone who physically or mentally threatens you? N   Is it safe for you to go home?  Y           Health Maintenance Due   Topic    Shingrix Vaccine Age 50> (2 of 2)    MICROALBUMIN Q1     MEDICARE YEARLY EXAM     FOOT EXAM Q1        Wt Readings from Last 3 Encounters:   06/12/19 155 lb 3.2 oz (70.4 kg)   04/30/19 154 lb 6.4 oz (70 kg)   04/23/19 165 lb (74.8 kg)     Temp Readings from Last 3 Encounters:   06/12/19 97.1 °F (36.2 °C) (Oral)   04/30/19 98.3 °F (36.8 °C) (Oral)   04/23/19 98.7 °F (37.1 °C) (Oral)     BP Readings from Last 3 Encounters:   06/12/19 142/60   04/30/19 120/56   04/23/19 139/65     Pulse Readings from Last 3 Encounters:   06/12/19 66   04/30/19 87   04/23/19 90     Vitals:    06/12/19 1320   BP: 142/60   Pulse: 66   Resp: 20   Temp: 97.1 °F (36.2 °C)   TempSrc: Oral   SpO2: 97%   Weight: 155 lb 3.2 oz (70.4 kg)   Height: 5' 6\" (1.676 m)   PainSc:   0 - No pain         Patient Care Team   Patient Care Team:  Izzy Merritt MD as PCP - General (Internal Medicine)

## 2019-06-13 ENCOUNTER — TELEPHONE (OUTPATIENT)
Dept: INTERNAL MEDICINE CLINIC | Age: 84
End: 2019-06-13

## 2019-06-13 DIAGNOSIS — N18.30 CKD (CHRONIC KIDNEY DISEASE) STAGE 3, GFR 30-59 ML/MIN (HCC): Primary | ICD-10-CM

## 2019-06-13 NOTE — TELEPHONE ENCOUNTER
PHI verified. Patient informed that Dr. Luis Lopez has reviewed lab results and stated Kidney function slightly better. [facility] needs to see Brant in 2 months to recheck.  Would also like him to see Dr. Yong Arango for renal consult. Appt scheduled for patient to see MJ Guo and referral was made for patient to see Dr. Yong Arango.

## 2019-06-13 NOTE — PROGRESS NOTES
Left message at phone number(s) listed in chart for patient to call the office at [phone] to discuss lab results.

## 2019-06-13 NOTE — TELEPHONE ENCOUNTER
----- Message from Africa Gunter MD sent at 6/12/2019  5:41 PM EDT -----  Kidney function slightly better. He needs to see Brant in 2 months to recheck. Would also like him to see Dr. Delvis Jerry for renal consult.

## 2019-06-25 RX ORDER — GABAPENTIN 300 MG/1
CAPSULE ORAL
Qty: 180 CAP | Refills: 0 | Status: SHIPPED | OUTPATIENT
Start: 2019-06-25 | End: 2019-09-22 | Stop reason: SDUPTHER

## 2019-06-25 RX ORDER — HYDRALAZINE HYDROCHLORIDE 25 MG/1
TABLET, FILM COATED ORAL
Qty: 180 TAB | Refills: 0 | Status: SHIPPED | OUTPATIENT
Start: 2019-06-25 | End: 2020-01-02

## 2019-07-02 RX ORDER — COLESEVELAM 180 1/1
TABLET ORAL
Qty: 180 TAB | Refills: 0 | Status: SHIPPED | OUTPATIENT
Start: 2019-07-02 | End: 2020-01-02

## 2019-07-24 ENCOUNTER — CLINICAL SUPPORT (OUTPATIENT)
Dept: INTERNAL MEDICINE CLINIC | Age: 84
End: 2019-07-24

## 2019-07-24 VITALS
DIASTOLIC BLOOD PRESSURE: 60 MMHG | HEIGHT: 66 IN | BODY MASS INDEX: 25.46 KG/M2 | RESPIRATION RATE: 20 BRPM | TEMPERATURE: 97.1 F | SYSTOLIC BLOOD PRESSURE: 150 MMHG | WEIGHT: 158.4 LBS | OXYGEN SATURATION: 95 % | HEART RATE: 73 BPM

## 2019-07-24 DIAGNOSIS — E53.8 VITAMIN B 12 DEFICIENCY: Primary | ICD-10-CM

## 2019-07-24 RX ORDER — CYANOCOBALAMIN 1000 UG/ML
1000 INJECTION, SOLUTION INTRAMUSCULAR; SUBCUTANEOUS ONCE
Qty: 1 ML | Refills: 0 | Status: SHIPPED | COMMUNITY
Start: 2019-07-24 | End: 2019-07-24

## 2019-07-24 NOTE — PROGRESS NOTES
After patient consent and per read back verbal order from [facility], NP, patient was given 1ml of B12 Injection in Right Deltoid, verified by Tani Zavala LPN. Patient tolerated injection well. Requested patient remain in office 15 minutes post injection. No side effects or reactions noted.

## 2019-07-31 RX ORDER — COLESEVELAM 180 1/1
TABLET ORAL
Qty: 180 TAB | Refills: 0 | Status: SHIPPED | OUTPATIENT
Start: 2019-07-31 | End: 2019-09-22 | Stop reason: SDUPTHER

## 2019-07-31 NOTE — TELEPHONE ENCOUNTER
PCP: Estevan Freitas MD    Last appt: 7/24/2019  Future Appointments   Date Time Provider Marcos Joiner   8/15/2019  1:15 PM Lovely Young NP 3 Ramsey Anyi       Requested Prescriptions     Pending Prescriptions Disp Refills    colesevelam (WELCHOL) 625 mg tablet [Pharmacy Med Name: COLESEVELAM 625MG TABLETS] 180 Tab 0     Sig: TAKE 3 TABLETS BY MOUTH TWICE DAILY       Prior labs and Blood pressures:  BP Readings from Last 3 Encounters:   07/24/19 150/60   06/12/19 138/70   04/30/19 120/56     Lab Results   Component Value Date/Time    Sodium 138 06/12/2019 01:52 PM    Potassium 5.7 (H) 06/12/2019 01:52 PM    Chloride 109 (H) 06/12/2019 01:52 PM    CO2 23.0 06/12/2019 01:52 PM    Anion gap 6 06/12/2019 01:52 PM    Glucose 169 (H) 06/12/2019 01:52 PM    BUN 25.0 (H) 06/12/2019 01:52 PM    Creatinine 2.0 (H) 06/12/2019 01:52 PM    BUN/Creatinine ratio 16 09/04/2018 02:02 PM    GFR est AA 39 06/12/2019 01:52 PM    GFR est non-AA 32 06/12/2019 01:52 PM    Calcium 9.0 06/12/2019 01:52 PM     Lab Results   Component Value Date/Time    Hemoglobin A1c 7.7 (H) 04/02/2019 02:36 PM    Hemoglobin A1c (POC) 7.3 (A) 12/20/2018 03:18 PM     Lab Results   Component Value Date/Time    Cholesterol, total 159 03/10/2010 08:27 AM    Cholesterol (POC) 100.0 12/20/2018 03:18 PM    HDL Cholesterol 46 03/10/2010 08:27 AM    HDL Cholesterol (POC) 49.0 12/20/2018 03:18 PM    LDL Cholesterol (POC) 16.8 12/20/2018 03:18 PM    LDL, calculated 82.4 03/10/2010 08:27 AM    VLDL, calculated 30.6 03/10/2010 08:27 AM    Triglyceride 153 03/10/2010 08:27 AM    Triglycerides (POC) 171.0 12/20/2018 03:18 PM    CHOL/HDL Ratio 3.5 03/10/2010 08:27 AM     No results found for: VITD3, XQVID2, XQVID3, XQVID, VD3RIA    No results found for: TSH, TSH2, TSH3, TSHP, TSHEXT

## 2019-08-15 ENCOUNTER — OFFICE VISIT (OUTPATIENT)
Dept: INTERNAL MEDICINE CLINIC | Age: 84
End: 2019-08-15

## 2019-08-15 VITALS
HEART RATE: 74 BPM | SYSTOLIC BLOOD PRESSURE: 140 MMHG | DIASTOLIC BLOOD PRESSURE: 60 MMHG | HEIGHT: 66 IN | TEMPERATURE: 97.3 F | WEIGHT: 158 LBS | OXYGEN SATURATION: 95 % | BODY MASS INDEX: 25.39 KG/M2

## 2019-08-15 DIAGNOSIS — N18.30 CKD (CHRONIC KIDNEY DISEASE) STAGE 3, GFR 30-59 ML/MIN (HCC): Primary | ICD-10-CM

## 2019-08-15 DIAGNOSIS — E11.21 TYPE II DIABETES MELLITUS WITH NEPHROPATHY (HCC): ICD-10-CM

## 2019-08-15 LAB
ANION GAP SERPL CALC-SCNC: 13 MMOL/L
BUN SERPL-MCNC: 40 MG/DL (ref 9–20)
CALCIUM SERPL-MCNC: 8.9 MG/DL (ref 8.4–10.2)
CHLORIDE SERPL-SCNC: 107 MMOL/L (ref 98–107)
CO2 SERPL-SCNC: 22 MMOL/L (ref 22–32)
CREAT SERPL-MCNC: 2.2 MG/DL (ref 0.8–1.5)
GLUCOSE SERPL-MCNC: 194 MG/DL (ref 75–110)
POTASSIUM SERPL-SCNC: 5.8 MMOL/L (ref 3.6–5)
SODIUM SERPL-SCNC: 142 MMOL/L (ref 137–145)

## 2019-08-15 NOTE — PROGRESS NOTES
Identified pt with two pt identifiers(name and ). Reviewed record in preparation for visit and have obtained necessary documentation. Chief Complaint   Patient presents with    Hypertension     2 month follow up    Vitamin B12 Deficiency      Visit Vitals  /60 (BP 1 Location: Right arm, BP Patient Position: Sitting)   Pulse 74   Temp 97.3 °F (36.3 °C) (Oral)   Ht 5' 6\" (1.676 m)   Wt 158 lb (71.7 kg)   SpO2 95%   BMI 25.50 kg/m²       Health Maintenance Due   Topic    Shingrix Vaccine Age 50> (2 of 2)    MICROALBUMIN Q1     MEDICARE YEARLY EXAM     FOOT EXAM Q1     Influenza Age 5 to Adult        Coordination of Care Questionnaire:  :   1) Have you been to an emergency room, urgent care, or hospitalized since your last visit? If yes, where when, and reason for visit? no       2. Have seen or consulted any other health care provider since your last visit? Dr. Daley  2019. If yes, where when, and reason for visit? NO      3) Do you have an Advanced Directive/ Living Will in place? Yes  If yes, do we have a copy on file NO, but will a copy for file. If no, would you like information NO    Patient is accompanied by spouse I have received verbal consent from Minor Saris to discuss any/all medical information while they are present in the room.

## 2019-08-15 NOTE — PROGRESS NOTES
Subjective:  Mr. Dali Barajas is a pleasant 80year old gentleman who comes in today with his wife in attendance. He is here for follow up of his chronic renal disease. He was last seen by Dr. Carver Dancer in June of 2019, at which time his renal function had slightly improved. He was asked by Dr. Carver Dancer to follow up here for lab studies. Since his visit here, he has seen Dr. Gamaliel Acuna, nephrologist.  He was advised to stay off all NSAIDs, as well as aspirin. Today he has no new complaints. He specifically denies headaches, dizziness or blurred vision. Denies chest pain or palpitations. He denies syncopal episode. He denies SOB, cough, wheezing, PND or orthopnea. He has very little ankle edema. He tells me that his appetite is good, weight remains stable.     Past Medical History:   Diagnosis Date    CAD (coronary artery disease)     mi    Chronic kidney disease     hx of elevated blood levels    Chronic pain     lower back    Diabetes ([facility] [address]     Enlarged prostate     Hypercholesterolemia     Hypertension     Other unknown and unspecified cause of morbidity or mortality     hayfever     Past Surgical History:   Procedure Laterality Date    CARDIAC SURG PROCEDURE UNLIST      bypass(1985), BLHYDL(3318, 2004)    HX CATARACT REMOVAL Bilateral     HX CHOLECYSTECTOMY      HX LUMBAR LAMINECTOMY  09/2017    HX ORTHOPAEDIC  09/23/2015    back surgery     OR COLONOSCOPY FLX DX W/COLLJ SPEC WHEN PFRMD  8/12/2013         OR EGD TRANSORAL BIOPSY SINGLE/MULTIPLE  6/28/2012         VASCULAR SURGERY PROCEDURE UNLIST  2/1996    left carotid       Current Outpatient Medications on File Prior to Visit   Medication Sig Dispense Refill    colesevelam (WELCHOL) 625 mg tablet TAKE 3 TABLETS BY MOUTH TWICE DAILY 180 Tab 0    colesevelam (WELCHOL) 625 mg tablet TAKE 3 TABLETS BY MOUTH TWICE DAILY 180 Tab 0    gabapentin (NEURONTIN) 300 mg capsule TAKE 1 CAPSULE BY MOUTH TWICE DAILY 180 Cap 0    hydrALAZINE (APRESOLINE) 25 mg tablet TAKE 1 TABLET BY MOUTH TWICE DAILY 180 Tab 0    traMADol (ULTRAM) 50 mg tablet Take 50 mg by mouth two (2) times a day.  ACCU-CHEK GUIDE strip USE 1 STRIP TO TEST BLOOD SUGAR EVERY  Strip 0    glipiZIDE (GLUCOTROL) 10 mg tablet Take 2 Tabs by mouth two (2) times a day. Take 2 tablets by mouth twice a day. 360 Tab 3    fluticasone propionate (FLONASE) 50 mcg/actuation nasal spray       Blood-Glucose Meter monitoring kit Use to test blood sugar once daily. 1 Kit 0    glucose blood VI test strips (ACCU-CHEK SMARTVIEW TEST STRIP) strip Use to check blood sugar once daily. 100 Strip 3    cyanocobalamin (VITAMIN B12) 1,000 mcg/mL injection INJECT 1 ML SUBCUTANEOUS EVERY MONTH 90 mL 0    ACCU-CHEK FASTCLIX LANCET DRUM misc USE AS DIRECTED TO MONITOR BLOOD SUGAR 1 Each 5    colesevelam (WELCHOL) 625 mg tablet TAKE 3 TABLETS BY MOUTH TWICE DAILY 180 Tab 1    hydrALAZINE (APRESOLINE) 25 mg tablet TAKE 1 TABLET BY MOUTH TWICE DAILY 180 Tab 1    ONGLYZA 2.5 mg tablet TAKE 1 TABLET BY MOUTH EVERY DAY 90 Tab 3    tamsulosin (FLOMAX) 0.4 mg capsule Take 2 Caps by mouth daily. (Patient taking differently: Take 0.8 mg by mouth daily. Indications: pt taking 1 twice daily) 180 Cap 3    nitroglycerin (NITROSTAT) 0.4 mg SL tablet 1 Tab by SubLINGual route every five (5) minutes as needed for Chest Pain. 25 Tab 3    acetaminophen (TYLENOL EXTRA STRENGTH) 500 mg tablet Take  by mouth every six (6) hours as needed for Pain.  Lancets misc Use with Fastclix lancing device daily to test blood sugar. 100 Each 3    ACCU-CHEK FASTCLIX misc USE AS DIRECTED TO MONITOR BLOOD SUGAR 100 Each 3    aspirin delayed-release 81 mg tablet Take 81 mg by mouth daily.  polyethylene glycol (MIRALAX) 17 gram/dose powder Take 17 g by mouth daily as needed.  simvastatin (ZOCOR) 20 mg tablet Take 20 mg by mouth nightly.       Cholecalciferol, Vitamin D3, (VITAMIN D3) 1,000 unit cap Take 1,000 Units by mouth daily.      ferrous sulfate 325 mg (65 mg iron) tablet Take 325 mg by mouth two (2) times a day.  diltiazem CD (DILT-CD) 300 mg ER capsule Take 300 mg by mouth daily. No current facility-administered medications on file prior to visit. Allergies   Allergen Reactions    Tetanus Vaccines And Toxoid Swelling     Swelling at the site    Tradjenta [Linagliptin] Diarrhea     headache     Physical Examination:  GENERAL:  Pleasant gentleman in no acute distress. He is alert and oriented. Answers my questions appropriately. He ambulates without any assistive devices. VITALS:  BP: 140/60. P: 74.  T: 97.3. O2 sat: 95. HEENT:  Normocephalic, atraumatic. NECK:  Supple without adenopathy. CHEST:  Lungs clear to auscultation, no rales or wheezes. CV:  Heart regular rhythm without murmur. EXTREMITIES:  No edema or calf tenderness. Distal pulses were present. Impression:  1. Chronic renal disease. 2. DM type 2. Plan:  1. It was opted to repeat BMP, as well as A1c. I will call him as soon as I have his results. We will forward his results to Dr. Len Silveira. 2. In the meantime he will continue with his current regimen. 3. He certainly will contact us should he have any concerns. Otherwise he will follow up in three months with Dr. Nuno Rosales.

## 2019-08-16 LAB — HBA1C MFR BLD HPLC: 5.5 % (ref 4–5.7)

## 2019-08-20 ENCOUNTER — TELEPHONE (OUTPATIENT)
Dept: INTERNAL MEDICINE CLINIC | Age: 84
End: 2019-08-20

## 2019-08-20 ENCOUNTER — DOCUMENTATION ONLY (OUTPATIENT)
Dept: INTERNAL MEDICINE CLINIC | Age: 84
End: 2019-08-20

## 2019-08-20 NOTE — PROGRESS NOTES
Per instructions from MJ Booker, lab results from 08-15-19 faxed to Dr. Narciso Sauceda office at [phone]. Phone call made to patient, but left message for patient to call the office to discuss lab results.

## 2019-08-20 NOTE — TELEPHONE ENCOUNTER
PHI verified. Patient informed that Missy Alonzo NP has reviewed lab results and stated Hemoglobin A1C is 5.5 and Creatinine was up a little bit from last labs. Also informed patient that lab results were faxed to Dr. Anne Corona.

## 2019-08-21 NOTE — PATIENT INSTRUCTIONS

## 2019-08-23 DIAGNOSIS — N40.0 BENIGN LOCALIZED HYPERPLASIA OF PROSTATE: ICD-10-CM

## 2019-08-23 RX ORDER — TAMSULOSIN HYDROCHLORIDE 0.4 MG/1
CAPSULE ORAL
Qty: 180 CAP | Refills: 0 | Status: SHIPPED | OUTPATIENT
Start: 2019-08-23 | End: 2019-11-16 | Stop reason: SDUPTHER

## 2019-08-27 ENCOUNTER — CLINICAL SUPPORT (OUTPATIENT)
Dept: INTERNAL MEDICINE CLINIC | Age: 84
End: 2019-08-27

## 2019-08-27 VITALS
HEART RATE: 69 BPM | WEIGHT: 158.4 LBS | OXYGEN SATURATION: 96 % | SYSTOLIC BLOOD PRESSURE: 150 MMHG | BODY MASS INDEX: 25.46 KG/M2 | HEIGHT: 66 IN | RESPIRATION RATE: 16 BRPM | TEMPERATURE: 97.6 F | DIASTOLIC BLOOD PRESSURE: 68 MMHG

## 2019-08-27 DIAGNOSIS — E53.8 VITAMIN B 12 DEFICIENCY: Primary | ICD-10-CM

## 2019-08-27 RX ORDER — CYANOCOBALAMIN 1000 UG/ML
1000 INJECTION, SOLUTION INTRAMUSCULAR; SUBCUTANEOUS ONCE
Qty: 1 ML | Refills: 0 | Status: SHIPPED | COMMUNITY
Start: 2019-08-27 | End: 2019-08-27

## 2019-08-27 NOTE — PROGRESS NOTES
After patient consent and per read back verbal order from Herrera Farnsworth, MJ patient was given 1ml of B12 Injection in Left Deltoid, verified by Marilou Lanes, RN. Patient tolerated injection well. Requested patient remain in office 15 minutes post injection. No side effects or reactions noted.

## 2019-09-22 DIAGNOSIS — M48.061 SPINAL STENOSIS OF LUMBAR REGION WITHOUT NEUROGENIC CLAUDICATION: Primary | ICD-10-CM

## 2019-09-22 RX ORDER — COLESEVELAM 180 1/1
TABLET ORAL
Qty: 540 TAB | Refills: 0 | Status: SHIPPED | OUTPATIENT
Start: 2019-09-22 | End: 2019-12-22

## 2019-09-25 RX ORDER — GABAPENTIN 300 MG/1
CAPSULE ORAL
Qty: 180 CAP | Refills: 0 | Status: SHIPPED | OUTPATIENT
Start: 2019-09-25 | End: 2019-10-07 | Stop reason: SDUPTHER

## 2019-09-25 NOTE — TELEPHONE ENCOUNTER
Last Refill: 6/25/19  Last visit:8/27/2019      Requested Prescriptions     Pending Prescriptions Disp Refills    gabapentin (NEURONTIN) 300 mg capsule [Pharmacy Med Name: GABAPENTIN 300MG CAPSULES] 180 Cap 0     Sig: TAKE 1 CAPSULE BY MOUTH TWICE DAILY

## 2019-10-03 RX ORDER — DILTIAZEM HYDROCHLORIDE 300 MG/1
300 CAPSULE, COATED, EXTENDED RELEASE ORAL DAILY
Qty: 90 CAP | Refills: 3 | Status: SHIPPED | OUTPATIENT
Start: 2019-10-03 | End: 2019-10-07 | Stop reason: SDUPTHER

## 2019-10-03 NOTE — TELEPHONE ENCOUNTER
PCP: Sherrie Werner MD    Last appt: 8/27/2019  Future Appointments   Date Time Provider Marcos Joiner   10/7/2019 10:00 AM Chanda Young NP [facility] SCHED   11/27/2019  1:00 PM Nestor Shahid MD [facility] SCHED       Requested Prescriptions     Pending Prescriptions Disp Refills    dilTIAZem CD (DILT-CD) 300 mg ER capsule 90 Cap 3     Sig: Take 1 Cap by mouth daily.        Prior labs and Blood pressures:  BP Readings from Last 3 Encounters:   08/27/19 150/68   08/15/19 140/60   07/24/19 150/60     Lab Results   Component Value Date/Time    Sodium 142 08/15/2019 02:03 PM    Potassium 5.8 (H) 08/15/2019 02:03 PM    Chloride 107 08/15/2019 02:03 PM    CO2 22.0 08/15/2019 02:03 PM    Anion gap 13 08/15/2019 02:03 PM    Glucose 194 (H) 08/15/2019 02:03 PM    BUN 40.0 (H) 08/15/2019 02:03 PM    Creatinine 2.2 (H) 08/15/2019 02:03 PM    BUN/Creatinine ratio 16 09/04/2018 02:02 PM    GFR est AA 35 08/15/2019 02:03 PM    GFR est non-AA 29 08/15/2019 02:03 PM    Calcium 8.9 08/15/2019 02:03 PM     Lab Results   Component Value Date/Time    Hemoglobin A1c 5.5 08/15/2019 02:03 PM    Hemoglobin A1c (POC) 7.3 (A) 12/20/2018 03:18 PM     Lab Results   Component Value Date/Time    Cholesterol, total 159 03/10/2010 08:27 AM    Cholesterol (POC) 100.0 12/20/2018 03:18 PM    HDL Cholesterol 46 03/10/2010 08:27 AM    HDL Cholesterol (POC) 49.0 12/20/2018 03:18 PM    LDL Cholesterol (POC) 16.8 12/20/2018 03:18 PM    LDL, calculated 82.4 03/10/2010 08:27 AM    VLDL, calculated 30.6 03/10/2010 08:27 AM    Triglyceride 153 03/10/2010 08:27 AM    Triglycerides (POC) 171.0 12/20/2018 03:18 PM    CHOL/HDL Ratio 3.5 03/10/2010 08:27 AM     No results found for: Robbie Andrews, XQVID3, XQVID, VD3RIA    No results found for: TSH, TSH2, TSH3, TSHP, TSHEXT

## 2019-10-07 ENCOUNTER — CLINICAL SUPPORT (OUTPATIENT)
Dept: INTERNAL MEDICINE CLINIC | Age: 84
End: 2019-10-07

## 2019-10-07 VITALS
RESPIRATION RATE: 14 BRPM | HEIGHT: 66 IN | WEIGHT: 158 LBS | OXYGEN SATURATION: 95 % | HEART RATE: 67 BPM | TEMPERATURE: 97.9 F | BODY MASS INDEX: 25.39 KG/M2 | DIASTOLIC BLOOD PRESSURE: 61 MMHG | SYSTOLIC BLOOD PRESSURE: 147 MMHG

## 2019-10-07 DIAGNOSIS — M48.061 SPINAL STENOSIS OF LUMBAR REGION WITHOUT NEUROGENIC CLAUDICATION: ICD-10-CM

## 2019-10-07 DIAGNOSIS — E53.8 B12 DEFICIENCY: Primary | ICD-10-CM

## 2019-10-07 RX ORDER — CYANOCOBALAMIN 1000 UG/ML
1000 INJECTION, SOLUTION INTRAMUSCULAR; SUBCUTANEOUS ONCE
Qty: 1 ML | Refills: 0 | Status: SHIPPED | COMMUNITY
Start: 2019-10-07 | End: 2019-10-07

## 2019-10-07 RX ORDER — SIMVASTATIN 20 MG/1
20 TABLET, FILM COATED ORAL
Qty: 90 TAB | Refills: 0 | Status: SHIPPED | OUTPATIENT
Start: 2019-10-07 | End: 2019-12-23

## 2019-10-07 RX ORDER — GABAPENTIN 300 MG/1
CAPSULE ORAL
Qty: 180 CAP | Refills: 0
Start: 2019-10-07 | End: 2020-01-13 | Stop reason: SDUPTHER

## 2019-10-07 RX ORDER — DILTIAZEM HYDROCHLORIDE 300 MG/1
300 CAPSULE, COATED, EXTENDED RELEASE ORAL DAILY
Qty: 90 CAP | Refills: 0
Start: 2019-10-07 | End: 2020-12-09 | Stop reason: SDUPTHER

## 2019-10-07 NOTE — PROGRESS NOTES
Chief Complaint   Patient presents with    Injection B12         1. Have you been to the ER, urgent care clinic since your last visit? Hospitalized since your last visit? no    2. Have you seen or consulted any other health care providers outside of the [address] [place] [address] since your last visit? Include any pap smears or colon screening. No    Pateint given B12 injection in right deltoid per verbal order from MJ de santiago. He tolerated procedure well.

## 2019-10-07 NOTE — TELEPHONE ENCOUNTER
Requested Prescriptions     Pending Prescriptions Disp Refills    dilTIAZem CD (DILT-CD) 300 mg ER capsule 90 Cap 0     Sig: Take 1 Cap by mouth daily.  simvastatin (ZOCOR) 20 mg tablet 90 Tab 0     Sig: Take 1 Tab by mouth nightly.     gabapentin (NEURONTIN) 300 mg capsule 180 Cap 0     Sig: TAKE 1 CAPSULE BY MOUTH TWICE DAILY

## 2019-10-24 NOTE — TELEPHONE ENCOUNTER
Requested Prescriptions     Pending Prescriptions Disp Refills    sAXagliptin (ONGLYZA) 2.5 mg tablet 30 Tab 0     Si Tab.        Last Refill: 10/29/18  Next Appointment: neetu Adam

## 2019-11-16 DIAGNOSIS — N40.0 BENIGN LOCALIZED HYPERPLASIA OF PROSTATE: ICD-10-CM

## 2019-11-18 RX ORDER — TAMSULOSIN HYDROCHLORIDE 0.4 MG/1
CAPSULE ORAL
Qty: 180 CAP | Refills: 0 | Status: SHIPPED | OUTPATIENT
Start: 2019-11-18 | End: 2019-11-29 | Stop reason: SDUPTHER

## 2019-11-29 DIAGNOSIS — N40.0 BENIGN LOCALIZED HYPERPLASIA OF PROSTATE: ICD-10-CM

## 2019-11-29 RX ORDER — TAMSULOSIN HYDROCHLORIDE 0.4 MG/1
CAPSULE ORAL
Qty: 180 CAP | Refills: 0 | Status: SHIPPED | OUTPATIENT
Start: 2019-11-29 | End: 2020-05-24

## 2019-11-29 NOTE — TELEPHONE ENCOUNTER
Last Refill: 10/25/19  Last visit:10/7/2019    NOV: 12/3/2019    Requested Prescriptions     Pending Prescriptions Disp Refills    sAXagliptin (ONGLYZA) 2.5 mg tablet 30 Tab 0     Sig: Take 1 Tab by mouth daily.     tamsulosin (FLOMAX) 0.4 mg capsule 180 Cap 0     Sig: TAKE 2 CAPSULES BY MOUTH DAILY

## 2019-11-29 NOTE — TELEPHONE ENCOUNTER
[facility] Requested Prescriptions     Pending Prescriptions Disp Refills    sAXagliptin (ONGLYZA) 2.5 mg tablet 30 Tab 0     Sig: Take 1 Tab by mouth daily.     tamsulosin (FLOMAX) 0.4 mg capsule 180 Cap 0

## 2019-12-03 ENCOUNTER — CLINICAL SUPPORT (OUTPATIENT)
Dept: INTERNAL MEDICINE CLINIC | Age: 84
End: 2019-12-03

## 2019-12-03 VITALS
WEIGHT: 159.4 LBS | RESPIRATION RATE: 16 BRPM | BODY MASS INDEX: 25.62 KG/M2 | TEMPERATURE: 98.2 F | HEART RATE: 75 BPM | HEIGHT: 66 IN | SYSTOLIC BLOOD PRESSURE: 132 MMHG | OXYGEN SATURATION: 95 % | DIASTOLIC BLOOD PRESSURE: 56 MMHG

## 2019-12-03 DIAGNOSIS — E53.8 B12 DEFICIENCY: Primary | ICD-10-CM

## 2019-12-03 RX ORDER — CYANOCOBALAMIN 1000 UG/ML
1000 INJECTION, SOLUTION INTRAMUSCULAR; SUBCUTANEOUS ONCE
Qty: 1 ML | Refills: 0
Start: 2019-12-03 | End: 2019-12-03

## 2019-12-03 NOTE — PROGRESS NOTES
After obtaining consent, and per orders of Dr. Brooks Franco, injection of B12 given by Kelvin Bella LPN.

## 2019-12-22 RX ORDER — COLESEVELAM 180 1/1
TABLET ORAL
Qty: 540 TAB | Refills: 0 | Status: SHIPPED | OUTPATIENT
Start: 2019-12-22 | End: 2020-01-02

## 2019-12-23 RX ORDER — SIMVASTATIN 20 MG/1
TABLET, FILM COATED ORAL
Qty: 90 TAB | Refills: 3 | Status: SHIPPED | OUTPATIENT
Start: 2019-12-23 | End: 2020-12-09

## 2019-12-23 NOTE — TELEPHONE ENCOUNTER
RX refill request from the patient/pharmacy. Patient last seen 12- with labs, and next appt. scheduled for 01-  Requested Prescriptions     Pending Prescriptions Disp Refills    simvastatin (ZOCOR) 20 mg tablet [Pharmacy Med Name: SIMVASTATIN 20MG TABLETS] 90 Tab 3     Sig: TAKE 1 TABLET BY MOUTH EVERY DAY IN THE EVENING   .

## 2019-12-24 RX ORDER — SAXAGLIPTIN 2.5 MG/1
TABLET, FILM COATED ORAL
Qty: 30 TAB | Refills: 0 | Status: SHIPPED | OUTPATIENT
Start: 2019-12-24 | End: 2020-01-13 | Stop reason: ALTCHOICE

## 2020-01-01 PROBLEM — K14.6 BURNING MOUTH SYNDROME: Status: RESOLVED | Noted: 2017-09-28 | Resolved: 2020-01-01

## 2020-01-01 PROBLEM — K40.90 LEFT INGUINAL HERNIA: Status: RESOLVED | Noted: 2019-03-08 | Resolved: 2020-01-01

## 2020-01-02 ENCOUNTER — OFFICE VISIT (OUTPATIENT)
Dept: INTERNAL MEDICINE CLINIC | Age: 85
End: 2020-01-02

## 2020-01-02 VITALS
OXYGEN SATURATION: 95 % | RESPIRATION RATE: 16 BRPM | HEART RATE: 66 BPM | WEIGHT: 159.8 LBS | BODY MASS INDEX: 25.68 KG/M2 | TEMPERATURE: 97.7 F | SYSTOLIC BLOOD PRESSURE: 142 MMHG | HEIGHT: 66 IN | DIASTOLIC BLOOD PRESSURE: 62 MMHG

## 2020-01-02 DIAGNOSIS — E11.21 TYPE II DIABETES MELLITUS WITH NEPHROPATHY (HCC): ICD-10-CM

## 2020-01-02 DIAGNOSIS — N18.30 CKD (CHRONIC KIDNEY DISEASE) STAGE 3, GFR 30-59 ML/MIN (HCC): ICD-10-CM

## 2020-01-02 DIAGNOSIS — R21 RASH: ICD-10-CM

## 2020-01-02 DIAGNOSIS — E53.8 B12 DEFICIENCY: ICD-10-CM

## 2020-01-02 DIAGNOSIS — E55.9 VITAMIN D DEFICIENCY: ICD-10-CM

## 2020-01-02 DIAGNOSIS — N40.1 BENIGN NON-NODULAR PROSTATIC HYPERPLASIA WITH LOWER URINARY TRACT SYMPTOMS: ICD-10-CM

## 2020-01-02 DIAGNOSIS — I10 ESSENTIAL HYPERTENSION: ICD-10-CM

## 2020-01-02 DIAGNOSIS — I25.10 ASCVD (ARTERIOSCLEROTIC CARDIOVASCULAR DISEASE): ICD-10-CM

## 2020-01-02 DIAGNOSIS — G31.84 MCI (MILD COGNITIVE IMPAIRMENT): ICD-10-CM

## 2020-01-02 DIAGNOSIS — Z12.5 PROSTATE CANCER SCREENING: ICD-10-CM

## 2020-01-02 DIAGNOSIS — Z00.00 PE (PHYSICAL EXAM), ANNUAL: Primary | ICD-10-CM

## 2020-01-02 DIAGNOSIS — E78.00 HYPERCHOLESTEROLEMIA: ICD-10-CM

## 2020-01-02 DIAGNOSIS — E11.40 TYPE 2 DIABETES MELLITUS WITH DIABETIC NEUROPATHY, WITHOUT LONG-TERM CURRENT USE OF INSULIN (HCC): ICD-10-CM

## 2020-01-02 LAB
25(OH)D3 SERPL-MCNC: 44 NG/ML (ref 30–96)
A-G RATIO,AGRAT: 1.4 RATIO
ALBUMIN SERPL-MCNC: 4.2 G/DL (ref 3.9–5.4)
ALP SERPL-CCNC: 97 U/L (ref 38–126)
ALT SERPL-CCNC: 19 U/L (ref 0–50)
ANION GAP SERPL CALC-SCNC: 10 MMOL/L
AST SERPL W P-5'-P-CCNC: 24 U/L (ref 14–36)
BILIRUB SERPL-MCNC: 0.5 MG/DL (ref 0.2–1.3)
BILIRUB UR QL: NEGATIVE
BUN SERPL-MCNC: 38 MG/DL (ref 9–20)
BUN/CREATININE RATIO,BUCR: 16 RATIO
CALCIUM SERPL-MCNC: 9 MG/DL (ref 8.4–10.2)
CHLORIDE SERPL-SCNC: 107 MMOL/L (ref 98–107)
CHOL/HDL RATIO,CHHD: 2 RATIO (ref 0–4)
CHOLEST SERPL-MCNC: 108 MG/DL (ref 0–200)
CK SERPL-CCNC: 142 U/L (ref 30–135)
CLARITY: CLEAR
CO2 SERPL-SCNC: 23 MMOL/L (ref 22–32)
COLOR UR: ABNORMAL
CREAT SERPL-MCNC: 2.4 MG/DL (ref 0.8–1.5)
GLOBULIN,GLOB: 2.9
GLUCOSE 24H UR-MRATE: ABNORMAL G/(24.H)
GLUCOSE SERPL-MCNC: 170 MG/DL (ref 75–110)
HDLC SERPL-MCNC: 48 MG/DL (ref 35–130)
HGB UR QL STRIP: NEGATIVE
IRON % SATURATION, SAT: 18 % (ref 15–55)
IRON,IRN: 65 UG/DL (ref 49–181)
KETONES UR QL STRIP.AUTO: NEGATIVE
LDL/HDL RATIO,LDHD: 1 RATIO
LDLC SERPL CALC-MCNC: 34 MG/DL (ref 0–130)
LEUKOCYTE ESTERASE: NEGATIVE
MICROALBUMIN, URINE: 100 MG/L (ref 0–20)
NITRITE UR QL STRIP.AUTO: NEGATIVE
PH UR STRIP: 5 [PH] (ref 5–7)
POTASSIUM SERPL-SCNC: 5.2 MMOL/L (ref 3.6–5)
PROT SERPL-MCNC: 7.1 G/DL (ref 6.3–8.2)
PROT UR STRIP-MCNC: ABNORMAL MG/DL
PSA, TEST22: 2.5 NG/ML (ref 0–4)
RBC #/AREA URNS HPF: 0 #/HPF
SODIUM SERPL-SCNC: 140 MMOL/L (ref 137–145)
SP GR UR REFRACTOMETRY: 1.02 (ref 1–1.03)
SQUAMOUS EPITHELIAL CELLS: ABNORMAL
TIBC,TIBC: 355 MCG/DL (ref 260–462)
TRIGL SERPL-MCNC: 132 MG/DL (ref 0–200)
TSH SERPL DL<=0.05 MIU/L-ACNC: 1.84 UIU/ML (ref 0.34–5.6)
UROBILINOGEN UR QL STRIP.AUTO: NEGATIVE
VLDLC SERPL CALC-MCNC: 26 MG/DL
WBC URNS QL MICRO: 0 #/HPF

## 2020-01-02 RX ORDER — CYANOCOBALAMIN 1000 UG/ML
1000 INJECTION, SOLUTION INTRAMUSCULAR; SUBCUTANEOUS ONCE
Qty: 1 ML | Refills: 0
Start: 2020-01-02 | End: 2020-01-02

## 2020-01-02 RX ORDER — CLOTRIMAZOLE AND BETAMETHASONE DIPROPIONATE 10; .64 MG/G; MG/G
CREAM TOPICAL
Qty: 15 G | Refills: 0 | Status: SHIPPED | OUTPATIENT
Start: 2020-01-02 | End: 2021-06-16 | Stop reason: SDUPTHER

## 2020-01-02 NOTE — PROGRESS NOTES
Administered B 12 injection in left arm patient tolerated injection well.     QFV#:[id number]    Exp:2/2021    THP:[id number]

## 2020-01-02 NOTE — PROGRESS NOTES
Chief Complaint   Patient presents with    Annual Wellness Visit     medicare wellness exam       Depression Risk Factor Screening:     3 most recent PHQ Screens 1/2/2020   Little interest or pleasure in doing things Not at all   Feeling down, depressed, irritable, or hopeless Not at all   Total Score PHQ 2 0       Functional Ability and Level of Safety:     Activities of Daily Living  ADL Assessment 1/2/2020   Feeding yourself No Help Needed   Getting from bed to chair No Help Needed   Getting dressed No Help Needed   Bathing or showering No Help Needed   Walk across the room (includes cane/walker) No Help Needed   Using the telphone No Help Needed   Taking your medications No Help Needed   Preparing meals No Help Needed   Managing money (expenses/bills) No Help Needed   Moderately strenuous housework (laundry) No Help Needed   Shopping for personal items (toiletries/medicines) No Help Needed   Shopping for groceries No Help Needed   Driving No Help Needed   Climbing a flight of stairs No Help Needed   Getting to places beyond walking distances No Help Needed       Fall Risk  Fall Risk Assessment, last 12 mths 1/2/2020   Able to walk? Yes   Fall in past 12 months? No   Fall with injury? -   Number of falls in past 12 months -   Fall Risk Score -       Abuse Screen  Abuse Screening Questionnaire 1/2/2020   Do you ever feel afraid of your partner? N   Are you in a relationship with someone who physically or mentally threatens you? N   Is it safe for you to go home?  Y         Patient Care Team   Patient Care Team:  Deepali Hanley MD as PCP - General (Hospitalist)  Ros Feng NP as PCP - REHABILITATION [facility] Empaneled Provider

## 2020-01-02 NOTE — PATIENT INSTRUCTIONS
Cyanocobalamin (Vitamin B-12) (By injection)   Cyanocobalamin ([id number]-BAL-[id number]-min)  Treats vitamin B-12 deficiency. Brand Name(s): B-12 Compliance Injection Kit, B-12 Kit, Physicians EZ Use B-12 Compliance, Vitamin Deficiency Injectable System - B12   There may be other brand names for this medicine. When This Medicine Should Not Be Used: This medicine is not right for everyone. Do not use it if you had an allergic reaction to vitamin B-12. How to Use This Medicine:   Injectable  · Your doctor will prescribe your exact dose and tell you how often it should be given. This medicine is given as a shot into one of your muscles. · Missed dose: Call your doctor or pharmacist for instructions. Drugs and Foods to Avoid:   Ask your doctor or pharmacist before using any other medicine, including over-the-counter medicines, vitamins, and herbal products. · Do not use folic acid supplements unless your doctor says it is okay. Warnings While Using This Medicine:   · Tell your doctor if you are pregnant or breastfeeding, or if you have kidney disease. · Keep all medicine out of the reach of children. Never share your medicine with anyone. Possible Side Effects While Using This Medicine:   Call your doctor right away if you notice any of these side effects:  · Allergic reaction: Itching or hives, swelling in your face or hands, swelling or tingling in your mouth or throat, chest tightness, trouble breathing  If you notice other side effects that you think are caused by this medicine, tell your doctor. Call your doctor for medical advice about side effects. You may report side effects to FDA at 3-336-FDA-3906  © 2017 [facility] Information is for End User's use only and may not be sold, redistributed or otherwise used for commercial purposes. The above information is an  only. It is not intended as medical advice for individual conditions or treatments.  Talk to your doctor, nurse or pharmacist before following any medical regimen to see if it is safe and effective for you.

## 2020-01-02 NOTE — PROGRESS NOTES
This note will not be viewable in [address] Shanita Keith is a 80 y.o. male and presents with Annual Wellness Visit (medicare wellness exam)      Subjective:  Patient comes in today for his annual Medicare wellness examination and follow-up of hypertension, diabetes, dyslipidemia and CKD 3. He is in attendance with his wife  Patient reports he has been doing fairly well. He does have some rash in the trigeminal nerve area and around the scrotal side for which he uses clotrimazole/betamethasone cream as needed. His diabetes is well controlled on glipizide, Onglyza. He reports he checks his fasting blood sugar every day in the morning which is around 100 210s. Dyslipidemia currently on statin. Denies muscle cramps or myalgias. Blood pressure well controlled on current meds. Patient has a history of atherosclerotic heart disease status post CABG and coronary artery stenting. Continues to remain on aspirin and statin denies PND, shortness of breath, exertional chest pain.       Past Medical History:   Diagnosis Date    CAD (coronary artery disease)     mi    Chronic kidney disease     hx of elevated blood levels    Chronic pain     lower back    Diabetes ([facility] [address]     Enlarged prostate     Hypercholesterolemia     Hypertension     Other unknown and unspecified cause of morbidity or mortality     hayfever     Past Surgical History:   Procedure Laterality Date    CARDIAC SURG PROCEDURE UNLIST      bypass(1985), BEJOIF(0405, 2004)    HX CATARACT REMOVAL Bilateral     HX CHOLECYSTECTOMY      HX LUMBAR LAMINECTOMY  09/2017    HX ORTHOPAEDIC  09/23/2015    back surgery     MI COLONOSCOPY FLX DX W/COLLJ SPEC WHEN PFRMD  8/12/2013         MI EGD TRANSORAL BIOPSY SINGLE/MULTIPLE  6/28/2012         VASCULAR SURGERY PROCEDURE UNLIST  2/1996    left carotid     Allergies   Allergen Reactions    Tetanus Vaccines And Toxoid Swelling     Swelling at the site    Tradjenta [Linagliptin] Diarrhea     headache Current Outpatient Medications   Medication Sig Dispense Refill    cyanocobalamin (VITAMIN B-12) 1,000 mcg/mL injection 1 mL by IntraMUSCular route once for 1 dose. 1 mL 0    clotrimazole-betamethasone (LOTRISONE) topical cream Apply  to affected area two (2) times daily as needed for Skin Irritation. 15 g 0    ONGLYZA 2.5 mg tablet TAKE 1 TABLET BY MOUTH EVERY DAY 30 Tab 0    simvastatin (ZOCOR) 20 mg tablet TAKE 1 TABLET BY MOUTH EVERY DAY IN THE EVENING 90 Tab 3    tamsulosin (FLOMAX) 0.4 mg capsule TAKE 2 CAPSULES BY MOUTH DAILY 180 Cap 0    dilTIAZem CD (DILT-CD) 300 mg ER capsule Take 1 Cap by mouth daily. 90 Cap 0    gabapentin (NEURONTIN) 300 mg capsule TAKE 1 CAPSULE BY MOUTH TWICE DAILY 180 Cap 0    ACCU-CHEK GUIDE strip USE 1 STRIP TO TEST BLOOD SUGAR EVERY  Strip 0    glipiZIDE (GLUCOTROL) 10 mg tablet Take 2 Tabs by mouth two (2) times a day. Take 2 tablets by mouth twice a day. 360 Tab 3    fluticasone propionate (FLONASE) 50 mcg/actuation nasal spray       Blood-Glucose Meter monitoring kit Use to test blood sugar once daily. 1 Kit 0    glucose blood VI test strips (ACCU-CHEK SMARTVIEW TEST STRIP) strip Use to check blood sugar once daily. 100 Strip 3    cyanocobalamin (VITAMIN B12) 1,000 mcg/mL injection INJECT 1 ML SUBCUTANEOUS EVERY MONTH 90 mL 0    ACCU-CHEK FASTCLIX LANCET DRUM misc USE AS DIRECTED TO MONITOR BLOOD SUGAR 1 Each 5    colesevelam (WELCHOL) 625 mg tablet TAKE 3 TABLETS BY MOUTH TWICE DAILY 180 Tab 1    hydrALAZINE (APRESOLINE) 25 mg tablet TAKE 1 TABLET BY MOUTH TWICE DAILY 180 Tab 1    nitroglycerin (NITROSTAT) 0.4 mg SL tablet 1 Tab by SubLINGual route every five (5) minutes as needed for Chest Pain. 25 Tab 3    acetaminophen (TYLENOL EXTRA STRENGTH) 500 mg tablet Take  by mouth every six (6) hours as needed for Pain.  Lancets misc Use with Fastclix lancing device daily to test blood sugar.  100 Each 3    ACCU-CHEK FASTCLIX misc USE AS DIRECTED TO MONITOR BLOOD SUGAR 100 Each 3    aspirin delayed-release 81 mg tablet Take 81 mg by mouth daily.  polyethylene glycol (MIRALAX) 17 gram/dose powder Take 17 g by mouth daily as needed.  Cholecalciferol, Vitamin D3, (VITAMIN D3) 1,000 unit cap Take 1,000 Units by mouth daily.  ferrous sulfate 325 mg (65 mg iron) tablet Take 325 mg by mouth two (2) times a day. Social History     Socioeconomic History    Marital status:      Spouse name: Not on file    Number of children: Not on file    Years of education: Not on file    Highest education level: Not on file   Tobacco Use    Smoking status: Former Smoker     Last attempt to quit: 6/3/1967     Years since quittin.6    Smokeless tobacco: Never Used   Substance and Sexual Activity    Alcohol use: No    Drug use: No     Family History   Problem Relation Age of Onset    Heart Disease Mother     Heart Disease Father        Review of Systems  ROS is unremarkable except for ones highlighted in bold.    Constitutional: negative for fevers, chills, anorexia and weight loss  Eyes:   negative for visual disturbance and irritation  ENT:   negative for tinnitus,sore throat,nasal congestion,ear pain,hoarseness  Respiratory:  negative for cough, hemoptysis, dyspnea,wheezing  CV:   negative for chest pain, palpitations, lower extremity edema  GI:   negative for nausea, vomiting, diarrhea, abdominal pain,melena  Endo:               negative for polyuria,polydipsia,polyphagia,heat intolerance  Genitourinary: negative for frequency, dysuria and hematuria  Integumentary: negative for rash and pruritus  Hematologic:  negative for easy bruising and gum/nose bleeding  Musculoskel: negative for myalgias, arthralgias, back pain, muscle weakness, joint pain  Neurological:  negative for headaches, dizziness, vertigo, memory problems and gait   Behavl/Psych: negative for feelings of anxiety, depression, mood changes  ROS otherwise negative Objective:  Visit Vitals  /62 (BP 1 Location: Left arm, BP Patient Position: Sitting)   Pulse 66   Temp 97.7 °F (36.5 °C) (Oral)   Resp 16   Ht 5' 6\" (1.676 m)   Wt 159 lb 12.8 oz (72.5 kg)   SpO2 95%   BMI 25.79 kg/m²     Physical Exam:   General appearance - alert, well appearing, and in no distress  Mental status - alert, oriented to person, place, and time  EYE-MELISSA, EOMI, fundi normal, corneas normal, no foreign bodies  ENT-ENT exam normal, no neck nodes or sinus tenderness  Nose - normal and patent, no erythema, discharge or polyps  Mouth - mucous membranes moist, pharynx normal without lesions  Neck - supple, no significant adenopathy   Chest - clear to auscultation, no wheezes, rales or rhonchi, symmetric air entry   Heart - normal rate, regular rhythm, normal S1, S2, no murmurs, rubs, clicks or gallops   Abdomen - soft, nontender, nondistended, no masses or organomegaly  Lymph- no adenopathy palpable  Ext-peripheral pulses normal, no pedal edema, no clubbing or cyanosis  Skin-Warm and dry. no hyperpigmentation, vitiligo, or suspicious lesions, rash on anterior trigeminal region bilaterally.   Neuro -alert, oriented, normal speech, no focal findings or movement disorder noted  Musculoskeletal- FROM, no bony abnormalities, no point tenderness    Diabetic foot exam:      Left Foot:              Visual Exam: normal               Pulse DP: 2+ (normal)              Filament test: normal sensation               Vibratory sensation: normal                 Right Foot:              Visual Exam: normal               Pulse DP: 2+ (normal)              Filament test: normal sensation               Vibratory sensation: normal      Lab Review:  Results for orders placed or performed in visit on 17/44/80   METABOLIC PANEL, BASIC   Result Value Ref Range    Glucose 194 (H) 75 - 110 mg/dL    BUN 40.0 (H) 9.0 - 20.0 mg/dL    Creatinine 2.2 (H) 0.8 - 1.5 mg/dL    Sodium 142 137 - 145 mmol/L    Potassium 5.8 (H) 3.6 - 5.0 mmol/L    Chloride 107 98 - 107 mmol/L    CO2 22.0 22.0 - 32.0 mmol/L    Calcium 8.9 8.4 - 10.2 mg/dl    Anion gap 13 mmol/L    GFR est AA 35 <60 mL/min/1.73m2    GFR est non-AA 29 <60 mL/min/1.73m2   HEMOGLOBIN A1C W/O EAG   Result Value Ref Range    Hemoglobin A1c 5.5 4.0 - 5.7 %        Documenation Review:  Lumbar spinal stenosis with severe L3-4 stenosis, S/P lumbar laminectomy. History of chronic anemia associated with B12 and iron deficiency, improved   History of cerebrovascular disease, S/P left carotid endarterectomy,   OHD, S/P CABG in 1985 with subsequent RCA stenting in 2004. He's had no recent angina or symptoms of CHF. 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 Health Maintenance Issues and Ancillary Studies:  1. TDAP (pertussis and tetanus) vaccine was given in June, 2012. 2. Pneumovax 23 (PPSV23) was given in November, 2007. 3. Seasonal influenza vaccine was given in 2019. 4. Prevnar 13 (PCV13) was given in October, 2015. 5. Zostavax given in June of 2012. 6. Patient reports he got 1 dose of his Shingrix vaccine. 7. Diabetic eye examination revealed no diabetic retinopathy in April, 2018. 8. CT scan of the pelvis in November, 2015 revealed an enlarged prostate. 9. MRI of the lumbosacral spine in August, 2015 revealed disc extrusion and severe stenosis at L3-4. He subsequently had a laminectomy at this level. 10. CT scan of the abdomen and pelvis in September, 2014 revealed an enlarged prostate, diverticulosis and stable adrenal adenomas. 11. MRI of the brain revealed normal IACs in July of 2014. 12. Right hip xray a year ago revealed arthritis. 15. Upper endoscopy in June of 2012 revealed gastritis. Biopsy was positive for H. Pylori and he was treated for this. 14. MRI of the abdomen in November, 2009 revealed no evidence for adrenal cancer. 15. Capsule endoscopy in October, 2013 revealed only the gastritis.   16. Colonoscopy in August, 2013 revealed diverticulosis. 17. Carotid dopplers in August, 2013 revealed 10-49% stenosis bilaterally. 18. Ultrasound of the abdomen was negative, S/P cholecystectomy in July of 2009. 19. PVRs were normal in April, 2012.  20. Last echocardiogram was normal with ejection fraction of 55% in June of 2012. 21. MRI of the brain in March, 2013 revealed some microvascular changes. 22. CT scan of the head was negative in October, 2012. 23. Lexiscan stress testing revealed no evidence for ischemia. There was evidence for an apical infarct and ejection fraction was 56%. 24. MRI of the right hip in July of 2017 revealed degenerative joint disease and possibly a labral tear. 25. EKG in the office revealed an old anterior myocardial infarction, unchanged from previous. 26. Pulmonary function studies were normal.  27. Health screening assessments were essentially normal.      Assessment/Plan:    Diagnoses and all orders for this visit:    1. PE (physical exam), annual    2. Type 2 diabetes mellitus with diabetic neuropathy, without long-term current use of insulin ([facility] [address]    3. Type II diabetes mellitus with nephropathy (HCC)  -     URINE, MICROALBUMIN, SEMIQUANTITATIVE  -     HEMOGLOBIN A1C W/O EAG  -     HM DIABETES FOOT EXAM    4. CKD (chronic kidney disease) stage 3, GFR 30-59 ml/min ([facility])  -     IRON PROFILE  -     METABOLIC PANEL, COMPREHENSIVE    5. ASCVD (arteriosclerotic cardiovascular disease)    6. Benign non-nodular prostatic hyperplasia with lower urinary tract symptoms    7. B12 deficiency  -     VITAMIN B12  -     VITAMIN B12 INJECTION  -     THER/PROPH/DIAG INJECTION, SUBCUT/IM  -     cyanocobalamin (VITAMIN B-12) 1,000 mcg/mL injection; 1 mL by IntraMUSCular route once for 1 dose. 8. Essential hypertension  -     CBC W/O DIFF  -     URINALYSIS W/MICROSCOPIC  -     TSH 3RD GENERATION    9. Vitamin D deficiency  -     VITAMIN D, 25 HYDROXY    10.  Hypercholesterolemia  -     CK  -     LIPID PANEL    11. MCI (mild cognitive impairment)    12. Prostate cancer screening  -     PSA SCREENING (SCREENING)    13. Rash  -     clotrimazole-betamethasone (LOTRISONE) topical cream; Apply  to affected area two (2) times daily as needed for Skin Irritation. Patient is up-to-date on all his immunization and health screenings. Already got his flu shot for this year. He is got only 1 dose of the Shingrix and the second 1 is due since it is backordered. His last colonoscopy was in 2013 and I do not think so he needs that to be repeated. Continue current meds. I will call with lab results and make further recommendations or adjustments if necessary. Discussed lifestyle modifications including Na restriction, low carb/fat diet, weight reduction and exercise (at least a walking program). ICD-10-CM ICD-9-CM    1. PE (physical exam), annual Z00.00 V70.0    2. Type 2 diabetes mellitus with diabetic neuropathy, without long-term current use of insulin (HCC) E11.40 250.60      357.2    3. Type II diabetes mellitus with nephropathy ([facility]) E11.21 250.40 URINE, MICROALBUMIN, SEMIQUANTITATIVE     583.81 HEMOGLOBIN A1C W/O EAG      HM DIABETES FOOT EXAM   4. CKD (chronic kidney disease) stage 3, GFR 30-59 ml/min ([facility]) N18.3 585.3 IRON PROFILE      METABOLIC PANEL, COMPREHENSIVE   5. ASCVD (arteriosclerotic cardiovascular disease) I25.10 429.2      440.9    6. Benign non-nodular prostatic hyperplasia with lower urinary tract symptoms N40.1 600.91    7. B12 deficiency E53.8 266.2 VITAMIN B12      VITAMIN B12 INJECTION      THER/PROPH/DIAG INJECTION, SUBCUT/IM      cyanocobalamin (VITAMIN B-12) 1,000 mcg/mL injection   8. Essential hypertension I10 401.9 CBC W/O DIFF      URINALYSIS W/MICROSCOPIC      TSH 3RD GENERATION   9. Vitamin D deficiency E55.9 268.9 VITAMIN D, 25 HYDROXY   10. Hypercholesterolemia E78.00 272.0 CK      LIPID PANEL   11. MCI (mild cognitive impairment) G31.84 331.83    12.  Prostate cancer screening Z12.5 V76.44 PSA SCREENING (SCREENING)   13. Rash R21 782.1 clotrimazole-betamethasone (LOTRISONE) topical cream         Follow-up and Dispositions    · Return in about 3 months (around 4/2/2020) for follow up. I have reviewed with the patient details of the assessment and plan and all questions were answered. Relevent patient education was performed. Verbal and/or written instructions (see AVS) provided. The most recent lab findings were reviewed with the patient. Plan was discussed with patient who verbally expressed understanding. An After Visit Summary was printed and given to the patient.     Ross Coley MD

## 2020-01-03 LAB
ERYTHROCYTE [DISTWIDTH] IN BLOOD BY AUTOMATED COUNT: 12.6 % (ref 12.3–15.4)
HBA1C MFR BLD HPLC: 7.1 % (ref 4–5.7)
HCT VFR BLD AUTO: 36.6 % (ref 37.5–51)
HGB BLD-MCNC: 12.1 G/DL (ref 13–17.7)
MCH RBC QN AUTO: 29.4 PG (ref 26.6–33)
MCHC RBC AUTO-ENTMCNC: 33.1 G/DL (ref 31.5–35.7)
MCV RBC AUTO: 89 FL (ref 79–97)
PLATELET # BLD AUTO: 154 X10E3/UL (ref 150–450)
RBC # BLD AUTO: 4.11 X10E6/UL (ref 4.14–5.8)
VIT B12 SERPL-MCNC: >2000 PG/ML (ref 232–1245)
WBC # BLD AUTO: 9 X10E3/UL (ref 3.4–10.8)

## 2020-01-03 NOTE — PROGRESS NOTES
Please let patient know all labs are unremarkable except for elevation in his A1c. Diabetes is uncontrolled. Needs to control his diet and continue with lifestyle modification  Advise lifestyle modifications including Na restriction, low carb/fat diet, weight reduction and exercise (at least a walking program).

## 2020-01-13 ENCOUNTER — DOCUMENTATION ONLY (OUTPATIENT)
Dept: INTERNAL MEDICINE CLINIC | Age: 85
End: 2020-01-13

## 2020-01-13 DIAGNOSIS — E11.40 TYPE 2 DIABETES MELLITUS WITH DIABETIC NEUROPATHY, WITHOUT LONG-TERM CURRENT USE OF INSULIN (HCC): Primary | ICD-10-CM

## 2020-01-13 DIAGNOSIS — M48.061 SPINAL STENOSIS OF LUMBAR REGION WITHOUT NEUROGENIC CLAUDICATION: ICD-10-CM

## 2020-01-13 RX ORDER — GABAPENTIN 300 MG/1
CAPSULE ORAL
Qty: 180 CAP | Refills: 0 | Status: SHIPPED | OUTPATIENT
Start: 2020-01-13 | End: 2020-03-20 | Stop reason: SDUPTHER

## 2020-01-20 RX ORDER — SAXAGLIPTIN 2.5 MG/1
TABLET, FILM COATED ORAL
Qty: 30 TAB | Refills: 0 | Status: SHIPPED | OUTPATIENT
Start: 2020-01-20 | End: 2020-02-24

## 2020-01-29 RX ORDER — GLIPIZIDE 10 MG/1
20 TABLET ORAL 2 TIMES DAILY
Qty: 360 TAB | Refills: 3 | Status: SHIPPED | OUTPATIENT
Start: 2020-01-29 | End: 2021-04-08 | Stop reason: SDUPTHER

## 2020-01-29 NOTE — TELEPHONE ENCOUNTER
Parminder Melton Requested Prescriptions     Pending Prescriptions Disp Refills    glipiZIDE (GLUCOTROL) 10 mg tablet 360 Tab 3     Sig: Take 2 Tabs by mouth two (2) times a day. Take 2 tablets by mouth twice a day.

## 2020-01-29 NOTE — TELEPHONE ENCOUNTER
Last Refill: 5/6/19  Last visit:1/2/2020    NOV: 2/3/2020      Requested Prescriptions     Pending Prescriptions Disp Refills    glipiZIDE (GLUCOTROL) 10 mg tablet 360 Tab 3     Sig: Take 2 Tabs by mouth two (2) times a day. Take 2 tablets by mouth twice a day.

## 2020-02-03 ENCOUNTER — CLINICAL SUPPORT (OUTPATIENT)
Dept: INTERNAL MEDICINE CLINIC | Age: 85
End: 2020-02-03

## 2020-02-03 VITALS
SYSTOLIC BLOOD PRESSURE: 138 MMHG | HEIGHT: 66 IN | TEMPERATURE: 97.8 F | WEIGHT: 159 LBS | OXYGEN SATURATION: 95 % | BODY MASS INDEX: 25.55 KG/M2 | DIASTOLIC BLOOD PRESSURE: 70 MMHG | RESPIRATION RATE: 16 BRPM | HEART RATE: 68 BPM

## 2020-02-03 DIAGNOSIS — E53.8 VITAMIN B 12 DEFICIENCY: Primary | ICD-10-CM

## 2020-02-03 RX ORDER — CYANOCOBALAMIN 1000 UG/ML
1000 INJECTION, SOLUTION INTRAMUSCULAR; SUBCUTANEOUS ONCE
Qty: 1 ML | Refills: 0 | Status: SHIPPED | COMMUNITY
Start: 2020-02-03 | End: 2020-02-03

## 2020-02-03 NOTE — PROGRESS NOTES
After obtaining verbal consent and per orders of Dr. Gabby Cornell, injection of B12 given by Summer Rao LPN.

## 2020-02-07 DIAGNOSIS — E11.40 TYPE 2 DIABETES MELLITUS WITH DIABETIC NEUROPATHY, WITHOUT LONG-TERM CURRENT USE OF INSULIN (HCC): ICD-10-CM

## 2020-02-07 NOTE — TELEPHONE ENCOUNTER
Last Refill: 1/13/20  Last visit:2/3/2020    NOV: 3/4/2020    Requested Prescriptions     Pending Prescriptions Disp Refills    SITagliptin (JANUVIA) 50 mg tablet 60 Tab 6     Sig: Take 2 Tabs by mouth daily for 90 days.  Indications: type 2 diabetes mellitus

## 2020-02-20 RX ORDER — LANCETS
EACH MISCELLANEOUS
Qty: 1 EACH | Refills: 6 | Status: SHIPPED | OUTPATIENT
Start: 2020-02-20 | End: 2020-09-10 | Stop reason: SDUPTHER

## 2020-02-20 RX ORDER — CYANOCOBALAMIN 1000 UG/ML
INJECTION, SOLUTION INTRAMUSCULAR; SUBCUTANEOUS
Qty: 1 ML | Refills: 12
Start: 2020-02-20 | End: 2020-02-21 | Stop reason: SDUPTHER

## 2020-02-20 NOTE — TELEPHONE ENCOUNTER
Requested Prescriptions     Pending Prescriptions Disp Refills    cyanocobalamin (VITAMIN B12) 1,000 mcg/mL injection 1 mL 12     Sig: INJECT 1 ML SUBCUTANEOUS EVERY MONTH    lancets (ACCU-CHEK FASTCLIX LANCET DRUM) misc 1 Each 6     Sig: USE AS DIRECTED TO MONITOR BLOOD SUGAR

## 2020-02-21 NOTE — TELEPHONE ENCOUNTER
Requested Prescriptions     Pending Prescriptions Disp Refills    cyanocobalamin (VITAMIN B12) 1,000 mcg/mL injection 1 mL 12     Sig: INJECT 1 ML SUBCUTANEOUS EVERY MONTH

## 2020-02-23 RX ORDER — CYANOCOBALAMIN 1000 UG/ML
INJECTION, SOLUTION INTRAMUSCULAR; SUBCUTANEOUS
Qty: 1 ML | Refills: 12
Start: 2020-02-23 | End: 2020-02-28 | Stop reason: SDUPTHER

## 2020-02-24 RX ORDER — SAXAGLIPTIN 2.5 MG/1
TABLET, FILM COATED ORAL
Qty: 30 TAB | Refills: 0 | Status: SHIPPED | OUTPATIENT
Start: 2020-02-24 | End: 2020-03-25

## 2020-02-25 DIAGNOSIS — E11.40 TYPE 2 DIABETES MELLITUS WITH DIABETIC NEUROPATHY, WITHOUT LONG-TERM CURRENT USE OF INSULIN (HCC): ICD-10-CM

## 2020-02-26 DIAGNOSIS — E11.49 TYPE II OR UNSPECIFIED TYPE DIABETES MELLITUS WITH NEUROLOGICAL MANIFESTATIONS, UNCONTROLLED(250.62) (HCC): Primary | ICD-10-CM

## 2020-02-27 NOTE — TELEPHONE ENCOUNTER
Jacob Rojas MD  You 17 hours ago (3:08 PM)      I have reviewed patient's chart.  To my knowledge she was on glipizide and Vick Begin chart review it seems he has been on Januvia in the past.  Please talk to patient and let me know if he needs to be switched back to Januvia due to insurance issues. Given history of CKD stage IV and uncontrolled diabetes I have put in a referral for endocrinology as well. Spoke with patient in reference to this and advised him I would reach out to his insurance company to see what can be done.

## 2020-02-28 RX ORDER — CYANOCOBALAMIN 1000 UG/ML
INJECTION, SOLUTION INTRAMUSCULAR; SUBCUTANEOUS
Qty: 1 ML | Refills: 12
Start: 2020-02-28

## 2020-02-28 NOTE — TELEPHONE ENCOUNTER
Requested Prescriptions     Pending Prescriptions Disp Refills    sAXagliptin (ONGLYZA) 2.5 mg tablet 30 Tab 0

## 2020-03-04 ENCOUNTER — CLINICAL SUPPORT (OUTPATIENT)
Dept: INTERNAL MEDICINE CLINIC | Age: 85
End: 2020-03-04

## 2020-03-04 VITALS
RESPIRATION RATE: 16 BRPM | WEIGHT: 159 LBS | OXYGEN SATURATION: 94 % | TEMPERATURE: 98 F | DIASTOLIC BLOOD PRESSURE: 60 MMHG | BODY MASS INDEX: 25.55 KG/M2 | HEART RATE: 94 BPM | SYSTOLIC BLOOD PRESSURE: 128 MMHG | HEIGHT: 66 IN

## 2020-03-04 DIAGNOSIS — E53.8 VITAMIN B 12 DEFICIENCY: ICD-10-CM

## 2020-03-04 DIAGNOSIS — Z23 ENCOUNTER FOR IMMUNIZATION: ICD-10-CM

## 2020-03-04 DIAGNOSIS — E53.8 B12 DEFICIENCY: Primary | ICD-10-CM

## 2020-03-04 RX ORDER — CYANOCOBALAMIN 1000 UG/ML
1000 INJECTION, SOLUTION INTRAMUSCULAR; SUBCUTANEOUS ONCE
Qty: 1 ML | Refills: 0 | Status: SHIPPED | COMMUNITY
Start: 2020-03-04 | End: 2020-03-04

## 2020-03-11 RX ORDER — HYDRALAZINE HYDROCHLORIDE 25 MG/1
TABLET, FILM COATED ORAL
Qty: 180 TAB | Refills: 3 | Status: SHIPPED | OUTPATIENT
Start: 2020-03-11 | End: 2021-01-19 | Stop reason: SDUPTHER

## 2020-03-11 NOTE — TELEPHONE ENCOUNTER
Last Refill:12/14/19  Last visit:3/4/2020    NOV: 4/2/2020    Requested Prescriptions     Pending Prescriptions Disp Refills    hydrALAZINE (APRESOLINE) 25 mg tablet 180 Tab 3     Sig: TAKE 1 TABLET BY MOUTH TWICE DAILY

## 2020-03-12 DIAGNOSIS — E11.40 TYPE 2 DIABETES MELLITUS WITH DIABETIC NEUROPATHY, WITHOUT LONG-TERM CURRENT USE OF INSULIN (HCC): ICD-10-CM

## 2020-03-12 NOTE — TELEPHONE ENCOUNTER
PCP: John Parks MD    Last appt: 3/4/2020  Future Appointments   Date Time Provider Marcos Joiner   4/2/2020  2:00 PM John Parks MD [address]   5/13/2020  2:00 PM John Parks MD [address]   7/21/2020  1:30 PM Scott Sandhu MD [facility] [address] [facility]        Requested Prescriptions     Pending Prescriptions Disp Refills    SITagliptin (JANUVIA) 100 mg tablet 30 Tab 3     Sig: Take 1 Tab by mouth daily.  Indications: type 2 diabetes mellitus       Prior labs and Blood pressures:  BP Readings from Last 3 Encounters:   03/04/20 128/60   02/03/20 138/70   01/02/20 142/62     Lab Results   Component Value Date/Time    Sodium 140 01/02/2020 03:58 PM    Potassium 5.2 (H) 01/02/2020 03:58 PM    Chloride 107 01/02/2020 03:58 PM    CO2 23.0 01/02/2020 03:58 PM    Anion gap 10 01/02/2020 03:58 PM    Glucose 170 (H) 01/02/2020 03:58 PM    BUN 38.0 (H) 01/02/2020 03:58 PM    Creatinine 2.4 (H) 01/02/2020 03:58 PM    BUN/Creatinine ratio 16 01/02/2020 03:58 PM    GFR est AA 31 01/02/2020 03:58 PM    GFR est non-AA 26 01/02/2020 03:58 PM    Calcium 9.0 01/02/2020 03:58 PM     Lab Results   Component Value Date/Time    Hemoglobin A1c 7.1 (H) 01/02/2020 03:58 PM    Hemoglobin A1c (POC) 7.3 (A) 12/20/2018 03:18 PM     Lab Results   Component Value Date/Time    Cholesterol, total 108 01/02/2020 03:58 PM    Cholesterol (POC) 100.0 12/20/2018 03:18 PM    HDL Cholesterol 48 01/02/2020 03:58 PM    HDL Cholesterol (POC) 49.0 12/20/2018 03:18 PM    LDL Cholesterol (POC) 16.8 12/20/2018 03:18 PM    LDL, calculated 34 01/02/2020 03:58 PM    VLDL, calculated 30.6 03/10/2010 08:27 AM    VLDL 26 01/02/2020 03:58 PM    Triglyceride 132 01/02/2020 03:58 PM    Triglycerides (POC) 171.0 12/20/2018 03:18 PM    CHOL/HDL Ratio 2 01/02/2020 03:58 PM     Lab Results   Component Value Date/Time    VITAMIN D, 25-HYDROXY 44 01/02/2020 03:59 PM       Lab Results   Component Value Date/Time    TSH, 3rd generation 1.84 01/02/2020 03:59 PM

## 2020-03-20 DIAGNOSIS — M48.061 SPINAL STENOSIS OF LUMBAR REGION WITHOUT NEUROGENIC CLAUDICATION: ICD-10-CM

## 2020-03-20 RX ORDER — GABAPENTIN 300 MG/1
CAPSULE ORAL
Qty: 180 CAP | Refills: 0 | Status: SHIPPED | OUTPATIENT
Start: 2020-03-20 | End: 2020-06-18

## 2020-03-20 NOTE — TELEPHONE ENCOUNTER
Requested Prescriptions     Pending Prescriptions Disp Refills    gabapentin (NEURONTIN) 300 mg capsule 180 Cap 0     Sig: TAKE 1 CAPSULE BY MOUTH TWICE DAILY     Last Refill: 1/13/2020  Last Office Visit: 1/2/2020  Future Office Visit: 4/2/2020

## 2020-03-22 RX ORDER — COLESEVELAM 180 1/1
TABLET ORAL
Qty: 180 TAB | Refills: 1 | Status: SHIPPED | OUTPATIENT
Start: 2020-03-22 | End: 2020-04-13

## 2020-03-25 RX ORDER — SAXAGLIPTIN 2.5 MG/1
TABLET, FILM COATED ORAL
Qty: 30 TAB | Refills: 0 | Status: SHIPPED | OUTPATIENT
Start: 2020-03-25 | End: 2020-04-24

## 2020-04-13 RX ORDER — COLESEVELAM 180 1/1
TABLET ORAL
Qty: 540 TAB | Refills: 2 | Status: SHIPPED | OUTPATIENT
Start: 2020-04-13 | End: 2020-12-09

## 2020-04-24 RX ORDER — SAXAGLIPTIN 2.5 MG/1
TABLET, FILM COATED ORAL
Qty: 30 TAB | Refills: 0 | Status: SHIPPED | OUTPATIENT
Start: 2020-04-24 | End: 2020-05-24

## 2020-05-15 NOTE — TELEPHONE ENCOUNTER
Requested Prescriptions     Pending Prescriptions Disp Refills    glucose blood VI test strips (Accu-Chek Guide test strips) strip 100 Strip 0       Last Visit:1/2/2020    Last Refill:2/19/2020

## 2020-05-24 DIAGNOSIS — N40.0 BENIGN LOCALIZED HYPERPLASIA OF PROSTATE: ICD-10-CM

## 2020-05-24 RX ORDER — SAXAGLIPTIN 2.5 MG/1
TABLET, FILM COATED ORAL
Qty: 30 TAB | Refills: 0 | Status: SHIPPED | OUTPATIENT
Start: 2020-05-24 | End: 2020-06-23

## 2020-05-24 RX ORDER — TAMSULOSIN HYDROCHLORIDE 0.4 MG/1
CAPSULE ORAL
Qty: 180 CAP | Refills: 0 | Status: SHIPPED | OUTPATIENT
Start: 2020-05-24 | End: 2020-08-12

## 2020-06-18 DIAGNOSIS — M48.061 SPINAL STENOSIS OF LUMBAR REGION WITHOUT NEUROGENIC CLAUDICATION: ICD-10-CM

## 2020-06-18 RX ORDER — GABAPENTIN 300 MG/1
CAPSULE ORAL
Qty: 180 CAP | Refills: 0 | Status: SHIPPED | OUTPATIENT
Start: 2020-06-18 | End: 2020-09-10

## 2020-06-23 RX ORDER — SAXAGLIPTIN 2.5 MG/1
TABLET, FILM COATED ORAL
Qty: 30 TAB | Refills: 0 | Status: SHIPPED | OUTPATIENT
Start: 2020-06-23 | End: 2020-07-16

## 2020-07-15 DIAGNOSIS — E11.40 TYPE 2 DIABETES MELLITUS WITH DIABETIC NEUROPATHY, WITHOUT LONG-TERM CURRENT USE OF INSULIN (HCC): ICD-10-CM

## 2020-07-15 NOTE — TELEPHONE ENCOUNTER
Last Refill: 3/12/2020  Last Office Visit: 1/2/2020  Future Office Visit: None    Requested Prescriptions     Pending Prescriptions Disp Refills    SITagliptin (JANUVIA) 100 mg tablet 30 Tab 3     Sig: Take 1 Tab by mouth daily.  Indications: type 2 diabetes mellitus

## 2020-07-16 ENCOUNTER — OFFICE VISIT (OUTPATIENT)
Dept: INTERNAL MEDICINE CLINIC | Age: 85
End: 2020-07-16

## 2020-07-16 VITALS
TEMPERATURE: 98.3 F | HEIGHT: 60 IN | SYSTOLIC BLOOD PRESSURE: 110 MMHG | RESPIRATION RATE: 16 BRPM | WEIGHT: 154 LBS | HEART RATE: 88 BPM | OXYGEN SATURATION: 98 % | DIASTOLIC BLOOD PRESSURE: 62 MMHG | BODY MASS INDEX: 30.23 KG/M2

## 2020-07-16 DIAGNOSIS — I10 ESSENTIAL HYPERTENSION: ICD-10-CM

## 2020-07-16 DIAGNOSIS — E11.40 TYPE 2 DIABETES MELLITUS WITH DIABETIC NEUROPATHY, WITHOUT LONG-TERM CURRENT USE OF INSULIN (HCC): Primary | ICD-10-CM

## 2020-07-16 DIAGNOSIS — E53.8 B12 DEFICIENCY: ICD-10-CM

## 2020-07-16 DIAGNOSIS — E78.00 HYPERCHOLESTEROLEMIA: ICD-10-CM

## 2020-07-16 DIAGNOSIS — N18.30 CKD (CHRONIC KIDNEY DISEASE) STAGE 3, GFR 30-59 ML/MIN (HCC): ICD-10-CM

## 2020-07-16 DIAGNOSIS — E11.21 TYPE II DIABETES MELLITUS WITH NEPHROPATHY (HCC): ICD-10-CM

## 2020-07-16 LAB
A-G RATIO,AGRAT: 1.8 RATIO
ALBUMIN SERPL-MCNC: 4.4 G/DL (ref 3.9–5.4)
ALP SERPL-CCNC: 85 U/L (ref 38–126)
ALT SERPL-CCNC: 17 U/L (ref 0–50)
ANION GAP SERPL CALC-SCNC: 11 MMOL/L
AST SERPL W P-5'-P-CCNC: 24 U/L (ref 14–36)
BILIRUB SERPL-MCNC: 0.4 MG/DL (ref 0.2–1.3)
BUN SERPL-MCNC: 38 MG/DL (ref 9–20)
BUN/CREATININE RATIO,BUCR: 17 RATIO
CALCIUM SERPL-MCNC: 9.3 MG/DL (ref 8.4–10.2)
CHLORIDE SERPL-SCNC: 108 MMOL/L (ref 98–107)
CHOL/HDL RATIO,CHHD: 2 RATIO (ref 0–4)
CHOLEST SERPL-MCNC: 103 MG/DL (ref 0–200)
CO2 SERPL-SCNC: 24 MMOL/L (ref 22–32)
CREAT SERPL-MCNC: 2.3 MG/DL (ref 0.8–1.5)
ERYTHROCYTE [DISTWIDTH] IN BLOOD BY AUTOMATED COUNT: 12.3 %
GLOBULIN,GLOB: 2.5
GLUCOSE SERPL-MCNC: 139 MG/DL (ref 75–110)
HBA1C MFR BLD HPLC: 7.4 % (ref 4–5.7)
HCT VFR BLD AUTO: 36 % (ref 37–51)
HDLC SERPL-MCNC: 55 MG/DL (ref 35–130)
HGB BLD-MCNC: 11.8 G/DL (ref 12–18)
LDL/HDL RATIO,LDHD: 0 RATIO
LDLC SERPL CALC-MCNC: 14 MG/DL (ref 0–130)
MCH RBC QN AUTO: 29.7 PG (ref 26–32)
MCHC RBC AUTO-ENTMCNC: 32.8 G/DL (ref 30–36)
MCV RBC AUTO: 90.8 FL (ref 80–97)
PLATELET # BLD AUTO: 142 K/UL (ref 140–440)
POTASSIUM SERPL-SCNC: 4.9 MMOL/L (ref 3.6–5)
PROT SERPL-MCNC: 6.9 G/DL (ref 6.3–8.2)
RBC # BLD AUTO: 3.97 M/UL (ref 4.2–6.3)
SODIUM SERPL-SCNC: 143 MMOL/L (ref 137–145)
TRIGL SERPL-MCNC: 168 MG/DL (ref 0–200)
VLDLC SERPL CALC-MCNC: 34 MG/DL
WBC # BLD AUTO: 7.1 K/UL (ref 4.1–10.9)

## 2020-07-16 RX ORDER — CYANOCOBALAMIN 1000 UG/ML
1000 INJECTION, SOLUTION INTRAMUSCULAR; SUBCUTANEOUS ONCE
Qty: 1 ML | Refills: 0
Start: 2020-07-16 | End: 2020-07-16

## 2020-07-16 NOTE — PROGRESS NOTES
This note will not be viewable in [address] Marcelo Lehman is a 80 y.o. male and presents with Medication Refill and Diabetes      Subjective:  Patient comes in today for follow-up of hypertension, diabetes, dyslipidemia and CKD 3. Patient comes in for follow-up of his type 2 diabetes. He has had diabetic nephropathystage III CKD. He was on Tradjenta in the past and had some diarrhea and it was discontinued. His insurance issues and so his Onglyza was switched to Januvia. However patient was taking both the [address]. He denied hypoglycemic episodes. Reports his fasting blood sugars have ranged between 1001 30s. Denies hypoglycemic episodes including sweating shakiness. He was on metformin in the past and that was discontinued. He remains on his glipizide. Dyslipidemia currently on statin. Denies muscle cramps or myalgias. Blood pressure well controlled on current meds. Patient has a history of atherosclerotic heart disease status post CABG and coronary artery stenting. Continues to remain on aspirin and statin denies PND, shortness of breath, exertional chest pain.       Past Medical History:   Diagnosis Date    CAD (coronary artery disease)     mi    Chronic kidney disease     hx of elevated blood levels    Chronic pain     lower back    Diabetes ([address]     Enlarged prostate     Hypercholesterolemia     Hypertension     Other unknown and unspecified cause of morbidity or mortality     hayfever     Past Surgical History:   Procedure Laterality Date    CARDIAC SURG PROCEDURE UNLIST      bypass(1985), [id number](4107, 2004)    HX CATARACT REMOVAL Bilateral     HX CHOLECYSTECTOMY      HX LUMBAR LAMINECTOMY  09/2017    HX ORTHOPAEDIC  09/23/2015    back surgery     AL COLONOSCOPY FLX DX W/COLLJ SPEC WHEN PFRMD  8/12/2013         AL EGD TRANSORAL BIOPSY SINGLE/MULTIPLE  6/28/2012         VASCULAR SURGERY PROCEDURE UNLIST  2/1996    left carotid     Allergies   Allergen Reactions    Tetanus Vaccines And Toxoid Swelling     Swelling at the site    Tradjenta [Linagliptin] Diarrhea     headache     Current Outpatient Medications   Medication Sig Dispense Refill    cyanocobalamin (Vitamin B-12) 1,000 mcg/mL injection 1 mL by IntraMUSCular route once for 1 dose. 1 mL 0    linaGLIPtin (TRADJENTA) 5 mg tablet Take 1 Tab by mouth daily for 90 days. 30 Tab 2    gabapentin (NEURONTIN) 300 mg capsule TAKE ONE CAPSULE BY MOUTH TWICE DAILY 180 Cap 0    tamsulosin (FLOMAX) 0.4 mg capsule TAKE 2 CAPSULE BY MOUTH EVERY  Cap 0    glucose blood VI test strips (Accu-Chek Guide test strips) strip USE 1 STRIP TO TEST BLOOD SUGAR EVERY DAY Dx E11.9 100 Strip 2    colesevelam (WELCHOL) 625 mg tablet TAKE 3 TABLETS BY MOUTH TWICE DAILY 540 Tab 2    hydrALAZINE (APRESOLINE) 25 mg tablet TAKE 1 TABLET BY MOUTH TWICE DAILY 180 Tab 3    cyanocobalamin (VITAMIN B12) 1,000 mcg/mL injection INJECT 1 ML SUBCUTANEOUS EVERY MONTH 1 mL 12    lancets (ACCU-CHEK FASTCLIX LANCET DRUM) misc USE AS DIRECTED TO MONITOR BLOOD SUGAR 1 Each 6    glipiZIDE (GLUCOTROL) 10 mg tablet Take 2 Tabs by mouth two (2) times a day. Take 2 tablets by mouth twice a day. 360 Tab 3    clotrimazole-betamethasone (LOTRISONE) topical cream Apply  to affected area two (2) times daily as needed for Skin Irritation. 15 g 0    simvastatin (ZOCOR) 20 mg tablet TAKE 1 TABLET BY MOUTH EVERY DAY IN THE EVENING 90 Tab 3    dilTIAZem CD (DILT-CD) 300 mg ER capsule Take 1 Cap by mouth daily. 90 Cap 0    fluticasone propionate (FLONASE) 50 mcg/actuation nasal spray       Blood-Glucose Meter monitoring kit Use to test blood sugar once daily. 1 Kit 0    glucose blood VI test strips (ACCU-CHEK SMARTVIEW TEST STRIP) strip Use to check blood sugar once daily. 100 Strip 3    nitroglycerin (NITROSTAT) 0.4 mg SL tablet 1 Tab by SubLINGual route every five (5) minutes as needed for Chest Pain.  25 Tab 3    acetaminophen (TYLENOL EXTRA STRENGTH) 500 mg tablet Take  by mouth every six (6) hours as needed for Pain.  Lancets misc Use with Fastclix lancing device daily to test blood sugar. 100 Each 3    ACCU-CHEK FASTCLIX misc USE AS DIRECTED TO MONITOR BLOOD SUGAR 100 Each 3    aspirin delayed-release 81 mg tablet Take 81 mg by mouth daily.  polyethylene glycol (MIRALAX) 17 gram/dose powder Take 17 g by mouth daily as needed.  Cholecalciferol, Vitamin D3, (VITAMIN D3) 1,000 unit cap Take 1,000 Units by mouth daily.  ferrous sulfate 325 mg (65 mg iron) tablet Take 325 mg by mouth two (2) times a day. Social History     Socioeconomic History    Marital status:      Spouse name: Not on file    Number of children: Not on file    Years of education: Not on file    Highest education level: Not on file   Tobacco Use    Smoking status: Former Smoker     Last attempt to quit: 6/3/1967     Years since quittin.1    Smokeless tobacco: Never Used   Substance and Sexual Activity    Alcohol use: No    Drug use: No     Family History   Problem Relation Age of Onset    Heart Disease Mother     Heart Disease Father        Review of Systems  ROS is unremarkable except for ones highlighted in bold.    Constitutional: negative for fevers, chills, anorexia and weight loss  Eyes:   negative for visual disturbance and irritation  ENT:   negative for tinnitus,sore throat,nasal congestion,ear pain,hoarseness  Respiratory:  negative for cough, hemoptysis, dyspnea,wheezing  CV:   negative for chest pain, palpitations, lower extremity edema  GI:   negative for nausea, vomiting, diarrhea, abdominal pain,melena  Endo:               negative for polyuria,polydipsia,polyphagia,heat intolerance  Genitourinary: negative for frequency, dysuria and hematuria  Integumentary: negative for rash and pruritus  Hematologic:  negative for easy bruising and gum/nose bleeding  Musculoskel: negative for myalgias, arthralgias, back pain, muscle weakness, joint pain  Neurological:  negative for headaches, dizziness, vertigo, memory problems and gait   Behavl/Psych: negative for feelings of anxiety, depression, mood changes  ROS otherwise negative     Objective:  Visit Vitals  /62 (BP 1 Location: Right arm, BP Patient Position: Sitting)   Pulse 88   Temp 98.3 °F (36.8 °C)   Resp 16   Ht 5' (1.524 m)   Wt 154 lb (69.9 kg)   SpO2 98%   BMI 30.08 kg/m²     Physical Exam:   General appearance - alert, well appearing, and in no distress  Mental status - alert, oriented to person, place, and time  EYE-MELISSA, EOMI, fundi normal, corneas normal, no foreign bodies  ENT-ENT exam normal, no neck nodes or sinus tenderness  Nose - normal and patent, no erythema, discharge or polyps  Mouth - mucous membranes moist, pharynx normal without lesions  Neck - supple, no significant adenopathy   Chest - clear to auscultation, no wheezes, rales or rhonchi, symmetric air entry   Heart - normal rate, regular rhythm, normal S1, S2, no murmurs, rubs, clicks or gallops   Abdomen - soft, nontender, nondistended, no masses or organomegaly  Lymph- no adenopathy palpable  Ext-peripheral pulses normal, no pedal edema, no clubbing or cyanosis  Skin-Warm and dry. no hyperpigmentation, vitiligo, or suspicious lesions, rash on anterior trigeminal region bilaterally.   Neuro -alert, oriented, normal speech, no focal findings or movement disorder noted  Musculoskeletal- FROM, no bony abnormalities, no point tenderness          Lab Review:  Results for orders placed or performed in visit on 01/02/20   CBC W/O DIFF   Result Value Ref Range    WBC 9.0 3.4 - 10.8 x10E3/uL    RBC 4.11 (L) 4.14 - 5.80 x10E6/uL    HGB 12.1 (L) 13.0 - 17.7 g/dL    HCT 36.6 (L) 37.5 - 51.0 %    MCV 89 79 - 97 fL    MCH 29.4 26.6 - 33.0 pg    MCHC 33.1 31.5 - 35.7 g/dL    RDW 12.6 12.3 - 15.4 %    PLATELET 863 377 - 852 x10E3/uL   PSA SCREENING (SCREENING)   Result Value Ref Range    PSA 2.5 0.0 - 4.0 ng/mL URINALYSIS W/MICROSCOPIC   Result Value Ref Range    Color Pale Yellow Pale Yellow - Yellow    CLARITY clear Clear    Glucose urine, 24 hr 1+ (A) Negative    Ketone Negative Negative    Bilirubin Negative Negative    Specific gravity 1.025 1.000 - 1.030    pH (UA) 5 5 - 7    Blood Negative Negative    Protein 3+ (A) Negative    Urobilinogen Negative Negative    Nitrites Negative Negative    Leukocyte Esterase Negative Negative    WBC 0 0 #/HPF    RBC 0 0 #/HPF    SQUAMOUS EPITHELIAL CELLS Occasional (A) None Seen   URINE, MICROALBUMIN, SEMIQUANTITATIVE   Result Value Ref Range    Microalbumin, Urine 100 (A) 0 - 20 mg/L   TSH 3RD GENERATION   Result Value Ref Range    TSH, 3rd generation 1.84 0.34 - 5.60 uIU/mL   VITAMIN D, 25 HYDROXY   Result Value Ref Range    VITAMIN D, 25-HYDROXY 44 30 - 96 ng/mL   HEMOGLOBIN A1C W/O EAG   Result Value Ref Range    Hemoglobin A1c 7.1 (H) 4.0 - 5.7 %   IRON PROFILE   Result Value Ref Range    TIBC 355 260 - 462 mcg/dL    Iron 65 49 - 181 ug/dL    Iron % saturation 18 15 - 55 %   METABOLIC PANEL, COMPREHENSIVE   Result Value Ref Range    Glucose 170 (H) 75 - 110 mg/dL    BUN 38.0 (H) 9.0 - 20.0 mg/dL    Creatinine 2.4 (H) 0.8 - 1.5 mg/dL    Sodium 140 137 - 145 mmol/L    Potassium 5.2 (H) 3.6 - 5.0 mmol/L    Chloride 107 98 - 107 mmol/L    CO2 23.0 22.0 - 32.0 mmol/L    Calcium 9.0 8.4 - 10.2 mg/dl    Protein, total 7.1 6.3 - 8.2 g/dL    Albumin 4.2 3.9 - 5.4 g/dL    ALT (SGPT) 19 0 - 50 U/L    AST (SGOT) 24.0 14.0 - 36.0 U/L    Alk.  phosphatase 97 38 - 126 U/L    Bilirubin, total 0.5 0.2 - 1.3 mg/dL    BUN/Creatinine ratio 16 Ratio    GFR est AA 31 <60 mL/min/1.73m2    GFR est non-AA 26 <60 mL/min/1.73m2    Globulin 2.90     A-G Ratio 1.4 Ratio    Anion gap 10 mmol/L   CK   Result Value Ref Range    .00 (H) 30.00 - 135.00 U/L   LIPID PANEL   Result Value Ref Range    Cholesterol, total 108 0 - 200 mg/dL    Triglyceride 132 0 - 200 mg/dL    HDL Cholesterol 48 35 - 130 mg/dL VLDL 26 mg/dL    LDL, calculated 34 0 - 130 mg/dL    CHOL/HDL Ratio 2 0 - 4 Ratio    LDL/HDL Ratio 1 Ratio   VITAMIN B12   Result Value Ref Range    Vitamin B12 >2000 (H) 232 - 1245 pg/mL        Documenation Review:  Lumbar spinal stenosis with severe L3-4 stenosis, S/P lumbar laminectomy. History of chronic anemia associated with B12 and iron deficiency, improved   History of cerebrovascular disease, S/P left carotid endarterectomy,   OHD, S/P CABG in 1985 with subsequent RCA stenting in 2004. He's had no recent angina or symptoms of CHF. 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 Assessment/Plan:    Diagnoses and all orders for this visit:    1. Type 2 diabetes mellitus with diabetic neuropathy, without long-term current use of insulin ([facility])  -     HEMOGLOBIN A1C W/O EAG  -     linaGLIPtin (TRADJENTA) 5 mg tablet; Take 1 Tab by mouth daily for 90 days. 2. Type II diabetes mellitus with nephropathy ([facility]    3. CKD (chronic kidney disease) stage 3, GFR 30-59 ml/min ([facility])    4. Essential hypertension  -     CBC W/O DIFF  -     METABOLIC PANEL, COMPREHENSIVE    5. Hypercholesterolemia  -     LIPID PANEL    6. B12 deficiency  -     VITAMIN B12 INJECTION  -     THER/PROPH/DIAG INJECTION, SUBCUT/IM  -     cyanocobalamin (Vitamin B-12) 1,000 mcg/mL injection; 1 mL by IntraMUSCular route once for 1 dose. Discontinue Januvia. Have recommended him to start [address] If he has diarrhea he will let me know. Given his renal function Tradjenta would be a better choice for him. Continue current meds. I will call with lab results and make further recommendations or adjustments if necessary. Discussed lifestyle modifications including Na restriction, low carb/fat diet, weight reduction and exercise (at least a walking program). ICD-10-CM ICD-9-CM    1.  Type 2 diabetes mellitus with diabetic neuropathy, without long-term current use of insulin ([address] E11.40 250.60 HEMOGLOBIN A1C W/O EAG     357.2 linaGLIPtin (TRADJENTA) 5 mg tablet      DISCONTINUED: SITagliptin (JANUVIA) 100 mg tablet   2. Type II diabetes mellitus with nephropathy (HCC)  E11.21 250.40      583.81    3. CKD (chronic kidney disease) stage 3, GFR 30-59 ml/min (HCC)  N18.3 585.3    4. Essential hypertension  I10 401.9 CBC W/O DIFF      METABOLIC PANEL, COMPREHENSIVE   5. Hypercholesterolemia  E78.00 272.0 LIPID PANEL   6. B12 deficiency  E53.8 266.2 VITAMIN B12 INJECTION      THER/PROPH/DIAG INJECTION, SUBCUT/IM      cyanocobalamin (Vitamin B-12) 1,000 mcg/mL injection         Follow-up and Dispositions    · Return in about 3 months (around 10/16/2020) for follow up. I have reviewed with the patient details of the assessment and plan and all questions were answered. Relevent patient education was performed. Verbal and/or written instructions (see AVS) provided. The most recent lab findings were reviewed with the patient. Plan was discussed with patient who verbally expressed understanding. An After Visit Summary was printed and given to the patient.     Alexis Hogan MD

## 2020-07-16 NOTE — PROGRESS NOTES
Catrachito Lopez is a 80 y.o. male presenting for Medication Refill and Diabetes  . 1. Have you been to the ER, urgent care clinic since your last visit? Hospitalized since your last visit? No    2. Have you seen or consulted any other health care providers outside of the [address] [place] [address] since your last visit? Include any pap smears or colon screening. No    Fall Risk Assessment, last 12 mths 7/16/2020   Able to walk? Yes   Fall in past 12 months? Yes   Fall with injury? Yes   Number of falls in past 12 months 1   Fall Risk Score 2         Abuse Screening Questionnaire 1/2/2020   Do you ever feel afraid of your partner? N   Are you in a relationship with someone who physically or mentally threatens you? N   Is it safe for you to go home? Y       3 most recent PHQ Screens 7/16/2020   Little interest or pleasure in doing things Not at all   Feeling down, depressed, irritable, or hopeless Not at all   Total Score PHQ 2 0       There are no discontinued medications.

## 2020-07-16 NOTE — PATIENT INSTRUCTIONS
Learning About Type 2 Diabetes  What is type 2 diabetes? Insulin is a hormone that helps your body use sugar from your food as energy. Type 2 diabetes happens when your body can't use insulin the right way. Over time, the pancreas can't make enough insulin. If you don't have enough insulin, too much sugar stays in your blood. If you are overweight, get little or no exercise, or have type 2 diabetes in your family, you are more likely to have problems with the way insulin works in your body.  Americans, Hispanics, Native Americans,  Americans, and Pacific Islanders have a higher risk for type 2 diabetes. Type 2 diabetes can be prevented or delayed with a healthy lifestyle, which includes staying at a healthy weight, making smart food choices, and getting regular exercise. What can you expect with type 2 diabetes? Alena Osler keep hearing about how important it is to keep your blood sugar within a target range. That's because over time, high blood sugar can lead to serious problems. It can:  · Harm your eyes, nerves, and kidneys. · Damage your blood vessels, leading to heart disease and stroke. · Reduce blood flow and cause nerve damage to parts of your body, especially your feet. This can cause slow healing and pain when you walk. · Make your immune system weak and less able to fight infections. When people hear the word \"diabetes,\" they often think of problems like these. But daily care and treatment can help prevent or delay these problems. The goal is to keep your blood sugar in a target range. That's the best way to reduce your chance of having more problems from diabetes. What are the symptoms? Some people who have type 2 diabetes may not have any symptoms early on. Many people with the disease don't even know they have it at first. But with time, diabetes starts to cause symptoms. You experience most symptoms of type 2 diabetes when your blood sugar is either too high or too low.   The most common symptoms of high blood sugar include:  · Thirst.  · Frequent urination. · Weight loss. · Blurry vision. The symptoms of low blood sugar include:  · Sweating. · Shakiness. · Weakness. · Hunger. · Confusion. How can you prevent type 2 diabetes? The best way to prevent or delay type 2 diabetes is to adopt healthy habits, which include:  · Staying at a healthy weight. · Exercising regularly. · Eating healthy foods. How is type 2 diabetes treated? If you have type 2 diabetes, here are the most important things you can do. · Take your diabetes medicines. · Check your blood sugar as often as your doctor recommends. Also, get a hemoglobin A1c test at least every 6 months. · Try to eat a variety of foods and to spread carbohydrate throughout the day. Carbohydrate raises blood sugar higher and more quickly than any other nutrient does. Carbohydrate is found in sugar, breads and cereals, fruit, starchy vegetables such as potatoes and corn, and milk and yogurt. · Get at least 30 minutes of exercise on most days of the week. Walking is a good choice. You also may want to do other activities, such as running, swimming, cycling, or playing tennis or team sports. If your doctor says it's okay, do muscle-strengthening exercises at least 2 times a week. · See your doctor for checkups and tests on a regular schedule. · If you have high blood pressure or high cholesterol, take the medicines as prescribed by your doctor. · Do not smoke. Smoking can make health problems worse. This includes problems you might have with type 2 diabetes. If you need help quitting, talk to your doctor about stop-smoking programs and medicines. These can increase your chances of quitting for good. Follow-up care is a key part of your treatment and safety. Be sure to make and go to all appointments, and call your doctor if you are having problems.  It's also a good idea to know your test results and keep a list of the medicines you take. Where can you learn more? Go to http://rosa isela-brenton.info/  Enter [id number] in the search box to learn more about \"Learning About Type 2 Diabetes. \"  Current as of: December 20, 2019               Content Version: 12.5  © 5721-5518 [employer]. Care instructions adapted under license by [employer] (which disclaims liability or warranty for this information). If you have questions about a medical condition or this instruction, always ask your healthcare professional. [address] any warranty or liability for your use of this information.

## 2020-07-18 RX ORDER — SAXAGLIPTIN 2.5 MG/1
TABLET, FILM COATED ORAL
Qty: 30 TAB | Refills: 0 | Status: SHIPPED | OUTPATIENT
Start: 2020-07-18 | End: 2021-01-19

## 2020-08-12 DIAGNOSIS — N40.0 BENIGN LOCALIZED HYPERPLASIA OF PROSTATE: ICD-10-CM

## 2020-08-12 RX ORDER — TAMSULOSIN HYDROCHLORIDE 0.4 MG/1
CAPSULE ORAL
Qty: 180 CAP | Refills: 0 | Status: SHIPPED | OUTPATIENT
Start: 2020-08-12 | End: 2020-08-31

## 2020-08-31 DIAGNOSIS — N40.0 BENIGN LOCALIZED HYPERPLASIA OF PROSTATE: ICD-10-CM

## 2020-08-31 RX ORDER — TAMSULOSIN HYDROCHLORIDE 0.4 MG/1
CAPSULE ORAL
Qty: 180 CAP | Refills: 0 | Status: SHIPPED | OUTPATIENT
Start: 2020-08-31 | End: 2020-12-09

## 2020-09-10 DIAGNOSIS — M48.061 SPINAL STENOSIS OF LUMBAR REGION WITHOUT NEUROGENIC CLAUDICATION: ICD-10-CM

## 2020-09-10 RX ORDER — GABAPENTIN 300 MG/1
CAPSULE ORAL
Qty: 180 CAP | Refills: 0 | Status: SHIPPED | OUTPATIENT
Start: 2020-09-10 | End: 2020-12-09

## 2020-09-10 RX ORDER — LANCETS
EACH MISCELLANEOUS
Qty: 1 EACH | Refills: 6 | Status: SHIPPED | OUTPATIENT
Start: 2020-09-10

## 2020-09-10 NOTE — TELEPHONE ENCOUNTER
Last Refill: 2/20/2020  Last Visit: 7/16/2020   Next Visit: 10/29/2020    Requested Prescriptions     Pending Prescriptions Disp Refills    lancets (Accu-Chek Fastclix Lancet Drum) misc 1 Each 6     Sig: USE AS DIRECTED TO MONITOR BLOOD SUGAR

## 2020-10-10 DIAGNOSIS — E11.40 TYPE 2 DIABETES MELLITUS WITH DIABETIC NEUROPATHY, WITHOUT LONG-TERM CURRENT USE OF INSULIN (HCC): ICD-10-CM

## 2020-10-11 RX ORDER — LINAGLIPTIN 5 MG/1
TABLET, FILM COATED ORAL
Qty: 30 TAB | Refills: 2 | Status: SHIPPED | OUTPATIENT
Start: 2020-10-11 | End: 2021-01-08 | Stop reason: SDUPTHER

## 2020-10-28 PROBLEM — E11.21 TYPE II DIABETES MELLITUS WITH NEPHROPATHY (HCC): Status: RESOLVED | Noted: 2018-12-20 | Resolved: 2020-10-28

## 2020-12-09 DIAGNOSIS — M48.061 SPINAL STENOSIS OF LUMBAR REGION WITHOUT NEUROGENIC CLAUDICATION: ICD-10-CM

## 2020-12-09 DIAGNOSIS — N40.0 BENIGN LOCALIZED HYPERPLASIA OF PROSTATE: ICD-10-CM

## 2020-12-09 RX ORDER — SIMVASTATIN 20 MG/1
TABLET, FILM COATED ORAL
Qty: 90 TAB | Refills: 0 | Status: SHIPPED | OUTPATIENT
Start: 2020-12-09 | End: 2021-03-14

## 2020-12-09 RX ORDER — COLESEVELAM 180 1/1
TABLET ORAL
Qty: 540 TAB | Refills: 2 | Status: SHIPPED | OUTPATIENT
Start: 2020-12-09 | End: 2021-09-09

## 2020-12-09 RX ORDER — GABAPENTIN 300 MG/1
CAPSULE ORAL
Qty: 180 CAP | Refills: 0 | Status: SHIPPED | OUTPATIENT
Start: 2020-12-09 | End: 2021-03-14

## 2020-12-09 RX ORDER — TAMSULOSIN HYDROCHLORIDE 0.4 MG/1
CAPSULE ORAL
Qty: 180 CAP | Refills: 0 | Status: SHIPPED | OUTPATIENT
Start: 2020-12-09 | End: 2021-03-14

## 2020-12-09 RX ORDER — DILTIAZEM HYDROCHLORIDE 300 MG/1
300 CAPSULE, COATED, EXTENDED RELEASE ORAL DAILY
Qty: 90 CAP | Refills: 0 | Status: SHIPPED | OUTPATIENT
Start: 2020-12-09 | End: 2021-03-14

## 2020-12-09 RX ORDER — SIMVASTATIN 20 MG/1
TABLET, FILM COATED ORAL
Qty: 30 TAB | Refills: 0 | Status: SHIPPED | OUTPATIENT
Start: 2020-12-09 | End: 2020-12-09

## 2020-12-09 NOTE — TELEPHONE ENCOUNTER
RX refill request from the patient/pharmacy. Patient last seen 07- with labs, and does not have a f/up appointment.   Requested Prescriptions     Pending Prescriptions Disp Refills    simvastatin (ZOCOR) 20 mg tablet [Pharmacy Med Name: SIMVASTATIN 20MG TABLETS] 30 Tab 0     Sig: TAKE 1 TABLET BY MOUTH EVERY DAY IN THE EVENING

## 2020-12-09 NOTE — TELEPHONE ENCOUNTER
Last Refill: 10/7/2019  Last Visit: 7/16/2020   Next Visit: none    Requested Prescriptions     Pending Prescriptions Disp Refills    dilTIAZem ER (DILT-CD) 300 mg capsule 90 Cap 0     Sig: Take 1 Cap by mouth daily.

## 2020-12-18 ENCOUNTER — CLINICAL SUPPORT (OUTPATIENT)
Dept: INTERNAL MEDICINE CLINIC | Age: 85
End: 2020-12-18
Payer: MEDICARE

## 2020-12-18 VITALS
WEIGHT: 153 LBS | TEMPERATURE: 98.9 F | OXYGEN SATURATION: 94 % | HEIGHT: 66 IN | RESPIRATION RATE: 14 BRPM | BODY MASS INDEX: 24.59 KG/M2 | DIASTOLIC BLOOD PRESSURE: 62 MMHG | SYSTOLIC BLOOD PRESSURE: 116 MMHG | HEART RATE: 99 BPM

## 2020-12-18 DIAGNOSIS — E53.8 B12 DEFICIENCY: Primary | ICD-10-CM

## 2020-12-18 PROCEDURE — 96372 THER/PROPH/DIAG INJ SC/IM: CPT | Performed by: INTERNAL MEDICINE

## 2020-12-18 RX ORDER — CYANOCOBALAMIN 1000 UG/ML
1000 INJECTION, SOLUTION INTRAMUSCULAR; SUBCUTANEOUS ONCE
Status: COMPLETED | OUTPATIENT
Start: 2020-12-18 | End: 2020-12-18

## 2020-12-18 RX ADMIN — CYANOCOBALAMIN 1000 MCG: 1000 INJECTION, SOLUTION INTRAMUSCULAR; SUBCUTANEOUS at 14:06

## 2020-12-18 NOTE — PROGRESS NOTES
After obtaining consent, and per orders of Dr. Wilfredo Ponce, injection of B12 patient supplied  given by Julio César Liu LPN.

## 2021-01-08 DIAGNOSIS — E11.40 TYPE 2 DIABETES MELLITUS WITH DIABETIC NEUROPATHY, WITHOUT LONG-TERM CURRENT USE OF INSULIN (HCC): ICD-10-CM

## 2021-01-08 RX ORDER — LINAGLIPTIN 5 MG/1
TABLET, FILM COATED ORAL
Qty: 30 TAB | Refills: 2 | OUTPATIENT
Start: 2021-01-08

## 2021-01-08 NOTE — TELEPHONE ENCOUNTER
Last Refill: 10/11/2020  Last Visit: 10/29/2020   Next Visit: 1/19/2021    Requested Prescriptions     Pending Prescriptions Disp Refills    linaGLIPtin (Tradjenta) 5 mg tablet 15 Tab 0     Sig: TAKE 1 TABLET BY MOUTH DAILY     Refused Prescriptions Disp Refills    Tradjenta 5 mg tablet [Pharmacy Med Name: TRADJENTA 5MG TABLETS] 30 Tab 2     Sig: TAKE 1 TABLET BY MOUTH DAILY     Refused By: Maurilio Al     Reason for Refusal: Appt required, please call patient

## 2021-01-10 RX ORDER — LINAGLIPTIN 5 MG/1
TABLET, FILM COATED ORAL
Qty: 15 TAB | Refills: 0 | Status: SHIPPED | OUTPATIENT
Start: 2021-01-10 | End: 2021-02-04 | Stop reason: SDUPTHER

## 2021-01-19 ENCOUNTER — OFFICE VISIT (OUTPATIENT)
Dept: INTERNAL MEDICINE CLINIC | Age: 86
End: 2021-01-19
Payer: MEDICARE

## 2021-01-19 VITALS
BODY MASS INDEX: 24.75 KG/M2 | HEART RATE: 91 BPM | OXYGEN SATURATION: 98 % | WEIGHT: 154 LBS | RESPIRATION RATE: 16 BRPM | DIASTOLIC BLOOD PRESSURE: 60 MMHG | TEMPERATURE: 97.4 F | HEIGHT: 66 IN | SYSTOLIC BLOOD PRESSURE: 140 MMHG

## 2021-01-19 DIAGNOSIS — Z00.00 ENCOUNTER FOR ANNUAL PHYSICAL EXAM: Primary | ICD-10-CM

## 2021-01-19 DIAGNOSIS — M48.061 SPINAL STENOSIS OF LUMBAR REGION WITHOUT NEUROGENIC CLAUDICATION: ICD-10-CM

## 2021-01-19 DIAGNOSIS — E53.8 VITAMIN B12 DEFICIENCY: ICD-10-CM

## 2021-01-19 DIAGNOSIS — N40.0 BENIGN LOCALIZED HYPERPLASIA OF PROSTATE: ICD-10-CM

## 2021-01-19 DIAGNOSIS — E11.40 TYPE 2 DIABETES MELLITUS WITH DIABETIC NEUROPATHY, WITHOUT LONG-TERM CURRENT USE OF INSULIN (HCC): ICD-10-CM

## 2021-01-19 DIAGNOSIS — G31.84 MCI (MILD COGNITIVE IMPAIRMENT): ICD-10-CM

## 2021-01-19 DIAGNOSIS — E78.00 HYPERCHOLESTEROLEMIA: ICD-10-CM

## 2021-01-19 DIAGNOSIS — R53.82 CHRONIC FATIGUE: ICD-10-CM

## 2021-01-19 DIAGNOSIS — Z12.5 ENCOUNTER FOR PROSTATE CANCER SCREENING: ICD-10-CM

## 2021-01-19 DIAGNOSIS — I10 ESSENTIAL HYPERTENSION: ICD-10-CM

## 2021-01-19 DIAGNOSIS — D64.9 ANEMIA, UNSPECIFIED TYPE: ICD-10-CM

## 2021-01-19 DIAGNOSIS — E55.9 VITAMIN D DEFICIENCY: ICD-10-CM

## 2021-01-19 DIAGNOSIS — I25.10 ASCVD (ARTERIOSCLEROTIC CARDIOVASCULAR DISEASE): ICD-10-CM

## 2021-01-19 DIAGNOSIS — N18.4 CKD (CHRONIC KIDNEY DISEASE) STAGE 4, GFR 15-29 ML/MIN (HCC): ICD-10-CM

## 2021-01-19 LAB
25(OH)D3 SERPL-MCNC: 45 NG/ML (ref 30–96)
A-G RATIO,AGRAT: 1.8 RATIO
ALBUMIN SERPL-MCNC: 4.4 G/DL (ref 3.9–5.4)
ALP SERPL-CCNC: 98 U/L (ref 38–126)
ALT SERPL-CCNC: 17 U/L (ref 0–50)
ANION GAP SERPL CALC-SCNC: 13 MMOL/L
AST SERPL W P-5'-P-CCNC: 24 U/L (ref 14–36)
BACTERIA,BACTU: ABNORMAL
BILIRUB SERPL-MCNC: 0.5 MG/DL (ref 0.2–1.3)
BILIRUB UR QL: NEGATIVE
BUN SERPL-MCNC: 41 MG/DL (ref 9–20)
BUN/CREATININE RATIO,BUCR: 18 RATIO
CALCIUM SERPL-MCNC: 9.1 MG/DL (ref 8.4–10.2)
CHLORIDE SERPL-SCNC: 103 MMOL/L (ref 98–107)
CHOL/HDL RATIO,CHHD: 2 RATIO (ref 0–4)
CHOLEST SERPL-MCNC: 102 MG/DL (ref 0–200)
CLARITY: CLEAR
CO2 SERPL-SCNC: 27 MMOL/L (ref 22–32)
COLOR UR: ABNORMAL
CREAT SERPL-MCNC: 2.3 MG/DL (ref 0.8–1.5)
ERYTHROCYTE [DISTWIDTH] IN BLOOD BY AUTOMATED COUNT: 12.3 %
GLOBULIN,GLOB: 2.5
GLUCOSE 24H UR-MRATE: ABNORMAL G/(24.H)
GLUCOSE SERPL-MCNC: 264 MG/DL (ref 75–110)
HBA1C MFR BLD HPLC: 7.7 % (ref 4–5.7)
HCT VFR BLD AUTO: 37.4 % (ref 37–51)
HDLC SERPL-MCNC: 52 MG/DL (ref 35–130)
HGB BLD-MCNC: 12 G/DL (ref 12–18)
HGB UR QL STRIP: ABNORMAL
KETONES UR QL STRIP.AUTO: NEGATIVE
LDL/HDL RATIO,LDHD: 0 RATIO
LDLC SERPL CALC-MCNC: 15 MG/DL (ref 0–130)
LEUKOCYTE ESTERASE: NEGATIVE
MCH RBC QN AUTO: 29.8 PG (ref 26–32)
MCHC RBC AUTO-ENTMCNC: 32.1 G/DL (ref 30–36)
MCV RBC AUTO: 92.9 FL (ref 80–97)
MICROALBUMIN, URINE: 100 MG/L (ref 0–20)
NITRITE UR QL STRIP.AUTO: NEGATIVE
PH UR STRIP: 6 [PH] (ref 5–7)
PLATELET # BLD AUTO: 145 K/UL (ref 140–440)
POTASSIUM SERPL-SCNC: 5.1 MMOL/L (ref 3.6–5)
PROT SERPL-MCNC: 6.9 G/DL (ref 6.3–8.2)
PROT UR STRIP-MCNC: ABNORMAL MG/DL
PSA, TEST22: 2.6 NG/ML (ref 0–4)
RBC # BLD AUTO: 4.03 M/UL (ref 4.2–6.3)
RBC #/AREA URNS HPF: ABNORMAL #/HPF
SODIUM SERPL-SCNC: 143 MMOL/L (ref 137–145)
SP GR UR REFRACTOMETRY: 1.01 (ref 1–1.03)
TRIGL SERPL-MCNC: 177 MG/DL (ref 0–200)
TSH SERPL DL<=0.05 MIU/L-ACNC: 2.03 UIU/ML (ref 0.34–5.6)
UROBILINOGEN UR QL STRIP.AUTO: NEGATIVE
VLDLC SERPL CALC-MCNC: 35 MG/DL
WBC # BLD AUTO: 8.9 K/UL (ref 4.1–10.9)
WBC URNS QL MICRO: 0 #/HPF

## 2021-01-19 PROCEDURE — 3288F FALL RISK ASSESSMENT DOCD: CPT | Performed by: INTERNAL MEDICINE

## 2021-01-19 PROCEDURE — 80061 LIPID PANEL: CPT | Performed by: INTERNAL MEDICINE

## 2021-01-19 PROCEDURE — 81003 URINALYSIS AUTO W/O SCOPE: CPT | Performed by: INTERNAL MEDICINE

## 2021-01-19 PROCEDURE — G8754 DIAS BP LESS 90: HCPCS | Performed by: INTERNAL MEDICINE

## 2021-01-19 PROCEDURE — 99214 OFFICE O/P EST MOD 30 MIN: CPT | Performed by: INTERNAL MEDICINE

## 2021-01-19 PROCEDURE — 83036 HEMOGLOBIN GLYCOSYLATED A1C: CPT | Performed by: INTERNAL MEDICINE

## 2021-01-19 PROCEDURE — 82044 UR ALBUMIN SEMIQUANTITATIVE: CPT | Performed by: INTERNAL MEDICINE

## 2021-01-19 PROCEDURE — G0439 PPPS, SUBSEQ VISIT: HCPCS | Performed by: INTERNAL MEDICINE

## 2021-01-19 PROCEDURE — 3051F HG A1C>EQUAL 7.0%<8.0%: CPT | Performed by: INTERNAL MEDICINE

## 2021-01-19 PROCEDURE — G0103 PSA SCREENING: HCPCS | Performed by: INTERNAL MEDICINE

## 2021-01-19 PROCEDURE — G8753 SYS BP > OR = 140: HCPCS | Performed by: INTERNAL MEDICINE

## 2021-01-19 PROCEDURE — 84443 ASSAY THYROID STIM HORMONE: CPT | Performed by: INTERNAL MEDICINE

## 2021-01-19 PROCEDURE — G8420 CALC BMI NORM PARAMETERS: HCPCS | Performed by: INTERNAL MEDICINE

## 2021-01-19 PROCEDURE — 1100F PTFALLS ASSESS-DOCD GE2>/YR: CPT | Performed by: INTERNAL MEDICINE

## 2021-01-19 PROCEDURE — 82306 VITAMIN D 25 HYDROXY: CPT | Performed by: INTERNAL MEDICINE

## 2021-01-19 PROCEDURE — 80053 COMPREHEN METABOLIC PANEL: CPT | Performed by: INTERNAL MEDICINE

## 2021-01-19 PROCEDURE — G8536 NO DOC ELDER MAL SCRN: HCPCS | Performed by: INTERNAL MEDICINE

## 2021-01-19 PROCEDURE — G8427 DOCREV CUR MEDS BY ELIG CLIN: HCPCS | Performed by: INTERNAL MEDICINE

## 2021-01-19 PROCEDURE — G8510 SCR DEP NEG, NO PLAN REQD: HCPCS | Performed by: INTERNAL MEDICINE

## 2021-01-19 PROCEDURE — 85027 COMPLETE CBC AUTOMATED: CPT | Performed by: INTERNAL MEDICINE

## 2021-01-19 RX ORDER — CYANOCOBALAMIN 1000 UG/ML
1000 INJECTION, SOLUTION INTRAMUSCULAR; SUBCUTANEOUS ONCE
Qty: 1 ML | Refills: 0
Start: 2021-01-19 | End: 2021-01-19

## 2021-01-19 RX ORDER — HYDRALAZINE HYDROCHLORIDE 25 MG/1
TABLET, FILM COATED ORAL
Qty: 180 TAB | Refills: 3 | Status: SHIPPED | OUTPATIENT
Start: 2021-01-19 | End: 2021-12-06 | Stop reason: SDUPTHER

## 2021-01-19 RX ORDER — BLOOD SUGAR DIAGNOSTIC
STRIP MISCELLANEOUS
Qty: 100 STRIP | Refills: 2 | Status: SHIPPED | OUTPATIENT
Start: 2021-01-19 | End: 2021-08-20 | Stop reason: SDUPTHER

## 2021-01-19 NOTE — PROGRESS NOTES
Betsy Gordon is a 80 y.o. male and presents with Annual Wellness Visit      Subjective:  Patient comes in today for his annual Medicare wellness examination and follow-up of hypertension, diabetes, dyslipidemia and CKD 4. He is in attendance with his wife    Patient comes in today after long overdue follow-up. He was recently seen by his nephrologist Dr. Ej Doty. He is now progressed to stage IV CKD. Dr. Rocio Avitia discussed quality of life, HD versus PD and gave options to the patient. He is considering HD as of now. He reports he has a wife who is 80years old who is in good health and considering HD might prolong his life. Type 2 diabetescurrently on Tradjenta. Did discontinue Onglyza as I told him it was duplicate therapy. Is not checking his blood sugars regularly. Metformin was discontinued in the past secondary to chronic kidney disease. He is on glipizide. For unknown reason he was taking 20 mg p.o. twice daily of glipizide. Not currently decrease it to 10 mg p.o. twice daily. Dyslipidemia currently on statin. Denies muscle cramps or myalgias. Blood pressure well controlled on current meds. Patient has a history of atherosclerotic heart disease status post CABG and coronary artery stenting. Continues to remain on aspirin and statin denies PND, shortness of breath, exertional chest pain.       Past Medical History:   Diagnosis Date    CAD (coronary artery disease)     mi    Chronic kidney disease     hx of elevated blood levels    Chronic pain     lower back    Diabetes ([facility] [address]     Enlarged prostate     Hypercholesterolemia     Hypertension     Other unknown and unspecified cause of morbidity or mortality     hayfever     Past Surgical History:   Procedure Laterality Date    HX CATARACT REMOVAL Bilateral     HX CHOLECYSTECTOMY      HX LUMBAR LAMINECTOMY  09/2017    HX ORTHOPAEDIC  09/23/2015    back surgery     MS CARDIAC SURG PROCEDURE UNLIST      bypass(1985), [id number]([id number], 2004)    NC COLONOSCOPY FLX DX W/COLLJ SPEC WHEN PFRMD  8/12/2013         NC EGD TRANSORAL BIOPSY SINGLE/MULTIPLE  6/28/2012         VASCULAR SURGERY PROCEDURE UNLIST  2/1996    left carotid     Allergies   Allergen Reactions    Tetanus Vaccines And Toxoid Swelling     Swelling at the site    Tradjenta [Linagliptin] Diarrhea     headache     Current Outpatient Medications   Medication Sig Dispense Refill    linaGLIPtin (Tradjenta) 5 mg tablet TAKE 1 TABLET BY MOUTH DAILY 15 Tab 0    gabapentin (NEURONTIN) 300 mg capsule TAKE 1 CAPSULE BY MOUTH TWICE DAILY 180 Cap 0    tamsulosin (FLOMAX) 0.4 mg capsule TAKE 2 CAPSULES BY MOUTH EVERY  Cap 0    simvastatin (ZOCOR) 20 mg tablet TAKE 1 TABLET BY MOUTH EVERY DAY IN THE EVENING 90 Tab 0    dilTIAZem ER (DILT-CD) 300 mg capsule Take 1 Cap by mouth daily. 90 Cap 0    colesevelam (WELCHOL) 625 mg tablet TAKE 3 TABLETS BY MOUTH TWICE DAILY 540 Tab 2    lancets (Accu-Chek Fastclix Lancet Drum) misc USE AS DIRECTED TO MONITOR BLOOD SUGAR 1 Each 6    glucose blood VI test strips (Accu-Chek Guide test strips) strip USE 1 STRIP TO TEST BLOOD SUGAR EVERY DAY Dx E11.9 100 Strip 2    hydrALAZINE (APRESOLINE) 25 mg tablet TAKE 1 TABLET BY MOUTH TWICE DAILY 180 Tab 3    cyanocobalamin (VITAMIN B12) 1,000 mcg/mL injection INJECT 1 ML SUBCUTANEOUS EVERY MONTH 1 mL 12    glipiZIDE (GLUCOTROL) 10 mg tablet Take 2 Tabs by mouth two (2) times a day. Take 2 tablets by mouth twice a day. (Patient taking differently: Take 10 mg by mouth two (2) times a day.) 360 Tab 3    clotrimazole-betamethasone (LOTRISONE) topical cream Apply  to affected area two (2) times daily as needed for Skin Irritation. 15 g 0    fluticasone propionate (FLONASE) 50 mcg/actuation nasal spray       Blood-Glucose Meter monitoring kit Use to test blood sugar once daily.  1 Kit 0    glucose blood VI test strips (ACCU-CHEK SMARTVIEW TEST STRIP) strip Use to check blood sugar once daily. 100 Strip 3    nitroglycerin (NITROSTAT) 0.4 mg SL tablet 1 Tab by SubLINGual route every five (5) minutes as needed for Chest Pain. 25 Tab 3    acetaminophen (TYLENOL EXTRA STRENGTH) 500 mg tablet Take  by mouth every six (6) hours as needed for Pain.  Lancets misc Use with Fastclix lancing device daily to test blood sugar. 100 Each 3    ACCU-CHEK FASTCLIX misc USE AS DIRECTED TO MONITOR BLOOD SUGAR 100 Each 3    aspirin delayed-release 81 mg tablet Take 81 mg by mouth daily.  polyethylene glycol (MIRALAX) 17 gram/dose powder Take 17 g by mouth daily as needed.  Cholecalciferol, Vitamin D3, (VITAMIN D3) 1,000 unit cap Take 1,000 Units by mouth daily.  ferrous sulfate 325 mg (65 mg iron) tablet Take 325 mg by mouth two (2) times a day. Social History     Socioeconomic History    Marital status:      Spouse name: Not on file    Number of children: Not on file    Years of education: Not on file    Highest education level: Not on file   Tobacco Use    Smoking status: Former Smoker     Quit date: 6/3/1967     Years since quittin.6    Smokeless tobacco: Never Used   Substance and Sexual Activity    Alcohol use: No    Drug use: No    Sexual activity: Not Currently     Family History   Problem Relation Age of Onset    Heart Disease Mother     Heart Disease Father        Review of Systems  ROS is unremarkable except for ones highlighted in bold.    Constitutional: negative for fevers, chills, anorexia and weight loss  Eyes:   negative for visual disturbance and irritation  ENT:   negative for tinnitus,sore throat,nasal congestion,ear pain,hoarseness  Respiratory:  negative for cough, hemoptysis, dyspnea,wheezing  CV:   negative for chest pain, palpitations, lower extremity edema  GI:   negative for nausea, vomiting, diarrhea, abdominal pain,melena  Endo:               negative for polyuria,polydipsia,polyphagia,heat intolerance  Genitourinary: negative for frequency, dysuria and hematuria  Integumentary: negative for rash and pruritus  Hematologic:  negative for easy bruising and gum/nose bleeding  Musculoskel: negative for myalgias, arthralgias, back pain, muscle weakness, joint pain  Neurological:  negative for headaches, dizziness, vertigo, memory problems and gait   Behavl/Psych: negative for feelings of anxiety, depression, mood changes  ROS otherwise negative     Objective:  Visit Vitals  BP (!) 140/60 (BP 1 Location: Left arm, BP Patient Position: Sitting)   Pulse 91   Temp 97.4 °F (36.3 °C)   Resp 16   Ht 5' 6\" (1.676 m)   Wt 154 lb (69.9 kg)   SpO2 98%   BMI 24.86 kg/m²     Physical Exam:   General appearance - alert, well appearing, and in no distress  Mental status - alert, oriented to person, place, and time  EYE-MELISSA, EOMI, fundi normal, corneas normal, no foreign bodies  ENT-ENT exam normal, no neck nodes or sinus tenderness  Nose - normal and patent, no erythema, discharge or polyps  Mouth - mucous membranes moist, pharynx normal without lesions  Neck - supple, no significant adenopathy   Chest - clear to auscultation, no wheezes, rales or rhonchi, symmetric air entry   Heart - normal rate, regular rhythm, normal S1, S2, no murmurs, rubs, clicks or gallops   Abdomen - soft, nontender, nondistended, no masses or organomegaly  Lymph- no adenopathy palpable  Ext-peripheral pulses normal, no pedal edema, no clubbing or cyanosis  Skin-Warm and dry. no hyperpigmentation, vitiligo, or suspicious lesions, rash on anterior trigeminal region bilaterally.   Neuro -alert, oriented, normal speech, no focal findings or movement disorder noted  Musculoskeletal- FROM, no bony abnormalities, no point tenderness    Diabetic foot exam:      Left Foot:              Visual Exam: normal               Pulse DP: 2+ (normal)              Filament test: normal sensation               Vibratory sensation: normal                 Right Foot:              Visual Exam: normal Pulse DP: 2+ (normal)              Filament test: normal sensation               Vibratory sensation: normal      Lab Review:  Results for orders placed or performed in visit on 07/16/20   CBC W/O DIFF   Result Value Ref Range    WBC 7.1 4.1 - 10.9 K/uL    RBC 3.97 (L) 4.20 - 6.30 M/uL    HGB 11.8 (L) 12.0 - 18.0 g/dL    HCT 36.0 (L) 37.0 - 51.0 %    MCH 29.7 26.0 - 32.0 pg    MCHC 32.8 30.0 - 36.0 g/dL    MCV 90.8 80.0 - 97.0 fL    RDW 12.3 %    PLATELET 117.6 544.9 - 440.0 K/uL   LIPID PANEL   Result Value Ref Range    Cholesterol, total 103 0 - 200 mg/dL    Triglyceride 168 0 - 200 mg/dL    HDL Cholesterol 55 35 - 130 mg/dL    VLDL 34 mg/dL    LDL, calculated 14 0 - 130 mg/dL    CHOL/HDL Ratio 2 0 - 4 Ratio    LDL/HDL Ratio 0 Ratio   METABOLIC PANEL, COMPREHENSIVE   Result Value Ref Range    Glucose 139 (H) 75 - 110 mg/dL    BUN 38.0 (H) 9.0 - 20.0 mg/dL    Creatinine 2.3 (H) 0.8 - 1.5 mg/dL    Sodium 143 137 - 145 mmol/L    Potassium 4.9 3.6 - 5.0 mmol/L    Chloride 108 (H) 98 - 107 mmol/L    CO2 24.0 22.0 - 32.0 mmol/L    Calcium 9.3 8.4 - 10.2 mg/dl    Protein, total 6.9 6.3 - 8.2 g/dL    Albumin 4.4 3.9 - 5.4 g/dL    ALT (SGPT) 17 0 - 50 U/L    AST (SGOT) 24.0 14.0 - 36.0 U/L    Alk. phosphatase 85 38 - 126 U/L    Bilirubin, total 0.4 0.2 - 1.3 mg/dL    BUN/Creatinine ratio 17 Ratio    GFR est AA 33 <60 mL/min/1.73m2    GFR est non-AA 27 <60 mL/min/1.73m2    Globulin 2.50     A-G Ratio 1.8 Ratio    Anion gap 11 mmol/L   HEMOGLOBIN A1C W/O EAG   Result Value Ref Range    Hemoglobin A1c 7.4 (H) 4.0 - 5.7 %        Documenation Review:  Lumbar spinal stenosis with severe L3-4 stenosis, S/P lumbar laminectomy. History of chronic anemia associated with B12 and iron deficiency, improved   History of cerebrovascular disease, S/P left carotid endarterectomy,   OHD, S/P CABG in 1985 with subsequent RCA stenting in 2004. He's had no recent angina or symptoms of CHF.   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 Health Maintenance Issues and Ancillary Studies:  1. TDAP (pertussis and tetanus) vaccine was given in June, 2012. 2. Pneumovax 23 (PPSV23) was given in November, 2007. 3. Seasonal influenza vaccine was given in 2020. 4. Prevnar 13 (PCV13) was given in October, 2015. 5. Zostavax given in June of 2012. 6. Patient reports he got 1 dose of his Shingrix vaccine. 7. Diabetic eye examination revealed no diabetic retinopathy in April, 2018. 8. CT scan of the pelvis in November, 2015 revealed an enlarged prostate. 9. MRI of the lumbosacral spine in August, 2015 revealed disc extrusion and severe stenosis at L3-4. He subsequently had a laminectomy at this level. 10. CT scan of the abdomen and pelvis in September, 2014 revealed an enlarged prostate, diverticulosis and stable adrenal adenomas. 11. MRI of the brain revealed normal IACs in July of 2014. 12. Right hip xray a year ago revealed arthritis. 15. Upper endoscopy in June of 2012 revealed gastritis. Biopsy was positive for H. Pylori and he was treated for this. 14. MRI of the abdomen in November, 2009 revealed no evidence for adrenal cancer. 15. Capsule endoscopy in October, 2013 revealed only the gastritis. 16. Colonoscopy in August, 2013 revealed diverticulosis. 17. Carotid dopplers in August, 2013 revealed 10-49% stenosis bilaterally. 18. Ultrasound of the abdomen was negative, S/P cholecystectomy in July of 2009. 19. PVRs were normal in April, 2012.  20. Last echocardiogram was normal with ejection fraction of 55% in June of 2012. 21. MRI of the brain in March, 2013 revealed some microvascular changes. 22. CT scan of the head was negative in October, 2012. 23. Lexiscan stress testing revealed no evidence for ischemia. There was evidence for an apical infarct and ejection fraction was 56%. 24.  MRI of the right hip in July of 2017 revealed degenerative joint disease and possibly a labral tear. 25. EKG in the office revealed an old anterior myocardial infarction, unchanged from previous. 26. Pulmonary function studies were normal.  27. Health screening assessments were essentially normal.      Assessment/Plan:    Diagnoses and all orders for this visit:    1. Encounter for annual physical exam    2. Encounter for prostate cancer screening  -     PSA, DIAGNOSTIC (PROSTATE SPECIFIC AG)    3. Essential hypertension  -     CBC W/O DIFF  -     METABOLIC PANEL, COMPREHENSIVE  -     URINALYSIS W/MICROSCOPIC    4. Hypercholesterolemia  -     LIPID PANEL    5. Type 2 diabetes mellitus with diabetic neuropathy, without long-term current use of insulin (HCC)  -     HEMOGLOBIN A1C W/O EAG  -     URINE, MICROALBUMIN, SEMIQUANTITATIVE  -      DIABETES FOOT EXAM    6. CKD (chronic kidney disease) stage 4, GFR 15-29 ml/min ([facility])    7. Benign localized hyperplasia of prostate    8. ASCVD (arteriosclerotic cardiovascular disease)    9. MCI (mild cognitive impairment)    10. Anemia, unspecified type    11. Spinal stenosis of lumbar region without neurogenic claudication    12. Vitamin D deficiency  -     VITAMIN D, 25 HYDROXY    13. Chronic fatigue  -     TSH 3RD GENERATION    Other orders  -     VITAMIN B12 INJECTION  -     THER/PROPH/DIAG INJECTION, SUBCUT/IM  -     cyanocobalamin (Vitamin B-12) 1,000 mcg/mL injection; 1 mL by IntraMUSCular route once for 1 dose.  -     glucose blood VI test strips (Accu-Chek Guide test strips) strip; USE 1 STRIP TO TEST BLOOD SUGAR EVERY DAY Dx E11.9  -     hydrALAZINE (APRESOLINE) 25 mg tablet; TAKE 1 TABLET BY MOUTH TWICE DAILY          Referral for ACP placed. I did discuss about goals of care and end-of-life issues with patient. Please see separate document attached. Reviewed records by Dr. Bart Avila. Michele/nephrologist.  Patient is leaning towards HD. He will follow up with Dr. Denton Vásquez. Patient is up-to-date on all his immunization and health screenings.   Already got his flu shot for this year. He is got only 1 dose of the Shingrix and the second 1 is due since it is backordered. His last colonoscopy was in 2013 and I do not think so he needs that to be repeated. Continue current meds. I will call with lab results and make further recommendations or adjustments if necessary. Discussed lifestyle modifications including Na restriction, low carb/fat diet, weight reduction and exercise (at least a walking program). ICD-10-CM ICD-9-CM    1. Encounter for annual physical exam  Z00.00 V70.0    2. Encounter for prostate cancer screening  Z12.5 V76.44 PSA, DIAGNOSTIC (PROSTATE SPECIFIC AG)   3. Essential hypertension  I10 401.9 CBC W/O DIFF      METABOLIC PANEL, COMPREHENSIVE      URINALYSIS W/MICROSCOPIC   4. Hypercholesterolemia  E78.00 272.0 LIPID PANEL   5. Type 2 diabetes mellitus with diabetic neuropathy, without long-term current use of insulin ([facility])  E11.40 250.60 HEMOGLOBIN A1C W/O EAG     357.2 URINE, MICROALBUMIN, SEMIQUANTITATIVE      HM DIABETES FOOT EXAM   6. CKD (chronic kidney disease) stage 4, GFR 15-29 ml/min ([facility])  N18.4 585.4    7. Benign localized hyperplasia of prostate  N40.0 600.20    8. ASCVD (arteriosclerotic cardiovascular disease)  I25.10 429.2      440.9    9. MCI (mild cognitive impairment)  G31.84 331.83    10. Anemia, unspecified type  D64.9 285.9    11. Spinal stenosis of lumbar region without neurogenic claudication  M48.061 724.02    12. Vitamin D deficiency  E55.9 268.9 VITAMIN D, 25 HYDROXY   13. Chronic fatigue  R53.82 780.79 TSH 3RD GENERATION             I have reviewed with the patient details of the assessment and plan and all questions were answered. Relevent patient education was performed. Verbal and/or written instructions (see AVS) provided. The most recent lab findings were reviewed with the patient. Plan was discussed with patient who verbally expressed understanding.     An After Visit Summary was printed and given to the patient.     Jenni Champion MD

## 2021-01-19 NOTE — ACP (ADVANCE CARE PLANNING)
Advance Care Planning     Advance Care Planning (ACP) Physician/NP/PA Conversation      Date of Conversation: 1/19/2021  Conducted with: Patient with Decision Making Capacity    Healthcare Decision Maker:     Click here to complete [place] including selection of the [place] Relationship (ie \"Primary\")  Today we referred to ACP Clinical Specialist for assistance. Care Preferences:    Hospitalization: \"If your health worsens and it becomes clear that your chance of recovery is unlikely, what would be your preference regarding hospitalization? \"  The patient is unsure. Ventilation: \"If you were unable to breathe on your own and your chance of recovery was unlikely, what would be your preference about the use of a ventilator (breathing machine) if it was available to you? \"   The patient is unsure. Resuscitation: \"In the event your heart stopped as a result of an underlying serious health condition, would you want attempts to be made to restart your heart, or would you prefer a natural death? \"   The patient is unsure.     Additional topics discussed: treatment goals, benefit/burden of treatment options, artificial nutrition, ventilation preferences, hospitalization preferences and resuscitation preferences    Conversation Outcomes / Follow-Up Plan:   ACP in process - information provided, considering goals and options  Reviewed DNR/DNI and patient elects Full Code (Attempt Resuscitation)     Length of Voluntary ACP Conversation in minutes:  20 minutes    Aidan Patel MD

## 2021-01-19 NOTE — PATIENT INSTRUCTIONS
The best way to stay healthy is to live a healthy lifestyle. A healthy lifestyle includes regular exercise, eating a well-balanced diet, keeping a healthy weight and not smoking. Regular physical exams and screening tests are another important way to take care of yourself. Preventive exams provided by health care providers can find health problems early when treatment works best and can keep you from getting certain diseases or illnesses. Preventive services include exams, lab tests, screenings, shots, monitoring and information to help you take care of your own health. All people over 65 should have a pneumonia shot. Pneumonia shots are usually only needed once in a lifetime unless your doctor decides differently. In addition to your physical exam, some screening tests are recommended: 
 
All people over 65 should have a yearly flu shot. People over 65 are at medium to high risk for Hepatitis B. Three shots are needed for complete protection. Bone mass measurement (dexa scan) is recommended every two years. Diabetes Mellitus screening is recommended every year. Glaucoma is an eye disease caused by high pressure in the eye. An eye exam is recommended every year. Cardiovascular screening tests that check your cholesterol and other blood fat (lipid) levels are recommended every five years. Colorectal Cancer screening tests help to find pre-cancerous polyps (growths in the colon) so they can be removed before they turn into cancer. Tests ordered for screening depend on your personal and family history risk factors. Prostate Cancer Screening (annually up to age 76) Screening for breast cancer is recommended yearly with a Mammogram. 
 
 Screening for cervical and vaginal cancer is recommended with a pelvic and Pap test every two years. However if you have had an abnormal pap in the past  three years or at high risk for cervical or vaginal cancer Medicare will cover a pap test and a pelvic exam every year. Here is a list of your current Health Maintenance items with a due date: 
Health Maintenance Due Topic Date Due  Shingles Vaccine (2 of 2) 05/15/2019  Glaucoma Screening   04/17/2020 [facility] Eye Exam  04/17/2020 [facility] Diabetic Foot Care  01/02/2021  Albumin Urine Test  01/02/2021 [facility] Annual Well Visit  01/02/2021

## 2021-01-19 NOTE — LETTER
1/19/2021 Mr. Betsy Gordon [address] [address] Dear Betsy Gordon: Please find your most recent results below. Resulted Orders CBC W/O DIFF Result Value Ref Range WBC 8.9 4.1 - 10.9 K/uL  
 RBC 4.03 (L) 4.20 - 6.30 M/uL  
 HGB 12.0 12.0 - 18.0 g/dL HCT 37.4 37.0 - 51.0 % MCH 29.8 26.0 - 32.0 pg  
 MCHC 32.1 30.0 - 36.0 g/dL MCV 92.9 80.0 - 97.0 fL  
 RDW 12.3 % PLATELET 681.5 742.5 - 440.0 K/uL LIPID PANEL Result Value Ref Range Cholesterol, total 102 0 - 200 mg/dL Triglyceride 177 0 - 200 mg/dL HDL Cholesterol 52 35 - 130 mg/dL VLDL 35 mg/dL LDL, calculated 15 0 - 130 mg/dL CHOL/HDL Ratio 2 0 - 4 Ratio LDL/HDL Ratio 0 Ratio METABOLIC PANEL, COMPREHENSIVE Result Value Ref Range Glucose 264 (H) 75 - 110 mg/dL BUN 41.0 (H) 9.0 - 20.0 mg/dL Creatinine 2.3 (H) 0.8 - 1.5 mg/dL Sodium 143 137 - 145 mmol/L Potassium 5.1 (H) 3.6 - 5.0 mmol/L Chloride 103 98 - 107 mmol/L  
 CO2 27.0 22.0 - 32.0 mmol/L Calcium 9.1 8.4 - 10.2 mg/dl Protein, total 6.9 6.3 - 8.2 g/dL Albumin 4.4 3.9 - 5.4 g/dL ALT (SGPT) 17 0 - 50 U/L  
 AST (SGOT) 24.0 14.0 - 36.0 U/L Alk. phosphatase 98 38 - 126 U/L Bilirubin, total 0.5 0.2 - 1.3 mg/dL BUN/Creatinine ratio 18 Ratio GFR est AA 33 <60 mL/min/1.73m2 GFR est non-AA 27 <60 mL/min/1.73m2 Globulin 2.50 A-G Ratio 1.8 Ratio Anion gap 13 mmol/L  
HEMOGLOBIN A1C W/O EAG Result Value Ref Range Hemoglobin A1c 7.7 (H) 4.0 - 5.7 % TSH 3RD GENERATION Result Value Ref Range TSH, 3rd generation 2.03 0.34 - 5.60 uIU/mL VITAMIN D, 25 HYDROXY Result Value Ref Range VITAMIN D, 25-HYDROXY 45 30 - 96 ng/mL URINE, MICROALBUMIN, SEMIQUANTITATIVE Result Value Ref Range Microalbumin, Urine 100 (A) 0 - 20 mg/L  
PSA, DIAGNOSTIC (PROSTATE SPECIFIC AG) Result Value Ref Range PSA 2.6 0.0 - 4.0 ng/mL URINALYSIS W/MICROSCOPIC  
 Result Value Ref Range Color Pale Yellow Pale Yellow - Yellow CLARITY clear Clear Glucose urine, 24 hr 3+ (A) Negative Ketone Negative Negative Bilirubin Negative Negative Specific gravity 1.015 1.000 - 1.030  
 pH (UA) 6 5 - 7 Blood 1+ (A) Negative Protein 3+ (A) Negative Urobilinogen Negative Negative Nitrites Negative Negative Leukocyte Esterase Negative Negative WBC 0 0 #/HPF  
 RBC 0-2 (A) 0 #/HPF Bacteria Occasional (A) None Seen RECOMMENDATIONS: 
You have chronic kidney disease stage IV. Your labs are at your baseline. Your A1c is slightly higher than last time. Continue glipizide and Tradjenta. Continue to work on diet and exercise. Rest of the labs look okay Please call me if you have any questions: [phone] Sincerely, 
 
 
Hao Villalobos MD

## 2021-01-19 NOTE — PROGRESS NOTES
Chief Complaint   Patient presents with    Annual Wellness Visit       Depression Risk Factor Screening:     3 most recent PHQ Screens 1/19/2021   Little interest or pleasure in doing things Not at all   Feeling down, depressed, irritable, or hopeless Not at all   Total Score PHQ 2 0       Functional Ability and Level of Safety:     Activities of Daily Living  ADL Assessment 1/2/2020   Feeding yourself No Help Needed   Getting from bed to chair No Help Needed   Getting dressed No Help Needed   Bathing or showering No Help Needed   Walk across the room (includes cane/walker) No Help Needed   Using the telphone No Help Needed   Taking your medications No Help Needed   Preparing meals No Help Needed   Managing money (expenses/bills) No Help Needed   Moderately strenuous housework (laundry) No Help Needed   Shopping for personal items (toiletries/medicines) No Help Needed   Shopping for groceries No Help Needed   Driving No Help Needed   Climbing a flight of stairs No Help Needed   Getting to places beyond walking distances No Help Needed       Fall Risk  Fall Risk Assessment, last 12 mths 1/19/2021   Able to walk? Yes   Fall in past 12 months? 0   Do you feel unsteady? 0   Are you worried about falling 0   Number of falls in past 12 months -   Fall with injury? -       Abuse Screen  Abuse Screening Questionnaire 1/19/2021   Do you ever feel afraid of your partner? N   Are you in a relationship with someone who physically or mentally threatens you? N   Is it safe for you to go home?  Y         Patient Care Team   Patient Care Team:  Haroon Jiang MD as PCP - General (Hospitalist)  Haroon Jiang MD as PCP - [facility] Empaneled Provider

## 2021-02-04 DIAGNOSIS — E11.40 TYPE 2 DIABETES MELLITUS WITH DIABETIC NEUROPATHY, WITHOUT LONG-TERM CURRENT USE OF INSULIN (HCC): ICD-10-CM

## 2021-02-04 RX ORDER — LINAGLIPTIN 5 MG/1
TABLET, FILM COATED ORAL
Qty: 15 TAB | Refills: 0 | Status: SHIPPED | OUTPATIENT
Start: 2021-02-04 | End: 2021-02-18

## 2021-02-04 RX ORDER — NITROGLYCERIN 0.4 MG/1
0.4 TABLET SUBLINGUAL
Qty: 25 TAB | Refills: 3 | Status: SHIPPED | OUTPATIENT
Start: 2021-02-04 | End: 2022-07-19

## 2021-02-04 NOTE — TELEPHONE ENCOUNTER
Last Refill:    Nitroglycerin: 2018   Tradjenta: 1/10/2021  Last Visit: 2021   Next Visit: None    Requested Prescriptions     Pending Prescriptions Disp Refills    nitroglycerin (Nitrostat) 0.4 mg SL tablet 25 Tab 3     Si Tab by SubLINGual route every five (5) minutes as needed for Chest Pain.     linaGLIPtin (Tradjenta) 5 mg tablet 15 Tab 0     Sig: TAKE 1 TABLET BY MOUTH DAILY

## 2021-02-17 ENCOUNTER — CLINICAL SUPPORT (OUTPATIENT)
Dept: INTERNAL MEDICINE CLINIC | Age: 86
End: 2021-02-17
Payer: MEDICARE

## 2021-02-17 VITALS
SYSTOLIC BLOOD PRESSURE: 102 MMHG | OXYGEN SATURATION: 99 % | TEMPERATURE: 97.9 F | BODY MASS INDEX: 24.86 KG/M2 | DIASTOLIC BLOOD PRESSURE: 58 MMHG | WEIGHT: 154 LBS | HEART RATE: 86 BPM

## 2021-02-17 DIAGNOSIS — E53.8 B12 DEFICIENCY: Primary | ICD-10-CM

## 2021-02-17 DIAGNOSIS — E53.8 VITAMIN B 12 DEFICIENCY: ICD-10-CM

## 2021-02-17 PROCEDURE — 96372 THER/PROPH/DIAG INJ SC/IM: CPT | Performed by: INTERNAL MEDICINE

## 2021-02-17 RX ORDER — CYANOCOBALAMIN 1000 UG/ML
1000 INJECTION, SOLUTION INTRAMUSCULAR; SUBCUTANEOUS ONCE
Status: COMPLETED | OUTPATIENT
Start: 2021-02-17 | End: 2021-02-17

## 2021-02-17 RX ADMIN — CYANOCOBALAMIN 1000 MCG: 1000 INJECTION, SOLUTION INTRAMUSCULAR; SUBCUTANEOUS at 13:44

## 2021-02-17 NOTE — PROGRESS NOTES
After obtaining consent, and per orders of Dr. Ching Sandhu, injection of B12 given by Macel Lundborg, LPN. Patient instructed to remain in clinic for 20 minutes afterwards, and to report any adverse reaction to me immediately.

## 2021-02-18 DIAGNOSIS — E11.40 TYPE 2 DIABETES MELLITUS WITH DIABETIC NEUROPATHY, WITHOUT LONG-TERM CURRENT USE OF INSULIN (HCC): ICD-10-CM

## 2021-02-18 RX ORDER — LINAGLIPTIN 5 MG/1
TABLET, FILM COATED ORAL
Qty: 90 TAB | Refills: 1 | Status: SHIPPED | OUTPATIENT
Start: 2021-02-18 | End: 2021-06-08

## 2021-02-18 RX ORDER — LINAGLIPTIN 5 MG/1
TABLET, FILM COATED ORAL
Qty: 15 TAB | Refills: 0 | Status: SHIPPED | OUTPATIENT
Start: 2021-02-18 | End: 2021-08-20 | Stop reason: SDUPTHER

## 2021-03-10 DIAGNOSIS — M48.061 SPINAL STENOSIS OF LUMBAR REGION WITHOUT NEUROGENIC CLAUDICATION: ICD-10-CM

## 2021-03-10 DIAGNOSIS — N40.0 BENIGN LOCALIZED HYPERPLASIA OF PROSTATE: ICD-10-CM

## 2021-03-14 RX ORDER — DILTIAZEM HYDROCHLORIDE 300 MG/1
CAPSULE, COATED, EXTENDED RELEASE ORAL
Qty: 90 CAP | Refills: 0 | Status: SHIPPED | OUTPATIENT
Start: 2021-03-14 | End: 2021-06-08 | Stop reason: SDUPTHER

## 2021-03-14 RX ORDER — TAMSULOSIN HYDROCHLORIDE 0.4 MG/1
CAPSULE ORAL
Qty: 180 CAP | Refills: 0 | Status: SHIPPED | OUTPATIENT
Start: 2021-03-14 | End: 2021-06-08 | Stop reason: SDUPTHER

## 2021-03-14 RX ORDER — SIMVASTATIN 20 MG/1
TABLET, FILM COATED ORAL
Qty: 90 TAB | Refills: 0 | Status: SHIPPED | OUTPATIENT
Start: 2021-03-14 | End: 2021-06-08 | Stop reason: SDUPTHER

## 2021-03-14 RX ORDER — GABAPENTIN 300 MG/1
CAPSULE ORAL
Qty: 180 CAP | Refills: 0 | Status: SHIPPED | OUTPATIENT
Start: 2021-03-14 | End: 2021-06-08 | Stop reason: SDUPTHER

## 2021-04-08 RX ORDER — GLIPIZIDE 10 MG/1
20 TABLET ORAL 2 TIMES DAILY
Qty: 360 TAB | Refills: 3 | Status: SHIPPED | OUTPATIENT
Start: 2021-04-08 | End: 2022-08-15 | Stop reason: SDUPTHER

## 2021-04-08 NOTE — TELEPHONE ENCOUNTER
Last Refill: 1/29/2020  Last Visit: 1/19/2021   Next Visit: None    Requested Prescriptions     Pending Prescriptions Disp Refills    glipiZIDE (GLUCOTROL) 10 mg tablet 360 Tab 3     Sig: Take 2 Tabs by mouth two (2) times a day. Take 2 tablets by mouth twice a day.

## 2021-06-08 DIAGNOSIS — N40.0 BENIGN LOCALIZED HYPERPLASIA OF PROSTATE: ICD-10-CM

## 2021-06-08 DIAGNOSIS — M48.061 SPINAL STENOSIS OF LUMBAR REGION WITHOUT NEUROGENIC CLAUDICATION: ICD-10-CM

## 2021-06-08 DIAGNOSIS — E11.40 TYPE 2 DIABETES MELLITUS WITH DIABETIC NEUROPATHY, WITHOUT LONG-TERM CURRENT USE OF INSULIN (HCC): ICD-10-CM

## 2021-06-08 RX ORDER — TAMSULOSIN HYDROCHLORIDE 0.4 MG/1
CAPSULE ORAL
Qty: 14 CAPSULE | Refills: 0 | Status: SHIPPED | OUTPATIENT
Start: 2021-06-08 | End: 2021-06-30

## 2021-06-08 RX ORDER — DILTIAZEM HYDROCHLORIDE 300 MG/1
CAPSULE, COATED, EXTENDED RELEASE ORAL
Qty: 90 CAPSULE | Refills: 0 | OUTPATIENT
Start: 2021-06-08

## 2021-06-08 RX ORDER — GABAPENTIN 300 MG/1
CAPSULE ORAL
Qty: 180 CAPSULE | Refills: 0 | OUTPATIENT
Start: 2021-06-08

## 2021-06-08 RX ORDER — TAMSULOSIN HYDROCHLORIDE 0.4 MG/1
CAPSULE ORAL
Qty: 180 CAPSULE | Refills: 0 | OUTPATIENT
Start: 2021-06-08

## 2021-06-08 RX ORDER — SIMVASTATIN 20 MG/1
TABLET, FILM COATED ORAL
Qty: 90 TABLET | Refills: 0 | OUTPATIENT
Start: 2021-06-08

## 2021-06-08 RX ORDER — GABAPENTIN 300 MG/1
CAPSULE ORAL
Qty: 14 CAPSULE | Refills: 0 | Status: SHIPPED | OUTPATIENT
Start: 2021-06-08 | End: 2021-06-30

## 2021-06-08 RX ORDER — DILTIAZEM HYDROCHLORIDE 300 MG/1
CAPSULE, COATED, EXTENDED RELEASE ORAL
Qty: 7 CAPSULE | Refills: 0 | Status: SHIPPED | OUTPATIENT
Start: 2021-06-08 | End: 2021-06-30

## 2021-06-08 RX ORDER — LINAGLIPTIN 5 MG/1
TABLET, FILM COATED ORAL
Qty: 90 TABLET | Refills: 1 | Status: SHIPPED | OUTPATIENT
Start: 2021-06-08 | End: 2021-06-16

## 2021-06-08 RX ORDER — SIMVASTATIN 20 MG/1
TABLET, FILM COATED ORAL
Qty: 7 TABLET | Refills: 0 | Status: SHIPPED | OUTPATIENT
Start: 2021-06-08 | End: 2021-06-30

## 2021-06-16 ENCOUNTER — OFFICE VISIT (OUTPATIENT)
Dept: INTERNAL MEDICINE CLINIC | Age: 86
End: 2021-06-16
Payer: MEDICARE

## 2021-06-16 VITALS
HEART RATE: 72 BPM | RESPIRATION RATE: 14 BRPM | WEIGHT: 149 LBS | SYSTOLIC BLOOD PRESSURE: 124 MMHG | TEMPERATURE: 98.1 F | BODY MASS INDEX: 23.95 KG/M2 | OXYGEN SATURATION: 99 % | DIASTOLIC BLOOD PRESSURE: 50 MMHG | HEIGHT: 66 IN

## 2021-06-16 DIAGNOSIS — E53.8 VITAMIN B 12 DEFICIENCY: ICD-10-CM

## 2021-06-16 DIAGNOSIS — I25.10 ASCVD (ARTERIOSCLEROTIC CARDIOVASCULAR DISEASE): ICD-10-CM

## 2021-06-16 DIAGNOSIS — I10 ESSENTIAL HYPERTENSION: ICD-10-CM

## 2021-06-16 DIAGNOSIS — E11.40 TYPE 2 DIABETES MELLITUS WITH DIABETIC NEUROPATHY, WITHOUT LONG-TERM CURRENT USE OF INSULIN (HCC): Primary | ICD-10-CM

## 2021-06-16 DIAGNOSIS — N18.4 CKD (CHRONIC KIDNEY DISEASE) STAGE 4, GFR 15-29 ML/MIN (HCC): ICD-10-CM

## 2021-06-16 DIAGNOSIS — R21 RASH: ICD-10-CM

## 2021-06-16 DIAGNOSIS — E78.00 HYPERCHOLESTEROLEMIA: ICD-10-CM

## 2021-06-16 DIAGNOSIS — I12.9 HYPERTENSIVE RENAL DISEASE: ICD-10-CM

## 2021-06-16 LAB
ALBUMIN SERPL-MCNC: 3.8 G/DL (ref 3.5–5)
ALBUMIN/GLOB SERPL: 1.3 {RATIO} (ref 1.1–2.2)
ALP SERPL-CCNC: 98 U/L (ref 45–117)
ALT SERPL-CCNC: 23 U/L (ref 12–78)
ANION GAP SERPL CALC-SCNC: 7 MMOL/L (ref 5–15)
AST SERPL-CCNC: 18 U/L (ref 15–37)
BILIRUB SERPL-MCNC: 0.4 MG/DL (ref 0.2–1)
BUN SERPL-MCNC: 52 MG/DL (ref 6–20)
BUN/CREAT SERPL: 20 (ref 12–20)
CALCIUM SERPL-MCNC: 8.9 MG/DL (ref 8.5–10.1)
CHLORIDE SERPL-SCNC: 109 MMOL/L (ref 97–108)
CHOLEST SERPL-MCNC: 93 MG/DL
CO2 SERPL-SCNC: 24 MMOL/L (ref 21–32)
CREAT SERPL-MCNC: 2.57 MG/DL (ref 0.7–1.3)
ERYTHROCYTE [DISTWIDTH] IN BLOOD BY AUTOMATED COUNT: 12.7 % (ref 11.5–14.5)
EST. AVERAGE GLUCOSE BLD GHB EST-MCNC: 163 MG/DL
GLOBULIN SER CALC-MCNC: 3 G/DL (ref 2–4)
GLUCOSE SERPL-MCNC: 201 MG/DL (ref 65–100)
HBA1C MFR BLD: 7.3 % (ref 4–5.6)
HCT VFR BLD AUTO: 34.4 % (ref 36.6–50.3)
HDLC SERPL-MCNC: 49 MG/DL
HDLC SERPL: 1.9 {RATIO} (ref 0–5)
HGB BLD-MCNC: 10.6 G/DL (ref 12.1–17)
LDLC SERPL CALC-MCNC: 20.8 MG/DL (ref 0–100)
MCH RBC QN AUTO: 28.8 PG (ref 26–34)
MCHC RBC AUTO-ENTMCNC: 30.8 G/DL (ref 30–36.5)
MCV RBC AUTO: 93.5 FL (ref 80–99)
NRBC # BLD: 0 K/UL (ref 0–0.01)
NRBC BLD-RTO: 0 PER 100 WBC
PLATELET # BLD AUTO: 147 K/UL (ref 150–400)
PMV BLD AUTO: 12 FL (ref 8.9–12.9)
POTASSIUM SERPL-SCNC: 4.8 MMOL/L (ref 3.5–5.1)
PROT SERPL-MCNC: 6.8 G/DL (ref 6.4–8.2)
RBC # BLD AUTO: 3.68 M/UL (ref 4.1–5.7)
SODIUM SERPL-SCNC: 140 MMOL/L (ref 136–145)
TRIGL SERPL-MCNC: 116 MG/DL (ref ?–150)
VLDLC SERPL CALC-MCNC: 23.2 MG/DL
WBC # BLD AUTO: 8.2 K/UL (ref 4.1–11.1)

## 2021-06-16 PROCEDURE — G8536 NO DOC ELDER MAL SCRN: HCPCS | Performed by: INTERNAL MEDICINE

## 2021-06-16 PROCEDURE — 99214 OFFICE O/P EST MOD 30 MIN: CPT | Performed by: INTERNAL MEDICINE

## 2021-06-16 PROCEDURE — G8420 CALC BMI NORM PARAMETERS: HCPCS | Performed by: INTERNAL MEDICINE

## 2021-06-16 PROCEDURE — 3051F HG A1C>EQUAL 7.0%<8.0%: CPT | Performed by: INTERNAL MEDICINE

## 2021-06-16 PROCEDURE — 96372 THER/PROPH/DIAG INJ SC/IM: CPT | Performed by: INTERNAL MEDICINE

## 2021-06-16 PROCEDURE — G8427 DOCREV CUR MEDS BY ELIG CLIN: HCPCS | Performed by: INTERNAL MEDICINE

## 2021-06-16 PROCEDURE — G8432 DEP SCR NOT DOC, RNG: HCPCS | Performed by: INTERNAL MEDICINE

## 2021-06-16 PROCEDURE — 1100F PTFALLS ASSESS-DOCD GE2>/YR: CPT | Performed by: INTERNAL MEDICINE

## 2021-06-16 PROCEDURE — 3288F FALL RISK ASSESSMENT DOCD: CPT | Performed by: INTERNAL MEDICINE

## 2021-06-16 RX ORDER — CYANOCOBALAMIN 1000 UG/ML
1000 INJECTION, SOLUTION INTRAMUSCULAR; SUBCUTANEOUS ONCE
Qty: 1 ML | Refills: 0
Start: 2021-06-16 | End: 2021-06-16

## 2021-06-16 RX ORDER — CLOTRIMAZOLE AND BETAMETHASONE DIPROPIONATE 10; .64 MG/G; MG/G
CREAM TOPICAL
Qty: 15 G | Refills: 0 | Status: SHIPPED | OUTPATIENT
Start: 2021-06-16 | End: 2021-07-09

## 2021-06-16 NOTE — PROGRESS NOTES
Chadwick Reveles is a 80 y.o. male and presents with Hypertension (follow up) and Diabetes      Subjective:  Patient comes in today for follow-up. Since last visit patient reports he has been doing well. He does have some decreased hearing for which he uses hearing aids. He has CKD stage IV most likely secondary to diabetes and hypertension. He reports he had a discussion with Dr. Jessie Bustos regarding hemodialysis versus peritoneal dialysis and quality of life. He still not decided as to which route he may go. Type 2 diabeteswell controlled. Last A1c 7.1 currently on Tradjenta. He did decrease his glipizide to 10 mg p.o. twice daily. Not on Metformin secondary to stage IV kidney disease. He reports he checks his sugars every day in the morning and they range between 901 20s. Denies hypoglycemic event. Denies tingling or numbness sensation. Dyslipidemia currently on statin. Denies muscle cramps or myalgias. Blood pressure well controlled on current meds. Denies headache or blurred vision. Patient has a history of atherosclerotic heart disease status post CABG and coronary artery stenting. Continues to remain on aspirin and statin denies PND, shortness of breath, exertional chest pain.       Past Medical History:   Diagnosis Date    CAD (coronary artery disease)     mi    Chronic kidney disease     hx of elevated blood levels    Chronic pain     lower back    Diabetes ([address]     Enlarged prostate     Hypercholesterolemia     Hypertension     Other unknown and unspecified cause of morbidity or mortality     hayfever     Past Surgical History:   Procedure Laterality Date    HX CATARACT REMOVAL Bilateral     HX CHOLECYSTECTOMY      HX LUMBAR LAMINECTOMY  09/2017    HX ORTHOPAEDIC  09/23/2015    back surgery     [place] CARDIAC SURG PROCEDURE UNLIST      bypass(1985), stents(1994, 2004)    [place] COLONOSCOPY FLX DX W/COLLJ SPEC WHEN PFRMD  8/12/2013         [place] EGD TRANSORAL BIOPSY SINGLE/MULTIPLE  6/28/2012         VASCULAR SURGERY PROCEDURE UNLIST  2/1996    left carotid     Allergies   Allergen Reactions    Tetanus Vaccines And Toxoid Swelling     Swelling at the site    Tradjenta [Linagliptin] Diarrhea     headache     Current Outpatient Medications   Medication Sig Dispense Refill    clotrimazole-betamethasone (LOTRISONE) topical cream Apply  to affected area two (2) times daily as needed for Skin Irritation. 15 g 0    cyanocobalamin (Vitamin B-12) 1,000 mcg/mL injection 1 mL by IntraMUSCular route once for 1 dose. 1 mL 0    tamsulosin (FLOMAX) 0.4 mg capsule TAKE 2 CAPSULES BY MOUTH EVERY DAY 14 Capsule 0    dilTIAZem ER (CARDIZEM CD) 300 mg capsule TAKE 1 CAPSULE BY MOUTH DAILY 7 Capsule 0    gabapentin (NEURONTIN) 300 mg capsule TAKE 1 CAPSULE BY MOUTH TWICE DAILY 14 Capsule 0    simvastatin (ZOCOR) 20 mg tablet TAKE 1 TABLET BY MOUTH EVERY DAY IN THE EVENING 7 Tablet 0    glipiZIDE (GLUCOTROL) 10 mg tablet Take 2 Tabs by mouth two (2) times a day. Take 2 tablets by mouth twice a day. 360 Tab 3    Tradjenta 5 mg tablet TAKE 1 TABLET BY MOUTH DAILY 15 Tab 0    nitroglycerin (Nitrostat) 0.4 mg SL tablet 1 Tab by SubLINGual route every five (5) minutes as needed for Chest Pain. 25 Tab 3    glucose blood VI test strips (Accu-Chek Guide test strips) strip USE 1 STRIP TO TEST BLOOD SUGAR EVERY DAY Dx E11.9 100 Strip 2    hydrALAZINE (APRESOLINE) 25 mg tablet TAKE 1 TABLET BY MOUTH TWICE DAILY 180 Tab 3    colesevelam (WELCHOL) 625 mg tablet TAKE 3 TABLETS BY MOUTH TWICE DAILY 540 Tab 2    lancets (Accu-Chek Fastclix Lancet Drum) misc USE AS DIRECTED TO MONITOR BLOOD SUGAR 1 Each 6    cyanocobalamin (VITAMIN B12) 1,000 mcg/mL injection INJECT 1 ML SUBCUTANEOUS EVERY MONTH 1 mL 12    fluticasone propionate (FLONASE) 50 mcg/actuation nasal spray       Blood-Glucose Meter monitoring kit Use to test blood sugar once daily.  1 Kit 0    glucose blood VI test strips (ACCU-CHEK SMARTVIEW TEST STRIP) strip Use to check blood sugar once daily. 100 Strip 3    acetaminophen (TYLENOL EXTRA STRENGTH) 500 mg tablet Take  by mouth every six (6) hours as needed for Pain.  Lancets misc Use with Fastclix lancing device daily to test blood sugar. 100 Each 3    ACCU-CHEK FASTCLIX misc USE AS DIRECTED TO MONITOR BLOOD SUGAR 100 Each 3    aspirin delayed-release 81 mg tablet Take 81 mg by mouth daily.  polyethylene glycol (MIRALAX) 17 gram/dose powder Take 17 g by mouth daily as needed.  Cholecalciferol, Vitamin D3, (VITAMIN D3) 1,000 unit cap Take 1,000 Units by mouth daily.  ferrous sulfate 325 mg (65 mg iron) tablet Take 325 mg by mouth two (2) times a day. Social History     Socioeconomic History    Marital status:      Spouse name: Not on file    Number of children: Not on file    Years of education: Not on file    Highest education level: Not on file   Tobacco Use    Smoking status: Former Smoker     Quit date: 6/3/1967     Years since quittin.0    Smokeless tobacco: Never Used   Vaping Use    Vaping Use: Never used   Substance and Sexual Activity    Alcohol use: No    Drug use: No    Sexual activity: Not Currently     Social Determinants of Health     Financial Resource Strain:     Difficulty of Paying Living Expenses:    Food Insecurity:     Worried About Running Out of Food in the Last Year:     [address] in the Last Year:    Transportation Needs:     Lack of Transportation (Medical):      Lack of Transportation (Non-Medical):    Physical Activity:     Days of Exercise per Week:     Minutes of Exercise per Session:    Stress:     Feeling of Stress :    Social Connections:     Frequency of Communication with Friends and Family:     Frequency of Social Gatherings with Friends and Family:     Attends Orthodox Services:     Active Member of Clubs or Organizations:     Attends Club or Organization Meetings:     Marital Status: Family History   Problem Relation Age of Onset    Heart Disease Mother     Heart Disease Father        Review of Systems  ROS is unremarkable except for ones highlighted in bold.    Constitutional: negative for fevers, chills, anorexia and weight loss  Eyes:   negative for visual disturbance and irritation  ENT:   negative for tinnitus,sore throat,nasal congestion,ear pain,hoarseness  Respiratory:  negative for cough, hemoptysis, dyspnea,wheezing  CV:   negative for chest pain, palpitations, lower extremity edema  GI:   negative for nausea, vomiting, diarrhea, abdominal pain,melena  Endo:               negative for polyuria,polydipsia,polyphagia,heat intolerance  Genitourinary: negative for frequency, dysuria and hematuria  Integumentary: negative for rash and pruritus  Hematologic:  negative for easy bruising and gum/nose bleeding  Musculoskel: negative for myalgias, arthralgias, back pain, muscle weakness, joint pain  Neurological:  negative for headaches, dizziness, vertigo, memory problems and gait   Behavl/Psych: negative for feelings of anxiety, depression, mood changes  ROS otherwise negative     Objective:  Visit Vitals  BP (!) 124/50 (BP 1 Location: Left upper arm, BP Patient Position: Sitting, BP Cuff Size: Adult)   Pulse 72   Temp 98.1 °F (36.7 °C)   Resp 14   Ht 5' 6\" (1.676 m)   Wt 149 lb (67.6 kg)   SpO2 99%   BMI 24.05 kg/m²     Physical Exam:   General appearance - alert, well appearing, and in no distress  Mental status - alert, oriented to person, place, and time  EYE-MELISSA, EOMI, fundi normal, corneas normal, no foreign bodies  ENT-ENT exam normal, no neck nodes or sinus tenderness  Nose - normal and patent, no erythema, discharge or polyps  Mouth - mucous membranes moist, pharynx normal without lesions  Neck - supple, no significant adenopathy   Chest - clear to auscultation, no wheezes, rales or rhonchi, symmetric air entry   Heart - normal rate, regular rhythm, normal S1, S2, no murmurs, rubs, clicks or gallops   Abdomen - soft, nontender, nondistended, no masses or organomegaly  Lymph- no adenopathy palpable  Ext-peripheral pulses normal, no pedal edema, no clubbing or cyanosis  Skin-Warm and dry. no hyperpigmentation, vitiligo, or suspicious lesions, rash on anterior trigeminal region bilaterally. Neuro -alert, oriented, normal speech, no focal findings or movement disorder noted  Musculoskeletal- FROM, no bony abnormalities, no point tenderness                  Lab Review:  Results for orders placed or performed in visit on 01/19/21   CBC W/O DIFF   Result Value Ref Range    WBC 8.9 4.1 - 10.9 K/uL    RBC 4.03 (L) 4.20 - 6.30 M/uL    HGB 12.0 12.0 - 18.0 g/dL    HCT 37.4 37.0 - 51.0 %    MCH 29.8 26.0 - 32.0 pg    MCHC 32.1 30.0 - 36.0 g/dL    MCV 92.9 80.0 - 97.0 fL    RDW 12.3 %    PLATELET 994.9 389.5 - 440.0 K/uL   LIPID PANEL   Result Value Ref Range    Cholesterol, total 102 0 - 200 mg/dL    Triglyceride 177 0 - 200 mg/dL    HDL Cholesterol 52 35 - 130 mg/dL    VLDL 35 mg/dL    LDL, calculated 15 0 - 130 mg/dL    CHOL/HDL Ratio 2 0 - 4 Ratio    LDL/HDL Ratio 0 Ratio   METABOLIC PANEL, COMPREHENSIVE   Result Value Ref Range    Glucose 264 (H) 75 - 110 mg/dL    BUN 41.0 (H) 9.0 - 20.0 mg/dL    Creatinine 2.3 (H) 0.8 - 1.5 mg/dL    Sodium 143 137 - 145 mmol/L    Potassium 5.1 (H) 3.6 - 5.0 mmol/L    Chloride 103 98 - 107 mmol/L    CO2 27.0 22.0 - 32.0 mmol/L    Calcium 9.1 8.4 - 10.2 mg/dl    Protein, total 6.9 6.3 - 8.2 g/dL    Albumin 4.4 3.9 - 5.4 g/dL    ALT (SGPT) 17 0 - 50 U/L    AST (SGOT) 24.0 14.0 - 36.0 U/L    Alk.  phosphatase 98 38 - 126 U/L    Bilirubin, total 0.5 0.2 - 1.3 mg/dL    BUN/Creatinine ratio 18 Ratio    GFR est AA 33 <60 mL/min/1.73m2    GFR est non-AA 27 <60 mL/min/1.73m2    Globulin 2.50     A-G Ratio 1.8 Ratio    Anion gap 13 mmol/L   HEMOGLOBIN A1C W/O EAG   Result Value Ref Range    Hemoglobin A1c 7.7 (H) 4.0 - 5.7 %   TSH 3RD GENERATION   Result Value Ref Range TSH, 3rd generation 2.03 0.34 - 5.60 uIU/mL   VITAMIN D, 25 HYDROXY   Result Value Ref Range    VITAMIN D, 25-HYDROXY 45 30 - 96 ng/mL   URINE, MICROALBUMIN, SEMIQUANTITATIVE   Result Value Ref Range    Microalbumin, Urine 100 (A) 0 - 20 mg/L   PSA, DIAGNOSTIC (PROSTATE SPECIFIC AG)   Result Value Ref Range    PSA 2.6 0.0 - 4.0 ng/mL   URINALYSIS W/MICROSCOPIC   Result Value Ref Range    Color Pale Yellow Pale Yellow - Yellow    CLARITY clear Clear    Glucose urine, 24 hr 3+ (A) Negative    Ketone Negative Negative    Bilirubin Negative Negative    Specific gravity 1.015 1.000 - 1.030    pH (UA) 6 5 - 7    Blood 1+ (A) Negative    Protein 3+ (A) Negative    Urobilinogen Negative Negative    Nitrites Negative Negative    Leukocyte Esterase Negative Negative    WBC 0 0 #/HPF    RBC 0-2 (A) 0 #/HPF    Bacteria Occasional (A) None Seen        Documenation Review:  Lumbar spinal stenosis with severe L3-4 stenosis, S/P lumbar laminectomy. History of chronic anemia associated with B12 and iron deficiency, improved   History of cerebrovascular disease, S/P left carotid endarterectomy,   OHD, S/P CABG in 1985 with subsequent RCA stenting in 2004. He's had no recent angina or symptoms of CHF. 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 Assessment/Plan:    Diagnoses and all orders for this visit:    1. Type 2 diabetes mellitus with diabetic neuropathy, without long-term current use of insulin (HCC)  -     HEMOGLOBIN A1C WITH EAG; Future    2. ASCVD (arteriosclerotic cardiovascular disease)    3. Essential hypertension  -     CBC W/O DIFF; Future  -     METABOLIC PANEL, COMPREHENSIVE; Future    4. Vitamin B 12 deficiency  -     VITAMIN B12 INJECTION  -     THER/PROPH/DIAG INJECTION, SUBCUT/IM  -     cyanocobalamin (Vitamin B-12) 1,000 mcg/mL injection; 1 mL by IntraMUSCular route once for 1 dose. 5. Hypercholesterolemia  -     LIPID PANEL; Future    6.  CKD (chronic kidney disease) stage 4, GFR 15-29 ml/min ([facility])    7. Hypertensive renal disease    8. Rash  -     clotrimazole-betamethasone (LOTRISONE) topical cream; Apply  to affected area two (2) times daily as needed for Skin Irritation. Reviewed records by Dr. Nirali Champion. Michele/nephrologist.  Patient is leaning towards HD. He will follow up with Dr. Lopez Jhaveri. Continue current meds. I will call with lab results and make further recommendations or adjustments if necessary. Discussed lifestyle modifications including Na restriction, low carb/fat diet, weight reduction and exercise (at least a walking program). ICD-10-CM ICD-9-CM    1. Type 2 diabetes mellitus with diabetic neuropathy, without long-term current use of insulin ([facility])  E11.40 250.60 HEMOGLOBIN A1C WITH EAG     357.2 HEMOGLOBIN A1C WITH EAG   2. ASCVD (arteriosclerotic cardiovascular disease)  I25.10 429.2      440.9    3. Essential hypertension  I10 401.9 CBC W/O DIFF      METABOLIC PANEL, COMPREHENSIVE      METABOLIC PANEL, COMPREHENSIVE      CBC W/O DIFF   4. Vitamin B 12 deficiency  E53.8 266.2 VITAMIN B12 INJECTION      THER/PROPH/DIAG INJECTION, SUBCUT/IM      cyanocobalamin (Vitamin B-12) 1,000 mcg/mL injection   5. Hypercholesterolemia  E78.00 272.0 LIPID PANEL      LIPID PANEL   6. CKD (chronic kidney disease) stage 4, GFR 15-29 ml/min (HCC)  N18.4 585.4    7. Hypertensive renal disease  I12.9 403.90      585.9    8. Rash  R21 782.1 clotrimazole-betamethasone (LOTRISONE) topical cream         Follow-up and Dispositions    · Return in about 3 months (around 9/16/2021) for follow up. I have reviewed with the patient details of the assessment and plan and all questions were answered. Relevent patient education was performed. Verbal and/or written instructions (see AVS) provided. The most recent lab findings were reviewed with the patient. Plan was discussed with patient who verbally expressed understanding.     An After Visit Summary was printed and given to the patient.     Amber Adames MD

## 2021-06-16 NOTE — PATIENT INSTRUCTIONS
Learning About Type 2 Diabetes What is type 2 diabetes? Type 2 diabetes is a condition in which you have too much sugar (glucose) in your blood. Glucose is a type of sugar produced in your body when carbohydrates and other foods are digested. It provides energy to cells throughout the body. Normally, blood sugar levels increase after you eat a meal. When blood sugar rises, cells in the pancreas release insulin, which causes the body to absorb sugar from the blood and lowers the blood sugar level to normal. 
When you have type 2 diabetes, sugar stays in the blood rather than entering the body's cells to be used for energy. This results in high blood sugar. It happens when your body can't use insulin the right way. Over time, high blood sugar can harm many parts of the body, such as your eyes, heart, blood vessels, nerves, and kidneys. It can also increase your risk for other health problems (complications). What can you expect with type 2 diabetes? Jossie Francisco keep hearing about how important it is to keep your blood sugar within a target range. That's because over time, high blood sugar can lead to serious problems. It can: 
· Harm your eyes, nerves, and kidneys. · Damage your blood vessels, leading to heart disease and stroke. · Reduce blood flow and cause nerve damage to parts of your body, especially your feet. This can cause slow healing and pain when you walk. · Make your immune system weak and less able to fight infections. When people hear the word \"diabetes,\" they often think of problems like these. But daily care and treatment can help prevent or delay these problems. The goal is to keep your blood sugar in a target range. That's the best way to reduce your chance of having more problems from diabetes. What are the symptoms? Some people who have type 2 diabetes may not have any symptoms early on.  Many people with the disease don't even know they have it at first. But with time, diabetes starts to cause symptoms. You have most symptoms of type 2 diabetes when your blood sugar is either too high or too low. The most common symptoms of high blood sugar include: · Thirst. 
· Needing to urinate often. · Weight loss. · Blurry vision. The symptoms of low blood sugar include: · Sweating. · Shakiness. · Weakness. · Hunger. · Confusion. You're not likely to get symptoms of low blood sugar unless you take insulin or use certain diabetes medicines that lower blood sugar. How can you help prevent type 2 diabetes? There are things you can do to help prevent type 2 diabetes. Stay at a healthy weight. Exercise regularly, and eat healthy foods. Even small changes can make a difference. If you have prediabetes, the medicine metformin can help prevent type 2 diabetes. How is type 2 diabetes treated? Treatment for type 2 diabetes will change over time to meet your needs. But the focus of your treatment will usually be to keep your blood sugar levels in your target range. This will help prevent problems such as eye, kidney, heart, blood vessel, and nerve disease. Some people may need medicines to help their bodies make insulin or decrease insulin resistance. Some medicines slow down how quickly the body absorbs carbohydrates. Treatment to manage type 2 diabetes includes: · Making healthy food choices and being active. · Losing weight, if you need to. · Seeing your doctor regularly. · Keeping your blood sugar in your target range. · Taking medicines, if you need them. · Quitting smoking, if you smoke. · Keeping your blood pressure and cholesterol under control. Follow-up care is a key part of your treatment and safety. Be sure to make and go to all appointments, and call your doctor if you are having problems. It's also a good idea to know your test results and keep a list of the medicines you take. Where can you learn more? Go to http://www.gray.com/ Enter X902 in the search box to learn more about \"Learning About Type 2 Diabetes. \" Current as of: August 31, 2020               Content Version: 12.8 © 2006-2021 [employer]. Care instructions adapted under license by [employer] (which disclaims liability or warranty for this information). If you have questions about a medical condition or this instruction, always ask your healthcare professional. [place] [address] any warranty or liability for your use of this information.

## 2021-06-16 NOTE — PROGRESS NOTES
German Larsen is a 80 y.o. male presenting for Hypertension (follow up) and Diabetes  . 1. Have you been to the ER, urgent care clinic since your last visit? Hospitalized since your last visit? No    2. Have you seen or consulted any other health care providers outside of the [address] [place] [address] since your last visit? Include any pap smears or colon screening. No    Fall Risk Assessment, last 12 mths 1/19/2021   Able to walk? Yes   Fall in past 12 months? 0   Do you feel unsteady? 0   Are you worried about falling 0   Number of falls in past 12 months -   Fall with injury? -         Abuse Screening Questionnaire 1/19/2021   Do you ever feel afraid of your partner? N   Are you in a relationship with someone who physically or mentally threatens you? N   Is it safe for you to go home? Y       3 most recent PHQ Screens 1/19/2021   Little interest or pleasure in doing things Not at all   Feeling down, depressed, irritable, or hopeless Not at all   Total Score PHQ 2 0       There are no discontinued medications. After obtaining consent, and per orders of Dr. Ronald Espinal, injection of B12 given by Mertha Seip, LPN. Patient instructed to remain in clinic for 20 minutes afterwards, and to report any adverse reaction to me immediately.

## 2021-06-30 DIAGNOSIS — N40.0 BENIGN LOCALIZED HYPERPLASIA OF PROSTATE: ICD-10-CM

## 2021-06-30 DIAGNOSIS — M48.061 SPINAL STENOSIS OF LUMBAR REGION WITHOUT NEUROGENIC CLAUDICATION: ICD-10-CM

## 2021-06-30 RX ORDER — GABAPENTIN 300 MG/1
CAPSULE ORAL
Qty: 90 CAPSULE | Refills: 0 | Status: SHIPPED | OUTPATIENT
Start: 2021-06-30 | End: 2021-07-09

## 2021-06-30 RX ORDER — TAMSULOSIN HYDROCHLORIDE 0.4 MG/1
CAPSULE ORAL
Qty: 14 CAPSULE | Refills: 0 | Status: SHIPPED | OUTPATIENT
Start: 2021-06-30 | End: 2021-07-09

## 2021-06-30 RX ORDER — SIMVASTATIN 20 MG/1
TABLET, FILM COATED ORAL
Qty: 7 TABLET | Refills: 0 | Status: SHIPPED | OUTPATIENT
Start: 2021-06-30 | End: 2021-07-09

## 2021-06-30 RX ORDER — DILTIAZEM HYDROCHLORIDE 300 MG/1
CAPSULE, COATED, EXTENDED RELEASE ORAL
Qty: 7 CAPSULE | Refills: 0 | Status: SHIPPED | OUTPATIENT
Start: 2021-06-30 | End: 2021-07-09

## 2021-07-08 DIAGNOSIS — R21 RASH: ICD-10-CM

## 2021-07-08 DIAGNOSIS — M48.061 SPINAL STENOSIS OF LUMBAR REGION WITHOUT NEUROGENIC CLAUDICATION: ICD-10-CM

## 2021-07-08 DIAGNOSIS — N40.0 BENIGN LOCALIZED HYPERPLASIA OF PROSTATE: ICD-10-CM

## 2021-07-09 RX ORDER — CLOTRIMAZOLE AND BETAMETHASONE DIPROPIONATE 10; .64 MG/G; MG/G
CREAM TOPICAL
Qty: 15 G | Refills: 0 | Status: SHIPPED | OUTPATIENT
Start: 2021-07-09

## 2021-07-09 RX ORDER — TAMSULOSIN HYDROCHLORIDE 0.4 MG/1
CAPSULE ORAL
Qty: 14 CAPSULE | Refills: 0 | Status: SHIPPED | OUTPATIENT
Start: 2021-07-09 | End: 2021-07-12 | Stop reason: SDUPTHER

## 2021-07-09 RX ORDER — SIMVASTATIN 20 MG/1
TABLET, FILM COATED ORAL
Qty: 7 TABLET | Refills: 0 | Status: SHIPPED | OUTPATIENT
Start: 2021-07-09 | End: 2022-01-14 | Stop reason: ALTCHOICE

## 2021-07-09 RX ORDER — DILTIAZEM HYDROCHLORIDE 300 MG/1
CAPSULE, COATED, EXTENDED RELEASE ORAL
Qty: 7 CAPSULE | Refills: 0 | Status: SHIPPED | OUTPATIENT
Start: 2021-07-09 | End: 2021-07-12 | Stop reason: SDUPTHER

## 2021-07-09 RX ORDER — GABAPENTIN 300 MG/1
CAPSULE ORAL
Qty: 90 CAPSULE | Refills: 0 | Status: SHIPPED | OUTPATIENT
Start: 2021-07-09 | End: 2021-08-20 | Stop reason: SDUPTHER

## 2021-07-12 DIAGNOSIS — N40.0 BENIGN LOCALIZED HYPERPLASIA OF PROSTATE: ICD-10-CM

## 2021-07-12 RX ORDER — DILTIAZEM HYDROCHLORIDE 300 MG/1
CAPSULE, COATED, EXTENDED RELEASE ORAL
Qty: 30 CAPSULE | Refills: 0 | Status: SHIPPED | OUTPATIENT
Start: 2021-07-12 | End: 2021-07-14

## 2021-07-12 RX ORDER — TAMSULOSIN HYDROCHLORIDE 0.4 MG/1
CAPSULE ORAL
Qty: 60 CAPSULE | Refills: 0 | Status: SHIPPED | OUTPATIENT
Start: 2021-07-12 | End: 2021-07-14

## 2021-07-12 NOTE — TELEPHONE ENCOUNTER
Patient wants to know why only a small quantity was called in of these prescriptions (7 and 14)  Last Visit: 6/16/2021   Next Visit: 9/21/2021    Requested Prescriptions     Pending Prescriptions Disp Refills    dilTIAZem ER (CARDIZEM CD) 300 mg capsule 30 Capsule 0     Sig: TAKE 1 CAPSULE BY MOUTH DAILY    tamsulosin (FLOMAX) 0.4 mg capsule 60 Capsule 0

## 2021-07-14 DIAGNOSIS — N40.0 BENIGN LOCALIZED HYPERPLASIA OF PROSTATE: ICD-10-CM

## 2021-07-14 RX ORDER — TAMSULOSIN HYDROCHLORIDE 0.4 MG/1
CAPSULE ORAL
Qty: 180 CAPSULE | Refills: 0 | Status: SHIPPED | OUTPATIENT
Start: 2021-07-14 | End: 2021-09-22 | Stop reason: SDUPTHER

## 2021-07-14 RX ORDER — DILTIAZEM HYDROCHLORIDE 300 MG/1
CAPSULE, COATED, EXTENDED RELEASE ORAL
Qty: 90 CAPSULE | Refills: 1 | Status: SHIPPED | OUTPATIENT
Start: 2021-07-14 | End: 2022-01-14 | Stop reason: SDUPTHER

## 2021-07-16 ENCOUNTER — CLINICAL SUPPORT (OUTPATIENT)
Dept: INTERNAL MEDICINE CLINIC | Age: 86
End: 2021-07-16
Payer: MEDICARE

## 2021-07-16 VITALS — OXYGEN SATURATION: 99 % | HEART RATE: 71 BPM | SYSTOLIC BLOOD PRESSURE: 138 MMHG | DIASTOLIC BLOOD PRESSURE: 60 MMHG

## 2021-07-16 DIAGNOSIS — E53.8 VITAMIN B 12 DEFICIENCY: Primary | ICD-10-CM

## 2021-07-16 PROCEDURE — 96372 THER/PROPH/DIAG INJ SC/IM: CPT | Performed by: INTERNAL MEDICINE

## 2021-07-16 RX ORDER — CYANOCOBALAMIN 1000 UG/ML
1000 INJECTION, SOLUTION INTRAMUSCULAR; SUBCUTANEOUS ONCE
Qty: 1 ML | Refills: 0
Start: 2021-07-16 | End: 2021-07-16

## 2021-07-16 NOTE — PROGRESS NOTES
After obtaining consent, and per orders of Dr. Estuardo Tierney, injection of B12 given by Lauro Abrams LPN. Patient instructed to remain in clinic for 20 minutes afterwards, and to report any adverse reaction to me immediately.

## 2021-08-20 ENCOUNTER — CLINICAL SUPPORT (OUTPATIENT)
Dept: INTERNAL MEDICINE CLINIC | Age: 86
End: 2021-08-20
Payer: MEDICARE

## 2021-08-20 VITALS
OXYGEN SATURATION: 95 % | HEART RATE: 85 BPM | SYSTOLIC BLOOD PRESSURE: 134 MMHG | BODY MASS INDEX: 23.74 KG/M2 | TEMPERATURE: 97.6 F | DIASTOLIC BLOOD PRESSURE: 72 MMHG | RESPIRATION RATE: 16 BRPM | HEIGHT: 66 IN | WEIGHT: 147.7 LBS

## 2021-08-20 DIAGNOSIS — E11.40 TYPE 2 DIABETES MELLITUS WITH DIABETIC NEUROPATHY, WITHOUT LONG-TERM CURRENT USE OF INSULIN (HCC): ICD-10-CM

## 2021-08-20 DIAGNOSIS — E53.8 B12 DEFICIENCY: Primary | ICD-10-CM

## 2021-08-20 DIAGNOSIS — E53.8 VITAMIN B 12 DEFICIENCY: ICD-10-CM

## 2021-08-20 DIAGNOSIS — M48.061 SPINAL STENOSIS OF LUMBAR REGION WITHOUT NEUROGENIC CLAUDICATION: ICD-10-CM

## 2021-08-20 PROCEDURE — 96372 THER/PROPH/DIAG INJ SC/IM: CPT | Performed by: INTERNAL MEDICINE

## 2021-08-20 RX ORDER — CYANOCOBALAMIN 1000 UG/ML
1000 INJECTION, SOLUTION INTRAMUSCULAR; SUBCUTANEOUS ONCE
Qty: 1 VIAL | Refills: 0
Start: 2021-08-20 | End: 2021-08-20

## 2021-08-20 NOTE — TELEPHONE ENCOUNTER
PCP: Drake Pena MD    Last appt: 6/16/2021  Future Appointments   Date Time Provider Marcos Joiner   9/21/2021  2:45 PM Drake Pena MD [facility]       Requested Prescriptions     Pending Prescriptions Disp Refills    linaGLIPtin (Tradjenta) 5 mg tablet 90 Tablet 0     Sig: Take 1 Tablet by mouth daily.  gabapentin (NEURONTIN) 300 mg capsule 90 Capsule 0     Sig: Take 1 Capsule by mouth two (2) times a day.     glucose blood VI test strips (Accu-Chek Guide test strips) strip 100 Strip 2     Sig: USE 1 STRIP TO TEST BLOOD SUGAR EVERY DAY Dx E11.9       Prior labs and Blood pressures:  BP Readings from Last 3 Encounters:   08/20/21 134/72   07/16/21 138/60   06/16/21 (!) 124/50     Lab Results   Component Value Date/Time    Sodium 140 06/16/2021 04:16 PM    Potassium 4.8 06/16/2021 04:16 PM    Chloride 109 (H) 06/16/2021 04:16 PM    CO2 24 06/16/2021 04:16 PM    Anion gap 7 06/16/2021 04:16 PM    Glucose 201 (H) 06/16/2021 04:16 PM    BUN 52 (H) 06/16/2021 04:16 PM    Creatinine 2.57 (H) 06/16/2021 04:16 PM    BUN/Creatinine ratio 20 06/16/2021 04:16 PM    GFR est AA 29 (L) 06/16/2021 04:16 PM    GFR est non-AA 24 (L) 06/16/2021 04:16 PM    Calcium 8.9 06/16/2021 04:16 PM     Lab Results   Component Value Date/Time    Hemoglobin A1c 7.3 (H) 06/16/2021 04:16 PM    Hemoglobin A1c (POC) 7.3 (A) 12/20/2018 03:18 PM     Lab Results   Component Value Date/Time    Cholesterol, total 93 06/16/2021 04:16 PM    Cholesterol (POC) 100.0 12/20/2018 03:18 PM    HDL Cholesterol 49 06/16/2021 04:16 PM    HDL Cholesterol (POC) 49.0 12/20/2018 03:18 PM    LDL Cholesterol (POC) 16.8 12/20/2018 03:18 PM    LDL, calculated 20.8 06/16/2021 04:16 PM    VLDL, calculated 23.2 06/16/2021 04:16 PM    Triglyceride 116 06/16/2021 04:16 PM    Triglycerides (POC) 171.0 12/20/2018 03:18 PM    CHOL/HDL Ratio 1.9 06/16/2021 04:16 PM     Lab Results   Component Value Date/Time    VITAMIN D, 25-HYDROXY 45 01/19/2021 02:50 PM       Lab Results   Component Value Date/Time    TSH, 3rd generation 2.03 01/19/2021 02:50 PM

## 2021-08-20 NOTE — PROGRESS NOTES
Chato Ayers is a 80 y.o. male     Chief Complaint   Patient presents with    Injection B12       Visit Vitals  /72 (BP 1 Location: Left arm, BP Patient Position: Sitting, BP Cuff Size: Adult)   Pulse 85   Temp 97.6 °F (36.4 °C) (Temporal)   Resp 16   Ht 5' 6\" (1.676 m)   Wt 147 lb 11.2 oz (67 kg)   SpO2 95%   BMI 23.84 kg/m²       Health Maintenance Due   Topic Date Due    Shingrix Vaccine Age 49> (2 of 2) 05/15/2019    Eye Exam Retinal or Dilated  04/17/2020       1. Have you been to the ER, urgent care clinic since your last visit? Hospitalized since your last visit? No     2. Have you seen or consulted any other health care providers outside of the [address] [place] [address] since your last visit? Include any pap smears or colon screening. No       Administered B12 injection in left deltoid patient tolerated injection well.     Lot#: [id number]    Exp: 8/31/21    NDC: 9459-1831-25 28-Oct-2018

## 2021-08-23 RX ORDER — GABAPENTIN 300 MG/1
300 CAPSULE ORAL 2 TIMES DAILY
Qty: 90 CAPSULE | Refills: 0 | Status: SHIPPED | OUTPATIENT
Start: 2021-08-23 | End: 2021-10-22 | Stop reason: SDUPTHER

## 2021-08-23 RX ORDER — BLOOD SUGAR DIAGNOSTIC
STRIP MISCELLANEOUS
Qty: 100 STRIP | Refills: 2 | Status: SHIPPED | OUTPATIENT
Start: 2021-08-23 | End: 2022-06-02 | Stop reason: SDUPTHER

## 2021-08-23 RX ORDER — LINAGLIPTIN 5 MG/1
5 TABLET, FILM COATED ORAL DAILY
Qty: 90 TABLET | Refills: 0 | Status: SHIPPED | OUTPATIENT
Start: 2021-08-23 | End: 2021-11-22 | Stop reason: SDUPTHER

## 2021-09-09 RX ORDER — COLESEVELAM 180 1/1
TABLET ORAL
Qty: 540 TABLET | Refills: 2 | Status: SHIPPED | OUTPATIENT
Start: 2021-09-09 | End: 2022-06-03 | Stop reason: SDUPTHER

## 2021-09-22 ENCOUNTER — OFFICE VISIT (OUTPATIENT)
Dept: INTERNAL MEDICINE CLINIC | Age: 86
End: 2021-09-22
Payer: MEDICARE

## 2021-09-22 VITALS
DIASTOLIC BLOOD PRESSURE: 58 MMHG | BODY MASS INDEX: 24.08 KG/M2 | OXYGEN SATURATION: 98 % | HEIGHT: 66 IN | TEMPERATURE: 98.4 F | RESPIRATION RATE: 16 BRPM | HEART RATE: 80 BPM | WEIGHT: 149.8 LBS | SYSTOLIC BLOOD PRESSURE: 138 MMHG

## 2021-09-22 DIAGNOSIS — Z23 ENCOUNTER FOR IMMUNIZATION: ICD-10-CM

## 2021-09-22 DIAGNOSIS — N40.0 BENIGN LOCALIZED HYPERPLASIA OF PROSTATE: ICD-10-CM

## 2021-09-22 DIAGNOSIS — N18.30 STAGE 3 CHRONIC KIDNEY DISEASE DUE TO DIABETES MELLITUS (HCC): ICD-10-CM

## 2021-09-22 DIAGNOSIS — E11.22 STAGE 3 CHRONIC KIDNEY DISEASE DUE TO DIABETES MELLITUS (HCC): ICD-10-CM

## 2021-09-22 DIAGNOSIS — E53.8 B12 DEFICIENCY: ICD-10-CM

## 2021-09-22 DIAGNOSIS — E11.40 TYPE 2 DIABETES MELLITUS WITH DIABETIC NEUROPATHY, WITHOUT LONG-TERM CURRENT USE OF INSULIN (HCC): Primary | ICD-10-CM

## 2021-09-22 DIAGNOSIS — E78.00 HYPERCHOLESTEROLEMIA: ICD-10-CM

## 2021-09-22 DIAGNOSIS — I10 ESSENTIAL HYPERTENSION: ICD-10-CM

## 2021-09-22 LAB
ALBUMIN SERPL-MCNC: 3.7 G/DL (ref 3.5–5)
ALBUMIN/GLOB SERPL: 1.1 {RATIO} (ref 1.1–2.2)
ALP SERPL-CCNC: 92 U/L (ref 45–117)
ALT SERPL-CCNC: 22 U/L (ref 12–78)
ANION GAP SERPL CALC-SCNC: 5 MMOL/L (ref 5–15)
AST SERPL-CCNC: 15 U/L (ref 15–37)
BILIRUB SERPL-MCNC: 0.4 MG/DL (ref 0.2–1)
BUN SERPL-MCNC: 47 MG/DL (ref 6–20)
BUN/CREAT SERPL: 19 (ref 12–20)
CALCIUM SERPL-MCNC: 9.3 MG/DL (ref 8.5–10.1)
CHLORIDE SERPL-SCNC: 110 MMOL/L (ref 97–108)
CO2 SERPL-SCNC: 26 MMOL/L (ref 21–32)
CREAT SERPL-MCNC: 2.54 MG/DL (ref 0.7–1.3)
EST. AVERAGE GLUCOSE BLD GHB EST-MCNC: 160 MG/DL
GLOBULIN SER CALC-MCNC: 3.4 G/DL (ref 2–4)
GLUCOSE SERPL-MCNC: 255 MG/DL (ref 65–100)
HBA1C MFR BLD: 7.2 % (ref 4–5.6)
POTASSIUM SERPL-SCNC: 4.9 MMOL/L (ref 3.5–5.1)
PROT SERPL-MCNC: 7.1 G/DL (ref 6.4–8.2)
SODIUM SERPL-SCNC: 141 MMOL/L (ref 136–145)

## 2021-09-22 PROCEDURE — 90694 VACC AIIV4 NO PRSRV 0.5ML IM: CPT | Performed by: NURSE PRACTITIONER

## 2021-09-22 PROCEDURE — 3051F HG A1C>EQUAL 7.0%<8.0%: CPT | Performed by: NURSE PRACTITIONER

## 2021-09-22 PROCEDURE — 1101F PT FALLS ASSESS-DOCD LE1/YR: CPT | Performed by: NURSE PRACTITIONER

## 2021-09-22 PROCEDURE — G8420 CALC BMI NORM PARAMETERS: HCPCS | Performed by: NURSE PRACTITIONER

## 2021-09-22 PROCEDURE — G8432 DEP SCR NOT DOC, RNG: HCPCS | Performed by: NURSE PRACTITIONER

## 2021-09-22 PROCEDURE — G8536 NO DOC ELDER MAL SCRN: HCPCS | Performed by: NURSE PRACTITIONER

## 2021-09-22 PROCEDURE — 96372 THER/PROPH/DIAG INJ SC/IM: CPT | Performed by: NURSE PRACTITIONER

## 2021-09-22 PROCEDURE — G8427 DOCREV CUR MEDS BY ELIG CLIN: HCPCS | Performed by: NURSE PRACTITIONER

## 2021-09-22 PROCEDURE — 99214 OFFICE O/P EST MOD 30 MIN: CPT | Performed by: NURSE PRACTITIONER

## 2021-09-22 PROCEDURE — G0008 ADMIN INFLUENZA VIRUS VAC: HCPCS | Performed by: NURSE PRACTITIONER

## 2021-09-22 RX ORDER — TAMSULOSIN HYDROCHLORIDE 0.4 MG/1
CAPSULE ORAL
Qty: 180 CAPSULE | Refills: 1 | Status: SHIPPED | OUTPATIENT
Start: 2021-09-22 | End: 2022-03-07

## 2021-09-22 RX ORDER — CYANOCOBALAMIN 1000 UG/ML
1000 INJECTION, SOLUTION INTRAMUSCULAR; SUBCUTANEOUS ONCE
Qty: 1 ML | Refills: 0
Start: 2021-09-22 | End: 2021-09-22

## 2021-09-22 NOTE — PROGRESS NOTES
Jenn Wheeler is a 80 y.o. male    Chief Complaint   Patient presents with    Diabetes     3 month follow up       Visit Vitals  BP (!) 140/50 (BP 1 Location: Left upper arm, BP Patient Position: Sitting, BP Cuff Size: Small adult)   Pulse 80   Temp 98.4 °F (36.9 °C)   Resp 16   Ht 5' 6\" (1.676 m)   Wt 149 lb 12.8 oz (67.9 kg)   SpO2 98%   BMI 24.18 kg/m²           1. Have you been to the ER, urgent care clinic since your last visit? Hospitalized since your last visit? NO    2. Have you seen or consulted any other health care providers outside of the [address] [place] [address] since your last visit? Include any pap smears or colon screening.  NO

## 2021-09-22 NOTE — PROGRESS NOTES
Chief Complaint   Patient presents with    Diabetes     3 month follow up       SUBJECTIVE:    Brandan Marcum is a 80 y.o. male who is here today for a follow up appointment regarding his type 2 diabetes with hypertension, stage IIIb chronic kidney disease, BPH, hypercholesterolemia, and B12 deficiency. He states he has been feeling fairly well overall, denies any new or acute complaints at this time. He is currently monitoring his blood glucose levels each morning, and states that his average blood sugars for the last 90 days have been approximately 125 mg/dL. He states his morning blood sugar reading was approximately 141 mg/dL. He denies any adverse side effects of his medication, and denies any hypoglycemic episodes. He is also currently being managed for BPH, and takes tamsulosin 0.4 mg 2 capsules each day for this. He states this has been working fairly well, and denies any adverse side effects of the medicine. He is requesting a B12 injection today, as he typically receives these every 3 months or so. He is being seen by nephrology for his chronic kidney disease, and is using hydralazine as prescribed. He keeps his blood pressures fairly well controlled. Current Outpatient Medications   Medication Sig Dispense Refill    colesevelam (WELCHOL) 625 mg tablet TAKE 3 TABLETS BY MOUTH TWICE DAILY 540 Tablet 2    linaGLIPtin (Tradjenta) 5 mg tablet Take 1 Tablet by mouth daily. 90 Tablet 0    gabapentin (NEURONTIN) 300 mg capsule Take 1 Capsule by mouth two (2) times a day.  90 Capsule 0    glucose blood VI test strips (Accu-Chek Guide test strips) strip USE 1 STRIP TO TEST BLOOD SUGAR EVERY DAY Dx E11.9 100 Strip 2    tamsulosin (FLOMAX) 0.4 mg capsule TAKE 2 CAPSULES BY MOUTH EVERY  Capsule 0    dilTIAZem ER (CARDIZEM CD) 300 mg capsule TAKE 1 CAPSULE BY MOUTH DAILY 90 Capsule 1    simvastatin (ZOCOR) 20 mg tablet TAKE 1 TABLET BY MOUTH EVERY DAY IN THE EVENING 7 Tablet 0    glipiZIDE (GLUCOTROL) 10 mg tablet Take 2 Tabs by mouth two (2) times a day. Take 2 tablets by mouth twice a day. 360 Tab 3    nitroglycerin (Nitrostat) 0.4 mg SL tablet 1 Tab by SubLINGual route every five (5) minutes as needed for Chest Pain. 25 Tab 3    hydrALAZINE (APRESOLINE) 25 mg tablet TAKE 1 TABLET BY MOUTH TWICE DAILY 180 Tab 3    lancets (Accu-Chek Fastclix Lancet Drum) misc USE AS DIRECTED TO MONITOR BLOOD SUGAR 1 Each 6    cyanocobalamin (VITAMIN B12) 1,000 mcg/mL injection INJECT 1 ML SUBCUTANEOUS EVERY MONTH 1 mL 12    fluticasone propionate (FLONASE) 50 mcg/actuation nasal spray       Blood-Glucose Meter monitoring kit Use to test blood sugar once daily. 1 Kit 0    glucose blood VI test strips (ACCU-CHEK SMARTVIEW TEST STRIP) strip Use to check blood sugar once daily. 100 Strip 3    acetaminophen (TYLENOL EXTRA STRENGTH) 500 mg tablet Take  by mouth every six (6) hours as needed for Pain.  Lancets misc Use with Fastclix lancing device daily to test blood sugar. 100 Each 3    ACCU-CHEK FASTCLIX misc USE AS DIRECTED TO MONITOR BLOOD SUGAR 100 Each 3    aspirin delayed-release 81 mg tablet Take 81 mg by mouth daily.  Cholecalciferol, Vitamin D3, (VITAMIN D3) 1,000 unit cap Take 1,000 Units by mouth daily.  clotrimazole-betamethasone (LOTRISONE) topical cream APPLY TO THE AFFECTED AREA TWICE DAILY AS NEEDED FOR SKIN IRRITATION (Patient not taking: Reported on 9/22/2021) 15 g 0    polyethylene glycol (MIRALAX) 17 gram/dose powder Take 17 g by mouth daily as needed. (Patient not taking: Reported on 9/22/2021)      ferrous sulfate 325 mg (65 mg iron) tablet Take 325 mg by mouth two (2) times a day.  (Patient not taking: Reported on 9/22/2021)       Past Medical History:   Diagnosis Date    CAD (coronary artery disease)     mi    Chronic kidney disease     hx of elevated blood levels    Chronic pain     lower back    Diabetes ([facility] [address]     Enlarged prostate     Hypercholesterolemia     Hypertension     Other unknown and unspecified cause of morbidity or mortality     hayfever     Past Surgical History:   Procedure Laterality Date    HX CATARACT REMOVAL Bilateral     HX CHOLECYSTECTOMY      HX LUMBAR LAMINECTOMY  09/2017    HX ORTHOPAEDIC  09/23/2015    back surgery     DC CARDIAC SURG PROCEDURE UNLIST      bypass(1985), FVTQIM([id number], 2004)    DC COLONOSCOPY FLX DX W/COLLJ SPEC WHEN PFRMD  8/12/2013         DC EGD TRANSORAL BIOPSY SINGLE/MULTIPLE  6/28/2012         VASCULAR SURGERY PROCEDURE UNLIST  2/1996    left carotid     Allergies   Allergen Reactions    Tetanus Vaccines And Toxoid Swelling     Swelling at the site    Tradjenta [Linagliptin] Diarrhea     headache       REVIEW OF SYSTEMS:                                        POSITIVE= bold text  Negative = regular text    General:                     fever, chills, sweats, generalized weakness, weight loss/gain,                                       loss of appetite   Eyes:                           blurred vision, eye pain, loss of vision, double vision  ENT:                            rhinorrhea, pharyngitis   Respiratory:               cough, sputum production, SOB, WOLF, wheezing, pleuritic pain   Cardiology:                chest pain, palpitations, orthopnea, PND, edema, syncope   Gastrointestinal:       abdominal pain , N/V, diarrhea, dysphagia, constipation, bleeding   Genitourinary:           frequency, urgency, dysuria, hematuria, incontinence   Muskuloskeletal :      arthralgia, myalgia, back pain  Hematology:              easy bruising, nose or gum bleeding, lymphadenopathy   Dermatological:         rash, ulceration, pruritis, color change / jaundice  Endocrine:                 hot flashes or polydipsia   Neurological:             headache, dizziness, confusion, focal weakness, paresthesia,                                      Speech difficulties, memory loss, gait difficulty  Psychological: Feelings of anxiety, depression, agitation        Social History     Socioeconomic History    Marital status:      Spouse name: Not on file    Number of children: Not on file    Years of education: Not on file    Highest education level: Not on file   Tobacco Use    Smoking status: Former Smoker     Quit date: 6/3/1967     Years since quittin.4    Smokeless tobacco: Never Used   Vaping Use    Vaping Use: Never used   Substance and Sexual Activity    Alcohol use: No    Drug use: No    Sexual activity: Not Currently     Social Determinants of Health     Financial Resource Strain:     Difficulty of Paying Living Expenses:    Food Insecurity:     Worried About Running Out of Food in the Last Year:     [address] in the Last Year:    Transportation Needs:     Lack of Transportation (Medical):  Lack of Transportation (Non-Medical):    Physical Activity:     Days of Exercise per Week:     Minutes of Exercise per Session:    Stress:     Feeling of Stress :    Social Connections:     Frequency of Communication with Friends and Family:     Frequency of Social Gatherings with Friends and Family:     Attends [employer] Services:     Active Member of Clubs or Organizations:     Attends Club or Organization Meetings:     Marital Status:      Family History   Problem Relation Age of Onset    Heart Disease Mother     Heart Disease Father        OBJECTIVE:   Visit Vitals  BP (!) 138/58 (BP 1 Location: Left upper arm, BP Patient Position: Sitting)   Pulse 80   Temp 98.4 °F (36.9 °C)   Resp 16   Ht 5' 6\" (1.676 m)   Wt 149 lb 12.8 oz (67.9 kg)   SpO2 98%   BMI 24.18 kg/m²       Constitutional: He appears well nourished, of stated age, and dressed appropriately. Eyes: Sclera anicteric, PERRLA, EOMI  Neck: Supple without lymphadenopathy. Thyroid normal, No JVD or bruits  Respiratory: Clear to ascultation X5, normal inspiratory effort, no adventitious breath sounds.   Cardiovascular: Regular rate and rhythm, no rubs or gallops, PMI not displaced, No thrills, no peripheral edema  Hematologic: No purpura, petechiae or unexplained bruising  Lymphatic: No lymph node enlargemant. Neuro: Non-focal exam, A & O X 3.  Psychiatric: Appropriate affect and demeanor, pleasant and cooperative. Patient's thought content and thought processing appear to be within normal limits. ASSESSMENT/PLAN:     ICD-10-CM ICD-9-CM    1. Type 2 diabetes mellitus with diabetic neuropathy, without long-term current use of insulin ([facility])  E11.40 250.60 HEMOGLOBIN A1C WITH EAG     357.2 HEMOGLOBIN A1C WITH EAG   2. Stage 3 chronic kidney disease due to diabetes mellitus ([facility]  D61.67 331.58 METABOLIC PANEL, COMPREHENSIVE    N17.37 269.5 METABOLIC PANEL, COMPREHENSIVE   3. B12 deficiency  E53.8 266.2 VITAMIN B12 INJECTION      THER/PROPH/DIAG INJECTION, SUBCUT/IM      cyanocobalamin (Vitamin B-12) 1,000 mcg/mL injection   4. Essential hypertension  I10 401.9    5. Hypercholesterolemia  E78.00 272.0    6. Encounter for immunization  Z23 V03.89 FLU (FLUAD QUAD INFLUENZA VACCINE,QUAD,ADJUVANTED)   7. Benign localized hyperplasia of prostate  N40.0 600.20 tamsulosin (FLOMAX) 0.4 mg capsule     1: Patient will be administered B12 injection today 1000 mcg as requested. 2: Patient will be administered flu vaccine as requested today. 3: I will refill patient's tamsulosin 0.4 mg to be taken 2 capsules daily. 4: Patient to continue current medications for management of hypertension and cholesterol. 5: Patient to continue to monitor blood sugars on a regular basis. Avoid concentrated sweets. 6: Patient to follow-up with me in approximately 3 months, or sooner as needed. Patient states understanding and agrees with plan. ATTENTION:   This medical record was transcribed using an electronic medical records system. Although proofread, it may and can contain electronic and spelling errors.   Other human spelling and other errors may be present. Corrections may be executed at a later time. Please feel free to contact us for any clarifications as needed. Signed,  Ramonia Medici.  Fleurette Cogan, MSN APRN FNP-BC

## 2021-09-22 NOTE — PROGRESS NOTES
After obtaining consent, and per orders of CHUCHO VILLAGRAN,NP injection of B12 INJECTION given by Cierra Moran. Patient instructed to remain in clinic for 20 minutes afterwards, and to report any adverse reaction to me immediately. Administered B12 INJECTION in RIGHT DELTOID patient tolerated well.     LOT #: [id number]    EXP:9/1/21    WUR:[id number]

## 2021-09-22 NOTE — PROGRESS NOTES
After obtaining consent, and per orders of CHUCHO VILLAGRAN,NP injection of FLU VACCINE QUAD given by Reece Prabhakar. Patient instructed to remain in clinic for 20 minutes afterwards, and to report any adverse reaction to me immediately. Administered FLU VACCINE QUAD in LEFT DELTOID patient tolerated well.     LOT #: [id number]    EXP:5/24/22    DJJ:[id number]

## 2021-09-23 NOTE — PROGRESS NOTES
Hemoglobin A1c is still slightly above normal at 7.2%. Kidney functions are still lower than normal.  You show to be quite dehydrated as well. I would suggest to drink a bit more water overall. Liver functions and electrolytes are okay. We need to recheck this in 3 months.

## 2021-10-22 ENCOUNTER — CLINICAL SUPPORT (OUTPATIENT)
Dept: INTERNAL MEDICINE CLINIC | Age: 86
End: 2021-10-22
Payer: MEDICARE

## 2021-10-22 VITALS
SYSTOLIC BLOOD PRESSURE: 124 MMHG | RESPIRATION RATE: 14 BRPM | HEART RATE: 68 BPM | DIASTOLIC BLOOD PRESSURE: 68 MMHG | WEIGHT: 149 LBS | OXYGEN SATURATION: 95 % | HEIGHT: 66 IN | BODY MASS INDEX: 23.95 KG/M2

## 2021-10-22 DIAGNOSIS — E53.8 B12 DEFICIENCY: Primary | ICD-10-CM

## 2021-10-22 DIAGNOSIS — M48.061 SPINAL STENOSIS OF LUMBAR REGION WITHOUT NEUROGENIC CLAUDICATION: ICD-10-CM

## 2021-10-22 PROCEDURE — 96372 THER/PROPH/DIAG INJ SC/IM: CPT | Performed by: NURSE PRACTITIONER

## 2021-10-22 RX ORDER — CYANOCOBALAMIN 1000 UG/ML
1000 INJECTION, SOLUTION INTRAMUSCULAR; SUBCUTANEOUS ONCE
Qty: 1 ML | Refills: 0
Start: 2021-10-22 | End: 2021-10-22

## 2021-10-22 NOTE — TELEPHONE ENCOUNTER
Last Refill: 8-23-21  Last Visit: 9/22/2021   Next Visit: 11/22/2021     Requested Prescriptions     Pending Prescriptions Disp Refills    gabapentin (NEURONTIN) 300 mg capsule 180 Capsule 0     Sig: Take 1 Capsule by mouth two (2) times a day.

## 2021-10-22 NOTE — PROGRESS NOTES
After obtaining verbal consent and per orders of Diego Wei, injection of B12 given by Godfrey Escobar. Order and injection/medication verified by second nurse/ma review by Ganga Merritt RN. Patient tolerated procedure well.  No reactions noted

## 2021-10-25 RX ORDER — GABAPENTIN 300 MG/1
300 CAPSULE ORAL 2 TIMES DAILY
Qty: 180 CAPSULE | Refills: 0 | Status: SHIPPED | OUTPATIENT
Start: 2021-10-25 | End: 2022-01-25

## 2021-11-22 ENCOUNTER — CLINICAL SUPPORT (OUTPATIENT)
Dept: INTERNAL MEDICINE CLINIC | Age: 86
End: 2021-11-22
Payer: MEDICARE

## 2021-11-22 DIAGNOSIS — E53.8 VITAMIN B 12 DEFICIENCY: Primary | ICD-10-CM

## 2021-11-22 DIAGNOSIS — E11.40 TYPE 2 DIABETES MELLITUS WITH DIABETIC NEUROPATHY, WITHOUT LONG-TERM CURRENT USE OF INSULIN (HCC): ICD-10-CM

## 2021-11-22 PROCEDURE — 96372 THER/PROPH/DIAG INJ SC/IM: CPT | Performed by: NURSE PRACTITIONER

## 2021-11-22 NOTE — TELEPHONE ENCOUNTER
Requested Prescriptions     Pending Prescriptions Disp Refills    linaGLIPtin (Tradjenta) 5 mg tablet 90 Tablet 0     Sig: Take 1 Tablet by mouth daily.        Last Refill: 8/23/21  Next Appointment:12/30/21

## 2021-11-23 RX ORDER — LINAGLIPTIN 5 MG/1
5 TABLET, FILM COATED ORAL DAILY
Qty: 90 TABLET | Refills: 0 | Status: SHIPPED | OUTPATIENT
Start: 2021-11-23 | End: 2022-02-24

## 2021-11-23 NOTE — TELEPHONE ENCOUNTER
PCP: Darby Laguerre NP    Last appt: 11/22/2021  Future Appointments   Date Time Provider Marcos Joiner   12/30/2021 11:00 AM Darby Laguerre NP [facility] AMB       Requested Prescriptions     Pending Prescriptions Disp Refills    linaGLIPtin (Tradjenta) 5 mg tablet 90 Tablet 0     Sig: Take 1 Tablet by mouth daily.          Other Comments:

## 2021-12-01 RX ORDER — CYANOCOBALAMIN 1000 UG/ML
1000 INJECTION, SOLUTION INTRAMUSCULAR; SUBCUTANEOUS ONCE
Qty: 1 ML | Refills: 0
Start: 2021-12-01 | End: 2021-12-01

## 2021-12-01 NOTE — PROGRESS NOTES
After obtaining consent, and per orders of CHUCHO VILLAGRAN,NP injection of B12 INJECTION  given by [facility]. Patient instructed to remain in clinic for 20 minutes afterwards, and to report any adverse reaction to me immediately. Administered B12 INJECTION in LEFT DELTOID patient tolerated well.     LOT #: [id number]    EXP:1/1/23    WZT:[id number]

## 2021-12-06 RX ORDER — HYDRALAZINE HYDROCHLORIDE 25 MG/1
TABLET, FILM COATED ORAL
Qty: 180 TABLET | Refills: 3 | Status: SHIPPED | OUTPATIENT
Start: 2021-12-06

## 2021-12-06 NOTE — TELEPHONE ENCOUNTER
PCP: Seun Goyal NP    Last appt: 11/22/2021  Future Appointments   Date Time Provider Marcos Joiner   12/30/2021 11:00 AM Seun Goyal NP [facility] AM[facility]       Last refilled:1/19/21    Requested Prescriptions     Pending Prescriptions Disp Refills    hydrALAZINE (APRESOLINE) 25 mg tablet 180 Tablet 3     Sig: TAKE 1 TABLET BY MOUTH TWICE DAILY

## 2021-12-30 ENCOUNTER — OFFICE VISIT (OUTPATIENT)
Dept: INTERNAL MEDICINE CLINIC | Age: 86
End: 2021-12-30
Payer: MEDICARE

## 2021-12-30 VITALS
OXYGEN SATURATION: 95 % | BODY MASS INDEX: 23.24 KG/M2 | DIASTOLIC BLOOD PRESSURE: 58 MMHG | SYSTOLIC BLOOD PRESSURE: 132 MMHG | RESPIRATION RATE: 18 BRPM | WEIGHT: 144.6 LBS | HEART RATE: 85 BPM | HEIGHT: 66 IN

## 2021-12-30 DIAGNOSIS — L85.3 DRY SKIN: ICD-10-CM

## 2021-12-30 DIAGNOSIS — I42.0 DILATED CARDIOMYOPATHY (HCC): ICD-10-CM

## 2021-12-30 DIAGNOSIS — E78.00 HYPERCHOLESTEROLEMIA: ICD-10-CM

## 2021-12-30 DIAGNOSIS — I10 ESSENTIAL HYPERTENSION: Primary | ICD-10-CM

## 2021-12-30 DIAGNOSIS — E11.40 TYPE 2 DIABETES MELLITUS WITH DIABETIC NEUROPATHY, WITHOUT LONG-TERM CURRENT USE OF INSULIN (HCC): ICD-10-CM

## 2021-12-30 DIAGNOSIS — N18.30 STAGE 3 CHRONIC KIDNEY DISEASE DUE TO DIABETES MELLITUS (HCC): ICD-10-CM

## 2021-12-30 DIAGNOSIS — N18.30 ANEMIA IN STAGE 3 CHRONIC KIDNEY DISEASE, UNSPECIFIED WHETHER STAGE 3A OR 3B CKD (HCC): ICD-10-CM

## 2021-12-30 DIAGNOSIS — Z79.899 ON STATIN THERAPY: ICD-10-CM

## 2021-12-30 DIAGNOSIS — E53.8 B12 DEFICIENCY: ICD-10-CM

## 2021-12-30 DIAGNOSIS — D63.1 ANEMIA IN STAGE 3 CHRONIC KIDNEY DISEASE, UNSPECIFIED WHETHER STAGE 3A OR 3B CKD (HCC): ICD-10-CM

## 2021-12-30 DIAGNOSIS — E11.22 STAGE 3 CHRONIC KIDNEY DISEASE DUE TO DIABETES MELLITUS (HCC): ICD-10-CM

## 2021-12-30 PROBLEM — D50.9 IRON DEFICIENCY ANEMIA: Status: ACTIVE | Noted: 2020-09-30

## 2021-12-30 LAB
ALBUMIN SERPL-MCNC: 3.8 G/DL (ref 3.5–5)
ALBUMIN/GLOB SERPL: 1.2 {RATIO} (ref 1.1–2.2)
ALP SERPL-CCNC: 97 U/L (ref 45–117)
ALT SERPL-CCNC: 19 U/L (ref 12–78)
ANION GAP SERPL CALC-SCNC: 5 MMOL/L (ref 5–15)
AST SERPL-CCNC: 13 U/L (ref 15–37)
BILIRUB SERPL-MCNC: 0.4 MG/DL (ref 0.2–1)
BUN SERPL-MCNC: 45 MG/DL (ref 6–20)
BUN/CREAT SERPL: 17 (ref 12–20)
CALCIUM SERPL-MCNC: 9.1 MG/DL (ref 8.5–10.1)
CHLORIDE SERPL-SCNC: 108 MMOL/L (ref 97–108)
CHOLEST SERPL-MCNC: 102 MG/DL
CO2 SERPL-SCNC: 25 MMOL/L (ref 21–32)
CREAT SERPL-MCNC: 2.72 MG/DL (ref 0.7–1.3)
ERYTHROCYTE [DISTWIDTH] IN BLOOD BY AUTOMATED COUNT: 13.2 % (ref 11.5–14.5)
EST. AVERAGE GLUCOSE BLD GHB EST-MCNC: 160 MG/DL
GLOBULIN SER CALC-MCNC: 3.3 G/DL (ref 2–4)
GLUCOSE SERPL-MCNC: 137 MG/DL (ref 65–100)
HBA1C MFR BLD: 7.2 % (ref 4–5.6)
HCT VFR BLD AUTO: 35.8 % (ref 36.6–50.3)
HDLC SERPL-MCNC: 50 MG/DL
HDLC SERPL: 2 {RATIO} (ref 0–5)
HGB BLD-MCNC: 10.8 G/DL (ref 12.1–17)
LDLC SERPL CALC-MCNC: 35 MG/DL (ref 0–100)
MCH RBC QN AUTO: 28.7 PG (ref 26–34)
MCHC RBC AUTO-ENTMCNC: 30.2 G/DL (ref 30–36.5)
MCV RBC AUTO: 95.2 FL (ref 80–99)
NRBC # BLD: 0 K/UL (ref 0–0.01)
NRBC BLD-RTO: 0 PER 100 WBC
PLATELET # BLD AUTO: 147 K/UL (ref 150–400)
PMV BLD AUTO: 12 FL (ref 8.9–12.9)
POTASSIUM SERPL-SCNC: 5.3 MMOL/L (ref 3.5–5.1)
PROT SERPL-MCNC: 7.1 G/DL (ref 6.4–8.2)
RBC # BLD AUTO: 3.76 M/UL (ref 4.1–5.7)
SODIUM SERPL-SCNC: 138 MMOL/L (ref 136–145)
TRIGL SERPL-MCNC: 85 MG/DL (ref ?–150)
VLDLC SERPL CALC-MCNC: 17 MG/DL
WBC # BLD AUTO: 8.2 K/UL (ref 4.1–11.1)

## 2021-12-30 PROCEDURE — 99214 OFFICE O/P EST MOD 30 MIN: CPT | Performed by: NURSE PRACTITIONER

## 2021-12-30 PROCEDURE — G8427 DOCREV CUR MEDS BY ELIG CLIN: HCPCS | Performed by: NURSE PRACTITIONER

## 2021-12-30 PROCEDURE — 1101F PT FALLS ASSESS-DOCD LE1/YR: CPT | Performed by: NURSE PRACTITIONER

## 2021-12-30 PROCEDURE — G8432 DEP SCR NOT DOC, RNG: HCPCS | Performed by: NURSE PRACTITIONER

## 2021-12-30 PROCEDURE — 3051F HG A1C>EQUAL 7.0%<8.0%: CPT | Performed by: NURSE PRACTITIONER

## 2021-12-30 PROCEDURE — G8536 NO DOC ELDER MAL SCRN: HCPCS | Performed by: NURSE PRACTITIONER

## 2021-12-30 PROCEDURE — 96372 THER/PROPH/DIAG INJ SC/IM: CPT | Performed by: NURSE PRACTITIONER

## 2021-12-30 PROCEDURE — G8420 CALC BMI NORM PARAMETERS: HCPCS | Performed by: NURSE PRACTITIONER

## 2021-12-30 RX ORDER — CYANOCOBALAMIN 1000 UG/ML
1000 INJECTION, SOLUTION INTRAMUSCULAR; SUBCUTANEOUS ONCE
Qty: 1 ML | Refills: 0
Start: 2021-12-30 | End: 2021-12-30

## 2021-12-30 RX ORDER — INFLUENZA A VIRUS A/MICHIGAN/45/2015 X-275 (H1N1) ANTIGEN (FORMALDEHYDE INACTIVATED), INFLUENZA A VIRUS A/SINGAPORE/INFIMH-16-0019/2016 IVR-186 (H3N2) ANTIGEN (FORMALDEHYDE INACTIVATED), INFLUENZA B VIRUS B/PHUKET/3073/2013 ANTIGEN (FORMALDEHYDE INACTIVATED), AND INFLUENZA B VIRUS B/MARYLAND/15/2016 BX-69A ANTIGEN (FORMALDEHYDE INACTIVATED) 60; 60; 60; 60 UG/.7ML; UG/.7ML; UG/.7ML; UG/.7ML
INJECTION, SUSPENSION INTRAMUSCULAR
COMMUNITY
End: 2022-06-30

## 2021-12-30 RX ORDER — FUROSEMIDE 20 MG/1
20 TABLET ORAL DAILY
COMMUNITY
Start: 2021-12-22

## 2021-12-30 NOTE — PROGRESS NOTES
1. Have you been to the ER, urgent care clinic since your last visit? Hospitalized since your last visit? No    2. Have you seen or consulted any other health care providers outside of the [address] [place] [address] since your last visit? Include any pap smears or colon screening.  No

## 2021-12-30 NOTE — PROGRESS NOTES
Chief Complaint   Patient presents with    Follow-up     routine 3 month f/u       SUBJECTIVE:    Benitez Sosa is a 80 y.o. male who is here today for a follow up appointment regarding his hypertension, hypercholesterolemia, type 2 diabetes with stage III chronic kidney disease and anemia, B12 deficiency, dilated cardiomyopathy, and complains of dry skin to lower extremities. The patient continues to take his medication as prescribed, and denies any adverse side effects. His blood pressure appears well controlled with his current regimen of medication. He sees cardiology yearly, and has had no exacerbations of his cardiomyopathy within the last year. He continues to take his medication for management of his cholesterol and is tolerating well. The patient is also being managed for type 2 diabetes, and is taking his medications as prescribed. He checks his blood sugars every so often, and denies any hypoglycemic events. He states he tries to be compliant with his diet for the most part. He is requesting a B12 injection today due to his deficiency. He is also complaining of some dry skin and itching to his lower legs and feet. He would like this looked at today. Current Outpatient Medications   Medication Sig Dispense Refill    hydrALAZINE (APRESOLINE) 25 mg tablet TAKE 1 TABLET BY MOUTH TWICE DAILY 180 Tablet 3    linaGLIPtin (Tradjenta) 5 mg tablet Take 1 Tablet by mouth daily. 90 Tablet 0    gabapentin (NEURONTIN) 300 mg capsule Take 1 Capsule by mouth two (2) times a day.  180 Capsule 0    tamsulosin (FLOMAX) 0.4 mg capsule Take two capsules daily with evening meal. 180 Capsule 1    colesevelam (WELCHOL) 625 mg tablet TAKE 3 TABLETS BY MOUTH TWICE DAILY 540 Tablet 2    glucose blood VI test strips (Accu-Chek Guide test strips) strip USE 1 STRIP TO TEST BLOOD SUGAR EVERY DAY Dx E11.9 100 Strip 2    dilTIAZem ER (CARDIZEM CD) 300 mg capsule TAKE 1 CAPSULE BY MOUTH DAILY 90 Capsule 1  clotrimazole-betamethasone (LOTRISONE) topical cream APPLY TO THE AFFECTED AREA TWICE DAILY AS NEEDED FOR SKIN IRRITATION 15 g 0    simvastatin (ZOCOR) 20 mg tablet TAKE 1 TABLET BY MOUTH EVERY DAY IN THE EVENING 7 Tablet 0    glipiZIDE (GLUCOTROL) 10 mg tablet Take 2 Tabs by mouth two (2) times a day. Take 2 tablets by mouth twice a day. 360 Tab 3    nitroglycerin (Nitrostat) 0.4 mg SL tablet 1 Tab by SubLINGual route every five (5) minutes as needed for Chest Pain. 25 Tab 3    lancets (Accu-Chek Fastclix Lancet Drum) misc USE AS DIRECTED TO MONITOR BLOOD SUGAR 1 Each 6    cyanocobalamin (VITAMIN B12) 1,000 mcg/mL injection INJECT 1 ML SUBCUTANEOUS EVERY MONTH 1 mL 12    fluticasone propionate (FLONASE) 50 mcg/actuation nasal spray       Blood-Glucose Meter monitoring kit Use to test blood sugar once daily. 1 Kit 0    glucose blood VI test strips (ACCU-CHEK SMARTVIEW TEST STRIP) strip Use to check blood sugar once daily. 100 Strip 3    acetaminophen (TYLENOL EXTRA STRENGTH) 500 mg tablet Take  by mouth every six (6) hours as needed for Pain.  Lancets misc Use with Fastclix lancing device daily to test blood sugar. 100 Each 3    ACCU-CHEK FASTCLIX misc USE AS DIRECTED TO MONITOR BLOOD SUGAR 100 Each 3    aspirin delayed-release 81 mg tablet Take 81 mg by mouth daily.  polyethylene glycol (MIRALAX) 17 gram/dose powder Take 17 g by mouth daily as needed.  Cholecalciferol, Vitamin D3, (VITAMIN D3) 1,000 unit cap Take 1,000 Units by mouth daily.  ferrous sulfate 325 mg (65 mg iron) tablet Take 325 mg by mouth two (2) times a day.        Past Medical History:   Diagnosis Date    CAD (coronary artery disease)     mi    Chronic kidney disease     hx of elevated blood levels    Chronic pain     lower back    Diabetes ([address]     Enlarged prostate     Hypercholesterolemia     Hypertension     Other unknown and unspecified cause of morbidity or mortality     hayfever     Past Surgical History:   Procedure Laterality Date    HX CATARACT REMOVAL Bilateral     HX CHOLECYSTECTOMY      HX LUMBAR LAMINECTOMY  09/2017    HX ORTHOPAEDIC  09/23/2015    back surgery     [place] CARDIAC SURG PROCEDURE UNLIST      bypass(1985), ICAKXB(0507, 2004)    [place] COLONOSCOPY FLX DX W/COLLJ SPEC WHEN PFRMD  8/12/2013         [place] EGD TRANSORAL BIOPSY SINGLE/MULTIPLE  6/28/2012         VASCULAR SURGERY PROCEDURE UNLIST  2/1996    left carotid     Allergies   Allergen Reactions    Tetanus Vaccines And Toxoid Swelling     Swelling at the site    Tradjenta [Linagliptin] Diarrhea     headache       REVIEW OF SYSTEMS:                                        POSITIVE= bold text  Negative = regular text    General:                     fever, chills, sweats, generalized weakness, weight loss/gain,                                       loss of appetite   Eyes:                           blurred vision, eye pain, loss of vision, double vision  ENT:                            rhinorrhea, pharyngitis   Respiratory:               cough, sputum production, SOB, WOLF, wheezing, pleuritic pain   Cardiology:                chest pain, palpitations, orthopnea, PND, edema, syncope   Gastrointestinal:       abdominal pain , N/V, diarrhea, dysphagia, constipation, bleeding   Genitourinary:           frequency, urgency, dysuria, hematuria, incontinence   Muskuloskeletal :      arthralgia, myalgia, back pain  Hematology:              easy bruising, nose or gum bleeding, lymphadenopathy   Dermatological:         rash, ulceration, pruritis, color change / jaundice  Endocrine:                 hot flashes or polydipsia   Neurological:             headache, dizziness, confusion, focal weakness, paresthesia,                                      Speech difficulties, memory loss, gait difficulty  Psychological:          Feelings of anxiety, depression, agitation        Social History     Socioeconomic History    Marital status:    Tobacco Use  Smoking status: Former Smoker     Quit date: 6/3/1967     Years since quittin.5    Smokeless tobacco: Never Used   Vaping Use    Vaping Use: Never used   Substance and Sexual Activity    Alcohol use: No    Drug use: No    Sexual activity: Not Currently     Family History   Problem Relation Age of Onset    Heart Disease Mother     Heart Disease Father        OBJECTIVE:     Visit Vitals  BP (!) 146/63 (BP 1 Location: Right arm, BP Patient Position: Sitting, BP Cuff Size: Large adult)   Pulse 85   Resp 18   Ht 5' 6\" (1.676 m)   Wt 144 lb 9.6 oz (65.6 kg)   SpO2 95%   BMI 23.34 kg/m²       Constitutional: He appears well nourished, of stated age, and dressed appropriately. Eyes: Sclera anicteric, PERRLA, EOMI  Neck: Supple without lymphadenopathy. Thyroid normal, No JVD or bruits  Respiratory: Clear to ascultation X5, normal inspiratory effort, no adventitious breath sounds. Cardiovascular: Regular rate and rhythm, mild murmur without rubs or gallops, PMI not displaced, No thrills, no peripheral edema  Hematologic: No purpura, petechiae or unexplained bruising  Lymphatic: No lymph node enlargemant. Integumentary: Dry and flaking skin to lower extremities bilaterally. No open wounds or fissures noted. Peripheral Vascular: Normal pulses palpable to PT/DP. Neuro: Non-focal exam, A & O X 3. Patient hard of hearing and wears hearing aids. Psychiatric: Appropriate affect and demeanor, pleasant and cooperative. ASSESSMENT/PLAN:     ICD-10-CM ICD-9-CM    1. Essential hypertension  V89 084.5 METABOLIC PANEL, COMPREHENSIVE      METABOLIC PANEL, COMPREHENSIVE   2. Hypercholesterolemia  E78.00 272.0 LIPID PANEL      LIPID PANEL   3. Dry skin  L85.3 701.1    4. Type 2 diabetes mellitus with diabetic neuropathy, without long-term current use of insulin (HCC)  E11.40 250.60 HEMOGLOBIN A1C WITH EAG     357.2 HEMOGLOBIN A1C WITH EAG   5. Dilated cardiomyopathy (HCC)  I42.0 425.4    6.  Stage 3 chronic kidney disease due to diabetes mellitus ([facility]  K53.72 929.27 METABOLIC PANEL, COMPREHENSIVE    M04.75 583.3 METABOLIC PANEL, COMPREHENSIVE   7. Anemia in stage 3 chronic kidney disease, unspecified whether stage 3a or 3b CKD (HCC)  N18.30 285.21 CBC W/O DIFF    D63.1 585.3 CBC W/O DIFF   8. On statin therapy  X39.915 A64.51 METABOLIC PANEL, COMPREHENSIVE      LIPID PANEL      LIPID PANEL      METABOLIC PANEL, COMPREHENSIVE   9. B12 deficiency  E53.8 266.2 VITAMIN B12 INJECTION      THER/PROPH/DIAG INJECTION, SUBCUT/IM      cyanocobalamin (Vitamin B-12) 1,000 mcg/mL injection     1: We will repeat labs today including: CMP, lipid panel, hemoglobin A1c, and CBC. 2: Patient will be given vitamin B12 injection as requested. 3: Patient advised to use over-the-counter Eucerin cream to lower extremities bilaterally each day. 4: Patient to continue current medications for management of diabetes, hypertension, and hypercholesterolemia.  5: Patient to continue healthy lifestyle management dietary choices including: Low-fat/low-cholesterol diet, avoidance of concentrated sweets, adequate amounts of fiber water, and regular exercise. 6: Patient to follow-up with me in approximately 3 months, or sooner as needed. Patient states understanding and agrees with plan. Orders Placed This Encounter    CBC W/O DIFF    METABOLIC PANEL, COMPREHENSIVE    HEMOGLOBIN A1C WITH EAG    LIPID PANEL         ATTENTION:   This medical record was transcribed using an electronic medical records system. Although proofread, it may and can contain electronic and spelling errors. Other human spelling and other errors may be present. Corrections may be executed at a later time. Please feel free to contact us for any clarifications as needed. Follow-up and Dispositions    · Return in about 3 months (around 3/30/2022) for Follow up -annual wellness visit. Signed,  Mich Bermudez.  Rubén Collado, MSN APRN FNP-BC

## 2022-01-01 ENCOUNTER — APPOINTMENT (OUTPATIENT)
Dept: GENERAL RADIOLOGY | Age: 87
DRG: 280 | End: 2022-01-01
Attending: EMERGENCY MEDICINE
Payer: MEDICARE

## 2022-01-01 ENCOUNTER — HOSPITAL ENCOUNTER (INPATIENT)
Age: 87
LOS: 1 days | Discharge: HOSPICE | DRG: 280 | End: 2022-11-07
Attending: EMERGENCY MEDICINE | Admitting: STUDENT IN AN ORGANIZED HEALTH CARE EDUCATION/TRAINING PROGRAM
Payer: MEDICARE

## 2022-01-01 ENCOUNTER — HOSPICE ADMISSION (OUTPATIENT)
Dept: HOSPICE | Facility: HOSPICE | Age: 87
End: 2022-01-01
Payer: MEDICARE

## 2022-01-01 ENCOUNTER — HOSPITAL ENCOUNTER (INPATIENT)
Age: 87
LOS: 9 days | DRG: 951 | End: 2022-11-16
Attending: FAMILY MEDICINE | Admitting: FAMILY MEDICINE
Payer: OTHER MISCELLANEOUS

## 2022-01-01 ENCOUNTER — OFFICE VISIT (OUTPATIENT)
Dept: INTERNAL MEDICINE CLINIC | Age: 87
End: 2022-01-01
Payer: MEDICARE

## 2022-01-01 VITALS
TEMPERATURE: 98.5 F | BODY MASS INDEX: 23.24 KG/M2 | RESPIRATION RATE: 16 BRPM | SYSTOLIC BLOOD PRESSURE: 134 MMHG | HEART RATE: 60 BPM | DIASTOLIC BLOOD PRESSURE: 70 MMHG | HEIGHT: 66 IN | WEIGHT: 144.6 LBS | OXYGEN SATURATION: 97 %

## 2022-01-01 VITALS
TEMPERATURE: 97.8 F | OXYGEN SATURATION: 95 % | HEART RATE: 92 BPM | DIASTOLIC BLOOD PRESSURE: 73 MMHG | BODY MASS INDEX: 23.3 KG/M2 | RESPIRATION RATE: 20 BRPM | SYSTOLIC BLOOD PRESSURE: 92 MMHG | HEIGHT: 66 IN | WEIGHT: 145 LBS

## 2022-01-01 VITALS
TEMPERATURE: 99 F | SYSTOLIC BLOOD PRESSURE: 107 MMHG | RESPIRATION RATE: 16 BRPM | HEART RATE: 88 BPM | OXYGEN SATURATION: 89 % | DIASTOLIC BLOOD PRESSURE: 45 MMHG

## 2022-01-01 DIAGNOSIS — R41.0 DELIRIUM: ICD-10-CM

## 2022-01-01 DIAGNOSIS — Z71.89 GOALS OF CARE, COUNSELING/DISCUSSION: ICD-10-CM

## 2022-01-01 DIAGNOSIS — I21.4 NSTEMI (NON-ST ELEVATED MYOCARDIAL INFARCTION) (HCC): Primary | ICD-10-CM

## 2022-01-01 DIAGNOSIS — Z51.5 END OF LIFE CARE: ICD-10-CM

## 2022-01-01 DIAGNOSIS — E11.22 STAGE 4 CHRONIC KIDNEY DISEASE DUE TO DIABETES MELLITUS (HCC): ICD-10-CM

## 2022-01-01 DIAGNOSIS — Z23 NEEDS FLU SHOT: ICD-10-CM

## 2022-01-01 DIAGNOSIS — K11.7 INCREASED OROPHARYNGEAL SECRETIONS: ICD-10-CM

## 2022-01-01 DIAGNOSIS — I20.0 UNSTABLE ANGINA (HCC): ICD-10-CM

## 2022-01-01 DIAGNOSIS — R45.1 RESTLESSNESS AND AGITATION: Primary | ICD-10-CM

## 2022-01-01 DIAGNOSIS — I10 ESSENTIAL HYPERTENSION: ICD-10-CM

## 2022-01-01 DIAGNOSIS — Z51.5 HOSPICE CARE: ICD-10-CM

## 2022-01-01 DIAGNOSIS — R52 NONVERBAL SIGNS OF PAIN: ICD-10-CM

## 2022-01-01 DIAGNOSIS — Z79.899 ON STATIN THERAPY: ICD-10-CM

## 2022-01-01 DIAGNOSIS — E11.40 TYPE 2 DIABETES MELLITUS WITH DIABETIC NEUROPATHY, WITHOUT LONG-TERM CURRENT USE OF INSULIN (HCC): Primary | ICD-10-CM

## 2022-01-01 DIAGNOSIS — R45.1 RESTLESSNESS: ICD-10-CM

## 2022-01-01 DIAGNOSIS — E78.00 HYPERCHOLESTEROLEMIA: ICD-10-CM

## 2022-01-01 DIAGNOSIS — N18.4 STAGE 4 CHRONIC KIDNEY DISEASE DUE TO DIABETES MELLITUS (HCC): ICD-10-CM

## 2022-01-01 LAB
ALBUMIN SERPL-MCNC: 3.2 G/DL (ref 3.5–5)
ALBUMIN/GLOB SERPL: 0.8 {RATIO} (ref 1.1–2.2)
ALP SERPL-CCNC: 81 U/L (ref 45–117)
ALT SERPL-CCNC: 31 U/L (ref 12–78)
ANION GAP SERPL CALC-SCNC: 10 MMOL/L (ref 5–15)
ANION GAP SERPL CALC-SCNC: 10 MMOL/L (ref 5–15)
APTT PPP: 26.2 SEC (ref 22.1–31)
APTT PPP: 44.1 SEC (ref 22.1–31)
APTT PPP: 46.7 SEC (ref 22.1–31)
APTT PPP: 75.8 SEC (ref 22.1–31)
AST SERPL-CCNC: 91 U/L (ref 15–37)
ATRIAL RATE: 115 BPM
ATRIAL RATE: 118 BPM
ATRIAL RATE: 118 BPM
B PERT DNA SPEC QL NAA+PROBE: NOT DETECTED
BASOPHILS # BLD: 0 K/UL (ref 0–0.1)
BASOPHILS # BLD: 0 K/UL (ref 0–0.1)
BASOPHILS NFR BLD: 0 % (ref 0–1)
BASOPHILS NFR BLD: 0 % (ref 0–1)
BILIRUB SERPL-MCNC: 0.5 MG/DL (ref 0.2–1)
BORDETELLA PARAPERTUSSIS PCR, BORPAR: NOT DETECTED
BUN SERPL-MCNC: 52 MG/DL (ref 6–20)
BUN SERPL-MCNC: 54 MG/DL (ref 6–20)
BUN/CREAT SERPL: 18 (ref 12–20)
BUN/CREAT SERPL: 20 (ref 12–20)
C PNEUM DNA SPEC QL NAA+PROBE: NOT DETECTED
CA-I BLD-MCNC: 1.19 MMOL/L (ref 1.12–1.32)
CALCIUM SERPL-MCNC: 9 MG/DL (ref 8.5–10.1)
CALCIUM SERPL-MCNC: 9 MG/DL (ref 8.5–10.1)
CALCULATED P AXIS, ECG09: 65 DEGREES
CALCULATED P AXIS, ECG09: 87 DEGREES
CALCULATED R AXIS, ECG10: -10 DEGREES
CALCULATED R AXIS, ECG10: -113 DEGREES
CALCULATED R AXIS, ECG10: 93 DEGREES
CALCULATED T AXIS, ECG11: 101 DEGREES
CALCULATED T AXIS, ECG11: 71 DEGREES
CALCULATED T AXIS, ECG11: 80 DEGREES
CHLORIDE BLD-SCNC: 111 MMOL/L (ref 98–107)
CHLORIDE SERPL-SCNC: 110 MMOL/L (ref 97–108)
CHLORIDE SERPL-SCNC: 110 MMOL/L (ref 97–108)
CHOLEST SERPL-MCNC: 126 MG/DL
CO2 SERPL-SCNC: 19 MMOL/L (ref 21–32)
CO2 SERPL-SCNC: 19 MMOL/L (ref 21–32)
CREAT BLD-MCNC: 2.43 MG/DL (ref 0.6–1.3)
CREAT SERPL-MCNC: 2.54 MG/DL (ref 0.7–1.3)
CREAT SERPL-MCNC: 3.05 MG/DL (ref 0.7–1.3)
DIAGNOSIS, 93000: NORMAL
DIFFERENTIAL METHOD BLD: ABNORMAL
DIFFERENTIAL METHOD BLD: ABNORMAL
EOSINOPHIL # BLD: 0 K/UL (ref 0–0.4)
EOSINOPHIL # BLD: 0 K/UL (ref 0–0.4)
EOSINOPHIL NFR BLD: 0 % (ref 0–7)
EOSINOPHIL NFR BLD: 0 % (ref 0–7)
ERYTHROCYTE [DISTWIDTH] IN BLOOD BY AUTOMATED COUNT: 13 % (ref 11.5–14.5)
ERYTHROCYTE [DISTWIDTH] IN BLOOD BY AUTOMATED COUNT: 13.1 % (ref 11.5–14.5)
ERYTHROCYTE [DISTWIDTH] IN BLOOD BY AUTOMATED COUNT: 13.2 % (ref 11.5–14.5)
EST. AVERAGE GLUCOSE BLD GHB EST-MCNC: 166 MG/DL
FLUAV SUBTYP SPEC NAA+PROBE: NOT DETECTED
FLUBV RNA SPEC QL NAA+PROBE: NOT DETECTED
GLOBULIN SER CALC-MCNC: 4.2 G/DL (ref 2–4)
GLUCOSE BLD STRIP.AUTO-MCNC: 168 MG/DL (ref 65–117)
GLUCOSE BLD STRIP.AUTO-MCNC: 174 MG/DL (ref 65–117)
GLUCOSE BLD STRIP.AUTO-MCNC: 174 MG/DL (ref 65–117)
GLUCOSE BLD STRIP.AUTO-MCNC: 200 MG/DL (ref 65–117)
GLUCOSE BLD STRIP.AUTO-MCNC: 262 MG/DL (ref 65–117)
GLUCOSE BLD STRIP.AUTO-MCNC: 73 MG/DL (ref 65–117)
GLUCOSE BLD-MCNC: 262 MG/DL (ref 65–100)
GLUCOSE SERPL-MCNC: 151 MG/DL (ref 65–100)
GLUCOSE SERPL-MCNC: 216 MG/DL (ref 65–100)
HADV DNA SPEC QL NAA+PROBE: NOT DETECTED
HBA1C MFR BLD: 7.4 % (ref 4–5.6)
HCOV 229E RNA SPEC QL NAA+PROBE: NOT DETECTED
HCOV HKU1 RNA SPEC QL NAA+PROBE: NOT DETECTED
HCOV NL63 RNA SPEC QL NAA+PROBE: NOT DETECTED
HCOV OC43 RNA SPEC QL NAA+PROBE: NOT DETECTED
HCT VFR BLD AUTO: 35 % (ref 36.6–50.3)
HCT VFR BLD AUTO: 36.9 % (ref 36.6–50.3)
HCT VFR BLD AUTO: 39.3 % (ref 36.6–50.3)
HDLC SERPL-MCNC: 45 MG/DL
HDLC SERPL: 2.8 {RATIO} (ref 0–5)
HGB BLD-MCNC: 11.1 G/DL (ref 12.1–17)
HGB BLD-MCNC: 11.6 G/DL (ref 12.1–17)
HGB BLD-MCNC: 12.6 G/DL (ref 12.1–17)
HMPV RNA SPEC QL NAA+PROBE: NOT DETECTED
HPIV1 RNA SPEC QL NAA+PROBE: NOT DETECTED
HPIV2 RNA SPEC QL NAA+PROBE: NOT DETECTED
HPIV3 RNA SPEC QL NAA+PROBE: NOT DETECTED
HPIV4 RNA SPEC QL NAA+PROBE: NOT DETECTED
IMM GRANULOCYTES # BLD AUTO: 0.1 K/UL (ref 0–0.04)
IMM GRANULOCYTES # BLD AUTO: 0.1 K/UL (ref 0–0.04)
IMM GRANULOCYTES NFR BLD AUTO: 1 % (ref 0–0.5)
IMM GRANULOCYTES NFR BLD AUTO: 1 % (ref 0–0.5)
LACTATE SERPL-SCNC: 0.9 MMOL/L (ref 0.4–2)
LACTATE SERPL-SCNC: 1.2 MMOL/L (ref 0.4–2)
LDLC SERPL CALC-MCNC: 66.6 MG/DL (ref 0–100)
LYMPHOCYTES # BLD: 0.5 K/UL (ref 0.8–3.5)
LYMPHOCYTES # BLD: 1 K/UL (ref 0.8–3.5)
LYMPHOCYTES NFR BLD: 11 % (ref 12–49)
LYMPHOCYTES NFR BLD: 5 % (ref 12–49)
M PNEUMO DNA SPEC QL NAA+PROBE: NOT DETECTED
MAGNESIUM SERPL-MCNC: 2.2 MG/DL (ref 1.6–2.4)
MAGNESIUM SERPL-MCNC: 2.3 MG/DL (ref 1.6–2.4)
MCH RBC QN AUTO: 29.1 PG (ref 26–34)
MCH RBC QN AUTO: 29.4 PG (ref 26–34)
MCH RBC QN AUTO: 29.8 PG (ref 26–34)
MCHC RBC AUTO-ENTMCNC: 31.4 G/DL (ref 30–36.5)
MCHC RBC AUTO-ENTMCNC: 31.7 G/DL (ref 30–36.5)
MCHC RBC AUTO-ENTMCNC: 32.1 G/DL (ref 30–36.5)
MCV RBC AUTO: 91.6 FL (ref 80–99)
MCV RBC AUTO: 92.9 FL (ref 80–99)
MCV RBC AUTO: 93.7 FL (ref 80–99)
MONOCYTES # BLD: 0.4 K/UL (ref 0–1)
MONOCYTES # BLD: 0.5 K/UL (ref 0–1)
MONOCYTES NFR BLD: 4 % (ref 5–13)
MONOCYTES NFR BLD: 5 % (ref 5–13)
NEUTS SEG # BLD: 7.7 K/UL (ref 1.8–8)
NEUTS SEG # BLD: 8.6 K/UL (ref 1.8–8)
NEUTS SEG NFR BLD: 83 % (ref 32–75)
NEUTS SEG NFR BLD: 90 % (ref 32–75)
NRBC # BLD: 0 K/UL (ref 0–0.01)
NRBC BLD-RTO: 0 PER 100 WBC
P-R INTERVAL, ECG05: 188 MS
P-R INTERVAL, ECG05: 194 MS
P-R INTERVAL, ECG05: 216 MS
PHOSPHATE SERPL-MCNC: 4.7 MG/DL (ref 2.6–4.7)
PHOSPHATE SERPL-MCNC: 4.9 MG/DL (ref 2.6–4.7)
PLATELET # BLD AUTO: 143 K/UL (ref 150–400)
PLATELET # BLD AUTO: 150 K/UL (ref 150–400)
PLATELET # BLD AUTO: 157 K/UL (ref 150–400)
PMV BLD AUTO: 11 FL (ref 8.9–12.9)
PMV BLD AUTO: 11 FL (ref 8.9–12.9)
PMV BLD AUTO: 11.2 FL (ref 8.9–12.9)
POTASSIUM BLD-SCNC: 5.5 MMOL/L (ref 3.5–5.1)
POTASSIUM SERPL-SCNC: 4.4 MMOL/L (ref 3.5–5.1)
POTASSIUM SERPL-SCNC: 6.2 MMOL/L (ref 3.5–5.1)
PROCALCITONIN SERPL-MCNC: 0.05 NG/ML
PROT SERPL-MCNC: 7.4 G/DL (ref 6.4–8.2)
Q-T INTERVAL, ECG07: 306 MS
Q-T INTERVAL, ECG07: 332 MS
Q-T INTERVAL, ECG07: 332 MS
QRS DURATION, ECG06: 104 MS
QRS DURATION, ECG06: 104 MS
QRS DURATION, ECG06: 92 MS
QTC CALCULATION (BEZET), ECG08: 428 MS
QTC CALCULATION (BEZET), ECG08: 459 MS
QTC CALCULATION (BEZET), ECG08: 465 MS
RBC # BLD AUTO: 3.82 M/UL (ref 4.1–5.7)
RBC # BLD AUTO: 3.94 M/UL (ref 4.1–5.7)
RBC # BLD AUTO: 4.23 M/UL (ref 4.1–5.7)
RBC MORPH BLD: ABNORMAL
RSV RNA SPEC QL NAA+PROBE: NOT DETECTED
RV+EV RNA SPEC QL NAA+PROBE: NOT DETECTED
SARS-COV-2 PCR, COVPCR: NOT DETECTED
SERVICE CMNT-IMP: ABNORMAL
SERVICE CMNT-IMP: NORMAL
SODIUM BLD-SCNC: 140 MMOL/L (ref 136–145)
SODIUM SERPL-SCNC: 139 MMOL/L (ref 136–145)
SODIUM SERPL-SCNC: 139 MMOL/L (ref 136–145)
T4 FREE SERPL-MCNC: 1.4 NG/DL (ref 0.8–1.5)
THERAPEUTIC RANGE,PTTT: ABNORMAL SECS (ref 58–77)
THERAPEUTIC RANGE,PTTT: NORMAL SECS (ref 58–77)
TRIGL SERPL-MCNC: 72 MG/DL (ref ?–150)
TROPONIN-HIGH SENSITIVITY: 322 NG/L (ref 0–76)
TROPONIN-HIGH SENSITIVITY: 3258 NG/L (ref 0–76)
TROPONIN-HIGH SENSITIVITY: ABNORMAL NG/L (ref 0–76)
TSH SERPL DL<=0.05 MIU/L-ACNC: 2.18 UIU/ML (ref 0.36–3.74)
VENTRICULAR RATE, ECG03: 115 BPM
VENTRICULAR RATE, ECG03: 118 BPM
VENTRICULAR RATE, ECG03: 118 BPM
VLDLC SERPL CALC-MCNC: 14.4 MG/DL
WBC # BLD AUTO: 9.3 K/UL (ref 4.1–11.1)
WBC # BLD AUTO: 9.3 K/UL (ref 4.1–11.1)
WBC # BLD AUTO: 9.6 K/UL (ref 4.1–11.1)

## 2022-01-01 PROCEDURE — 90694 VACC AIIV4 NO PRSRV 0.5ML IM: CPT | Performed by: NURSE PRACTITIONER

## 2022-01-01 PROCEDURE — 74011250636 HC RX REV CODE- 250/636: Performed by: PHYSICAL MEDICINE & REHABILITATION

## 2022-01-01 PROCEDURE — 84439 ASSAY OF FREE THYROXINE: CPT

## 2022-01-01 PROCEDURE — 84484 ASSAY OF TROPONIN QUANT: CPT

## 2022-01-01 PROCEDURE — 84100 ASSAY OF PHOSPHORUS: CPT

## 2022-01-01 PROCEDURE — 74011250636 HC RX REV CODE- 250/636: Performed by: NURSE PRACTITIONER

## 2022-01-01 PROCEDURE — 74011250636 HC RX REV CODE- 250/636: Performed by: FAMILY MEDICINE

## 2022-01-01 PROCEDURE — 74011250637 HC RX REV CODE- 250/637: Performed by: STUDENT IN AN ORGANIZED HEALTH CARE EDUCATION/TRAINING PROGRAM

## 2022-01-01 PROCEDURE — 71046 X-RAY EXAM CHEST 2 VIEWS: CPT

## 2022-01-01 PROCEDURE — 65270000015 HC RM PRIVATE ONCOLOGY

## 2022-01-01 PROCEDURE — 80061 LIPID PANEL: CPT

## 2022-01-01 PROCEDURE — 99233 SBSQ HOSP IP/OBS HIGH 50: CPT | Performed by: PHYSICAL MEDICINE & REHABILITATION

## 2022-01-01 PROCEDURE — 0202U NFCT DS 22 TRGT SARS-COV-2: CPT

## 2022-01-01 PROCEDURE — 83605 ASSAY OF LACTIC ACID: CPT

## 2022-01-01 PROCEDURE — 74011000258 HC RX REV CODE- 258: Performed by: STUDENT IN AN ORGANIZED HEALTH CARE EDUCATION/TRAINING PROGRAM

## 2022-01-01 PROCEDURE — 84145 PROCALCITONIN (PCT): CPT

## 2022-01-01 PROCEDURE — 74011000250 HC RX REV CODE- 250: Performed by: NURSE PRACTITIONER

## 2022-01-01 PROCEDURE — 74011000250 HC RX REV CODE- 250: Performed by: INTERNAL MEDICINE

## 2022-01-01 PROCEDURE — 84443 ASSAY THYROID STIM HORMONE: CPT

## 2022-01-01 PROCEDURE — 74011250636 HC RX REV CODE- 250/636: Performed by: INTERNAL MEDICINE

## 2022-01-01 PROCEDURE — 74011636637 HC RX REV CODE- 636/637: Performed by: STUDENT IN AN ORGANIZED HEALTH CARE EDUCATION/TRAINING PROGRAM

## 2022-01-01 PROCEDURE — 82962 GLUCOSE BLOOD TEST: CPT

## 2022-01-01 PROCEDURE — 83735 ASSAY OF MAGNESIUM: CPT

## 2022-01-01 PROCEDURE — 83036 HEMOGLOBIN GLYCOSYLATED A1C: CPT

## 2022-01-01 PROCEDURE — 74011000258 HC RX REV CODE- 258: Performed by: PHYSICAL MEDICINE & REHABILITATION

## 2022-01-01 PROCEDURE — 99223 1ST HOSP IP/OBS HIGH 75: CPT | Performed by: INTERNAL MEDICINE

## 2022-01-01 PROCEDURE — 0656 HSPC GENERAL INPATIENT

## 2022-01-01 PROCEDURE — 80047 BASIC METABLC PNL IONIZED CA: CPT

## 2022-01-01 PROCEDURE — G8427 DOCREV CUR MEDS BY ELIG CLIN: HCPCS | Performed by: NURSE PRACTITIONER

## 2022-01-01 PROCEDURE — 65270000046 HC RM TELEMETRY

## 2022-01-01 PROCEDURE — 93005 ELECTROCARDIOGRAM TRACING: CPT

## 2022-01-01 PROCEDURE — 74011000250 HC RX REV CODE- 250: Performed by: STUDENT IN AN ORGANIZED HEALTH CARE EDUCATION/TRAINING PROGRAM

## 2022-01-01 PROCEDURE — 74011000258 HC RX REV CODE- 258: Performed by: NURSE PRACTITIONER

## 2022-01-01 PROCEDURE — G8420 CALC BMI NORM PARAMETERS: HCPCS | Performed by: NURSE PRACTITIONER

## 2022-01-01 PROCEDURE — 36415 COLL VENOUS BLD VENIPUNCTURE: CPT

## 2022-01-01 PROCEDURE — 65270000029 HC RM PRIVATE

## 2022-01-01 PROCEDURE — 1101F PT FALLS ASSESS-DOCD LE1/YR: CPT | Performed by: NURSE PRACTITIONER

## 2022-01-01 PROCEDURE — 99214 OFFICE O/P EST MOD 30 MIN: CPT | Performed by: NURSE PRACTITIONER

## 2022-01-01 PROCEDURE — G0008 ADMIN INFLUENZA VIRUS VAC: HCPCS | Performed by: NURSE PRACTITIONER

## 2022-01-01 PROCEDURE — 1123F ACP DISCUSS/DSCN MKR DOCD: CPT | Performed by: NURSE PRACTITIONER

## 2022-01-01 PROCEDURE — 85027 COMPLETE CBC AUTOMATED: CPT

## 2022-01-01 PROCEDURE — 3051F HG A1C>EQUAL 7.0%<8.0%: CPT | Performed by: NURSE PRACTITIONER

## 2022-01-01 PROCEDURE — 99285 EMERGENCY DEPT VISIT HI MDM: CPT

## 2022-01-01 PROCEDURE — 85730 THROMBOPLASTIN TIME PARTIAL: CPT

## 2022-01-01 PROCEDURE — 80053 COMPREHEN METABOLIC PANEL: CPT

## 2022-01-01 PROCEDURE — 99233 SBSQ HOSP IP/OBS HIGH 50: CPT | Performed by: FAMILY MEDICINE

## 2022-01-01 PROCEDURE — 74011250637 HC RX REV CODE- 250/637: Performed by: EMERGENCY MEDICINE

## 2022-01-01 PROCEDURE — 85025 COMPLETE CBC W/AUTO DIFF WBC: CPT

## 2022-01-01 PROCEDURE — 74011250636 HC RX REV CODE- 250/636: Performed by: STUDENT IN AN ORGANIZED HEALTH CARE EDUCATION/TRAINING PROGRAM

## 2022-01-01 PROCEDURE — 80048 BASIC METABOLIC PNL TOTAL CA: CPT

## 2022-01-01 PROCEDURE — G8432 DEP SCR NOT DOC, RNG: HCPCS | Performed by: NURSE PRACTITIONER

## 2022-01-01 PROCEDURE — G8536 NO DOC ELDER MAL SCRN: HCPCS | Performed by: NURSE PRACTITIONER

## 2022-01-01 RX ORDER — MIDAZOLAM HYDROCHLORIDE 1 MG/ML
1 INJECTION, SOLUTION INTRAMUSCULAR; INTRAVENOUS
Status: DISCONTINUED | OUTPATIENT
Start: 2022-01-01 | End: 2022-01-01 | Stop reason: HOSPADM

## 2022-01-01 RX ORDER — MIDAZOLAM HYDROCHLORIDE 1 MG/ML
1 INJECTION, SOLUTION INTRAMUSCULAR; INTRAVENOUS
Status: DISCONTINUED | OUTPATIENT
Start: 2022-01-01 | End: 2022-01-01

## 2022-01-01 RX ORDER — HEPARIN SODIUM 1000 [USP'U]/ML
60 INJECTION, SOLUTION INTRAVENOUS; SUBCUTANEOUS ONCE
Status: COMPLETED | OUTPATIENT
Start: 2022-01-01 | End: 2022-01-01

## 2022-01-01 RX ORDER — HYDROMORPHONE HYDROCHLORIDE 1 MG/ML
0.5 INJECTION, SOLUTION INTRAMUSCULAR; INTRAVENOUS; SUBCUTANEOUS EVERY 4 HOURS
Status: DISCONTINUED | OUTPATIENT
Start: 2022-01-01 | End: 2022-01-01

## 2022-01-01 RX ORDER — HYDROMORPHONE HYDROCHLORIDE 1 MG/ML
1 INJECTION, SOLUTION INTRAMUSCULAR; INTRAVENOUS; SUBCUTANEOUS EVERY 4 HOURS
Status: DISCONTINUED | OUTPATIENT
Start: 2022-01-01 | End: 2022-01-01

## 2022-01-01 RX ORDER — HALOPERIDOL 5 MG/ML
2 INJECTION INTRAMUSCULAR EVERY 6 HOURS
Status: DISCONTINUED | OUTPATIENT
Start: 2022-01-01 | End: 2022-01-01 | Stop reason: HOSPADM

## 2022-01-01 RX ORDER — GLYCOPYRROLATE 0.2 MG/ML
0.2 INJECTION INTRAMUSCULAR; INTRAVENOUS
Status: DISCONTINUED | OUTPATIENT
Start: 2022-01-01 | End: 2022-01-01 | Stop reason: HOSPADM

## 2022-01-01 RX ORDER — FUROSEMIDE 20 MG/1
20 TABLET ORAL DAILY
Status: DISCONTINUED | OUTPATIENT
Start: 2022-01-01 | End: 2022-01-01

## 2022-01-01 RX ORDER — GUAIFENESIN 100 MG/5ML
81 LIQUID (ML) ORAL DAILY
Status: DISCONTINUED | OUTPATIENT
Start: 2022-01-01 | End: 2022-01-01 | Stop reason: SDUPTHER

## 2022-01-01 RX ORDER — METOPROLOL TARTRATE 5 MG/5ML
5 INJECTION INTRAVENOUS EVERY 6 HOURS
Status: DISCONTINUED | OUTPATIENT
Start: 2022-01-01 | End: 2022-01-01

## 2022-01-01 RX ORDER — HYDROMORPHONE HYDROCHLORIDE 1 MG/ML
1 INJECTION, SOLUTION INTRAMUSCULAR; INTRAVENOUS; SUBCUTANEOUS
Status: DISCONTINUED | OUTPATIENT
Start: 2022-01-01 | End: 2022-01-01 | Stop reason: HOSPADM

## 2022-01-01 RX ORDER — INSULIN LISPRO 100 [IU]/ML
1-8 INJECTION, SOLUTION INTRAVENOUS; SUBCUTANEOUS
Status: DISCONTINUED | OUTPATIENT
Start: 2022-01-01 | End: 2022-01-01

## 2022-01-01 RX ORDER — HEPARIN SODIUM 1000 [USP'U]/ML
60 INJECTION, SOLUTION INTRAVENOUS; SUBCUTANEOUS AS NEEDED
Status: DISCONTINUED | OUTPATIENT
Start: 2022-01-01 | End: 2022-01-01

## 2022-01-01 RX ORDER — NITROGLYCERIN 0.4 MG/1
0.4 TABLET SUBLINGUAL AS NEEDED
Status: DISCONTINUED | OUTPATIENT
Start: 2022-01-01 | End: 2022-01-01

## 2022-01-01 RX ORDER — DEXTROSE MONOHYDRATE 100 MG/ML
0-250 INJECTION, SOLUTION INTRAVENOUS AS NEEDED
Status: DISCONTINUED | OUTPATIENT
Start: 2022-01-01 | End: 2022-01-01

## 2022-01-01 RX ORDER — TAMSULOSIN HYDROCHLORIDE 0.4 MG/1
0.8 CAPSULE ORAL DAILY
Status: DISCONTINUED | OUTPATIENT
Start: 2022-01-01 | End: 2022-01-01 | Stop reason: HOSPADM

## 2022-01-01 RX ORDER — DIAZEPAM 10 MG/2ML
3 INJECTION INTRAMUSCULAR ONCE
Status: COMPLETED | OUTPATIENT
Start: 2022-01-01 | End: 2022-01-01

## 2022-01-01 RX ORDER — KETOROLAC TROMETHAMINE 30 MG/ML
30 INJECTION, SOLUTION INTRAMUSCULAR; INTRAVENOUS
Status: ACTIVE | OUTPATIENT
Start: 2022-01-01 | End: 2022-01-01

## 2022-01-01 RX ORDER — HEPARIN SODIUM 1000 [USP'U]/ML
30 INJECTION, SOLUTION INTRAVENOUS; SUBCUTANEOUS AS NEEDED
Status: DISCONTINUED | OUTPATIENT
Start: 2022-01-01 | End: 2022-01-01

## 2022-01-01 RX ORDER — ATORVASTATIN CALCIUM 10 MG/1
10 TABLET, FILM COATED ORAL DAILY
Status: DISCONTINUED | OUTPATIENT
Start: 2022-01-01 | End: 2022-01-01

## 2022-01-01 RX ORDER — ASPIRIN 325 MG
325 TABLET ORAL
Status: COMPLETED | OUTPATIENT
Start: 2022-01-01 | End: 2022-01-01

## 2022-01-01 RX ORDER — COLESEVELAM 180 1/1
625 TABLET ORAL 2 TIMES DAILY WITH MEALS
Status: DISCONTINUED | OUTPATIENT
Start: 2022-01-01 | End: 2022-01-01

## 2022-01-01 RX ORDER — HYDROMORPHONE HYDROCHLORIDE 1 MG/ML
0.2 INJECTION, SOLUTION INTRAMUSCULAR; INTRAVENOUS; SUBCUTANEOUS
Status: DISCONTINUED | OUTPATIENT
Start: 2022-01-01 | End: 2022-01-01 | Stop reason: HOSPADM

## 2022-01-01 RX ORDER — GABAPENTIN 300 MG/1
300 CAPSULE ORAL 2 TIMES DAILY
Status: DISCONTINUED | OUTPATIENT
Start: 2022-01-01 | End: 2022-01-01

## 2022-01-01 RX ORDER — MIDAZOLAM HYDROCHLORIDE 1 MG/ML
1 INJECTION, SOLUTION INTRAMUSCULAR; INTRAVENOUS ONCE
Status: COMPLETED | OUTPATIENT
Start: 2022-01-01 | End: 2022-01-01

## 2022-01-01 RX ORDER — QUETIAPINE FUMARATE 25 MG/1
50 TABLET, FILM COATED ORAL ONCE
Status: CANCELLED | OUTPATIENT
Start: 2022-01-01 | End: 2022-01-01

## 2022-01-01 RX ORDER — NITROGLYCERIN 0.4 MG/1
0.4 TABLET SUBLINGUAL AS NEEDED
Status: DISCONTINUED | OUTPATIENT
Start: 2022-01-01 | End: 2022-01-01 | Stop reason: SDUPTHER

## 2022-01-01 RX ORDER — DIPHENHYDRAMINE HYDROCHLORIDE 50 MG/ML
25 INJECTION, SOLUTION INTRAMUSCULAR; INTRAVENOUS ONCE
Status: COMPLETED | OUTPATIENT
Start: 2022-01-01 | End: 2022-01-01

## 2022-01-01 RX ORDER — LINEZOLID 2 MG/ML
600 INJECTION, SOLUTION INTRAVENOUS EVERY 12 HOURS
Status: DISCONTINUED | OUTPATIENT
Start: 2022-01-01 | End: 2022-01-01

## 2022-01-01 RX ORDER — FACIAL-BODY WIPES
10 EACH TOPICAL DAILY PRN
Status: DISCONTINUED | OUTPATIENT
Start: 2022-01-01 | End: 2022-01-01 | Stop reason: HOSPADM

## 2022-01-01 RX ORDER — HALOPERIDOL 5 MG/ML
5 INJECTION INTRAMUSCULAR ONCE
Status: COMPLETED | OUTPATIENT
Start: 2022-01-01 | End: 2022-01-01

## 2022-01-01 RX ORDER — POLYETHYLENE GLYCOL 3350 17 G/17G
17 POWDER, FOR SOLUTION ORAL DAILY
Status: DISCONTINUED | OUTPATIENT
Start: 2022-01-01 | End: 2022-01-01 | Stop reason: HOSPADM

## 2022-01-01 RX ORDER — METOPROLOL TARTRATE 5 MG/5ML
5 INJECTION INTRAVENOUS
Status: DISCONTINUED | OUTPATIENT
Start: 2022-01-01 | End: 2022-01-01

## 2022-01-01 RX ORDER — LORAZEPAM 2 MG/ML
1 INJECTION INTRAMUSCULAR EVERY 4 HOURS
Status: DISCONTINUED | OUTPATIENT
Start: 2022-01-01 | End: 2022-01-01

## 2022-01-01 RX ORDER — CARVEDILOL 3.12 MG/1
3.12 TABLET ORAL 2 TIMES DAILY WITH MEALS
Status: DISCONTINUED | OUTPATIENT
Start: 2022-01-01 | End: 2022-01-01

## 2022-01-01 RX ORDER — MIDAZOLAM HYDROCHLORIDE 1 MG/ML
2 INJECTION, SOLUTION INTRAMUSCULAR; INTRAVENOUS
Status: DISCONTINUED | OUTPATIENT
Start: 2022-01-01 | End: 2022-01-01

## 2022-01-01 RX ORDER — ACETAMINOPHEN 650 MG/1
650 SUPPOSITORY RECTAL
Status: DISCONTINUED | OUTPATIENT
Start: 2022-01-01 | End: 2022-01-01 | Stop reason: HOSPADM

## 2022-01-01 RX ORDER — HYDRALAZINE HYDROCHLORIDE 20 MG/ML
10 INJECTION INTRAMUSCULAR; INTRAVENOUS
Status: DISCONTINUED | OUTPATIENT
Start: 2022-01-01 | End: 2022-01-01

## 2022-01-01 RX ORDER — LANOLIN ALCOHOL/MO/W.PET/CERES
325 CREAM (GRAM) TOPICAL 2 TIMES DAILY
Status: DISCONTINUED | OUTPATIENT
Start: 2022-01-01 | End: 2022-01-01

## 2022-01-01 RX ORDER — HYDROMORPHONE HYDROCHLORIDE 1 MG/ML
0.5 INJECTION, SOLUTION INTRAMUSCULAR; INTRAVENOUS; SUBCUTANEOUS
Status: DISCONTINUED | OUTPATIENT
Start: 2022-01-01 | End: 2022-01-01

## 2022-01-01 RX ORDER — MAGNESIUM SULFATE 100 %
16 CRYSTALS MISCELLANEOUS AS NEEDED
Status: DISCONTINUED | OUTPATIENT
Start: 2022-01-01 | End: 2022-01-01

## 2022-01-01 RX ORDER — MIDAZOLAM HYDROCHLORIDE 1 MG/ML
2 INJECTION, SOLUTION INTRAMUSCULAR; INTRAVENOUS
Status: DISCONTINUED | OUTPATIENT
Start: 2022-01-01 | End: 2022-01-01 | Stop reason: HOSPADM

## 2022-01-01 RX ORDER — ASPIRIN 81 MG/1
81 TABLET ORAL DAILY
Status: DISCONTINUED | OUTPATIENT
Start: 2022-01-01 | End: 2022-01-01

## 2022-01-01 RX ORDER — FLUTICASONE PROPIONATE 50 MCG
2 SPRAY, SUSPENSION (ML) NASAL DAILY
Status: DISCONTINUED | OUTPATIENT
Start: 2022-01-01 | End: 2022-01-01

## 2022-01-01 RX ORDER — BUMETANIDE 0.25 MG/ML
1 INJECTION INTRAMUSCULAR; INTRAVENOUS ONCE
Status: COMPLETED | OUTPATIENT
Start: 2022-01-01 | End: 2022-01-01

## 2022-01-01 RX ORDER — KETOROLAC TROMETHAMINE 30 MG/ML
30 INJECTION, SOLUTION INTRAMUSCULAR; INTRAVENOUS
Status: DISPENSED | OUTPATIENT
Start: 2022-01-01 | End: 2022-01-01

## 2022-01-01 RX ORDER — HALOPERIDOL 5 MG/ML
5 INJECTION INTRAMUSCULAR
Status: COMPLETED | OUTPATIENT
Start: 2022-01-01 | End: 2022-01-01

## 2022-01-01 RX ORDER — HYDRALAZINE HYDROCHLORIDE 25 MG/1
25 TABLET, FILM COATED ORAL 2 TIMES DAILY
Status: DISCONTINUED | OUTPATIENT
Start: 2022-01-01 | End: 2022-01-01

## 2022-01-01 RX ORDER — CLONAZEPAM 0.5 MG/1
TABLET ORAL
Status: CANCELLED | OUTPATIENT
Start: 2022-01-01

## 2022-01-01 RX ORDER — BALSAM PERU/CASTOR OIL
OINTMENT (GRAM) TOPICAL 2 TIMES DAILY
Status: DISCONTINUED | OUTPATIENT
Start: 2022-01-01 | End: 2022-01-01 | Stop reason: HOSPADM

## 2022-01-01 RX ORDER — HEPARIN SODIUM 10000 [USP'U]/100ML
12-25 INJECTION, SOLUTION INTRAVENOUS
Status: DISCONTINUED | OUTPATIENT
Start: 2022-01-01 | End: 2022-01-01

## 2022-01-01 RX ORDER — GLYCOPYRROLATE 0.2 MG/ML
0.2 INJECTION INTRAMUSCULAR; INTRAVENOUS
Status: DISCONTINUED | OUTPATIENT
Start: 2022-01-01 | End: 2022-01-01

## 2022-01-01 RX ORDER — CLOTRIMAZOLE AND BETAMETHASONE DIPROPIONATE 10; .64 MG/G; MG/G
CREAM TOPICAL
Status: DISCONTINUED | OUTPATIENT
Start: 2022-01-01 | End: 2022-01-01

## 2022-01-01 RX ORDER — NITROGLYCERIN 20 MG/100ML
0-20 INJECTION INTRAVENOUS
Status: DISCONTINUED | OUTPATIENT
Start: 2022-01-01 | End: 2022-01-01

## 2022-01-01 RX ORDER — DILTIAZEM HYDROCHLORIDE 300 MG/1
300 CAPSULE, COATED, EXTENDED RELEASE ORAL DAILY
Status: DISCONTINUED | OUTPATIENT
Start: 2022-01-01 | End: 2022-01-01

## 2022-01-01 RX ORDER — NITROGLYCERIN 0.4 MG/1
0.4 TABLET SUBLINGUAL
Status: COMPLETED | OUTPATIENT
Start: 2022-01-01 | End: 2022-01-01

## 2022-01-01 RX ORDER — GUAIFENESIN 600 MG/1
600 TABLET, EXTENDED RELEASE ORAL
Status: DISCONTINUED | OUTPATIENT
Start: 2022-01-01 | End: 2022-01-01

## 2022-01-01 RX ORDER — GLYCOPYRROLATE 0.2 MG/ML
0.2 INJECTION INTRAMUSCULAR; INTRAVENOUS EVERY 4 HOURS
Status: DISCONTINUED | OUTPATIENT
Start: 2022-01-01 | End: 2022-01-01

## 2022-01-01 RX ADMIN — HYDROMORPHONE HYDROCHLORIDE 0.5 MG: 1 INJECTION, SOLUTION INTRAMUSCULAR; INTRAVENOUS; SUBCUTANEOUS at 23:38

## 2022-01-01 RX ADMIN — GLYCOPYRROLATE 0.2 MG: 0.2 INJECTION, SOLUTION INTRAMUSCULAR; INTRAVENOUS at 19:43

## 2022-01-01 RX ADMIN — MIDAZOLAM HYDROCHLORIDE 2 MG: 1 INJECTION, SOLUTION INTRAMUSCULAR; INTRAVENOUS at 16:53

## 2022-01-01 RX ADMIN — HYDRALAZINE HYDROCHLORIDE 25 MG: 50 TABLET, FILM COATED ORAL at 08:50

## 2022-01-01 RX ADMIN — MIDAZOLAM HYDROCHLORIDE 2 MG: 1 INJECTION, SOLUTION INTRAMUSCULAR; INTRAVENOUS at 18:33

## 2022-01-01 RX ADMIN — ASPIRIN 81 MG: 81 TABLET, COATED ORAL at 08:50

## 2022-01-01 RX ADMIN — MIDAZOLAM HYDROCHLORIDE 2 MG: 1 INJECTION, SOLUTION INTRAMUSCULAR; INTRAVENOUS at 23:17

## 2022-01-01 RX ADMIN — HYDROMORPHONE HYDROCHLORIDE 1 MG: 1 INJECTION, SOLUTION INTRAMUSCULAR; INTRAVENOUS; SUBCUTANEOUS at 08:34

## 2022-01-01 RX ADMIN — MIDAZOLAM HYDROCHLORIDE 2 MG: 1 INJECTION, SOLUTION INTRAMUSCULAR; INTRAVENOUS at 10:27

## 2022-01-01 RX ADMIN — BUMETANIDE 1 MG: 0.25 INJECTION, SOLUTION INTRAMUSCULAR; INTRAVENOUS at 04:29

## 2022-01-01 RX ADMIN — MIDAZOLAM HYDROCHLORIDE 2 MG: 1 INJECTION, SOLUTION INTRAMUSCULAR; INTRAVENOUS at 23:27

## 2022-01-01 RX ADMIN — GLYCOPYRROLATE 0.2 MG: 0.2 INJECTION, SOLUTION INTRAMUSCULAR; INTRAVENOUS at 03:39

## 2022-01-01 RX ADMIN — OLANZAPINE 5 MG: 10 INJECTION, POWDER, LYOPHILIZED, FOR SOLUTION INTRAMUSCULAR at 03:54

## 2022-01-01 RX ADMIN — HEPARIN SODIUM 3950 UNITS: 1000 INJECTION INTRAVENOUS; SUBCUTANEOUS at 04:00

## 2022-01-01 RX ADMIN — HYDROMORPHONE HYDROCHLORIDE 1 MG: 1 INJECTION, SOLUTION INTRAMUSCULAR; INTRAVENOUS; SUBCUTANEOUS at 00:01

## 2022-01-01 RX ADMIN — TAMSULOSIN HYDROCHLORIDE 0.8 MG: 0.4 CAPSULE ORAL at 08:50

## 2022-01-01 RX ADMIN — MIDAZOLAM HYDROCHLORIDE 2 MG: 1 INJECTION, SOLUTION INTRAMUSCULAR; INTRAVENOUS at 08:08

## 2022-01-01 RX ADMIN — HYDROMORPHONE HYDROCHLORIDE 1 MG: 1 INJECTION, SOLUTION INTRAMUSCULAR; INTRAVENOUS; SUBCUTANEOUS at 12:49

## 2022-01-01 RX ADMIN — MIDAZOLAM HYDROCHLORIDE 2 MG: 1 INJECTION, SOLUTION INTRAMUSCULAR; INTRAVENOUS at 06:31

## 2022-01-01 RX ADMIN — CEFEPIME 1 G: 1 INJECTION, POWDER, FOR SOLUTION INTRAMUSCULAR; INTRAVENOUS at 04:22

## 2022-01-01 RX ADMIN — HYDROMORPHONE HYDROCHLORIDE 1 MG: 1 INJECTION, SOLUTION INTRAMUSCULAR; INTRAVENOUS; SUBCUTANEOUS at 18:06

## 2022-01-01 RX ADMIN — MIDAZOLAM HYDROCHLORIDE 2 MG: 1 INJECTION, SOLUTION INTRAMUSCULAR; INTRAVENOUS at 12:04

## 2022-01-01 RX ADMIN — MIDAZOLAM HYDROCHLORIDE 2 MG: 1 INJECTION, SOLUTION INTRAMUSCULAR; INTRAVENOUS at 10:54

## 2022-01-01 RX ADMIN — MIDAZOLAM HYDROCHLORIDE 2 MG: 1 INJECTION, SOLUTION INTRAMUSCULAR; INTRAVENOUS at 21:16

## 2022-01-01 RX ADMIN — MIDAZOLAM HYDROCHLORIDE 2 MG: 1 INJECTION, SOLUTION INTRAMUSCULAR; INTRAVENOUS at 20:35

## 2022-01-01 RX ADMIN — MIDAZOLAM HYDROCHLORIDE 2 MG: 1 INJECTION, SOLUTION INTRAMUSCULAR; INTRAVENOUS at 05:11

## 2022-01-01 RX ADMIN — HYDROMORPHONE HYDROCHLORIDE 1 MG: 1 INJECTION, SOLUTION INTRAMUSCULAR; INTRAVENOUS; SUBCUTANEOUS at 15:33

## 2022-01-01 RX ADMIN — GLYCOPYRROLATE 0.2 MG: 0.2 INJECTION, SOLUTION INTRAMUSCULAR; INTRAVENOUS at 16:27

## 2022-01-01 RX ADMIN — GLYCOPYRROLATE 0.2 MG: 0.2 INJECTION, SOLUTION INTRAMUSCULAR; INTRAVENOUS at 23:38

## 2022-01-01 RX ADMIN — HYDROMORPHONE HYDROCHLORIDE 1 MG: 1 INJECTION, SOLUTION INTRAMUSCULAR; INTRAVENOUS; SUBCUTANEOUS at 18:54

## 2022-01-01 RX ADMIN — MIDAZOLAM 2 MG: 1 INJECTION INTRAMUSCULAR; INTRAVENOUS at 09:49

## 2022-01-01 RX ADMIN — MIDAZOLAM HYDROCHLORIDE 2 MG: 1 INJECTION, SOLUTION INTRAMUSCULAR; INTRAVENOUS at 23:38

## 2022-01-01 RX ADMIN — LORAZEPAM 1 MG: 2 INJECTION INTRAMUSCULAR; INTRAVENOUS at 04:05

## 2022-01-01 RX ADMIN — GLYCOPYRROLATE 0.2 MG: 0.2 INJECTION, SOLUTION INTRAMUSCULAR; INTRAVENOUS at 23:25

## 2022-01-01 RX ADMIN — HYDROMORPHONE HYDROCHLORIDE 1 MG: 1 INJECTION, SOLUTION INTRAMUSCULAR; INTRAVENOUS; SUBCUTANEOUS at 23:21

## 2022-01-01 RX ADMIN — HYDROMORPHONE HYDROCHLORIDE 1 MG: 1 INJECTION, SOLUTION INTRAMUSCULAR; INTRAVENOUS; SUBCUTANEOUS at 23:42

## 2022-01-01 RX ADMIN — MIDAZOLAM HYDROCHLORIDE 2 MG: 1 INJECTION, SOLUTION INTRAMUSCULAR; INTRAVENOUS at 21:52

## 2022-01-01 RX ADMIN — MIDAZOLAM HYDROCHLORIDE 2 MG: 1 INJECTION, SOLUTION INTRAMUSCULAR; INTRAVENOUS at 10:46

## 2022-01-01 RX ADMIN — MIDAZOLAM HYDROCHLORIDE 2 MG: 1 INJECTION, SOLUTION INTRAMUSCULAR; INTRAVENOUS at 18:39

## 2022-01-01 RX ADMIN — GLYCOPYRROLATE 0.2 MG: 0.2 INJECTION, SOLUTION INTRAMUSCULAR; INTRAVENOUS at 23:17

## 2022-01-01 RX ADMIN — HYDROMORPHONE HYDROCHLORIDE 1 MG: 1 INJECTION, SOLUTION INTRAMUSCULAR; INTRAVENOUS; SUBCUTANEOUS at 03:39

## 2022-01-01 RX ADMIN — ATORVASTATIN CALCIUM 10 MG: 10 TABLET, FILM COATED ORAL at 08:50

## 2022-01-01 RX ADMIN — HYDROMORPHONE HYDROCHLORIDE 1 MG: 1 INJECTION, SOLUTION INTRAMUSCULAR; INTRAVENOUS; SUBCUTANEOUS at 16:27

## 2022-01-01 RX ADMIN — MIDAZOLAM HYDROCHLORIDE 2 MG: 1 INJECTION, SOLUTION INTRAMUSCULAR; INTRAVENOUS at 17:29

## 2022-01-01 RX ADMIN — HYDROMORPHONE HYDROCHLORIDE 0.5 MG: 1 INJECTION, SOLUTION INTRAMUSCULAR; INTRAVENOUS; SUBCUTANEOUS at 00:16

## 2022-01-01 RX ADMIN — HYDROMORPHONE HYDROCHLORIDE 1 MG: 1 INJECTION, SOLUTION INTRAMUSCULAR; INTRAVENOUS; SUBCUTANEOUS at 16:53

## 2022-01-01 RX ADMIN — MIDAZOLAM HYDROCHLORIDE 2 MG: 1 INJECTION, SOLUTION INTRAMUSCULAR; INTRAVENOUS at 03:45

## 2022-01-01 RX ADMIN — GLYCOPYRROLATE 0.2 MG: 0.2 INJECTION, SOLUTION INTRAMUSCULAR; INTRAVENOUS at 12:04

## 2022-01-01 RX ADMIN — DIAZEPAM 3 MG: 5 INJECTION, SOLUTION INTRAMUSCULAR; INTRAVENOUS at 01:28

## 2022-01-01 RX ADMIN — MIDAZOLAM HYDROCHLORIDE 2 MG: 1 INJECTION, SOLUTION INTRAMUSCULAR; INTRAVENOUS at 18:21

## 2022-01-01 RX ADMIN — METOPROLOL TARTRATE 5 MG: 5 INJECTION INTRAVENOUS at 06:26

## 2022-01-01 RX ADMIN — MIDAZOLAM HYDROCHLORIDE 1 MG: 1 INJECTION, SOLUTION INTRAMUSCULAR; INTRAVENOUS at 13:31

## 2022-01-01 RX ADMIN — MIDAZOLAM 2 MG: 1 INJECTION INTRAMUSCULAR; INTRAVENOUS at 15:47

## 2022-01-01 RX ADMIN — Medication 3 UNITS: at 21:55

## 2022-01-01 RX ADMIN — HYDROMORPHONE HYDROCHLORIDE 1 MG: 1 INJECTION, SOLUTION INTRAMUSCULAR; INTRAVENOUS; SUBCUTANEOUS at 13:14

## 2022-01-01 RX ADMIN — LINEZOLID 600 MG: 600 INJECTION, SOLUTION INTRAVENOUS at 04:14

## 2022-01-01 RX ADMIN — HYDROMORPHONE HYDROCHLORIDE 1 MG: 1 INJECTION, SOLUTION INTRAMUSCULAR; INTRAVENOUS; SUBCUTANEOUS at 20:25

## 2022-01-01 RX ADMIN — MIDAZOLAM HYDROCHLORIDE 2 MG: 1 INJECTION, SOLUTION INTRAMUSCULAR; INTRAVENOUS at 14:41

## 2022-01-01 RX ADMIN — HYDROMORPHONE HYDROCHLORIDE 1 MG: 1 INJECTION, SOLUTION INTRAMUSCULAR; INTRAVENOUS; SUBCUTANEOUS at 06:21

## 2022-01-01 RX ADMIN — MIDAZOLAM HYDROCHLORIDE 1 MG: 1 INJECTION, SOLUTION INTRAMUSCULAR; INTRAVENOUS at 12:09

## 2022-01-01 RX ADMIN — GLYCOPYRROLATE 0.2 MG: 0.2 INJECTION, SOLUTION INTRAMUSCULAR; INTRAVENOUS at 08:08

## 2022-01-01 RX ADMIN — NITROGLYCERIN 0.4 MG: 0.4 TABLET, ORALLY DISINTEGRATING SUBLINGUAL at 04:45

## 2022-01-01 RX ADMIN — MIDAZOLAM HYDROCHLORIDE 2 MG: 1 INJECTION, SOLUTION INTRAMUSCULAR; INTRAVENOUS at 03:40

## 2022-01-01 RX ADMIN — HYDROMORPHONE HYDROCHLORIDE 0.5 MG: 1 INJECTION, SOLUTION INTRAMUSCULAR; INTRAVENOUS; SUBCUTANEOUS at 12:04

## 2022-01-01 RX ADMIN — HYDROMORPHONE HYDROCHLORIDE 1 MG: 1 INJECTION, SOLUTION INTRAMUSCULAR; INTRAVENOUS; SUBCUTANEOUS at 15:22

## 2022-01-01 RX ADMIN — MIDAZOLAM HYDROCHLORIDE 2 MG: 1 INJECTION, SOLUTION INTRAMUSCULAR; INTRAVENOUS at 11:09

## 2022-01-01 RX ADMIN — HYDRALAZINE HYDROCHLORIDE 25 MG: 50 TABLET, FILM COATED ORAL at 18:19

## 2022-01-01 RX ADMIN — Medication 2 UNITS: at 08:55

## 2022-01-01 RX ADMIN — GLYCOPYRROLATE 0.2 MG: 0.2 INJECTION, SOLUTION INTRAMUSCULAR; INTRAVENOUS at 08:05

## 2022-01-01 RX ADMIN — MIDAZOLAM 2 MG/HR: 5 INJECTION INTRAMUSCULAR; INTRAVENOUS at 03:30

## 2022-01-01 RX ADMIN — MIDAZOLAM HYDROCHLORIDE 2 MG: 1 INJECTION, SOLUTION INTRAMUSCULAR; INTRAVENOUS at 19:44

## 2022-01-01 RX ADMIN — HYDROMORPHONE HYDROCHLORIDE 0.5 MG: 1 INJECTION, SOLUTION INTRAMUSCULAR; INTRAVENOUS; SUBCUTANEOUS at 08:37

## 2022-01-01 RX ADMIN — HYDROMORPHONE HYDROCHLORIDE 1 MG: 1 INJECTION, SOLUTION INTRAMUSCULAR; INTRAVENOUS; SUBCUTANEOUS at 19:44

## 2022-01-01 RX ADMIN — HYDROMORPHONE HYDROCHLORIDE 1 MG: 1 INJECTION, SOLUTION INTRAMUSCULAR; INTRAVENOUS; SUBCUTANEOUS at 13:39

## 2022-01-01 RX ADMIN — MIDAZOLAM 1 MG/HR: 5 INJECTION INTRAMUSCULAR; INTRAVENOUS at 13:21

## 2022-01-01 RX ADMIN — HYDROMORPHONE HYDROCHLORIDE 0.5 MG: 1 INJECTION, SOLUTION INTRAMUSCULAR; INTRAVENOUS; SUBCUTANEOUS at 03:41

## 2022-01-01 RX ADMIN — MIDAZOLAM HYDROCHLORIDE 2 MG: 1 INJECTION, SOLUTION INTRAMUSCULAR; INTRAVENOUS at 23:01

## 2022-01-01 RX ADMIN — HYDROMORPHONE HYDROCHLORIDE 0.5 MG: 1 INJECTION, SOLUTION INTRAMUSCULAR; INTRAVENOUS; SUBCUTANEOUS at 09:38

## 2022-01-01 RX ADMIN — GLYCOPYRROLATE 0.2 MG: 0.2 INJECTION, SOLUTION INTRAMUSCULAR; INTRAVENOUS at 03:06

## 2022-01-01 RX ADMIN — MIDAZOLAM HYDROCHLORIDE 2 MG: 1 INJECTION, SOLUTION INTRAMUSCULAR; INTRAVENOUS at 12:55

## 2022-01-01 RX ADMIN — MIDAZOLAM HYDROCHLORIDE 2 MG: 1 INJECTION, SOLUTION INTRAMUSCULAR; INTRAVENOUS at 05:38

## 2022-01-01 RX ADMIN — HYDROMORPHONE HYDROCHLORIDE 1 MG: 1 INJECTION, SOLUTION INTRAMUSCULAR; INTRAVENOUS; SUBCUTANEOUS at 18:44

## 2022-01-01 RX ADMIN — FUROSEMIDE 20 MG: 40 TABLET ORAL at 08:55

## 2022-01-01 RX ADMIN — CARVEDILOL 3.12 MG: 3.12 TABLET, FILM COATED ORAL at 08:55

## 2022-01-01 RX ADMIN — NITROGLYCERIN 0.4 MG: 0.4 TABLET, ORALLY DISINTEGRATING SUBLINGUAL at 02:44

## 2022-01-01 RX ADMIN — CEFEPIME 2 G: 2 INJECTION, POWDER, FOR SOLUTION INTRAVENOUS at 04:28

## 2022-01-01 RX ADMIN — HYDROMORPHONE HYDROCHLORIDE 0.5 MG: 1 INJECTION, SOLUTION INTRAMUSCULAR; INTRAVENOUS; SUBCUTANEOUS at 12:18

## 2022-01-01 RX ADMIN — METOPROLOL TARTRATE 5 MG: 5 INJECTION INTRAVENOUS at 18:19

## 2022-01-01 RX ADMIN — GLYCOPYRROLATE 0.2 MG: 0.2 INJECTION, SOLUTION INTRAMUSCULAR; INTRAVENOUS at 03:41

## 2022-01-01 RX ADMIN — MIDAZOLAM HYDROCHLORIDE 2 MG: 1 INJECTION, SOLUTION INTRAMUSCULAR; INTRAVENOUS at 02:35

## 2022-01-01 RX ADMIN — MIDAZOLAM HYDROCHLORIDE 2 MG: 1 INJECTION, SOLUTION INTRAMUSCULAR; INTRAVENOUS at 12:18

## 2022-01-01 RX ADMIN — GLYCOPYRROLATE 0.2 MG: 0.2 INJECTION, SOLUTION INTRAMUSCULAR; INTRAVENOUS at 04:18

## 2022-01-01 RX ADMIN — MIDAZOLAM HYDROCHLORIDE 2 MG: 1 INJECTION, SOLUTION INTRAMUSCULAR; INTRAVENOUS at 01:15

## 2022-01-01 RX ADMIN — HEPARIN SODIUM 14 UNITS/KG/HR: 10000 INJECTION, SOLUTION INTRAVENOUS at 12:32

## 2022-01-01 RX ADMIN — GLYCOPYRROLATE 0.2 MG: 0.2 INJECTION, SOLUTION INTRAMUSCULAR; INTRAVENOUS at 20:34

## 2022-01-01 RX ADMIN — MIDAZOLAM HYDROCHLORIDE 2 MG: 1 INJECTION, SOLUTION INTRAMUSCULAR; INTRAVENOUS at 16:35

## 2022-01-01 RX ADMIN — MIDAZOLAM HYDROCHLORIDE 2 MG: 1 INJECTION, SOLUTION INTRAMUSCULAR; INTRAVENOUS at 08:05

## 2022-01-01 RX ADMIN — HYDROMORPHONE HYDROCHLORIDE 1 MG: 1 INJECTION, SOLUTION INTRAMUSCULAR; INTRAVENOUS; SUBCUTANEOUS at 02:26

## 2022-01-01 RX ADMIN — MIDAZOLAM HYDROCHLORIDE 2 MG: 1 INJECTION, SOLUTION INTRAMUSCULAR; INTRAVENOUS at 14:30

## 2022-01-01 RX ADMIN — HYDROMORPHONE HYDROCHLORIDE 1 MG: 1 INJECTION, SOLUTION INTRAMUSCULAR; INTRAVENOUS; SUBCUTANEOUS at 02:39

## 2022-01-01 RX ADMIN — METOPROLOL TARTRATE 5 MG: 5 INJECTION INTRAVENOUS at 00:18

## 2022-01-01 RX ADMIN — GLYCOPYRROLATE 0.2 MG: 0.2 INJECTION, SOLUTION INTRAMUSCULAR; INTRAVENOUS at 04:44

## 2022-01-01 RX ADMIN — HYDROMORPHONE HYDROCHLORIDE 1 MG: 1 INJECTION, SOLUTION INTRAMUSCULAR; INTRAVENOUS; SUBCUTANEOUS at 02:48

## 2022-01-01 RX ADMIN — MIDAZOLAM HYDROCHLORIDE 2 MG: 1 INJECTION, SOLUTION INTRAMUSCULAR; INTRAVENOUS at 01:24

## 2022-01-01 RX ADMIN — NITROGLYCERIN 5 MCG/MIN: 20 INJECTION INTRAVENOUS at 23:55

## 2022-01-01 RX ADMIN — MIDAZOLAM HYDROCHLORIDE 2 MG: 1 INJECTION, SOLUTION INTRAMUSCULAR; INTRAVENOUS at 19:56

## 2022-01-01 RX ADMIN — HYDROMORPHONE HYDROCHLORIDE 1 MG: 1 INJECTION, SOLUTION INTRAMUSCULAR; INTRAVENOUS; SUBCUTANEOUS at 04:17

## 2022-01-01 RX ADMIN — GLYCOPYRROLATE 0.2 MG: 0.2 INJECTION, SOLUTION INTRAMUSCULAR; INTRAVENOUS at 13:14

## 2022-01-01 RX ADMIN — HYDROMORPHONE HYDROCHLORIDE 0.5 MG: 1 INJECTION, SOLUTION INTRAMUSCULAR; INTRAVENOUS; SUBCUTANEOUS at 14:31

## 2022-01-01 RX ADMIN — FERROUS SULFATE TAB 325 MG (65 MG ELEMENTAL FE) 325 MG: 325 (65 FE) TAB at 18:19

## 2022-01-01 RX ADMIN — GLYCOPYRROLATE 0.2 MG: 0.2 INJECTION, SOLUTION INTRAMUSCULAR; INTRAVENOUS at 16:20

## 2022-01-01 RX ADMIN — GLYCOPYRROLATE 0.2 MG: 0.2 INJECTION, SOLUTION INTRAMUSCULAR; INTRAVENOUS at 00:24

## 2022-01-01 RX ADMIN — HYDROMORPHONE HYDROCHLORIDE 1 MG: 1 INJECTION, SOLUTION INTRAMUSCULAR; INTRAVENOUS; SUBCUTANEOUS at 23:16

## 2022-01-01 RX ADMIN — FAMOTIDINE 20 MG: 10 INJECTION, SOLUTION INTRAVENOUS at 08:56

## 2022-01-01 RX ADMIN — DIPHENHYDRAMINE HYDROCHLORIDE 25 MG: 50 INJECTION, SOLUTION INTRAMUSCULAR; INTRAVENOUS at 00:47

## 2022-01-01 RX ADMIN — HALOPERIDOL LACTATE 5 MG: 5 INJECTION, SOLUTION INTRAMUSCULAR at 09:11

## 2022-01-01 RX ADMIN — HYDROMORPHONE HYDROCHLORIDE 1 MG: 1 INJECTION, SOLUTION INTRAMUSCULAR; INTRAVENOUS; SUBCUTANEOUS at 23:26

## 2022-01-01 RX ADMIN — HYDROMORPHONE HYDROCHLORIDE 1 MG: 1 INJECTION, SOLUTION INTRAMUSCULAR; INTRAVENOUS; SUBCUTANEOUS at 19:57

## 2022-01-01 RX ADMIN — MIDAZOLAM HYDROCHLORIDE 2 MG: 1 INJECTION, SOLUTION INTRAMUSCULAR; INTRAVENOUS at 08:37

## 2022-01-01 RX ADMIN — LORAZEPAM 1 MG: 2 INJECTION INTRAMUSCULAR; INTRAVENOUS at 18:13

## 2022-01-01 RX ADMIN — HYDROMORPHONE HYDROCHLORIDE 1 MG: 1 INJECTION, SOLUTION INTRAMUSCULAR; INTRAVENOUS; SUBCUTANEOUS at 09:33

## 2022-01-01 RX ADMIN — MIDAZOLAM HYDROCHLORIDE 2 MG: 1 INJECTION, SOLUTION INTRAMUSCULAR; INTRAVENOUS at 16:27

## 2022-01-01 RX ADMIN — MIDAZOLAM HYDROCHLORIDE 2 MG: 1 INJECTION, SOLUTION INTRAMUSCULAR; INTRAVENOUS at 19:53

## 2022-01-01 RX ADMIN — HYDROMORPHONE HYDROCHLORIDE 1 MG: 1 INJECTION, SOLUTION INTRAMUSCULAR; INTRAVENOUS; SUBCUTANEOUS at 20:24

## 2022-01-01 RX ADMIN — GABAPENTIN 300 MG: 300 CAPSULE ORAL at 21:55

## 2022-01-01 RX ADMIN — HYDROMORPHONE HYDROCHLORIDE 0.5 MG: 1 INJECTION, SOLUTION INTRAMUSCULAR; INTRAVENOUS; SUBCUTANEOUS at 08:08

## 2022-01-01 RX ADMIN — LORAZEPAM 1 MG: 2 INJECTION INTRAMUSCULAR; INTRAVENOUS at 21:02

## 2022-01-01 RX ADMIN — MIDAZOLAM HYDROCHLORIDE 2 MG: 1 INJECTION, SOLUTION INTRAMUSCULAR; INTRAVENOUS at 05:35

## 2022-01-01 RX ADMIN — HALOPERIDOL LACTATE 5 MG: 5 INJECTION, SOLUTION INTRAMUSCULAR at 08:31

## 2022-01-01 RX ADMIN — LORAZEPAM 1 MG: 2 INJECTION INTRAMUSCULAR; INTRAVENOUS at 01:09

## 2022-01-01 RX ADMIN — GLYCOPYRROLATE 0.2 MG: 0.2 INJECTION, SOLUTION INTRAMUSCULAR; INTRAVENOUS at 13:39

## 2022-01-01 RX ADMIN — GLYCOPYRROLATE 0.2 MG: 0.2 INJECTION, SOLUTION INTRAMUSCULAR; INTRAVENOUS at 16:35

## 2022-01-01 RX ADMIN — NITROGLYCERIN 0.4 MG: 0.4 TABLET, ORALLY DISINTEGRATING SUBLINGUAL at 02:53

## 2022-01-01 RX ADMIN — HYDROMORPHONE HYDROCHLORIDE 1 MG: 1 INJECTION, SOLUTION INTRAMUSCULAR; INTRAVENOUS; SUBCUTANEOUS at 08:05

## 2022-01-01 RX ADMIN — GLYCOPYRROLATE 0.2 MG: 0.2 INJECTION, SOLUTION INTRAMUSCULAR; INTRAVENOUS at 08:34

## 2022-01-01 RX ADMIN — HYDROMORPHONE HYDROCHLORIDE 1 MG: 1 INJECTION, SOLUTION INTRAMUSCULAR; INTRAVENOUS; SUBCUTANEOUS at 23:29

## 2022-01-01 RX ADMIN — HEPARIN SODIUM 14 UNITS/KG/HR: 10000 INJECTION, SOLUTION INTRAVENOUS at 12:48

## 2022-01-01 RX ADMIN — HEPARIN SODIUM 1970 UNITS: 1000 INJECTION INTRAVENOUS; SUBCUTANEOUS at 21:55

## 2022-01-01 RX ADMIN — HYDROMORPHONE HYDROCHLORIDE 1 MG: 1 INJECTION, SOLUTION INTRAMUSCULAR; INTRAVENOUS; SUBCUTANEOUS at 17:30

## 2022-01-01 RX ADMIN — MIDAZOLAM HYDROCHLORIDE 2 MG: 1 INJECTION, SOLUTION INTRAMUSCULAR; INTRAVENOUS at 18:43

## 2022-01-01 RX ADMIN — MIDAZOLAM HYDROCHLORIDE 2 MG: 1 INJECTION, SOLUTION INTRAMUSCULAR; INTRAVENOUS at 23:04

## 2022-01-01 RX ADMIN — HYDROMORPHONE HYDROCHLORIDE 1 MG: 1 INJECTION, SOLUTION INTRAMUSCULAR; INTRAVENOUS; SUBCUTANEOUS at 09:25

## 2022-01-01 RX ADMIN — MIDAZOLAM HYDROCHLORIDE 2 MG: 1 INJECTION, SOLUTION INTRAMUSCULAR; INTRAVENOUS at 03:06

## 2022-01-01 RX ADMIN — NITROGLYCERIN 10 MCG/MIN: 20 INJECTION INTRAVENOUS at 08:57

## 2022-01-01 RX ADMIN — MIDAZOLAM HYDROCHLORIDE 2 MG: 1 INJECTION, SOLUTION INTRAMUSCULAR; INTRAVENOUS at 13:14

## 2022-01-01 RX ADMIN — HYDROMORPHONE HYDROCHLORIDE 1 MG: 1 INJECTION, SOLUTION INTRAMUSCULAR; INTRAVENOUS; SUBCUTANEOUS at 20:14

## 2022-01-01 RX ADMIN — GABAPENTIN 300 MG: 300 CAPSULE ORAL at 08:50

## 2022-01-01 RX ADMIN — NITROGLYCERIN 0.4 MG: 0.4 TABLET, ORALLY DISINTEGRATING SUBLINGUAL at 02:59

## 2022-01-01 RX ADMIN — GLYCOPYRROLATE 0.2 MG: 0.2 INJECTION, SOLUTION INTRAMUSCULAR; INTRAVENOUS at 12:55

## 2022-01-01 RX ADMIN — HYDROMORPHONE HYDROCHLORIDE 1 MG: 1 INJECTION, SOLUTION INTRAMUSCULAR; INTRAVENOUS; SUBCUTANEOUS at 11:42

## 2022-01-01 RX ADMIN — MIDAZOLAM HYDROCHLORIDE 2 MG: 1 INJECTION, SOLUTION INTRAMUSCULAR; INTRAVENOUS at 01:54

## 2022-01-01 RX ADMIN — HYDROMORPHONE HYDROCHLORIDE 1 MG: 1 INJECTION, SOLUTION INTRAMUSCULAR; INTRAVENOUS; SUBCUTANEOUS at 05:02

## 2022-01-01 RX ADMIN — ASPIRIN 325 MG ORAL TABLET 325 MG: 325 PILL ORAL at 02:44

## 2022-01-01 RX ADMIN — SODIUM ZIRCONIUM CYCLOSILICATE 15 G: 10 POWDER, FOR SUSPENSION ORAL at 04:34

## 2022-01-01 RX ADMIN — Medication 3 UNITS: at 08:31

## 2022-01-01 RX ADMIN — HYDROMORPHONE HYDROCHLORIDE 1 MG: 1 INJECTION, SOLUTION INTRAMUSCULAR; INTRAVENOUS; SUBCUTANEOUS at 03:07

## 2022-01-01 RX ADMIN — LORAZEPAM 1 MG: 2 INJECTION INTRAMUSCULAR; INTRAVENOUS at 07:59

## 2022-01-01 RX ADMIN — HYDROMORPHONE HYDROCHLORIDE 0.5 MG: 1 INJECTION, SOLUTION INTRAMUSCULAR; INTRAVENOUS; SUBCUTANEOUS at 20:35

## 2022-01-01 RX ADMIN — MIDAZOLAM HYDROCHLORIDE 2 MG: 1 INJECTION, SOLUTION INTRAMUSCULAR; INTRAVENOUS at 21:35

## 2022-01-01 RX ADMIN — CASTOR OIL AND BALSAM, PERU: 788; 87 OINTMENT TOPICAL at 08:38

## 2022-01-01 RX ADMIN — KETOROLAC TROMETHAMINE 30 MG: 30 INJECTION, SOLUTION INTRAMUSCULAR at 10:46

## 2022-01-01 RX ADMIN — HYDROMORPHONE HYDROCHLORIDE 0.5 MG: 1 INJECTION, SOLUTION INTRAMUSCULAR; INTRAVENOUS; SUBCUTANEOUS at 19:54

## 2022-01-01 RX ADMIN — HYDROMORPHONE HYDROCHLORIDE 1 MG: 1 INJECTION, SOLUTION INTRAMUSCULAR; INTRAVENOUS; SUBCUTANEOUS at 21:01

## 2022-01-01 RX ADMIN — HYDROMORPHONE HYDROCHLORIDE 1 MG: 1 INJECTION, SOLUTION INTRAMUSCULAR; INTRAVENOUS; SUBCUTANEOUS at 07:55

## 2022-01-01 RX ADMIN — HYDROMORPHONE HYDROCHLORIDE 1 MG: 1 INJECTION, SOLUTION INTRAMUSCULAR; INTRAVENOUS; SUBCUTANEOUS at 14:26

## 2022-01-01 RX ADMIN — HALOPERIDOL LACTATE 2 MG: 5 INJECTION, SOLUTION INTRAMUSCULAR at 12:09

## 2022-01-01 RX ADMIN — GLYCOPYRROLATE 0.2 MG: 0.2 INJECTION, SOLUTION INTRAMUSCULAR; INTRAVENOUS at 23:43

## 2022-01-01 RX ADMIN — GLYCOPYRROLATE 0.2 MG: 0.2 INJECTION, SOLUTION INTRAMUSCULAR; INTRAVENOUS at 17:30

## 2022-01-01 RX ADMIN — MIDAZOLAM HYDROCHLORIDE 2 MG: 1 INJECTION, SOLUTION INTRAMUSCULAR; INTRAVENOUS at 13:39

## 2022-01-01 RX ADMIN — HYDROMORPHONE HYDROCHLORIDE 1 MG: 1 INJECTION, SOLUTION INTRAMUSCULAR; INTRAVENOUS; SUBCUTANEOUS at 12:55

## 2022-01-01 RX ADMIN — MIDAZOLAM HYDROCHLORIDE 2 MG: 1 INJECTION, SOLUTION INTRAMUSCULAR; INTRAVENOUS at 04:18

## 2022-01-01 RX ADMIN — GLYCOPYRROLATE 0.2 MG: 0.2 INJECTION, SOLUTION INTRAMUSCULAR; INTRAVENOUS at 19:54

## 2022-01-01 RX ADMIN — MIDAZOLAM HYDROCHLORIDE 2 MG: 1 INJECTION, SOLUTION INTRAMUSCULAR; INTRAVENOUS at 16:20

## 2022-01-01 RX ADMIN — HYDROMORPHONE HYDROCHLORIDE 0.5 MG: 1 INJECTION, SOLUTION INTRAMUSCULAR; INTRAVENOUS; SUBCUTANEOUS at 04:43

## 2022-01-01 RX ADMIN — CEFEPIME 1 G: 1 INJECTION, POWDER, FOR SOLUTION INTRAMUSCULAR; INTRAVENOUS at 16:59

## 2022-01-01 RX ADMIN — HYDROMORPHONE HYDROCHLORIDE 0.5 MG: 1 INJECTION, SOLUTION INTRAMUSCULAR; INTRAVENOUS; SUBCUTANEOUS at 00:24

## 2022-01-01 RX ADMIN — HYDROMORPHONE HYDROCHLORIDE 0.5 MG: 1 INJECTION, SOLUTION INTRAMUSCULAR; INTRAVENOUS; SUBCUTANEOUS at 16:34

## 2022-01-01 RX ADMIN — MIDAZOLAM HYDROCHLORIDE 2 MG: 1 INJECTION, SOLUTION INTRAMUSCULAR; INTRAVENOUS at 07:55

## 2022-01-01 RX ADMIN — LINEZOLID 600 MG: 600 INJECTION, SOLUTION INTRAVENOUS at 19:07

## 2022-01-01 RX ADMIN — MIDAZOLAM HYDROCHLORIDE 2 MG: 1 INJECTION, SOLUTION INTRAMUSCULAR; INTRAVENOUS at 08:34

## 2022-01-01 RX ADMIN — GLYCOPYRROLATE 0.2 MG: 0.2 INJECTION, SOLUTION INTRAMUSCULAR; INTRAVENOUS at 07:55

## 2022-01-01 RX ADMIN — MIDAZOLAM HYDROCHLORIDE 2 MG: 1 INJECTION, SOLUTION INTRAMUSCULAR; INTRAVENOUS at 14:45

## 2022-01-01 RX ADMIN — HEPARIN SODIUM 12 UNITS/KG/HR: 10000 INJECTION, SOLUTION INTRAVENOUS at 04:01

## 2022-01-01 RX ADMIN — CARVEDILOL 3.12 MG: 3.12 TABLET, FILM COATED ORAL at 04:37

## 2022-01-01 RX ADMIN — HYDROMORPHONE HYDROCHLORIDE 1 MG: 1 INJECTION, SOLUTION INTRAMUSCULAR; INTRAVENOUS; SUBCUTANEOUS at 18:21

## 2022-01-01 RX ADMIN — HYDROMORPHONE HYDROCHLORIDE 1 MG: 1 INJECTION, SOLUTION INTRAMUSCULAR; INTRAVENOUS; SUBCUTANEOUS at 06:23

## 2022-01-01 RX ADMIN — GLYCOPYRROLATE 0.2 MG: 0.2 INJECTION, SOLUTION INTRAMUSCULAR; INTRAVENOUS at 20:25

## 2022-01-01 RX ADMIN — GLYCOPYRROLATE 0.2 MG: 0.2 INJECTION, SOLUTION INTRAMUSCULAR; INTRAVENOUS at 12:17

## 2022-01-01 RX ADMIN — HEPARIN SODIUM 16 UNITS/KG/HR: 10000 INJECTION, SOLUTION INTRAVENOUS at 04:49

## 2022-01-01 RX ADMIN — GLYCOPYRROLATE 0.2 MG: 0.2 INJECTION, SOLUTION INTRAMUSCULAR; INTRAVENOUS at 08:37

## 2022-01-01 RX ADMIN — MIDAZOLAM HYDROCHLORIDE 2 MG: 1 INJECTION, SOLUTION INTRAMUSCULAR; INTRAVENOUS at 18:32

## 2022-01-01 RX ADMIN — METOPROLOL TARTRATE 5 MG: 5 INJECTION, SOLUTION INTRAVENOUS at 11:26

## 2022-01-01 RX ADMIN — HYDROMORPHONE HYDROCHLORIDE 0.5 MG: 1 INJECTION, SOLUTION INTRAMUSCULAR; INTRAVENOUS; SUBCUTANEOUS at 16:20

## 2022-01-01 RX ADMIN — GLYCOPYRROLATE 0.2 MG: 0.2 INJECTION, SOLUTION INTRAMUSCULAR; INTRAVENOUS at 16:53

## 2022-01-01 RX ADMIN — HYDROMORPHONE HYDROCHLORIDE 1 MG: 1 INJECTION, SOLUTION INTRAMUSCULAR; INTRAVENOUS; SUBCUTANEOUS at 12:23

## 2022-01-01 RX ADMIN — MIDAZOLAM HYDROCHLORIDE 2 MG: 1 INJECTION, SOLUTION INTRAMUSCULAR; INTRAVENOUS at 14:32

## 2022-01-01 RX ADMIN — GLYCOPYRROLATE 0.2 MG: 0.2 INJECTION, SOLUTION INTRAMUSCULAR; INTRAVENOUS at 19:57

## 2022-01-01 RX ADMIN — HYDROMORPHONE HYDROCHLORIDE 0.5 MG: 1 INJECTION, SOLUTION INTRAMUSCULAR; INTRAVENOUS; SUBCUTANEOUS at 15:46

## 2022-01-01 RX ADMIN — HYDROMORPHONE HYDROCHLORIDE 1 MG: 1 INJECTION, SOLUTION INTRAMUSCULAR; INTRAVENOUS; SUBCUTANEOUS at 16:16

## 2022-01-01 RX ADMIN — MIDAZOLAM HYDROCHLORIDE 2 MG: 1 INJECTION, SOLUTION INTRAMUSCULAR; INTRAVENOUS at 20:24

## 2022-01-01 RX ADMIN — MIDAZOLAM HYDROCHLORIDE 2 MG: 1 INJECTION, SOLUTION INTRAMUSCULAR; INTRAVENOUS at 21:34

## 2022-01-01 RX ADMIN — MIDAZOLAM HYDROCHLORIDE 1 MG: 1 INJECTION, SOLUTION INTRAMUSCULAR; INTRAVENOUS at 15:46

## 2022-01-01 RX ADMIN — MIDAZOLAM HYDROCHLORIDE 2 MG: 1 INJECTION, SOLUTION INTRAMUSCULAR; INTRAVENOUS at 23:43

## 2022-01-01 RX ADMIN — MIDAZOLAM HYDROCHLORIDE 2 MG: 1 INJECTION, SOLUTION INTRAMUSCULAR; INTRAVENOUS at 02:02

## 2022-01-01 RX ADMIN — MIDAZOLAM 2 MG/HR: 5 INJECTION INTRAMUSCULAR; INTRAVENOUS at 03:31

## 2022-01-01 RX ADMIN — HYDROMORPHONE HYDROCHLORIDE 1 MG: 1 INJECTION, SOLUTION INTRAMUSCULAR; INTRAVENOUS; SUBCUTANEOUS at 09:18

## 2022-01-01 RX ADMIN — MIDAZOLAM HYDROCHLORIDE 2 MG: 1 INJECTION, SOLUTION INTRAMUSCULAR; INTRAVENOUS at 04:44

## 2022-01-03 NOTE — PROGRESS NOTES
Labs look fairly stable overall. Patient still slightly anemic, and likely due to Stage IV chronic kidney disease. A1C at 7.2% -- keep working to avoid concentrated sweets and high-carb foods. Avoid NSAIDs such as Ibuprofen, Advil, Aleve, Naprosyn, and Motrin. Hydrate well with water. Follow up with Nephrology as planned.

## 2022-01-14 DIAGNOSIS — E78.00 HYPERCHOLESTEROLEMIA: Primary | ICD-10-CM

## 2022-01-14 RX ORDER — ROSUVASTATIN CALCIUM 10 MG/1
10 TABLET, COATED ORAL
Qty: 90 TABLET | Refills: 1 | Status: SHIPPED | OUTPATIENT
Start: 2022-01-14 | End: 2022-06-03

## 2022-01-14 RX ORDER — DILTIAZEM HYDROCHLORIDE 300 MG/1
300 CAPSULE, COATED, EXTENDED RELEASE ORAL DAILY
Qty: 90 CAPSULE | Refills: 1 | Status: SHIPPED | OUTPATIENT
Start: 2022-01-14 | End: 2022-06-03

## 2022-01-14 NOTE — TELEPHONE ENCOUNTER
PCP: Kareem Michel NP    Last appt: 12/30/2021  Future Appointments   Date Time Provider Marcos Joiner   1/31/2022  2:00 PM NURSE VISIT [facility] BS AMB   2/28/2022  2:00 PM NURSE VISIT [facility] BS AMB   3/31/2022  2:00 PM Caryl Conde NP [facility] BS AMB       Requested Prescriptions     Pending Prescriptions Disp Refills    dilTIAZem ER (CARDIZEM CD) 300 mg capsule 90 Capsule 1     Sig: Take 1 Capsule by mouth daily.        Prior labs and Blood pressures:  BP Readings from Last 3 Encounters:   12/30/21 (!) 132/58   10/22/21 124/68   09/22/21 (!) 138/58     Lab Results   Component Value Date/Time    Sodium 138 12/30/2021 11:35 AM    Potassium 5.3 (H) 12/30/2021 11:35 AM    Chloride 108 12/30/2021 11:35 AM    CO2 25 12/30/2021 11:35 AM    Anion gap 5 12/30/2021 11:35 AM    Glucose 137 (H) 12/30/2021 11:35 AM    BUN 45 (H) 12/30/2021 11:35 AM    Creatinine 2.72 (H) 12/30/2021 11:35 AM    BUN/Creatinine ratio 17 12/30/2021 11:35 AM    GFR est AA 27 (L) 12/30/2021 11:35 AM    GFR est non-AA 22 (L) 12/30/2021 11:35 AM    Calcium 9.1 12/30/2021 11:35 AM     Lab Results   Component Value Date/Time    Hemoglobin A1c 7.2 (H) 12/30/2021 11:35 AM    Hemoglobin A1c (POC) 7.3 (A) 12/20/2018 03:18 PM     Lab Results   Component Value Date/Time    Cholesterol, total 102 12/30/2021 11:35 AM    Cholesterol (POC) 100.0 12/20/2018 03:18 PM    HDL Cholesterol 50 12/30/2021 11:35 AM    HDL Cholesterol (POC) 49.0 12/20/2018 03:18 PM    LDL Cholesterol (POC) 16.8 12/20/2018 03:18 PM    LDL, calculated 35 12/30/2021 11:35 AM    VLDL, calculated 17 12/30/2021 11:35 AM    Triglyceride 85 12/30/2021 11:35 AM    Triglycerides (POC) 171.0 12/20/2018 03:18 PM    CHOL/HDL Ratio 2.0 12/30/2021 11:35 AM     Lab Results   Component Value Date/Time    VITAMIN D, 25-HYDROXY 45 01/19/2021 02:50 PM       Lab Results   Component Value Date/Time    TSH, 3rd generation 2.03 01/19/2021 02:50 PM

## 2022-01-25 DIAGNOSIS — M48.061 SPINAL STENOSIS OF LUMBAR REGION WITHOUT NEUROGENIC CLAUDICATION: ICD-10-CM

## 2022-01-25 RX ORDER — GABAPENTIN 300 MG/1
CAPSULE ORAL
Qty: 180 CAPSULE | Refills: 1 | Status: SHIPPED | OUTPATIENT
Start: 2022-01-25 | End: 2022-07-21

## 2022-02-03 ENCOUNTER — TELEPHONE (OUTPATIENT)
Dept: INTERNAL MEDICINE CLINIC | Age: 87
End: 2022-02-03

## 2022-02-03 NOTE — TELEPHONE ENCOUNTER
----- Message from Domingo Hicks sent at 2/3/2022 11:27 AM EST -----  Subject: Message to Provider    QUESTIONS  Information for Provider? needs call back, office phone not working,   questions about throat problem, [phone] nurse is unavailable -- will   get a call back   ---------------------------------------------------------------------------  --------------  [address]  What is the best way for the office to contact you? OK to leave message on   voicemail  Preferred Call Back Phone Number?  [phone]  ---------------------------------------------------------------------------  --------------  SCRIPT ANSWERS  undefined

## 2022-02-04 ENCOUNTER — OFFICE VISIT (OUTPATIENT)
Dept: INTERNAL MEDICINE CLINIC | Age: 87
End: 2022-02-04
Payer: MEDICARE

## 2022-02-04 VITALS
OXYGEN SATURATION: 97 % | HEART RATE: 81 BPM | RESPIRATION RATE: 16 BRPM | HEIGHT: 66 IN | BODY MASS INDEX: 23.05 KG/M2 | TEMPERATURE: 97.6 F | SYSTOLIC BLOOD PRESSURE: 136 MMHG | WEIGHT: 143.4 LBS | DIASTOLIC BLOOD PRESSURE: 74 MMHG

## 2022-02-04 DIAGNOSIS — J02.9 PHARYNGITIS, UNSPECIFIED ETIOLOGY: Primary | ICD-10-CM

## 2022-02-04 DIAGNOSIS — J35.8 TONSILLOLITH: ICD-10-CM

## 2022-02-04 LAB
S PYO AG THROAT QL: NEGATIVE
VALID INTERNAL CONTROL?: YES

## 2022-02-04 PROCEDURE — 87880 STREP A ASSAY W/OPTIC: CPT | Performed by: NURSE PRACTITIONER

## 2022-02-04 PROCEDURE — G8420 CALC BMI NORM PARAMETERS: HCPCS | Performed by: NURSE PRACTITIONER

## 2022-02-04 PROCEDURE — 1101F PT FALLS ASSESS-DOCD LE1/YR: CPT | Performed by: NURSE PRACTITIONER

## 2022-02-04 PROCEDURE — G8536 NO DOC ELDER MAL SCRN: HCPCS | Performed by: NURSE PRACTITIONER

## 2022-02-04 PROCEDURE — G8432 DEP SCR NOT DOC, RNG: HCPCS | Performed by: NURSE PRACTITIONER

## 2022-02-04 PROCEDURE — 99213 OFFICE O/P EST LOW 20 MIN: CPT | Performed by: NURSE PRACTITIONER

## 2022-02-04 PROCEDURE — G8427 DOCREV CUR MEDS BY ELIG CLIN: HCPCS | Performed by: NURSE PRACTITIONER

## 2022-02-04 NOTE — PROGRESS NOTES
Chief Complaint   Patient presents with    Sore Throat     tonsil issue       SUBJECTIVE:    Hamida Kwon is a 80 y.o. male who is here today with complaints of right-sided \"tonsil pain,\" that began approximately 8 days ago. He states the pain has worsened over the past several days. He has not used anything over-the-counter to assist with his discomfort, and has had no fevers. He states he has had something similar to this in the past.      Current Outpatient Medications   Medication Sig Dispense Refill    gabapentin (NEURONTIN) 300 mg capsule TAKE 1 CAPSULE BY MOUTH TWICE DAILY 180 Capsule 1    dilTIAZem ER (CARDIZEM CD) 300 mg capsule Take 1 Capsule by mouth daily. 90 Capsule 1    rosuvastatin (CRESTOR) 10 mg tablet Take 1 Tablet by mouth nightly. 90 Tablet 1    furosemide (LASIX) 20 mg tablet       hydrALAZINE (APRESOLINE) 25 mg tablet TAKE 1 TABLET BY MOUTH TWICE DAILY 180 Tablet 3    linaGLIPtin (Tradjenta) 5 mg tablet Take 1 Tablet by mouth daily. 90 Tablet 0    tamsulosin (FLOMAX) 0.4 mg capsule Take two capsules daily with evening meal. 180 Capsule 1    colesevelam (WELCHOL) 625 mg tablet TAKE 3 TABLETS BY MOUTH TWICE DAILY 540 Tablet 2    glucose blood VI test strips (Accu-Chek Guide test strips) strip USE 1 STRIP TO TEST BLOOD SUGAR EVERY DAY Dx E11.9 100 Strip 2    clotrimazole-betamethasone (LOTRISONE) topical cream APPLY TO THE AFFECTED AREA TWICE DAILY AS NEEDED FOR SKIN IRRITATION 15 g 0    glipiZIDE (GLUCOTROL) 10 mg tablet Take 2 Tabs by mouth two (2) times a day. Take 2 tablets by mouth twice a day. 360 Tab 3    nitroglycerin (Nitrostat) 0.4 mg SL tablet 1 Tab by SubLINGual route every five (5) minutes as needed for Chest Pain.  25 Tab 3    lancets (Accu-Chek Fastclix Lancet Drum) misc USE AS DIRECTED TO MONITOR BLOOD SUGAR 1 Each 6    cyanocobalamin (VITAMIN B12) 1,000 mcg/mL injection INJECT 1 ML SUBCUTANEOUS EVERY MONTH 1 mL 12    fluticasone propionate (FLONASE) 50 mcg/actuation nasal spray       Blood-Glucose Meter monitoring kit Use to test blood sugar once daily. 1 Kit 0    acetaminophen (TYLENOL EXTRA STRENGTH) 500 mg tablet Take  by mouth every six (6) hours as needed for Pain.  ACCU-CHEK FASTCLIX misc USE AS DIRECTED TO MONITOR BLOOD SUGAR 100 Each 3    aspirin delayed-release 81 mg tablet Take 81 mg by mouth daily.  polyethylene glycol (MIRALAX) 17 gram/dose powder Take 17 g by mouth daily as needed.  Cholecalciferol, Vitamin D3, (VITAMIN D3) 1,000 unit cap Take 1,000 Units by mouth daily.  ferrous sulfate 325 mg (65 mg iron) tablet Take 325 mg by mouth two (2) times a day.  influenza vaccine 2020-21, 65 yrs+,,PF, (Fluzone HighDose Quad 20-21 PF) syrg injection Fluzone High-Dose Quad 2020-21 (PF) 240 mcg/0.7 mL IM syringe (Patient not taking: Reported on 2/4/2022)      glucose blood VI test strips (ACCU-CHEK SMARTVIEW TEST STRIP) strip Use to check blood sugar once daily. (Patient not taking: Reported on 2/4/2022) 100 Strip 3    Lancets misc Use with Fastclix lancing device daily to test blood sugar.  (Patient not taking: Reported on 2/4/2022) 100 Each 3     Past Medical History:   Diagnosis Date    CAD (coronary artery disease)     mi    Chronic kidney disease     hx of elevated blood levels    Chronic pain     lower back    Diabetes ([facility] [address] [facility]     Enlarged prostate     Hypercholesterolemia     Hypertension     Other unknown and unspecified cause of morbidity or mortality     hayfever     Past Surgical History:   Procedure Laterality Date    HX CATARACT REMOVAL Bilateral     HX CHOLECYSTECTOMY      HX LUMBAR LAMINECTOMY  09/2017    HX ORTHOPAEDIC  09/23/2015    back surgery     [place] CARDIAC SURG PROCEDURE UNLIST      bypass(1985), stents(1994, 2004)    [place] COLONOSCOPY FLX DX W/COLLJ SPEC WHEN PFRMD  8/12/2013         [place] EGD TRANSORAL BIOPSY SINGLE/MULTIPLE  6/28/2012         VASCULAR SURGERY PROCEDURE UNLIST  2/1996    left carotid Allergies   Allergen Reactions    Tetanus Vaccines And Toxoid Swelling     Swelling at the site    Tradjenta [Linagliptin] Diarrhea     headache       REVIEW OF SYSTEMS:                                        POSITIVE= bold text  Negative = regular text    General:                     fever, chills, sweats, generalized weakness, weight loss/gain,                                       loss of appetite   Eyes:                           blurred vision, eye pain, loss of vision, double vision  ENT:                            rhinorrhea, pharyngitis   Respiratory:               cough, sputum production, SOB, WOLF, wheezing, pleuritic pain   Cardiology:                chest pain, palpitations, orthopnea, PND, edema, syncope   Gastrointestinal:       abdominal pain , N/V, diarrhea, dysphagia, constipation, bleeding   Genitourinary:           frequency, urgency, dysuria, hematuria, incontinence   Muskuloskeletal :      arthralgia, myalgia, back pain  Hematology:              easy bruising, nose or gum bleeding, lymphadenopathy   Dermatological:         rash, ulceration, pruritis, color change / jaundice  Endocrine:                 hot flashes or polydipsia   Neurological:             headache, dizziness, confusion, focal weakness, paresthesia,                                      Speech difficulties, memory loss, gait difficulty  Psychological:          Feelings of anxiety, depression, agitation        Social History     Socioeconomic History    Marital status:    Tobacco Use    Smoking status: Former Smoker     Quit date: 6/3/1967     Years since quittin.7    Smokeless tobacco: Never Used   Vaping Use    Vaping Use: Never used   Substance and Sexual Activity    Alcohol use: No    Drug use: No    Sexual activity: Not Currently     Family History   Problem Relation Age of Onset    Heart Disease Mother     Heart Disease Father        OBJECTIVE:     Visit Vitals  /74 (BP 1 Location: Left arm, BP Patient Position: Sitting, BP Cuff Size: Adult)   Pulse 81   Temp 97.6 °F (36.4 °C) (Temporal)   Resp 16   Ht 5' 6\" (1.676 m)   Wt 143 lb 6.4 oz (65 kg)   SpO2 97%   BMI 23.15 kg/m²       Constitutional: He appears well nourished, of stated age, and dressed appropriately. Eyes: Sclera anicteric, PERRLA, EOMI  ENT: Nares clear, moist mucous membranes. Oropharynx is reddened with white nodule to right tonsillar area consistent with tonsillolith. Exam is difficult due to high gag reflex. Unable to relieve manually. Neck: Supple with slight swelling to right side. Respiratory: Clear to ascultation X5, normal inspiratory effort, no adventitious breath sounds. Cardiovascular: Regular rate and rhythm. ASSESSMENT/PLAN:     ICD-10-CM ICD-9-CM    1. Pharyngitis, unspecified etiology  J02.9 462 AMB POC RAPID STREP A   2. Tonsillolith  J35.8 474.8      1: Patient negative for Streptococcus. 2: Pain likely secondary to tonsillolith observed. Unable to remove in office due to high gag reflex and limited instrumentation. 3: Patient advised to gargle with warm salt water several times a day. 4: We will refer patient to ENT for further evaluation. ATTENTION:   This medical record was transcribed using an electronic medical records system. Although proofread, it may and can contain electronic and spelling errors. Other human spelling and other errors may be present. Corrections may be executed at a later time. Please feel free to contact us for any clarifications as needed. Signed,  Ascencion Carmen.  Ahmet Starks, MSN APRN FNP-BC

## 2022-02-04 NOTE — PATIENT INSTRUCTIONS
Gargle frequently with warm salt water. Use throat lozenges such as Cepacol or Chloraseptic to help with the pain.

## 2022-02-04 NOTE — PROGRESS NOTES
Mary Ospina is a 80 y.o. male     Chief Complaint   Patient presents with    Sore Throat     tonsil issue       Visit Vitals  /74 (BP 1 Location: Left arm, BP Patient Position: Sitting, BP Cuff Size: Adult)   Pulse 81   Temp 97.6 °F (36.4 °C) (Temporal)   Resp 16   Ht 5' 6\" (1.676 m)   Wt 143 lb 6.4 oz (65 kg)   SpO2 97%   BMI 23.15 kg/m²       Health Maintenance Due   Topic Date Due    Shingrix Vaccine Age 49> (2 of 2) 05/15/2019    Eye Exam Retinal or Dilated  04/17/2020    COVID-19 Vaccine (3 - Booster) 09/09/2021    MICROALBUMIN Q1  01/19/2022    Foot Exam Q1  02/03/2022    Medicare Yearly Exam  01/20/2022       1. Have you been to the ER, urgent care clinic since your last visit? Hospitalized since your last visit? No     2. Have you seen or consulted any other health care providers outside of the [address] [place] [address] since your last visit? Include any pap smears or colon screening.  No

## 2022-02-23 DIAGNOSIS — E11.40 TYPE 2 DIABETES MELLITUS WITH DIABETIC NEUROPATHY, WITHOUT LONG-TERM CURRENT USE OF INSULIN (HCC): ICD-10-CM

## 2022-02-24 RX ORDER — LINAGLIPTIN 5 MG/1
5 TABLET, FILM COATED ORAL DAILY
Qty: 90 TABLET | Refills: 0 | Status: SHIPPED | OUTPATIENT
Start: 2022-02-24 | End: 2022-05-25

## 2022-03-05 DIAGNOSIS — N40.0 BENIGN LOCALIZED HYPERPLASIA OF PROSTATE: ICD-10-CM

## 2022-03-07 RX ORDER — TAMSULOSIN HYDROCHLORIDE 0.4 MG/1
CAPSULE ORAL
Qty: 180 CAPSULE | Refills: 1 | Status: SHIPPED | OUTPATIENT
Start: 2022-03-07 | End: 2022-09-01

## 2022-03-31 ENCOUNTER — OFFICE VISIT (OUTPATIENT)
Dept: INTERNAL MEDICINE CLINIC | Age: 87
End: 2022-03-31
Payer: MEDICARE

## 2022-03-31 VITALS
WEIGHT: 144.4 LBS | SYSTOLIC BLOOD PRESSURE: 118 MMHG | RESPIRATION RATE: 16 BRPM | HEART RATE: 89 BPM | TEMPERATURE: 98.6 F | DIASTOLIC BLOOD PRESSURE: 61 MMHG | OXYGEN SATURATION: 96 % | HEIGHT: 66 IN | BODY MASS INDEX: 23.21 KG/M2

## 2022-03-31 DIAGNOSIS — E11.40 TYPE 2 DIABETES MELLITUS WITH DIABETIC NEUROPATHY, WITHOUT LONG-TERM CURRENT USE OF INSULIN (HCC): ICD-10-CM

## 2022-03-31 DIAGNOSIS — I42.0 DILATED CARDIOMYOPATHY (HCC): ICD-10-CM

## 2022-03-31 DIAGNOSIS — N18.4 STAGE 4 CHRONIC KIDNEY DISEASE DUE TO DIABETES MELLITUS (HCC): ICD-10-CM

## 2022-03-31 DIAGNOSIS — Z71.89 ACP (ADVANCE CARE PLANNING): ICD-10-CM

## 2022-03-31 DIAGNOSIS — R19.5 HARD STOOL: ICD-10-CM

## 2022-03-31 DIAGNOSIS — K40.90 LEFT INGUINAL HERNIA: ICD-10-CM

## 2022-03-31 DIAGNOSIS — Z00.00 ENCOUNTER FOR SUBSEQUENT ANNUAL WELLNESS VISIT (AWV) IN MEDICARE PATIENT: Primary | ICD-10-CM

## 2022-03-31 DIAGNOSIS — R10.32 LEFT GROIN PAIN: ICD-10-CM

## 2022-03-31 DIAGNOSIS — E53.8 B12 DEFICIENCY: ICD-10-CM

## 2022-03-31 DIAGNOSIS — E11.22 STAGE 4 CHRONIC KIDNEY DISEASE DUE TO DIABETES MELLITUS (HCC): ICD-10-CM

## 2022-03-31 PROCEDURE — 96372 THER/PROPH/DIAG INJ SC/IM: CPT | Performed by: NURSE PRACTITIONER

## 2022-03-31 PROCEDURE — G8427 DOCREV CUR MEDS BY ELIG CLIN: HCPCS | Performed by: NURSE PRACTITIONER

## 2022-03-31 PROCEDURE — 1101F PT FALLS ASSESS-DOCD LE1/YR: CPT | Performed by: NURSE PRACTITIONER

## 2022-03-31 PROCEDURE — G8420 CALC BMI NORM PARAMETERS: HCPCS | Performed by: NURSE PRACTITIONER

## 2022-03-31 PROCEDURE — G8536 NO DOC ELDER MAL SCRN: HCPCS | Performed by: NURSE PRACTITIONER

## 2022-03-31 PROCEDURE — G8432 DEP SCR NOT DOC, RNG: HCPCS | Performed by: NURSE PRACTITIONER

## 2022-03-31 PROCEDURE — 99213 OFFICE O/P EST LOW 20 MIN: CPT | Performed by: NURSE PRACTITIONER

## 2022-03-31 RX ORDER — CYANOCOBALAMIN 1000 UG/ML
1000 INJECTION, SOLUTION INTRAMUSCULAR; SUBCUTANEOUS ONCE
Qty: 1 ML | Refills: 0
Start: 2022-03-31 | End: 2022-03-31

## 2022-03-31 NOTE — PROGRESS NOTES
Jose Larsen is a 80 y.o. male    Chief Complaint   Patient presents with    Annual Wellness Visit     AWV       Visit Vitals  /61 (BP 1 Location: Left upper arm, BP Patient Position: Sitting, BP Cuff Size: Small adult)   Pulse 89   Temp 98.6 °F (37 °C)   Resp 16   Ht 5' 6\" (1.676 m)   Wt 144 lb 6.4 oz (65.5 kg)   SpO2 96%   BMI 23.31 kg/m²           1. Have you been to the ER, urgent care clinic since your last visit? Hospitalized since your last visit? NO    2. Have you seen or consulted any other health care providers outside of the [address] [place] [address] since your last visit? Include any pap smears or colon screening.  NO

## 2022-03-31 NOTE — PROGRESS NOTES
HPI:    Mr. Aida Coats is an 40-year-old  male who is here for his annual wellness visit as well as follow-up regarding his type 2 diabetes, stage III chronic renal insufficiency, dilated cardiomyopathy, and B12 deficiency. In addition, he is also complaining of some left groin pain which has been bothering him for the past several days. He states the onset was approximately 5 days ago, and occurred as he was getting up out of bed in the morning. He does admit to ongoing hard stools which he states he has to strain excessively to pass. He has a history of MiraLAX use, but admits that he has not been using this for quite some time. He denies any black tarry stools or blood in his stools. He denies any significant changes in his bowel patterns. He denies any rash or bruising to the painful areas. He states his symptoms are worse when he gets up and has to walk, but is able to sit in a position which is comfortable for him. He is also requesting a B12 shot today. Annual Wellness Visit    End of Life Planning: This was discussed with him today and he  has NO advanced directive - add't info provided. Reviewed DNR/DNI and patient is interested- forms filled out & order placed. Depression Screen:  3 most recent PHQ Screens 3/31/2022   Little interest or pleasure in doing things Not at all   Feeling down, depressed, irritable, or hopeless Not at all   Total Score PHQ 2 0         Fall Risk:   Fall Risk Assessment, last 12 mths 3/31/2022   Able to walk? Yes   Fall in past 12 months? 0   Do you feel unsteady? 0   Are you worried about falling 0   Number of falls in past 12 months -   Fall with injury? -       Abuse Screen:  Abuse Screening Questionnaire 1/19/2021   Do you ever feel afraid of your partner? N   Are you in a relationship with someone who physically or mentally threatens you? N   Is it safe for you to go home? Y         Alcohol Risk Factor Screening:   On any occasion during the past 3 months, have you had more than 3 drinks containing alcohol? No  Do you average more than 7 drinks per week? No    Hearing Loss:  Hearing is good. wears hearing aides    Activities of Daily Living:  Self-care. Requires assistance with: no ADLs    Adult Nutrition Screen:  No risk factors noted. Health Maintenance  Daily Aspirin: yes  Bone Density: up to date  Glaucoma Screening: Yes    Immunizations:                Influenza: up to date. Tetanus: up to date. Shingles: unknown. Pneumovax: up to date. COVID-19: Up-to-date    Cancer screening:               Colon: up to date. Patient Care Team:  Jagruti Villasenor NP as PCP - General (Internal Medicine)  Jagruti Villasenor NP as PCP - [facility] Empaneled Provider         Prior to Admission medications    Medication Sig Start Date End Date Taking? Authorizing Provider   tamsulosin (FLOMAX) 0.4 mg capsule TAKE 2 CAPSULES BY MOUTH DAILY WITH THE EVENING MEAL 3/7/22  Yes Eitan LAW NP   Tradjenta 5 mg tablet TAKE 1 TABLET BY MOUTH DAILY 2/24/22  Yes Eitan LAW NP   gabapentin (NEURONTIN) 300 mg capsule TAKE 1 CAPSULE BY MOUTH TWICE DAILY 1/25/22  Yes Eitan LAW NP   dilTIAZem ER (CARDIZEM CD) 300 mg capsule Take 1 Capsule by mouth daily. 1/14/22  Yes Eitan LAW NP   rosuvastatin (CRESTOR) 10 mg tablet Take 1 Tablet by mouth nightly.  1/14/22  Yes Eitan LAW NP   furosemide (LASIX) 20 mg tablet  12/22/21  Yes Provider, Historical   hydrALAZINE (APRESOLINE) 25 mg tablet TAKE 1 TABLET BY MOUTH TWICE DAILY 12/6/21  Yes Eitan LAW NP   colesevelam (WELCHOL) 625 mg tablet TAKE 3 TABLETS BY MOUTH TWICE DAILY 9/9/21  Yes Marisol Schmidt MD   glucose blood VI test strips (Accu-Chek Guide test strips) strip USE 1 STRIP TO TEST BLOOD SUGAR EVERY DAY Dx E11.9 8/23/21  Yes Marisol Schmidt MD   clotrimazole-betamethasone (LOTRISONE) topical cream APPLY TO THE AFFECTED AREA TWICE DAILY AS NEEDED FOR SKIN IRRITATION 7/9/21  Yes Marisol Schmidt MD   glipiZIDE (GLUCOTROL) 10 mg tablet Take 2 Tabs by mouth two (2) times a day. Take 2 tablets by mouth twice a day. 4/8/21  Yes Laxmi Walker MD   nitroglycerin (Nitrostat) 0.4 mg SL tablet 1 Tab by SubLINGual route every five (5) minutes as needed for Chest Pain. 2/4/21  Yes Laxmi Walker MD   lancets (Accu-Chek Fastclix Lancet Drum) misc USE AS DIRECTED TO MONITOR BLOOD SUGAR 9/10/20  Yes Laxmi Walker MD   cyanocobalamin (VITAMIN B12) 1,000 mcg/mL injection INJECT 1 ML SUBCUTANEOUS EVERY MONTH 2/28/20  Yes Ron Blanchard MD   fluticasone propionate [facility]) 50 mcg/actuation nasal spray  3/15/19  Yes Provider, Historical   Blood-Glucose Meter monitoring kit Use to test blood sugar once daily. 2/28/19  Yes Mariola Anderson MD   acetaminophen (TYLENOL EXTRA STRENGTH) 500 mg tablet Take  by mouth every six (6) hours as needed for Pain. Yes Provider, Historical   ACCU-CHEK FASTCLIX misc USE AS DIRECTED TO MONITOR BLOOD SUGAR 10/1/17  Yes Mariola Anderson MD   aspirin delayed-release 81 mg tablet Take 81 mg by mouth daily. Yes Provider, Historical   Cholecalciferol, Vitamin D3, (VITAMIN D3) 1,000 unit cap Take 1,000 Units by mouth daily. Yes Provider, Historical   ferrous sulfate 325 mg (65 mg iron) tablet Take 325 mg by mouth two (2) times a day. Yes Provider, Historical   influenza vaccine 2020-21, 65 yrs+,,PF, (Fluzone HighDose Quad 20-21 PF) syrg injection Fluzone High-Dose Quad 2020-21 (PF) 240 mcg/0.7 mL IM syringe  Patient not taking: Reported on 2/4/2022    Provider, Historical   glucose blood VI test strips (ACCU-CHEK SMARTVIEW TEST STRIP) strip Use to check blood sugar once daily. Patient not taking: Reported on 2/4/2022 2/28/19   Mariola Anderson MD   Lancets misc Use with Fastclix lancing device daily to test blood sugar. Patient not taking: Reported on 2/4/2022 10/18/17   Mariola Anderson MD   polyethylene glycol [facility]) 17 gram/dose powder Take 17 g by mouth daily as needed.   Patient not taking: Reported on 3/31/2022    Provider, Historical        Allergies   Allergen Reactions    Tetanus Vaccines And Toxoid Swelling     Swelling at the site    Tradjenta [Linagliptin] Diarrhea     headache         Past Medical History:   Diagnosis Date    CAD (coronary artery disease)     mi    Chronic kidney disease     hx of elevated blood levels    Chronic pain     lower back    Diabetes ([facility]     Enlarged prostate     Hypercholesterolemia     Hypertension     Other unknown and unspecified cause of morbidity or mortality     hayfever       Past Surgical History:   Procedure Laterality Date    HX CATARACT REMOVAL Bilateral     HX CHOLECYSTECTOMY      HX LUMBAR LAMINECTOMY  2017    HX ORTHOPAEDIC  2015    back surgery     [place] CARDIAC SURG PROCEDURE UNLIST      bypass(), KELWBA(, )    [place] COLONOSCOPY FLX DX W/COLLJ SPEC WHEN PFRMD  2013         [place] EGD TRANSORAL BIOPSY SINGLE/MULTIPLE  2012         VASCULAR SURGERY PROCEDURE UNLIST  1996    left carotid       Social History     Socioeconomic History    Marital status:      Spouse name: Not on file    Number of children: Not on file    Years of education: Not on file    Highest education level: Not on file   Occupational History    Not on file   Tobacco Use    Smoking status: Former Smoker     Quit date: 6/3/1967     Years since quittin.8    Smokeless tobacco: Never Used   Vaping Use    Vaping Use: Never used   Substance and Sexual Activity    Alcohol use: No    Drug use: No    Sexual activity: Not Currently   Other Topics Concern    Not on file   Social History Narrative    Not on file     Social Determinants of Health     Financial Resource Strain:     Difficulty of Paying Living Expenses: Not on file   Food Insecurity:     Worried About Running Out of Food in the Last Year: Not on file    Omari of Food in the Last Year: Not on file   Transportation Needs:     Lack of Transportation (Medical): Not on file    Lack of Transportation (Non-Medical):  Not on file   Physical Activity:     Days of Exercise per Week: Not on file    Minutes of Exercise per Session: Not on file   Stress:     Feeling of Stress : Not on file   Social Connections:     Frequency of Communication with Friends and Family: Not on file    Frequency of Social Gatherings with Friends and Family: Not on file    Attends [employer] Services: Not on file    Active Member of [address] [place] [address] [employer] or Organizations: Not on file    Attends Club or Organization Meetings: Not on file    Marital Status: Not on file   Intimate Partner Violence:     Fear of Current or Ex-Partner: Not on file    Emotionally Abused: Not on file    Physically Abused: Not on file    Sexually Abused: Not on file   Housing Stability:     Unable to Pay for Housing in the Last Year: Not on file    Number of [place] in the Last Year: Not on file    Unstable Housing in the Last Year: Not on file       Family History   Problem Relation Age of Onset    Heart Disease Mother     Heart Disease Father        Patient Active Problem List   Diagnosis Code    Essential hypertension I10    Hypercholesterolemia E78.00    Stage 3 chronic kidney disease due to diabetes mellitus ([facility] [address] E11.22, N18.30    CAD (coronary artery disease) I25.10    Benign non-nodular prostatic hyperplasia with lower urinary tract symptoms N40.1    Spinal stenosis of lumbar region without neurogenic claudication M48.061    Adrenal adenoma D35.00    Allergic rhinitis J30.9    ASCVD (arteriosclerotic cardiovascular disease) I25.10    B12 deficiency E53.8    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 I42.0    Dyspnea R06.00    Iron deficiency anemia D50.9           Review of Systems   Constitutional: Negative. HENT: Negative. Eyes: Negative. Respiratory: Negative. Cardiovascular: Negative. Gastrointestinal: Positive for abdominal pain (Left groin) and constipation. Negative for blood in stool, diarrhea and melena. Genitourinary: Negative for dysuria, flank pain, frequency, hematuria and urgency. Musculoskeletal: Positive for joint pain. Negative for falls. Skin: Negative. Neurological: Negative. Endo/Heme/Allergies: Negative. Psychiatric/Behavioral: Negative. Physical Exam  Vitals reviewed. Constitutional:       General: He is not in acute distress. Appearance: Normal appearance. HENT:      Head: Normocephalic. Eyes:      General: No scleral icterus. Pupils: Pupils are equal, round, and reactive to light. Cardiovascular:      Rate and Rhythm: Normal rate and regular rhythm. Pulses: Normal pulses. Heart sounds: Murmur heard. No friction rub. No gallop. Pulmonary:      Effort: Pulmonary effort is normal.      Breath sounds: Normal breath sounds. No stridor. No wheezing. Abdominal:      General: Abdomen is flat. Bowel sounds are normal.      Palpations: Abdomen is soft. Hernia: A hernia (Left inguinal) is present. Musculoskeletal:      Cervical back: Neck supple. Skin:     General: Skin is warm and dry. Capillary Refill: Capillary refill takes less than 2 seconds. Neurological:      General: No focal deficit present. Mental Status: He is alert and oriented to person, place, and time.    Psychiatric:         Mood and Affect: Mood normal.         Behavior: Behavior normal.            Visit Vitals  /61 (BP 1 Location: Left upper arm, BP Patient Position: Sitting, BP Cuff Size: Small adult)   Pulse 89   Temp 98.6 °F (37 °C)   Resp 16   Ht 5' 6\" (1.676 m)   Wt 144 lb 6.4 oz (65.5 kg)   SpO2 96%   BMI 23.31 kg/m²         Assessment & Plan:    ICD-10-CM ICD-9-CM    1. Encounter for subsequent annual wellness visit (AWV) in Medicare patient  Z00.00 V70.0  WELLNESS EXAM   2. Type 2 diabetes mellitus with diabetic neuropathy, without long-term current use of insulin ([facility])  E11.40 250.60      357.2    3. Stage 4 chronic kidney disease due to diabetes mellitus ([facility] [address] [facility]  E11.22 250.40 RENAL FUNCTION PANEL    N18.4 585.4 MICROALBUMIN, UR, RAND W/ MICROALB/CREAT RATIO      RENAL FUNCTION PANEL      MICROALBUMIN, UR, RAND W/ MICROALB/CREAT RATIO   4. Dilated cardiomyopathy (HCC)  I42.0 425.4    5. Left groin pain  R10.32 789.04    6. Left inguinal hernia  K40.90 550.90    7. Hard stool  R19.5 787.7    8. B12 deficiency  E53.8 266.2 VITAMIN B12 INJECTION      THER/PROPH/DIAG INJECTION, SUBCUT/IM      cyanocobalamin (Vitamin B-12) 1,000 mcg/mL injection   9. ACP (advance care planning)  Z71.89 V65.49 REFERRAL TO ACP CLINICAL SPECIALIST     1: We will perform labs today including: Renal function panel and microalbumin. 2: Patient given B12 injection as requested. 3: I have advised patient to use MiraLAX regularly 1-2 times a day until stools soft and easy to pass. Avoid straining as this is increasing worsening of his symptoms. 4: Patient agrees to use MiraLAX for now, and will suspend referral to general surgeon unless symptoms worsen. 5: Increase fluid intake and fiber in diet. 6: Patient to continue current medications as prescribed. 7: Patient will be referred to ACP  for DNR/advanced directive. 8: Patient to follow-up with me in approximately 3 months, or sooner as needed. Patient states understanding and agrees to plan. ER warnings were reinforced with patient. Patient states understanding. Follow-up and Dispositions    · Return in about 3 months (around 6/30/2022) for Follow up.         Advised him to call back or return to office if symptoms worsen/change/persist.  Discussed expected course/resolution/complications of diagnosis in detail with patient. Medication risks/benefits/costs/interactions/alternatives discussed with patient. He was given an after visit summary which includes diagnoses, current medications, & vitals. He expressed understanding with the diagnosis and plan.

## 2022-03-31 NOTE — PROGRESS NOTES
After obtaining consent, and per orders of CHUCHO VILLAGRAN,NP injection of B12 given by Pilo Contreras. Patient instructed to remain in clinic for 20 minutes afterwards, and to report any adverse reaction to me immediately. Administered B12 in LEFT DELTOID patient tolerated well.     LOT #: [id number]    EXP:2/1/23    [facility]:[id number]

## 2022-04-01 ENCOUNTER — PATIENT OUTREACH (OUTPATIENT)
Dept: CASE MANAGEMENT | Age: 87
End: 2022-04-01

## 2022-04-01 LAB
ALBUMIN SERPL-MCNC: 3.7 G/DL (ref 3.5–5)
ANION GAP SERPL CALC-SCNC: 3 MMOL/L (ref 5–15)
BUN SERPL-MCNC: 58 MG/DL (ref 6–20)
BUN/CREAT SERPL: 21 (ref 12–20)
CALCIUM SERPL-MCNC: 8.6 MG/DL (ref 8.5–10.1)
CHLORIDE SERPL-SCNC: 109 MMOL/L (ref 97–108)
CO2 SERPL-SCNC: 25 MMOL/L (ref 21–32)
CREAT SERPL-MCNC: 2.77 MG/DL (ref 0.7–1.3)
CREAT UR-MCNC: 81.4 MG/DL
GLUCOSE SERPL-MCNC: 256 MG/DL (ref 65–100)
MICROALBUMIN UR-MCNC: 84.6 MG/DL
MICROALBUMIN/CREAT UR-RTO: 1039 MG/G (ref 0–30)
PHOSPHATE SERPL-MCNC: 4.4 MG/DL (ref 2.6–4.7)
POTASSIUM SERPL-SCNC: 4.8 MMOL/L (ref 3.5–5.1)
SODIUM SERPL-SCNC: 137 MMOL/L (ref 136–145)

## 2022-04-01 NOTE — ACP (ADVANCE CARE PLANNING)
Advance Care Planning   Ambulatory ACP Specialist Patient Outreach    Date:  4/1/2022    ACP Specialist:  Talon Correa LPN    Outreach call to patient in follow-up to ACP Specialist referral from:    [x] PCP  [] Provider   [] Ambulatory Care Management [] Other     For:                  [x] Advance Directive Assistance              [x] Complete Portable DNR order              [] Complete POST/MOST              [x] Code Status Discussion             [] Discuss Goals of Care             [] Early ACP Decision-Making              [] Other (Specify)    Date Referral Received: 4/1/22    Today's Outreach:  [x] First   [] Second  [] Third       Third outreach made by: [] Phone  [] Email / mail    [] [employer]     Intervention:  [] Spoke with Patient   [] Left VM requesting return call      Outcome:  First attempt to contact pt regarding ACP. No answer on mobile phone. VM not available at this time. Will attempt 2nd outreach within one week. Next Step:   [] ACP scheduled conversation  [x] Outreach again in one week               [] Email / Mail ACP Info Sheets  [] Email / Mail Advance Directive   [] Closing referral.  Routing closure to referring provider/staff and to ACP Specialist . [] Closure letter mailed to patient with invitation to contact ACP Specialist if / when ready.   Thank you for this referral.

## 2022-04-05 NOTE — PROGRESS NOTES
Electrolytes are in normal range. Kidney functions remain stable and unchanged from previous study. Avoid NSAID use. You are also a bit dehydrated. Please drink more water. Follow-up with nephrology as planned.

## 2022-04-06 ENCOUNTER — TELEPHONE (OUTPATIENT)
Dept: INTERNAL MEDICINE CLINIC | Age: 87
End: 2022-04-06

## 2022-04-06 ENCOUNTER — PATIENT OUTREACH (OUTPATIENT)
Dept: CASE MANAGEMENT | Age: 87
End: 2022-04-06

## 2022-04-06 NOTE — TELEPHONE ENCOUNTER
----- Message from Dannette Spurling sent at 4/6/2022  2:22 PM EDT -----  Subject: Message to Provider    QUESTIONS  Information for Provider? Pt is attempting to get a message to Shad Rodriguez in   the practice who used to work with Dr. Pooja Noble, who was pt's PCP prior to   Lea Montaño. Pt would very much like Dr. Pooja Noble to call him re? upcoming   surgery. Pt said if Abdullahivivian Michael is unable to get in touch with Dr. Pooja Noble,   please call him and let him know. Thank you.   ---------------------------------------------------------------------------  --------------  CALL BACK INFO  What is the best way for the office to contact you? OK to leave message on   voicemail  Preferred Call Back Phone Number? [phone]  ---------------------------------------------------------------------------  --------------  SCRIPT ANSWERS  Relationship to Patient?  Self

## 2022-04-06 NOTE — TELEPHONE ENCOUNTER
Spoke to patient and informed him that Keiry Watson No longer works at [employer] and that we don't have a way in contacting Dr. Yves Foster

## 2022-04-06 NOTE — ACP (ADVANCE CARE PLANNING)
Advance Care Planning   Ambulatory ACP Specialist Patient Outreach    Date:  4/6/2022    ACP Specialist:  Vaughn Sher LPN    Outreach call to patient in follow-up to ACP Specialist referral from:    [x] PCP  [] Provider   [] Ambulatory Care Management [] Other     For:                  [x] Advance Directive Assistance              [x] Complete Portable DNR order              [] Complete POST/MOST              [x] Code Status Discussion             [] Discuss Goals of Care             [] Early ACP Decision-Making              [] Other (Specify)    Date Referral Received: 4/1/22    Today's Outreach:  [] First   [x] Second  [] Third       Third outreach made by: [] Phone  [] Email / mail    [] [employer]     Intervention:  [x] Spoke with Patient   [] Left VM requesting return call      Outcome: Spoke with patient who wished to review ACP documents before scheduling appointment. Will e-mail documents to patient and follow up within one week. Next Step:   [] ACP scheduled conversation  [x] Outreach again in one week               [] Email / Mail ACP Info Sheets  [] Email / Mail Advance Directive   [] Closing referral.  Routing closure to referring provider/staff and to ACP Specialist . [] Closure letter mailed to patient with invitation to contact ACP Specialist if / when ready.   Thank you for this referral.

## 2022-04-13 ENCOUNTER — PATIENT OUTREACH (OUTPATIENT)
Dept: CASE MANAGEMENT | Age: 87
End: 2022-04-13

## 2022-04-13 NOTE — ACP (ADVANCE CARE PLANNING)
Advance Care Planning   Ambulatory ACP Specialist Patient Outreach    Date:  4/13/2022    ACP Specialist:  Carlos A Ray LPN    Outreach call to patient in follow-up to ACP Specialist referral from:    [x] PCP  [] Provider   [] Ambulatory Care Management [] Other     For:                  [x] Advance Directive Assistance              [x] Complete Portable DNR order              [] Complete POST/MOST              [x] Code Status Discussion             [] Discuss Goals of Care             [] Early ACP Decision-Making              [] Other (Specify)    Date Referral Received: 4/1/22    Today's Outreach:  [] First   [x] Second  [] Third       Third outreach made by: [] Phone  [] Email / mail    [] [employer]     Intervention:  [x] Spoke with Patient   [] Left VM requesting return call      Outcome: Pt stated the did receive ACP material that was sent to him. He stated that he is not sure if he wants to complete AMD at this time. Pt will reach out if/when ready. HCDM have been updated. Will close referral at this time. Next Step:   [] ACP scheduled conversation  [] Outreach again in one week               [] Email / Mail ACP Info Sheets  [] Email / Mail Advance Directive   [x] Closing referral.  Routing closure to referring provider/staff and to ACP Specialist . [] Closure letter mailed to patient with invitation to contact ACP Specialist if / when ready.   Thank you for this referral.

## 2022-06-02 NOTE — TELEPHONE ENCOUNTER
PCP: Thurmond Apgar, NP    Last appt: 3/31/2022  Future Appointments   Date Time Provider Marcos Joiner   2022  2:30 PM Thurmond Apgar, NP [facility] BS AMB       Requested Prescriptions     Pending Prescriptions Disp Refills    glucose blood VI test strips (Accu-Chek Guide test strips) strip 100 Strip 2     Sig: USE 1 STRIP TO TEST BLOOD SUGAR EVERY DAY Dx E11.9       Prior labs and Blood pressures:  BP Readings from Last 3 Encounters:   22 118/61   22 136/74   21 (!) 132/58     Lab Results   Component Value Date/Time    Sodium 137 2022 02:51 PM    Potassium 4.8 2022 02:51 PM    Chloride 109 (H) 2022 02:51 PM    CO2 25 2022 02:51 PM    Anion gap 3 (L) 2022 02:51 PM    Glucose 256 (H) 2022 02:51 PM    BUN 58 (H) 2022 02:51 PM    Creatinine 2.77 (H) 2022 02:51 PM    BUN/Creatinine ratio 21 (H) 2022 02:51 PM    GFR est AA 27 (L) 2022 02:51 PM    GFR est non-AA 22 (L) 2022 02:51 PM    Calcium 8.6 2022 02:51 PM

## 2022-06-03 DIAGNOSIS — E78.00 HYPERCHOLESTEROLEMIA: ICD-10-CM

## 2022-06-03 RX ORDER — ROSUVASTATIN CALCIUM 10 MG/1
TABLET, COATED ORAL
Qty: 90 TABLET | Refills: 1 | Status: SHIPPED | OUTPATIENT
Start: 2022-06-03 | End: 2022-07-29 | Stop reason: ALTCHOICE

## 2022-06-03 RX ORDER — BLOOD SUGAR DIAGNOSTIC
STRIP MISCELLANEOUS
Qty: 100 STRIP | Refills: 2 | Status: SHIPPED | OUTPATIENT
Start: 2022-06-03

## 2022-06-03 RX ORDER — DILTIAZEM HYDROCHLORIDE 300 MG/1
300 CAPSULE, COATED, EXTENDED RELEASE ORAL DAILY
Qty: 90 CAPSULE | Refills: 1 | Status: SHIPPED | OUTPATIENT
Start: 2022-06-03

## 2022-06-03 RX ORDER — COLESEVELAM 180 1/1
TABLET ORAL
Qty: 540 TABLET | Refills: 2 | Status: SHIPPED | OUTPATIENT
Start: 2022-06-03

## 2022-06-03 NOTE — TELEPHONE ENCOUNTER
PCP: Collin Watkins NP    Last appt: 3/31/2022  Future Appointments   Date Time Provider Marcos Joiner   6/30/2022  2:30 PM Collin Watkins NP [facility] AMB       Requested Prescriptions     Pending Prescriptions Disp Refills    colesevelam (WELCHOL) 625 mg tablet 540 Tablet 2     Sig: Take 3 tablets by mouth twice daily         Other Comments:

## 2022-06-30 ENCOUNTER — OFFICE VISIT (OUTPATIENT)
Dept: INTERNAL MEDICINE CLINIC | Age: 87
End: 2022-06-30
Payer: MEDICARE

## 2022-06-30 VITALS
WEIGHT: 140.4 LBS | HEIGHT: 66 IN | OXYGEN SATURATION: 96 % | TEMPERATURE: 97.9 F | DIASTOLIC BLOOD PRESSURE: 64 MMHG | RESPIRATION RATE: 17 BRPM | SYSTOLIC BLOOD PRESSURE: 132 MMHG | BODY MASS INDEX: 22.56 KG/M2 | HEART RATE: 86 BPM

## 2022-06-30 DIAGNOSIS — Z00.00 ENCOUNTER FOR SUBSEQUENT ANNUAL WELLNESS VISIT (AWV) IN MEDICARE PATIENT: Primary | ICD-10-CM

## 2022-06-30 DIAGNOSIS — Z71.89 ACP (ADVANCE CARE PLANNING): ICD-10-CM

## 2022-06-30 DIAGNOSIS — N18.4 CKD (CHRONIC KIDNEY DISEASE) STAGE 4, GFR 15-29 ML/MIN (HCC): ICD-10-CM

## 2022-06-30 DIAGNOSIS — E11.40 TYPE 2 DIABETES MELLITUS WITH DIABETIC NEUROPATHY, WITHOUT LONG-TERM CURRENT USE OF INSULIN (HCC): ICD-10-CM

## 2022-06-30 DIAGNOSIS — E78.00 HYPERCHOLESTEROLEMIA: ICD-10-CM

## 2022-06-30 DIAGNOSIS — Z79.899 ON STATIN THERAPY: ICD-10-CM

## 2022-06-30 DIAGNOSIS — R07.89 CHEST WALL PAIN: ICD-10-CM

## 2022-06-30 PROCEDURE — G8427 DOCREV CUR MEDS BY ELIG CLIN: HCPCS | Performed by: NURSE PRACTITIONER

## 2022-06-30 PROCEDURE — G8432 DEP SCR NOT DOC, RNG: HCPCS | Performed by: NURSE PRACTITIONER

## 2022-06-30 PROCEDURE — 1123F ACP DISCUSS/DSCN MKR DOCD: CPT | Performed by: NURSE PRACTITIONER

## 2022-06-30 PROCEDURE — G8420 CALC BMI NORM PARAMETERS: HCPCS | Performed by: NURSE PRACTITIONER

## 2022-06-30 PROCEDURE — G8536 NO DOC ELDER MAL SCRN: HCPCS | Performed by: NURSE PRACTITIONER

## 2022-06-30 PROCEDURE — G0439 PPPS, SUBSEQ VISIT: HCPCS | Performed by: NURSE PRACTITIONER

## 2022-06-30 PROCEDURE — 1101F PT FALLS ASSESS-DOCD LE1/YR: CPT | Performed by: NURSE PRACTITIONER

## 2022-06-30 NOTE — PROGRESS NOTES
Chief Complaint   Patient presents with    Hypertension     3 month follow up    Diabetes    Cholesterol Problem     Visit Vitals  /64 (BP 1 Location: Left upper arm, BP Patient Position: Sitting, BP Cuff Size: Adult)   Pulse 86   Temp 97.9 °F (36.6 °C) (Oral)   Resp 17   Ht 5' 6\" (1.676 m)   Wt 140 lb 6.4 oz (63.7 kg)   SpO2 96%   BMI 22.66 kg/m²     1. Have you been to the ER, urgent care clinic since your last visit? Hospitalized since your last visit? No    2. Have you seen or consulted any other health care providers outside of the [address] [place] [address] since your last visit? Include any pap smears or colon screening.  No

## 2022-06-30 NOTE — PROGRESS NOTES
HPI:    Mr. Jesus Castellanos is an 24-year-old  male who is here for his subsequent annual wellness visit as well as follow-up regarding current medical conditions which include: Type 2 diabetes, hypercholesterolemia, hypertension, stage IV chronic kidney disease, and has more recently been having episodes of chest pain for the past 6 to 8 weeks. The patient continues to take his medications as prescribed. He denies any adverse side effects of these medicines, and is tolerating them well overall. He denies any hypoglycemic episodes, and checks his blood sugars on occasion. His last hemoglobin A1c was approximately 7.2%. More recently, the patient complains of some episodic chest pains that are \"sharp\" in nature, typically at rest, and last for only a short few minutes, and are easily relieved with use of nitroglycerin. He states these have been going on for the past 6 to 8 weeks. He states this is typically when he is seated or laying down, he denies any shortness of breath, nausea, radiation of pain, or sweating. They are unrelated to any activity. He is unsure if these are cardiac or something else. Annual Wellness Visit    End of Life Planning: This was discussed with him today and he has NO advanced directive - not interested in additional information. Reviewed DNR/DNI and patient is not interested. Depression Screen:  3 most recent PHQ Screens 3/31/2022   Little interest or pleasure in doing things Not at all   Feeling down, depressed, irritable, or hopeless Not at all   Total Score PHQ 2 0         Fall Risk:   Fall Risk Assessment, last 12 mths 3/31/2022   Able to walk? Yes   Fall in past 12 months? 0   Do you feel unsteady? 0   Are you worried about falling 0   Number of falls in past 12 months -   Fall with injury? -       Abuse Screen:  Abuse Screening Questionnaire 1/19/2021   Do you ever feel afraid of your partner?  N   Are you in a relationship with someone who physically or mentally threatens you? N   Is it safe for you to go home? Y         Alcohol Risk Factor Screening: On any occasion during the past 3 months, have you had more than 3 drinks containing alcohol? No  Do you average more than 7 drinks per week? No    Hearing Loss:  wears hearing aides    Activities of Daily Living:  Self-care. Requires assistance with: no ADLs    Adult Nutrition Screen:  No risk factors noted. Health Maintenance  Daily Aspirin: yes  Bone Density: unknown  Glaucoma Screening: Yes    Immunizations:                Influenza: up to date. Tetanus: unknown. Shingles: unknown. Pneumovax: up to date. COVID-19: Up-to-date. Cancer screening:               Colon: N/A. Patient Care Team:  Jessica Sauceda NP as PCP - General (Internal Medicine Physician)  Jessica Sauceda NP as PCP - Rosalie Cotter Provider         Prior to Admission medications    Medication Sig Start Date End Date Taking? Authorizing Provider   glucose blood VI test strips (Accu-Chek Guide test strips) strip USE 1 STRIP TO TEST BLOOD SUGAR EVERY DAY Dx E11.9 6/3/22  Yes Carolina LAW NP   dilTIAZem ER (CARDIZEM CD) 300 mg capsule TAKE 1 CAPSULE BY MOUTH DAILY 6/3/22  Yes Carolina LAW NP   rosuvastatin (CRESTOR) 10 mg tablet TAKE 1 TABLET BY MOUTH EVERY NIGHT 6/3/22  Yes Carolina LAW NP   [facility]) 625 mg tablet Take 3 tablets by mouth twice daily 6/3/22  Yes Carolina LAW NP   Tradjenta 5 mg tablet TAKE 1 TABLET BY MOUTH DAILY 5/25/22  Yes Carolina LAW NP   tamsulosin (FLOMAX) 0.4 mg capsule TAKE 2 CAPSULES BY MOUTH DAILY WITH THE EVENING MEAL 3/7/22  Yes Carolina LAW NP   gabapentin (NEURONTIN) 300 mg capsule TAKE 1 CAPSULE BY MOUTH TWICE DAILY 1/25/22  Yes Carolina LAW NP   furosemide (LASIX) 20 mg tablet Take 20 mg by mouth daily.  12/22/21  Yes Provider, Historical   hydrALAZINE (APRESOLINE) 25 mg tablet TAKE 1 TABLET BY MOUTH TWICE DAILY 12/6/21  Yes Jessica Sauceda NP   clotrimazole-betamethasone (LOTRISONE) topical cream APPLY TO THE AFFECTED AREA TWICE DAILY AS NEEDED FOR SKIN IRRITATION 7/9/21  Yes Jose Schaeffer MD   glipiZIDE (GLUCOTROL) 10 mg tablet Take 2 Tabs by mouth two (2) times a day. Take 2 tablets by mouth twice a day. Patient taking differently: Take 20 mg by mouth two (2) times a day. Take 2 tablets by mouth twice a day. Indications: pt is taking 1 tablet twice daily 4/8/21  Yes Jose Schaeffer MD   nitroglycerin (Nitrostat) 0.4 mg SL tablet 1 Tab by SubLINGual route every five (5) minutes as needed for Chest Pain. 2/4/21  Yes Jose Schaeffer MD   lancets (Accu-Chek Fastclix Lancet Drum) misc USE AS DIRECTED TO MONITOR BLOOD SUGAR 9/10/20  Yes Jose Schaeffer MD   cyanocobalamin (VITAMIN B12) 1,000 mcg/mL injection INJECT 1 ML SUBCUTANEOUS EVERY MONTH 2/28/20  Yes Tiffany Bass MD   fluticasone propionate [facility]) 50 mcg/actuation nasal spray  3/15/19  Yes Provider, Historical   Blood-Glucose Meter monitoring kit Use to test blood sugar once daily. 2/28/19  Yes Tiburcio Collet, MD   acetaminophen (TYLENOL EXTRA STRENGTH) 500 mg tablet Take  by mouth every six (6) hours as needed for Pain. Yes Provider, Historical   ACCU-CHEK FASTCLIX misc USE AS DIRECTED TO MONITOR BLOOD SUGAR 10/1/17  Yes Tiburcio Collet, MD   aspirin delayed-release 81 mg tablet Take 81 mg by mouth daily. Yes Provider, Historical   Cholecalciferol, Vitamin D3, (VITAMIN D3) 1,000 unit cap Take 1,000 Units by mouth daily. Yes Provider, Historical   ferrous sulfate 325 mg (65 mg iron) tablet Take 325 mg by mouth two (2) times a day. Yes Provider, Historical   influenza vaccine 2020-21, 65 yrs+,,PF, (Fluzone HighDose Quad 20-21 PF) syrg injection Fluzone High-Dose Quad 2020-21 (PF) 240 mcg/0.7 mL IM syringe  Patient not taking: Reported on 2/4/2022    Provider, Historical   glucose blood VI test strips (ACCU-CHEK SMARTVIEW TEST STRIP) strip Use to check blood sugar once daily.   Patient not taking: Reported on 2022   Aileen Hampton MD   Lancets misc Use with Fastclix lancing device daily to test blood sugar. Patient not taking: Reported on 2022 10/18/17   Aileen Hampton MD   polyethylene glycol [facility]) 17 gram/dose powder Take 17 g by mouth daily as needed.   Patient not taking: Reported on 3/31/2022    Provider, Historical        Allergies   Allergen Reactions    Tetanus Vaccines And Toxoid Swelling     Swelling at the site    Tradjenta [Linagliptin] Diarrhea     headache         Past Medical History:   Diagnosis Date    CAD (coronary artery disease)     mi    Chronic kidney disease     hx of elevated blood levels    Chronic pain     lower back    Diabetes ([facility] [address]     Enlarged prostate     Hypercholesterolemia     Hypertension     Other unknown and unspecified cause of morbidity or mortality     hayfever       Past Surgical History:   Procedure Laterality Date    HX CATARACT REMOVAL Bilateral     HX CHOLECYSTECTOMY      HX LUMBAR LAMINECTOMY  2017    HX ORTHOPAEDIC  2015    back surgery     ID CARDIAC SURG PROCEDURE UNLIST      bypass(), QALFEL(, )    ID COLONOSCOPY FLX DX W/COLLJ SPEC WHEN PFRMD  2013         ID EGD TRANSORAL BIOPSY SINGLE/MULTIPLE  2012         VASCULAR SURGERY PROCEDURE UNLIST  1996    left carotid       Social History     Socioeconomic History    Marital status:      Spouse name: Not on file    Number of children: Not on file    Years of education: Not on file    Highest education level: Not on file   Occupational History    Not on file   Tobacco Use    Smoking status: Former Smoker     Quit date: 6/3/1967     Years since quittin.1    Smokeless tobacco: Never Used   Vaping Use    Vaping Use: Never used   Substance and Sexual Activity    Alcohol use: No    Drug use: No    Sexual activity: Not Currently   Other Topics Concern    Not on file   Social History Narrative    Not on file     Social Determinants of Health     Financial Resource Strain:     Difficulty of Paying Living Expenses: Not on file   Food Insecurity:     Worried About Running Out of Food in the Last Year: Not on file    Omari of Food in the Last Year: Not on file   Transportation Needs:     Lack of Transportation (Medical): Not on file    Lack of Transportation (Non-Medical):  Not on file   Physical Activity:     Days of Exercise per Week: Not on file    Minutes of Exercise per Session: Not on file   Stress:     Feeling of Stress : Not on file   Social Connections:     Frequency of Communication with Friends and Family: Not on file    Frequency of Social Gatherings with Friends and Family: Not on file    Attends Shinto Services: Not on file    Active Member of [address] [place] [address] [employer] or Organizations: Not on file    Attends Club or Organization Meetings: Not on file    Marital Status: Not on file   Intimate Partner Violence:     Fear of Current or Ex-Partner: Not on file    Emotionally Abused: Not on file    Physically Abused: Not on file    Sexually Abused: Not on file   Housing Stability:     Unable to Pay for Housing in the Last Year: Not on file    Number of [place] in the Last Year: Not on file    Unstable Housing in the Last Year: Not on file       Family History   Problem Relation Age of Onset    Heart Disease Mother     Heart Disease Father        Patient Active Problem List   Diagnosis Code    Essential hypertension I10    Hypercholesterolemia E78.00    Stage 3 chronic kidney disease due to diabetes mellitus ([facility] [address] E11.22, N18.30    CAD (coronary artery disease) I25.10    Benign non-nodular prostatic hyperplasia with lower urinary tract symptoms N40.1    Spinal stenosis of lumbar region without neurogenic claudication M48.061    Adrenal adenoma D35.00    Allergic rhinitis J30.9    ASCVD (arteriosclerotic cardiovascular disease) I25.10    B12 deficiency E53.8    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    History of gastritis Z87.19    MCI (mild cognitive impairment) G31.84    Vitamin D deficiency E55.9    CVD (cerebrovascular disease) I67.9    PE (physical exam), annual Z00.00    Anemia in stage 3 chronic kidney disease (HCC) N18.30, D63.1    Type 2 diabetes mellitus with diabetic neuropathy, without long-term current use of insulin ([facility]) E11.40    On statin therapy Z79.899    Dilated cardiomyopathy ([facility]) I42.0    Dyspnea R06.00    Iron deficiency anemia D50.9    CKD (chronic kidney disease) stage 4, GFR 15-29 ml/min ([facility]) N18.4           Review of Systems   Constitutional: Negative. Eyes: Negative. Respiratory: Negative. Negative for shortness of breath. Cardiovascular: Positive for chest pain. Negative for palpitations and orthopnea. Gastrointestinal: Negative. Genitourinary: Negative. Musculoskeletal: Positive for joint pain. Negative for falls. Skin: Negative. Neurological: Negative. Endo/Heme/Allergies: Negative. Psychiatric/Behavioral: Negative. Physical Exam  Vitals and nursing note reviewed. Constitutional:       General: He is not in acute distress. HENT:      Head: Normocephalic. Eyes:      General: No scleral icterus. Pupils: Pupils are equal, round, and reactive to light. Cardiovascular:      Rate and Rhythm: Normal rate and regular rhythm. Heart sounds: Murmur heard. No friction rub. No gallop. Pulmonary:      Effort: Pulmonary effort is normal.      Breath sounds: Normal breath sounds. No stridor. No wheezing. Musculoskeletal:      Cervical back: Neck supple. Skin:     General: Skin is warm. Neurological:      General: No focal deficit present. Mental Status: He is alert and oriented to person, place, and time.    Psychiatric:         Mood and Affect: Mood normal.         Behavior: Behavior normal.            Visit Vitals  /64 (BP 1 Location: Left upper arm, BP Patient Position: Sitting, BP Cuff Size: Adult)   Pulse 86   Temp 97.9 °F (36.6 °C) (Oral)   Resp 17   Ht 5' 6\" (1.676 m)   Wt 140 lb 6.4 oz (63.7 kg)   SpO2 96%   BMI 22.66 kg/m²         Assessment & Plan:      ICD-10-CM ICD-9-CM    1. Encounter for subsequent annual wellness visit (AWV) in Medicare patient  Z00.00 V70.0  WELLNESS EXAM   2. ACP (advance care planning)  Z71.89 V65.49    3. Chest wall pain  R07.89 786.52    4. Type 2 diabetes mellitus with diabetic neuropathy, without long-term current use of insulin ([facility])  E11.40 250.60 HEMOGLOBIN A1C WITH EAG     357.2    5. CKD (chronic kidney disease) stage 4, GFR 15-29 ml/min ([facility])  L06.0 650.1 METABOLIC PANEL, COMPREHENSIVE      HEMOGLOBIN A1C WITH EAG   6. Hypercholesterolemia  E78.00 272.0 LIPID PANEL   7. On statin therapy  Z79.899 V58.69 CK     1: Chest pain discussed with patient today. I believe these are likely musculoskeletal in nature, and less likely to be cardiac despite resolution with nitroglycerin. Admittedly, the patient states he simply takes the nitroglycerin \"to be safe. \"  He states he does not need a referral to cardiology at this time, but will contact Dr. Vani Mustafa office if symptoms persist.  2: Patient to continue current medications for management of type 2 diabetes. 3: Continue current medication for management of hypertension and hypercholesterolemia.  4: Patient to return for fasting labs in next 2 weeks including: CMP, lipid panel, hemoglobin A1c, and CK.  5: Patient to follow-up with me in approximately 4 months, or sooner as needed. Patient states understanding and agrees with plan. Follow-up and Dispositions    · Return in about 4 months (around 10/30/2022) for Follow up. Advised him to call back or return to office if symptoms worsen/change/persist.  Discussed expected course/resolution/complications of diagnosis in detail with patient. Medication risks/benefits/costs/interactions/alternatives discussed with patient.   He was given an after visit summary which includes diagnoses, current medications, & vitals. He expressed understanding with the diagnosis and plan. Signed,  Mane Arellano.  Lelo Russell, MSN APRN FNP-BC

## 2022-07-06 ENCOUNTER — APPOINTMENT (OUTPATIENT)
Dept: INTERNAL MEDICINE CLINIC | Age: 87
End: 2022-07-06

## 2022-07-06 DIAGNOSIS — E11.40 TYPE 2 DIABETES MELLITUS WITH DIABETIC NEUROPATHY, WITHOUT LONG-TERM CURRENT USE OF INSULIN (HCC): ICD-10-CM

## 2022-07-06 DIAGNOSIS — E78.00 HYPERCHOLESTEROLEMIA: ICD-10-CM

## 2022-07-06 DIAGNOSIS — Z79.899 ON STATIN THERAPY: ICD-10-CM

## 2022-07-06 DIAGNOSIS — N18.4 CKD (CHRONIC KIDNEY DISEASE) STAGE 4, GFR 15-29 ML/MIN (HCC): ICD-10-CM

## 2022-07-07 LAB
ALBUMIN SERPL-MCNC: 3.6 G/DL (ref 3.5–5)
ALBUMIN/GLOB SERPL: 1.1 {RATIO} (ref 1.1–2.2)
ALP SERPL-CCNC: 78 U/L (ref 45–117)
ALT SERPL-CCNC: 15 U/L (ref 12–78)
ANION GAP SERPL CALC-SCNC: 3 MMOL/L (ref 5–15)
AST SERPL-CCNC: 15 U/L (ref 15–37)
BILIRUB SERPL-MCNC: 0.4 MG/DL (ref 0.2–1)
BUN SERPL-MCNC: 46 MG/DL (ref 6–20)
BUN/CREAT SERPL: 17 (ref 12–20)
CALCIUM SERPL-MCNC: 8.9 MG/DL (ref 8.5–10.1)
CHLORIDE SERPL-SCNC: 111 MMOL/L (ref 97–108)
CHOLEST SERPL-MCNC: 84 MG/DL
CK SERPL-CCNC: 100 U/L (ref 39–308)
CO2 SERPL-SCNC: 25 MMOL/L (ref 21–32)
CREAT SERPL-MCNC: 2.64 MG/DL (ref 0.7–1.3)
EST. AVERAGE GLUCOSE BLD GHB EST-MCNC: 154 MG/DL
GLOBULIN SER CALC-MCNC: 3.3 G/DL (ref 2–4)
GLUCOSE SERPL-MCNC: 162 MG/DL (ref 65–100)
HBA1C MFR BLD: 7 % (ref 4–5.6)
HDLC SERPL-MCNC: 38 MG/DL
HDLC SERPL: 2.2 {RATIO} (ref 0–5)
LDLC SERPL CALC-MCNC: 28.2 MG/DL (ref 0–100)
POTASSIUM SERPL-SCNC: 5.1 MMOL/L (ref 3.5–5.1)
PROT SERPL-MCNC: 6.9 G/DL (ref 6.4–8.2)
SODIUM SERPL-SCNC: 139 MMOL/L (ref 136–145)
TRIGL SERPL-MCNC: 89 MG/DL (ref ?–150)
VLDLC SERPL CALC-MCNC: 17.8 MG/DL

## 2022-07-08 NOTE — PROGRESS NOTES
Cholesterol levels are optimal.  Continue rosuvastatin 10 mg nightly. Hemoglobin A1c shows adequate control of diabetes. Continue current medications and avoid concentrated sweets and excess carbohydrates in diet. Metabolic panel shows patient to be moderately dehydrated. Please drink more water. Kidney functions remain stable but low and in stage IV chronic kidney disease. I suspect this will mildly improve if patient drinks more water. Avoid NSAIDs such as Advil, Aleve, ibuprofen, naproxen sodium, and Motrin. Tylenol is okay.

## 2022-07-19 RX ORDER — NITROGLYCERIN 0.4 MG/1
TABLET SUBLINGUAL
Qty: 25 TABLET | Refills: 3 | Status: SHIPPED | OUTPATIENT
Start: 2022-07-19

## 2022-07-20 DIAGNOSIS — M48.061 SPINAL STENOSIS OF LUMBAR REGION WITHOUT NEUROGENIC CLAUDICATION: ICD-10-CM

## 2022-07-20 DIAGNOSIS — E78.00 HYPERCHOLESTEROLEMIA: Primary | ICD-10-CM

## 2022-07-20 NOTE — PROGRESS NOTES
Called patient no answer so I left a voicemail for them to call the office back, letter also sent to patient.

## 2022-07-21 RX ORDER — GABAPENTIN 300 MG/1
CAPSULE ORAL
Qty: 180 CAPSULE | Refills: 1 | Status: SHIPPED | OUTPATIENT
Start: 2022-07-21

## 2022-07-25 ENCOUNTER — TELEPHONE (OUTPATIENT)
Dept: INTERNAL MEDICINE CLINIC | Age: 87
End: 2022-07-25

## 2022-07-29 RX ORDER — SIMVASTATIN 20 MG/1
20 TABLET, FILM COATED ORAL
Qty: 90 TABLET | Refills: 1 | Status: SHIPPED | OUTPATIENT
Start: 2022-07-29

## 2022-07-29 NOTE — TELEPHONE ENCOUNTER
----- Message from Deirdre Stuart Maria sent at 7/29/2022  4:02 PM EDT -----  Spoke to patient and provided them with the results. Patient stated since he started the rosuvastatin he has had chest pain patient would like to switch back to simvastatin.

## 2022-08-15 NOTE — TELEPHONE ENCOUNTER
Last Refill: 4/8/21  Last Visit: 6/30/2022   Next Visit: 11/1/2022    Requested Prescriptions     Pending Prescriptions Disp Refills    glipiZIDE (GLUCOTROL) 10 mg tablet 360 Tablet 3     Sig: Take 2 Tablets by mouth two (2) times a day. Take 2 tablets by mouth twice a day.

## 2022-08-16 RX ORDER — GLIPIZIDE 10 MG/1
20 TABLET ORAL 2 TIMES DAILY
Qty: 360 TABLET | Refills: 1 | Status: SHIPPED | OUTPATIENT
Start: 2022-08-16

## 2022-08-22 ENCOUNTER — APPOINTMENT (OUTPATIENT)
Dept: CT IMAGING | Age: 87
End: 2022-08-22
Attending: STUDENT IN AN ORGANIZED HEALTH CARE EDUCATION/TRAINING PROGRAM
Payer: MEDICARE

## 2022-08-22 ENCOUNTER — HOSPITAL ENCOUNTER (EMERGENCY)
Age: 87
Discharge: HOME | End: 2022-08-22
Attending: STUDENT IN AN ORGANIZED HEALTH CARE EDUCATION/TRAINING PROGRAM
Payer: MEDICARE

## 2022-08-22 VITALS
HEART RATE: 82 BPM | WEIGHT: 140 LBS | BODY MASS INDEX: 22.5 KG/M2 | HEIGHT: 66 IN | TEMPERATURE: 98.7 F | OXYGEN SATURATION: 96 % | SYSTOLIC BLOOD PRESSURE: 137 MMHG | RESPIRATION RATE: 19 BRPM | DIASTOLIC BLOOD PRESSURE: 56 MMHG

## 2022-08-22 DIAGNOSIS — R10.30 INGUINAL PAIN, UNSPECIFIED LATERALITY: Primary | ICD-10-CM

## 2022-08-22 LAB
ALBUMIN SERPL-MCNC: 3.5 G/DL (ref 3.5–5)
ALBUMIN/GLOB SERPL: 0.9 {RATIO} (ref 1.1–2.2)
ALP SERPL-CCNC: 77 U/L (ref 45–117)
ALT SERPL-CCNC: 20 U/L (ref 12–78)
ANION GAP SERPL CALC-SCNC: 8 MMOL/L (ref 5–15)
APPEARANCE UR: CLEAR
AST SERPL-CCNC: 20 U/L (ref 15–37)
BACTERIA URNS QL MICRO: NEGATIVE /HPF
BILIRUB SERPL-MCNC: 0.4 MG/DL (ref 0.2–1)
BILIRUB UR QL: NEGATIVE
BUN SERPL-MCNC: 48 MG/DL (ref 6–20)
BUN/CREAT SERPL: 17 (ref 12–20)
CALCIUM SERPL-MCNC: 8.9 MG/DL (ref 8.5–10.1)
CHLORIDE SERPL-SCNC: 106 MMOL/L (ref 97–108)
CO2 SERPL-SCNC: 23 MMOL/L (ref 21–32)
COLOR UR: ABNORMAL
CREAT SERPL-MCNC: 2.8 MG/DL (ref 0.7–1.3)
EPITH CASTS URNS QL MICRO: ABNORMAL /LPF
ERYTHROCYTE [DISTWIDTH] IN BLOOD BY AUTOMATED COUNT: 12.3 % (ref 11.5–14.5)
GLOBULIN SER CALC-MCNC: 3.7 G/DL (ref 2–4)
GLUCOSE SERPL-MCNC: 345 MG/DL (ref 65–100)
GLUCOSE UR STRIP.AUTO-MCNC: 500 MG/DL
HCT VFR BLD AUTO: 35.5 % (ref 36.6–50.3)
HGB BLD-MCNC: 11.2 G/DL (ref 12.1–17)
HGB UR QL STRIP: NEGATIVE
HYALINE CASTS URNS QL MICRO: ABNORMAL /LPF (ref 0–2)
KETONES UR QL STRIP.AUTO: NEGATIVE MG/DL
LEUKOCYTE ESTERASE UR QL STRIP.AUTO: NEGATIVE
LIPASE SERPL-CCNC: 72 U/L (ref 73–393)
MCH RBC QN AUTO: 29.6 PG (ref 26–34)
MCHC RBC AUTO-ENTMCNC: 31.5 G/DL (ref 30–36.5)
MCV RBC AUTO: 93.9 FL (ref 80–99)
NITRITE UR QL STRIP.AUTO: NEGATIVE
NRBC # BLD: 0 K/UL (ref 0–0.01)
NRBC BLD-RTO: 0 PER 100 WBC
PH UR STRIP: 5.5 [PH] (ref 5–8)
PLATELET # BLD AUTO: 131 K/UL (ref 150–400)
PMV BLD AUTO: 10.8 FL (ref 8.9–12.9)
POTASSIUM SERPL-SCNC: 4.9 MMOL/L (ref 3.5–5.1)
PROT SERPL-MCNC: 7.2 G/DL (ref 6.4–8.2)
PROT UR STRIP-MCNC: 300 MG/DL
RBC # BLD AUTO: 3.78 M/UL (ref 4.1–5.7)
RBC #/AREA URNS HPF: ABNORMAL /HPF (ref 0–5)
SODIUM SERPL-SCNC: 137 MMOL/L (ref 136–145)
SP GR UR REFRACTOMETRY: 1.01
UR CULT HOLD, URHOLD: NORMAL
UROBILINOGEN UR QL STRIP.AUTO: 0.2 EU/DL (ref 0.2–1)
WBC # BLD AUTO: 9.3 K/UL (ref 4.1–11.1)
WBC URNS QL MICRO: ABNORMAL /HPF (ref 0–4)

## 2022-08-22 PROCEDURE — 99284 EMERGENCY DEPT VISIT MOD MDM: CPT

## 2022-08-22 PROCEDURE — 74176 CT ABD & PELVIS W/O CONTRAST: CPT

## 2022-08-22 PROCEDURE — 80053 COMPREHEN METABOLIC PANEL: CPT

## 2022-08-22 PROCEDURE — 74011250636 HC RX REV CODE- 250/636: Performed by: STUDENT IN AN ORGANIZED HEALTH CARE EDUCATION/TRAINING PROGRAM

## 2022-08-22 PROCEDURE — 83690 ASSAY OF LIPASE: CPT

## 2022-08-22 PROCEDURE — 81001 URINALYSIS AUTO W/SCOPE: CPT

## 2022-08-22 PROCEDURE — 36415 COLL VENOUS BLD VENIPUNCTURE: CPT

## 2022-08-22 PROCEDURE — 85027 COMPLETE CBC AUTOMATED: CPT

## 2022-08-22 RX ORDER — POLYETHYLENE GLYCOL 3350 17 G/17G
17 POWDER, FOR SOLUTION ORAL DAILY
Qty: 235 G | Refills: 0 | Status: SHIPPED | OUTPATIENT
Start: 2022-08-22

## 2022-08-22 RX ADMIN — SODIUM CHLORIDE 500 ML: 9 INJECTION, SOLUTION INTRAVENOUS at 19:14

## 2022-08-22 NOTE — ED PROVIDER NOTES
EMERGENCY DEPARTMENT HISTORY AND PHYSICAL EXAM      Date: 8/22/2022  Patient Name: Lora Giron    History of Presenting Illness     Chief Complaint   Patient presents with    Groin Pain     Left sided groin pain ; dispatch for testicular pain but pt reports no testicular pain. The primary told him he had a hernia there. No vomiting. No nausea. Bgl by rescy 333; second bgl 316 by rescue. Arrives from home. HPI: Lora Giron, 80 y.o. male presents to the ED with cc of left groin pain. This has been an issue for about 3 months. Saw his primary care doctor initially, who told him that he probably has a hernia. Provided information for follow-up for surgery, however he never did. States that over the past month the pain has been worse, he notices it more with walking and bearing down. He has not noticed any increasing swelling in that region. The pain is better when he puts a hot water pack on it. He has a long history of constipation on and off, this has been worse over the past 6 months. His last bowel movement was 2 days ago, it was very hard. He is passing gas. Reports intermittent poor appetite. Denies vomiting or fevers. He is not taking anything for the constipation at home. He denies dysuria or hematuria. There are no other complaints, changes, or physical findings at this time. PCP: Daphney Strong NP    No current facility-administered medications on file prior to encounter. Current Outpatient Medications on File Prior to Encounter   Medication Sig Dispense Refill    glipiZIDE (GLUCOTROL) 10 mg tablet Take 2 Tablets by mouth two (2) times a day. Take 2 tablets by mouth twice a day. 360 Tablet 1    simvastatin (ZOCOR) 20 mg tablet Take 1 Tablet by mouth nightly.  90 Tablet 1    gabapentin (NEURONTIN) 300 mg capsule TAKE 1 CAPSULE BY MOUTH TWICE DAILY 180 Capsule 1    nitroglycerin (NITROSTAT) 0.4 mg SL tablet DISSOLVE 1 TABLET UNDER THE TONGUE EVERY 5 MINUTES AS NEEDED FOR CHEST PAIN 25 Tablet 3    glucose blood VI test strips (Accu-Chek Guide test strips) strip USE 1 STRIP TO TEST BLOOD SUGAR EVERY DAY Dx E11.9 100 Strip 2    dilTIAZem ER (CARDIZEM CD) 300 mg capsule TAKE 1 CAPSULE BY MOUTH DAILY 90 Capsule 1    colesevelam (WELCHOL) 625 mg tablet Take 3 tablets by mouth twice daily 540 Tablet 2    Tradjenta 5 mg tablet TAKE 1 TABLET BY MOUTH DAILY 90 Tablet 1    tamsulosin (FLOMAX) 0.4 mg capsule TAKE 2 CAPSULES BY MOUTH DAILY WITH THE EVENING MEAL 180 Capsule 1    furosemide (LASIX) 20 mg tablet Take 20 mg by mouth daily. hydrALAZINE (APRESOLINE) 25 mg tablet TAKE 1 TABLET BY MOUTH TWICE DAILY 180 Tablet 3    clotrimazole-betamethasone (LOTRISONE) topical cream APPLY TO THE AFFECTED AREA TWICE DAILY AS NEEDED FOR SKIN IRRITATION 15 g 0    lancets (Accu-Chek Fastclix Lancet Drum) misc USE AS DIRECTED TO MONITOR BLOOD SUGAR 1 Each 6    cyanocobalamin (VITAMIN B12) 1,000 mcg/mL injection INJECT 1 ML SUBCUTANEOUS EVERY MONTH 1 mL 12    fluticasone propionate (FLONASE) 50 mcg/actuation nasal spray       Blood-Glucose Meter monitoring kit Use to test blood sugar once daily. 1 Kit 0    acetaminophen (TYLENOL EXTRA STRENGTH) 500 mg tablet Take  by mouth every six (6) hours as needed for Pain. ACCU-CHEK FASTCLIX misc USE AS DIRECTED TO MONITOR BLOOD SUGAR 100 Each 3    aspirin delayed-release 81 mg tablet Take 81 mg by mouth daily. Cholecalciferol, Vitamin D3, (VITAMIN D3) 1,000 unit cap Take 1,000 Units by mouth daily. ferrous sulfate 325 mg (65 mg iron) tablet Take 325 mg by mouth two (2) times a day.          Past History     Past Medical History:  Past Medical History:   Diagnosis Date    CAD (coronary artery disease)     mi    Chronic kidney disease     hx of elevated blood levels    Chronic pain     lower back    Diabetes ([address]     Enlarged prostate     Hypercholesterolemia     Hypertension     Other unknown and unspecified cause of morbidity or mortality hayfever       Past Surgical History:  Past Surgical History:   Procedure Laterality Date    HX CATARACT REMOVAL Bilateral     HX CHOLECYSTECTOMY      HX LUMBAR LAMINECTOMY  2017    HX ORTHOPAEDIC  2015    back surgery     AR CARDIAC SURG PROCEDURE UNLIST      bypass(), [id number], )    AR COLONOSCOPY FLX DX W/COLLJ SPEC WHEN PFRMD  2013         AR EGD TRANSORAL BIOPSY SINGLE/MULTIPLE  2012         VASCULAR SURGERY PROCEDURE UNLIST  1996    left carotid       Family History:  Family History   Problem Relation Age of Onset    Heart Disease Mother     Heart Disease Father        Social History:  Social History     Tobacco Use    Smoking status: Former     Types: Cigarettes     Quit date: 6/3/1967     Years since quittin.2    Smokeless tobacco: Never   Vaping Use    Vaping Use: Never used   Substance Use Topics    Alcohol use: No    Drug use: No       Allergies: Allergies   Allergen Reactions    Tetanus Vaccines And Toxoid Swelling     Swelling at the site    Tradjenta [Linagliptin] Diarrhea     headache         Review of Systems   no fever  No ear pain  No eye pain  no shortness of breath  no chest pain  No vomiting  no dysuria  no leg pain  No rash  No lymphadenopathy  No weight loss    Physical Exam   Physical Exam  Constitutional:       General: He is not in acute distress. Appearance: He is not toxic-appearing. HENT:      Head: Normocephalic and atraumatic. Eyes:      Extraocular Movements: Extraocular movements intact. Cardiovascular:      Rate and Rhythm: Normal rate and regular rhythm. Pulmonary:      Effort: Pulmonary effort is normal.      Breath sounds: Normal breath sounds. Abdominal:      Palpations: Abdomen is soft. Tenderness: There is no abdominal tenderness.       Comments: Tender to palpation in the left inguinal region without masses, erythema or skin changes   Genitourinary:     Penis: Normal.       Testes: Normal.   Musculoskeletal: General: No deformity. Cervical back: Neck supple. Skin:     General: Skin is warm and dry. Neurological:      General: No focal deficit present. Mental Status: He is alert and oriented to person, place, and time. Psychiatric:         Mood and Affect: Mood normal.       Diagnostic Study Results     Labs -     Recent Results (from the past 24 hour(s))   CBC W/O DIFF    Collection Time: 08/22/22  2:24 PM   Result Value Ref Range    WBC 9.3 4.1 - 11.1 K/uL    RBC 3.78 (L) 4.10 - 5.70 M/uL    HGB 11.2 (L) 12.1 - 17.0 g/dL    HCT 35.5 (L) 36.6 - 50.3 %    MCV 93.9 80.0 - 99.0 FL    MCH 29.6 26.0 - 34.0 PG    MCHC 31.5 30.0 - 36.5 g/dL    RDW 12.3 11.5 - 14.5 %    PLATELET 395 (L) 807 - 400 K/uL    MPV 10.8 8.9 - 12.9 FL    NRBC 0.0 0  WBC    ABSOLUTE NRBC 0.00 0.00 - 2.38 K/uL   METABOLIC PANEL, COMPREHENSIVE    Collection Time: 08/22/22  2:24 PM   Result Value Ref Range    Sodium 137 136 - 145 mmol/L    Potassium 4.9 3.5 - 5.1 mmol/L    Chloride 106 97 - 108 mmol/L    CO2 23 21 - 32 mmol/L    Anion gap 8 5 - 15 mmol/L    Glucose 345 (H) 65 - 100 mg/dL    BUN 48 (H) 6 - 20 MG/DL    Creatinine 2.80 (H) 0.70 - 1.30 MG/DL    BUN/Creatinine ratio 17 12 - 20      GFR est AA 26 (L) >60 ml/min/1.73m2    GFR est non-AA 22 (L) >60 ml/min/1.73m2    Calcium 8.9 8.5 - 10.1 MG/DL    Bilirubin, total 0.4 0.2 - 1.0 MG/DL    ALT (SGPT) 20 12 - 78 U/L    AST (SGOT) 20 15 - 37 U/L    Alk.  phosphatase 77 45 - 117 U/L    Protein, total 7.2 6.4 - 8.2 g/dL    Albumin 3.5 3.5 - 5.0 g/dL    Globulin 3.7 2.0 - 4.0 g/dL    A-G Ratio 0.9 (L) 1.1 - 2.2     URINALYSIS W/ RFLX MICROSCOPIC    Collection Time: 08/22/22  2:24 PM   Result Value Ref Range    Color YELLOW/STRAW      Appearance CLEAR CLEAR      Specific gravity 1.015      pH (UA) 5.5 5.0 - 8.0      Protein 300 (A) NEG mg/dL    Glucose 500 (A) NEG mg/dL    Ketone Negative NEG mg/dL    Bilirubin Negative NEG      Blood Negative NEG      Urobilinogen 0.2 0.2 - 1.0 EU/dL Nitrites Negative NEG      Leukocyte Esterase Negative NEG      WBC 0-4 0 - 4 /hpf    RBC 0-5 0 - 5 /hpf    Epithelial cells FEW FEW /lpf    Bacteria Negative NEG /hpf    Hyaline cast 2-5 0 - 2 /lpf   LIPASE    Collection Time: 08/22/22  2:24 PM   Result Value Ref Range    Lipase 72 (L) 73 - 393 U/L   URINE CULTURE HOLD SAMPLE    Collection Time: 08/22/22  2:24 PM    Specimen: Serum   Result Value Ref Range    Urine culture hold        Urine on hold in Microbiology dept for 2 days. If unpreserved urine is submitted, it cannot be used for addtional testing after 24 hours, recollection will be required. Radiologic Studies -   CT ABD PELV WO CONT    (Results Pending)     CT Results  (Last 48 hours)      None          CXR Results  (Last 48 hours)      None              Medical Decision Making   I am the first provider for this patient. I reviewed the vital signs, available nursing notes, past medical history, past surgical history, family history and social history. Vital Signs-Reviewed the patient's vital signs. Patient Vitals for the past 24 hrs:   Temp Pulse Resp BP SpO2   08/22/22 1413 98.7 °F (37.1 °C) (!) 105 14 (!) 123/58 97 %         Provider Notes (Medical Decision Making):   41-year-old male presenting with left groin pain. Concern for possible hernia, lymphadenopathy, muscle strain or sprain. Testicular exam unremarkable. His abdominal exam is otherwise benign and nontender, unlikely any other acute intra-abdominal infection or obstruction. ED Course:     Initial assessment performed. The patients presenting problems have been discussed, and they are in agreement with the care plan formulated and outlined with them. I have encouraged them to ask questions as they arise throughout their visit. ED Course as of 08/24/22 1341   Mon Aug 22, 2022   2136 Assumed care of patient at this time. CT shows no signs of groin or inguinal pathology.   Discussed this with patient he he will follow-up with his surgeon. [JS]      ED Course User Index  [JS] Lara Vaz MD      CBC negative for leukocytosis or anemia, basic metabolic panel with normal renal function, Basic metabolic panel with elevated creatinine of 2.8, however not significantly changed from baseline CKD per chart review, otherwise no worrisome electrolyte abnormalities, UA is not suggestive of UTI. Critical Care Time:         Disposition:  Home    PLAN:  1. Current Discharge Medication List        2.    Follow-up Information    None       Return to ED if worse     Diagnosis     Clinical Impression: Acute left groin pain

## 2022-08-23 ENCOUNTER — PATIENT OUTREACH (OUTPATIENT)
Dept: CASE MANAGEMENT | Age: 87
End: 2022-08-23

## 2022-08-23 NOTE — ED NOTES
Pt discharged home with all personal belongings, all questions answered, no further concerns at this time

## 2022-08-23 NOTE — PROGRESS NOTES
Ambulatory Care Management Note    Date/Time:  8/23/2022 10:23 AM    This patient was received as a referral from Daily assignment. Ambulatory Care Manager outreached to patient today to offer care management services. Introduction to self and role of care manager provided. Patient accepted care management services at this time. Follow up call scheduled at this time. Patient has Ambulatory Care Manager's contact number for for any questions or concerns. Ambulatory Care Management Note    Date/Time:  8/23/2022 10:23 AM    This Ambulatory Care Manager (MANUEL) reviewed and updated the following screenings during this call; general assessment, self management assessment, and SDOH assessments    Patient's challenges to self-management identified:   functional physical ability, lack of knowledge about disease, level of motivation, medical condition, and polypharmacy      Medication Management:  good adherence and good understanding    Advance Care Planning:   Does patient have an Advance Directive:  yes; reviewed and current     [employer], Referrals, and Durable Medical Equipment: none      Health Maintenance Due   Topic Date Due    Shingrix Vaccine Age 50> (2 of 2) 05/15/2019    Foot Exam Q1  02/03/2022    DTaP/Tdap/Td series (2 - Td or Tdap) 06/05/2022     Health Maintenance Reviewed: will discuss on later call    Ambulatory Care Management Note      Date/Time:  8/23/2022 10:37 AM    This patient was received as a referral from Daily assignment. Top Challenges reviewed with the provider   Chronic constipation  Reports daily BMs, though they are \"hard\" & require straining  Doesn't like or drink water  Coffee beverage of choice  Poor appetite  Sedentary  GFR 22 (8/22/22)   (8/22/22);  A1C 7.0 on 7/6/22  Has had recent fall, unsure why  Does usually ambulate with walker         Ambulatory  contacted patient for discussion and case management of chronic constipation & improving diet/water intake   Summary of patients top problems:   Chronic constipation - Pt does report daily hard BMs which require straining to produce. He reports poor appetite with minimal water intake, he likes coffee. He was prescribed Miralax and encouraged to take this. He was provided with healthy food options to improve diet & prevent constipation. He was encouraged to drink water or diet lemonade/iced teas. Poor nutrition/dehydration secondary to constipation - Pt reports no appetite, likes coffee, doesn't drink much water. PCP/Specialist follow up:   Future Appointments   Date Time Provider Marcos Joiner   11/1/2022  3:00 PM Oswald Ramos NP [facility] AM[facility]           Goals        Follows diet/fluid recommendations and maintains goal weight      08/23/22  Pt has had ongoing issue of chronic constipation, he went to ED on 8/22/22 for groin pain and was prescribed Miralax. Pt reports poor appetite and poor water intake. His main beverage is coffee. He reports he does have a BM daily though it is hard and he has to strain a lot. Discussed taking the Miralax - put in coffee, tea, or juice; make need to take 2-3 doses to be effective. In the meantime, he was advised to increase water intake to 5-6 cups a day - he may flavor water or drink diet lemonade or diet iced tea. Discussed eating healthier foods that will also help prevent constipation - such as plenty of fruits, veggies, beans, salads, whole grains, and prunes. Also spoke with his wife who said she would help pt with this. Discussed eating smaller meals with healthy snacks in between, pt liked this idea. Pt will:  Eat 2-3 small meals a day  Eat 2-3 healthy snacks a day (popcorn, jello, veggies, fruit, yogurt, hummus, dried fruit)  Eat at least 1 veggie per day  Eat at least 1 fruit per day  Drink 5-6 glasses of water/lemonade/iced tea a day  This ACM will follow up with pt & wife in 1 week. Wife has this ACM's contact info.  -MLL                 Patient verbalized understanding of all information discussed. Patient has this Ambulatory Care Manager's contact information for any further questions, concerns, or needs.

## 2022-08-30 ENCOUNTER — TELEPHONE (OUTPATIENT)
Dept: INTERNAL MEDICINE CLINIC | Age: 87
End: 2022-08-30

## 2022-08-30 NOTE — TELEPHONE ENCOUNTER
----- Message from Janie Ferreira sent at 8/24/2022 11:37 AM EDT -----  Subject: Referral Request    Reason for referral request? Pt would like referral for GI or whom ever   can see him for constipation. Provider patient wants to be referred to(if known):     Provider Phone Number(if known): Additional Information for Provider?   ---------------------------------------------------------------------------  --------------  [id number]    [phone];  Do not leave any message, patient will call back for answer  ---------------------------------------------------------------------------  --------------

## 2022-09-01 DIAGNOSIS — N40.0 BENIGN LOCALIZED HYPERPLASIA OF PROSTATE: ICD-10-CM

## 2022-09-01 RX ORDER — TAMSULOSIN HYDROCHLORIDE 0.4 MG/1
CAPSULE ORAL
Qty: 180 CAPSULE | Refills: 1 | Status: SHIPPED | OUTPATIENT
Start: 2022-09-01

## 2022-09-12 ENCOUNTER — HOSPITAL ENCOUNTER (OUTPATIENT)
Age: 87
Setting detail: OUTPATIENT SURGERY
End: 2022-09-12
Attending: INTERNAL MEDICINE | Admitting: INTERNAL MEDICINE

## 2022-09-12 ENCOUNTER — PATIENT OUTREACH (OUTPATIENT)
Dept: CASE MANAGEMENT | Age: 87
End: 2022-09-12

## 2022-09-12 ENCOUNTER — HOSPITAL ENCOUNTER (OUTPATIENT)
Dept: GENERAL RADIOLOGY | Age: 87
Discharge: HOME | End: 2022-09-12
Payer: MEDICARE

## 2022-09-12 ENCOUNTER — TRANSCRIBE ORDER (OUTPATIENT)
Dept: REGISTRATION | Age: 87
End: 2022-09-12

## 2022-09-12 DIAGNOSIS — I25.10 CORONARY ATHEROSCLEROSIS OF NATIVE CORONARY ARTERY: Primary | ICD-10-CM

## 2022-09-12 DIAGNOSIS — I25.10 CORONARY ATHEROSCLEROSIS OF NATIVE CORONARY ARTERY: ICD-10-CM

## 2022-09-12 DIAGNOSIS — R07.9 CHEST PAIN, UNSPECIFIED TYPE: ICD-10-CM

## 2022-09-12 PROCEDURE — 71046 X-RAY EXAM CHEST 2 VIEWS: CPT

## 2022-09-12 NOTE — PROGRESS NOTES
Goals Addressed                   This Visit's Progress     Follows diet/fluid recommendations and maintains goal weight   On track     09/12/22  Pt has been having fair appetite lately. Pt reports he is on a constant diet, he put himself on a diet  Pt does fair with veggies and eats some fruits  Pt drinks less than 1 cup of water a day per wife - he drinks 4-5 cups of coffee a day and diet coke for dinner  Suggested swapping the diet coke for iced tea/lemonade/flavored water  Wife will continue to encourage water to drink  Pt taking Miralax occ; takes wheat germ with applesauce   Wt this am was 137 with clothes/keys/wallet (at Radiology - having left heart cath on 9/16)  This ACM will follow up with pt in 1-2 weeks. -[facility]    08/23/22  Pt has had ongoing issue of chronic constipation, he went to ED on 8/22/22 for groin pain and was prescribed Pt Miralax. Pt reports poor appetite and poor water intake. His main beverage is coffee. He reports he does have a BM daily though it is hard and he has to strain a lot. Discussed taking the Miralax - put in coffee, tea, or juice; make need to take 2-3 doses to be effective. In the meantime, he was advised to increase water intake to 5-6 cups a day - he may flavor water or drink diet lemonade or diet iced tea. Discussed eating healthier foods that will also help prevent constipation - such as plenty of fruits, veggies, beans, salads, whole grains, and prunes. Also spoke with his wife who said she would help pt with this. Discussed eating smaller meals with healthy snacks in between, pt liked this idea. Pt will:  Eat 2-3 small meals a day  Eat 2-3 healthy snacks a day (popcorn, jello, veggies, fruit, yogurt, hummus, dried fruit)  Eat at least 1 veggie per day  Eat at least 1 fruit per day  Drink 5-6 glasses of water/lemonade/iced tea a day  This ACM will follow up with pt & wife in 1 week. Wife has this ACM's contact info.  -[facility]

## 2022-10-04 ENCOUNTER — PATIENT OUTREACH (OUTPATIENT)
Dept: CASE MANAGEMENT | Age: 87
End: 2022-10-04

## 2022-10-04 NOTE — PROGRESS NOTES
Patient has graduated from the Complex Case Management  program on 10/04/22 . Patient/family has the ability to self-manage at this time. Care management goals have been completed. No further Ambulatory Care Manager follow up scheduled. Goals Addressed                   This Visit's Progress     COMPLETED: Follows diet/fluid recommendations and maintains goal weight        10/04/22  Heart cath that was scheduled for 9/16  was canceled, pt not sure why, he thinks it was insurance related as he received a letter from the insurance co  He plans on calling Cards tomorrow  Pt reports overall he is feeling well  Weight remains around 137 lbs  He reports drinking more tea, less coffee & working on increasing water  Not taking Miralax at this time, though pt asked about dosing. Discussed importance of water related to constipation  Greg Gray, MSN, RN - Ambulatory Care Manager - [phone]       09/12/22  Pt has been having fair appetite lately. Pt reports he is on a constant diet, he put himself on a diet  Pt does fair with veggies and eats some fruits  Pt drinks less than 1 cup of water a day per wife - he drinks 4-5 cups of coffee a day and diet coke for dinner  Suggested swapping the diet coke for iced tea/lemonade/flavored water  Wife will continue to encourage water to drink  Pt taking Miralax occ; takes wheat germ with applesauce   Wt this am was 137 with clothes/keys/wallet (at Radiology - having left heart cath on 9/16)  This ACM will follow up with pt in 1-2 weeks. -MLL    08/23/22  Pt has had ongoing issue of chronic constipation, he went to ED on 8/22/22 for groin pain and was prescribed Pt Miralax. Pt reports poor appetite and poor water intake. His main beverage is coffee. He reports he does have a BM daily though it is hard and he has to strain a lot. Discussed taking the Miralax - put in coffee, tea, or juice; make need to take 2-3 doses to be effective.  In the meantime, he was advised to increase water intake to 5-6 cups a day - he may flavor water or drink diet lemonade or diet iced tea. Discussed eating healthier foods that will also help prevent constipation - such as plenty of fruits, veggies, beans, salads, whole grains, and prunes. Also spoke with his wife who said she would help pt with this. Discussed eating smaller meals with healthy snacks in between, pt liked this idea. Pt will:  Eat 2-3 small meals a day  Eat 2-3 healthy snacks a day (popcorn, jello, veggies, fruit, yogurt, hummus, dried fruit)  Eat at least 1 veggie per day  Eat at least 1 fruit per day  Drink 5-6 glasses of water/lemonade/iced tea a day  This ACM will follow up with pt & wife in 1 week. Wife has this ACM's contact info. -MLL                Patient has Ambulatory Care Manager's contact information for any further questions, concerns, or needs.   Patients upcoming visits:    Future Appointments   Date Time Provider Marcos Joiner   11/1/2022  3:00 PM Myrla Mcburney, NP [facility] AMB

## 2022-10-13 ENCOUNTER — HOSPITAL ENCOUNTER (OUTPATIENT)
Age: 87
Setting detail: OUTPATIENT SURGERY
Discharge: HOME | End: 2022-10-13
Attending: INTERNAL MEDICINE | Admitting: INTERNAL MEDICINE
Payer: MEDICARE

## 2022-10-13 VITALS
TEMPERATURE: 97.9 F | HEART RATE: 58 BPM | WEIGHT: 140 LBS | SYSTOLIC BLOOD PRESSURE: 114 MMHG | BODY MASS INDEX: 22.5 KG/M2 | RESPIRATION RATE: 19 BRPM | HEIGHT: 66 IN | DIASTOLIC BLOOD PRESSURE: 34 MMHG | OXYGEN SATURATION: 97 %

## 2022-10-13 DIAGNOSIS — R07.9 CHEST PAIN, UNSPECIFIED TYPE: ICD-10-CM

## 2022-10-13 LAB
GLUCOSE BLD STRIP.AUTO-MCNC: 214 MG/DL (ref 65–117)
SERVICE CMNT-IMP: ABNORMAL

## 2022-10-13 PROCEDURE — 82962 GLUCOSE BLOOD TEST: CPT

## 2022-10-13 PROCEDURE — 74011250637 HC RX REV CODE- 250/637: Performed by: INTERNAL MEDICINE

## 2022-10-13 RX ORDER — GUAIFENESIN 100 MG/5ML
81 LIQUID (ML) ORAL
Status: COMPLETED | OUTPATIENT
Start: 2022-10-13 | End: 2022-10-13

## 2022-10-13 RX ADMIN — ASPIRIN 81 MG CHEWABLE TABLET 81 MG: 81 TABLET CHEWABLE at 08:46

## 2022-10-13 NOTE — PROGRESS NOTES
Cardiac Cath Lab Recovery Arrival Note:      Rick Melton arrived to Cardiac Cath Lab, Recovery Area. Staff introduced to patient. Patient identifiers verified with NAME and DATE OF BIRTH. Procedure verified with patient. Consent forms reviewed and signed by patient or authorized representative and verified. Allergies verified. Patient and family oriented to department. Patient and family informed of procedure and plan of care. Questions answered with review. Patient prepped for procedure, per orders from physician, prior to arrival.    Patient on cardiac monitor, non-invasive blood pressure, SPO2 monitor. On room air. Patient is A&Ox 3. Patient reports no c/o. Patient in stretcher, in low position, with side rails up, call bell within reach, patient instructed to call if assistance as needed. Patient prep in: [address], [place]. Patient family has pager # none  Family in: [facility] [facility] waiting area.    Prep by: Romeo Cason RN

## 2022-10-13 NOTE — PROGRESS NOTES
Primary Nurse Irma Villela RN and Michelle Lebron RN performed a dual skin assessment on this patient No impairment noted  Kosta score is 21

## 2022-10-13 NOTE — PROGRESS NOTES
Provider Lopez at bedside indicated patient's creatinine higher than he was comfortable with. Discussed options with patient and decided to cancel procedure and treat with medication. Patient IV removed without complication. Patient escorted to lobby with wife.

## 2022-11-01 PROBLEM — E11.22 STAGE 4 CHRONIC KIDNEY DISEASE DUE TO DIABETES MELLITUS (HCC): Status: ACTIVE | Noted: 2022-01-01

## 2022-11-01 NOTE — PROGRESS NOTES
Levar Ward is a 80 y.o. male     Chief Complaint   Patient presents with    Hypertension     4M follow up       Visit Vitals  /70 (BP 1 Location: Left arm, BP Patient Position: Sitting, BP Cuff Size: Adult)   Pulse 60   Temp 98.5 °F (36.9 °C) (Oral)   Resp 16   Ht 5' 6\" (1.676 m)   Wt 144 lb 9.6 oz (65.6 kg)   SpO2 97%   BMI 23.34 kg/m²       Health Maintenance Due   Topic Date Due    Shingrix Vaccine Age 50> (2 of 2) 05/15/2019    Foot Exam Q1  02/03/2022    DTaP/Tdap/Td series (2 - Td or Tdap) 06/05/2022    Flu Vaccine (1) 08/01/2022         1. \"Have you been to the ER, urgent care clinic since your last visit? Hospitalized since your last visit? \" No    2. \"Have you seen or consulted any other health care providers outside of the [address] [place] [address] since your last visit? \" No     3. For patients aged 39-70: Has the patient had a colonoscopy / FIT/ Cologuard? NA - based on age      If the patient is female:    4. For patients aged 41-77: Has the patient had a mammogram within the past 2 years? NA - based on age or sex      11. For patients aged 21-65: Has the patient had a pap smear? NA - based on age or sex    Administered influenza vaccine in left deltoid patient tolerated injection well.     RUST#:[id number]    Exp:June 14 2023    ABK:[id number]

## 2022-11-01 NOTE — PROGRESS NOTES
Chief Complaint   Patient presents with    Hypertension     4M follow up       SUBJECTIVE:    Alex Marmolejo is a 80 y.o. male who is here today for a follow up appointment regarding his current medical conditions which include: Essential hypertension, hypercholesterolemia, type 2 diabetes with neuropathy and stage IV chronic kidney disease. He states he is feeling fairly well overall, and denies any new or acute complaints. The patient continues to take his blood pressure medication as prescribed, and denies any adverse side effects. His blood pressure appears well controlled at this time. He denies any recent episodes of chest pain, chest pressure, shortness of breath, headaches, dizziness, blurred vision, palpitations, or syncope episodes. He continues to take simvastatin 20 mg daily for management of his cholesterol and tolerating this well. His last LDL was at goal.    He continues to take medication for management of his type 2 diabetes. He denies any hypoglycemic episodes and is checking his blood sugars on a regular basis. His last hemoglobin A1c was approximately 7.0%. He continues to have stage IV chronic kidney disease. His last GFR was 22 mL/min. He states he is rather weak at times, but denies any falls. He states he is eating, but does not have as good of an appetite as he once did. Current Outpatient Medications   Medication Sig Dispense Refill    tamsulosin (FLOMAX) 0.4 mg capsule TAKE 2 CAPSULES BY MOUTH DAILY WITH THE EVENING MEAL 180 Capsule 1    polyethylene glycol (Miralax) 17 gram/dose powder Take 17 g by mouth daily. 1 tablespoon with 8 oz of water daily 235 g 0    glipiZIDE (GLUCOTROL) 10 mg tablet Take 2 Tablets by mouth two (2) times a day. Take 2 tablets by mouth twice a day. 360 Tablet 1    simvastatin (ZOCOR) 20 mg tablet Take 1 Tablet by mouth nightly.  90 Tablet 1    gabapentin (NEURONTIN) 300 mg capsule TAKE 1 CAPSULE BY MOUTH TWICE DAILY 180 Capsule 1 nitroglycerin (NITROSTAT) 0.4 mg SL tablet DISSOLVE 1 TABLET UNDER THE TONGUE EVERY 5 MINUTES AS NEEDED FOR CHEST PAIN 25 Tablet 3    glucose blood VI test strips (Accu-Chek Guide test strips) strip USE 1 STRIP TO TEST BLOOD SUGAR EVERY DAY Dx E11.9 100 Strip 2    dilTIAZem ER (CARDIZEM CD) 300 mg capsule TAKE 1 CAPSULE BY MOUTH DAILY 90 Capsule 1    colesevelam (WELCHOL) 625 mg tablet Take 3 tablets by mouth twice daily 540 Tablet 2    Tradjenta 5 mg tablet TAKE 1 TABLET BY MOUTH DAILY 90 Tablet 1    furosemide (LASIX) 20 mg tablet Take 20 mg by mouth daily. hydrALAZINE (APRESOLINE) 25 mg tablet TAKE 1 TABLET BY MOUTH TWICE DAILY 180 Tablet 3    clotrimazole-betamethasone (LOTRISONE) topical cream APPLY TO THE AFFECTED AREA TWICE DAILY AS NEEDED FOR SKIN IRRITATION 15 g 0    lancets (Accu-Chek Fastclix Lancet Drum) misc USE AS DIRECTED TO MONITOR BLOOD SUGAR 1 Each 6    cyanocobalamin (VITAMIN B12) 1,000 mcg/mL injection INJECT 1 ML SUBCUTANEOUS EVERY MONTH 1 mL 12    fluticasone propionate (FLONASE) 50 mcg/actuation nasal spray       Blood-Glucose Meter monitoring kit Use to test blood sugar once daily. 1 Kit 0    acetaminophen (TYLENOL) 500 mg tablet Take  by mouth every six (6) hours as needed for Pain. ACCU-CHEK FASTCLIX misc USE AS DIRECTED TO MONITOR BLOOD SUGAR 100 Each 3    aspirin delayed-release 81 mg tablet Take 81 mg by mouth daily. cholecalciferol (VITAMIN D3) 25 mcg (1,000 unit) cap Take 1,000 Units by mouth daily. ferrous sulfate 325 mg (65 mg iron) tablet Take 325 mg by mouth two (2) times a day.        Past Medical History:   Diagnosis Date    CAD (coronary artery disease)     mi    Chronic kidney disease     hx of elevated blood levels    Chronic pain     lower back    Diabetes ([address]     Enlarged prostate     Hypercholesterolemia     Hypertension     Other unknown and unspecified cause of morbidity or mortality     hayfever     Past Surgical History:   Procedure Laterality Date HX CATARACT REMOVAL Bilateral     HX CHOLECYSTECTOMY      HX LUMBAR LAMINECTOMY  09/2017    HX ORTHOPAEDIC  09/23/2015    back surgery     OK CARDIAC SURG PROCEDURE UNLIST      bypass(1985), SNRTBF(8781, 2004)    OK COLONOSCOPY FLX DX W/COLLJ SPEC WHEN PFRMD  8/12/2013         OK EGD TRANSORAL BIOPSY SINGLE/MULTIPLE  6/28/2012         VASCULAR SURGERY PROCEDURE UNLIST  2/1996    left carotid     Allergies   Allergen Reactions    Tetanus Vaccines And Toxoid Swelling     Swelling at the site    Tradjenta [Linagliptin] Diarrhea     headache       REVIEW OF SYSTEMS:                                        POSITIVE= bold text  Negative = regular text    General:                     fever, chills, sweats, generalized fatigue, weight loss/gain,                                       loss of appetite   Eyes:                           blurred vision, eye pain, loss of vision, double vision  ENT:                            rhinorrhea, pharyngitis   Respiratory:               cough, sputum production, SOB, WOLF, wheezing, pleuritic pain   Cardiology:                chest pain, palpitations, orthopnea, PND, edema, syncope   Gastrointestinal:       abdominal pain , N/V, diarrhea, dysphagia, constipation, bleeding   Genitourinary:           frequency, urgency, dysuria, hematuria, incontinence   Muskuloskeletal :      arthralgia, myalgia, back pain  Hematology:              easy bruising, nose or gum bleeding, lymphadenopathy   Dermatological:         rash, ulceration, pruritis, color change / jaundice  Endocrine:                 hot flashes or polydipsia   Neurological:             headache, dizziness, confusion, focal weakness, paresthesia,                                      Speech difficulties, memory loss, gait difficulty  Psychological:          Feelings of anxiety, depression, agitation        Social History     Socioeconomic History    Marital status:    Tobacco Use    Smoking status: Former     Types: Cigarettes Quit date: 6/3/1967     Years since quittin.4    Smokeless tobacco: Never   Vaping Use    Vaping Use: Never used   Substance and Sexual Activity    Alcohol use: No    Drug use: No    Sexual activity: Not Currently     Social Determinants of Health     Financial Resource Strain: Low Risk     Difficulty of Paying Living Expenses: Not hard at all   Food Insecurity: No Food Insecurity    Worried About Running Out of Food in the Last Year: Never true    Ran Out of Food in the Last Year: Never true   Transportation Needs: No Transportation Needs    Lack of Transportation (Medical): No    Lack of Transportation (Non-Medical): No   Physical Activity: Inactive    Days of Exercise per Week: 0 days    Minutes of Exercise per Session: 0 min   Housing Stability: Low Risk     Unable to Pay for Housing in the Last Year: No    Number of Places Lived in the Last Year: 1    Unstable Housing in the Last Year: No     Family History   Problem Relation Age of Onset    Heart Disease Mother     Heart Disease Father        OBJECTIVE:     Visit Vitals  /70 (BP 1 Location: Left arm, BP Patient Position: Sitting, BP Cuff Size: Adult)   Pulse 60   Temp 98.5 °F (36.9 °C) (Oral)   Resp 16   Ht 5' 6\" (1.676 m)   Wt 144 lb 9.6 oz (65.6 kg)   SpO2 97%   BMI 23.34 kg/m²       Constitutional: He appears well nourished, of stated age, and dressed appropriately. Eyes: Sclera anicteric, PERRLA, EOMI  Neck: Supple without lymphadenopathy. Thyroid normal, No JVD or bruits  Respiratory: Clear to ascultation X5, normal inspiratory effort, no adventitious breath sounds. Cardiovascular: Regular rate and rhythm, no rubs or gallops, PMI not displaced, No thrills, no peripheral edema  Neuro: Non-focal exam, A & O X 3.  Guarded [place] and gait. Patient uses Rollator for mobility assistance. Psychiatric: Appropriate affect and demeanor, pleasant and cooperative. Patient's thought content and thought processing appear to be within normal limits. ASSESSMENT/PLAN:     ICD-10-CM ICD-9-CM    1. Type 2 diabetes mellitus with diabetic neuropathy, without long-term current use of insulin ([facility])  E11.40 250.60      357.2       2. Stage 4 chronic kidney disease due to diabetes mellitus ([facility] [address]  E11.22 250.40     N18.4 585.4       3. Essential hypertension  I10 401.9       4. Hypercholesterolemia  E78.00 272.0       5. Needs flu shot  Z23 V04.81 INFLUENZA, FLUAD, (AGE 65 Y+), IM, PF, 0.5 ML      6. On statin therapy  Z79.899 V58.69         1: Flu vaccine administered the patient today. 2: Patient to continue current medications for management of type 2 diabetes. Patient appears to be stable with the current medication regimen. 3: Patient to continue to avoid NSAID use due to stage IV chronic kidney disease. 4: Patient to continue current medication for management of hypertension. 5: Patient to continue simvastatin for management of hypercholesterolemia. Patient stable on medication. 6: Continue healthy lifestyle management and appropriate dietary intake. 7: Patient to follow-up with me in approximately 3 months, or sooner as needed. Orders Placed This Encounter    Influenza, FLUAD, (age 72 y+), IM, PF, 0.5 mL         ATTENTION:   This medical record was transcribed using an electronic medical records system. Although proofread, it may and can contain electronic and spelling errors. Other human spelling and other errors may be present. Corrections may be executed at a later time. Please feel free to contact us for any clarifications as needed. Follow-up and Dispositions    Return in about 3 months (around 2/1/2023) for Follow up with fasting labs. SignedKirkor.  Danis Mccall, MSN APRN FNP-BC

## 2022-11-01 NOTE — PATIENT INSTRUCTIONS
Vaccine Information Statement    Influenza (Flu) Vaccine (Inactivated or Recombinant): What You Need to Know    Many vaccine information statements are available in [place]ian and other languages. See www.immunize.org/vis. Hojas de información sobre vacunas están disponibles en español y en muchos otros idiomas. Visite www.immunize.org/vis. 1. Why get vaccinated? Influenza vaccine can prevent influenza (flu). Flu is a contagious disease that spreads around the [facility] every year, usually between October and May. Anyone can get the flu, but it is more dangerous for some people. Infants and young children, people 72 years and older, pregnant people, and people with certain health conditions or a weakened immune system are at greatest risk of flu complications. Pneumonia, bronchitis, sinus infections, and ear infections are examples of flu-related complications. If you have a medical condition, such as heart disease, cancer, or diabetes, flu can make it worse. Flu can cause fever and chills, sore throat, muscle aches, fatigue, cough, headache, and runny or stuffy nose. Some people may have vomiting and diarrhea, though this is more common in children than adults. In an average year, thousands of people in the [facility] die from flu, and many more are hospitalized. Flu vaccine prevents millions of illnesses and flu-related visits to the doctor each year. 2. Influenza vaccines     CDC recommends everyone 6 months and older get vaccinated every flu season. Children 6 months through 6years of age may need 2 doses during a single flu season. Everyone else needs only 1 dose each flu season. It takes about 2 weeks for protection to develop after vaccination. There are many flu viruses, and they are always changing. Each year a new flu vaccine is made to protect against the influenza viruses believed to be likely to cause disease in the upcoming flu season.  Even when the vaccine doesnt exactly match these viruses, it may still provide some protection. Influenza vaccine does not cause flu. Influenza vaccine may be given at the same time as other vaccines. 3. Talk with your health care provider    Tell your vaccination provider if the person getting the vaccine:  Has had an allergic reaction after a previous dose of influenza vaccine, or has any severe, life-threatening allergies   Has ever had Guillain-Barré Syndrome (also called GBS)    In some cases, your health care provider may decide to postpone influenza vaccination until a future visit. Influenza vaccine can be administered at any time during pregnancy. People who are or will be pregnant during influenza season should receive inactivated influenza vaccine. People with minor illnesses, such as a cold, may be vaccinated. People who are moderately or severely ill should usually wait until they recover before getting influenza vaccine. Your health care provider can give you more information. 4. Risks of a vaccine reaction    Soreness, redness, and swelling where the shot is given, fever, muscle aches, and headache can happen after influenza vaccination. There may be a very small increased risk of Guillain-Barré Syndrome (GBS) after inactivated influenza vaccine (the flu shot). Bernarda Marinelli children who get the flu shot along with pneumococcal vaccine (PCV13) and/or DTaP vaccine at the same time might be slightly more likely to have a seizure caused by fever. Tell your health care provider if a child who is getting flu vaccine has ever had a seizure. People sometimes faint after medical procedures, including vaccination. Tell your provider if you feel dizzy or have vision changes or ringing in the ears. As with any medicine, there is a very remote chance of a vaccine causing a severe allergic reaction, other serious injury, or death. 5. What if there is a serious problem?     An allergic reaction could occur after the vaccinated person leaves the clinic. If you see signs of a severe allergic reaction (hives, swelling of the face and throat, difficulty breathing, a fast heartbeat, dizziness, or weakness), call 9-1-1 and get the person to the nearest hospital.    For other signs that concern you, call your health care provider. Adverse reactions should be reported to the Vaccine Adverse Event Reporting System (VAERS). Your health care provider will usually file this report, or you can do it yourself. Visit the VAERS website at www.vaers. [facility].gov or call 2-550.669.2968. VAERS is only for reporting reactions, and VAERS staff members do not give medical advice. 6. The National Vaccine Injury Compensation Program    The [facility] Vaccine Injury Compensation Program (VICP) is a federal program that was created to compensate people who may have been injured by certain vaccines. Claims regarding alleged injury or death due to vaccination have a time limit for filing, which may be as short as two years. Visit the VICP website at www.[facility].gov/vaccinecompensation or call 7-294.976.6599 to learn about the program and about filing a claim. 7. How can I learn more? Ask your health care provider. Call your local or state health department. Visit the website of the Food and Drug Administration (FDA) for vaccine package inserts and additional information at www.fda.gov/vaccines-blood-biologics/vaccines. Contact the Centers for Disease Control and Prevention (CDC): Call 5-441.694.2972 (7-475-IHU-INFO) or  Visit CDCs influenza website at www.cdc.gov/flu. Vaccine Information Statement   Inactivated Influenza Vaccine   8/6/2021  [id number]   Department of Health and Human Services  Centers for Disease Control and Prevention    Office Use Only

## 2022-11-06 PROBLEM — R07.9 CHEST PAIN: Status: ACTIVE | Noted: 2022-01-01

## 2022-11-06 NOTE — ED NOTES
Pt got up and used the bedside commode to have a BM with assist.  Pt in a hospital gown now and resting. No needs at this time. Call bell within reach.

## 2022-11-06 NOTE — H&P
PLEASE NOTE: I HAVE GENERATED THIS NOTE WITH THE ASSISTANCE OF VOICE-RECOGNITION TECHNOLOGY. PLEASE EXCUSE ANY SPELLING, GRAMMATICAL, AND SYNTAX ERRORS YOU MAY FIND. IF YOU NEED CLARIFICATION ON ANYTHING, PLEASE FEEL FREE TO REACH OUT TO ME.  Gerardo Leahy [facility]  History and Physical - Dr. Chantale Dow:     80 y.o. male with pertinent medical hx of hypertension, diabetes, CAD presented with acute chest pain    VS revealed tachycardia, mild hypertension now but presented with a hypertension of 190/100    Physical exam revealed nothing really remarkable    Labs revealed creatinine around baseline, initial potassium of 5.5, initial troponin of 322 that bumped up to 2258    Imaging revealed:     Chronic interstitial opacities with some apparent increase which may reflect  superimposed pulmonary edema. There is airspace opacity in the right midlung  zone laterally which may represent pneumonia. Active Problems:    Chest pain (11/6/2022)    ? Pulmonary edema  ?   Pneumonia    Plan:     Acute NSTEMI  -Aspirin 325 statin  -Heparin GTT with bolus  -Cardiology consult  -Echocardiogram  -Nitrostat, Coreg, oxygen; will not give morphine due to CKD    Acute hypertensive emergency  -Do not drop blood pressure more than 25% in the first 24 hours  -Hydralazine as needed  -Continue home medications in the morning    Questionable pneumonia  -We will cover for HCAP, patient was just here on 10/13/2022  -Cefepime and Zyvox, due to the patient's CKD  -Oxygen K as needed  -Supportive treatment  -Procalcitonin, MRSA, viral panel    Questionable pulmonary edema  -We will give the patient a dose of Bumex  -Echo; management as above    Diabetes  -Sign scale insulin  -Hold home Tradjenta    CAD/CHF  -Continue home Zocor, aspirin, patient is not on the other morbidity mortality benefit medications at home, will let cardiology manage this  -We will continue home Lasix in the morning    BPH  -Continue home tamsulosin    If code status was discussed with the patient, then code status entered as per the orders: Full                          Subjective:   Primary Care Provider: Vijay Ram NP  Date of Service:  See Date Note Was Originated  Chief Complaint: Chest pain    80 y.o. male presents with several hours duration of abrupt onset, worsening, severe, constant, 7 out of 10, sharp, substernal, nonradiating chest pain that is also associated with mild shortness of breath, remitted by nothing, exacerbated by nothing. Review of Systems:  12 point ROS obtained and otherwise negative, except as per HPI and above. Past Medical History:   Diagnosis Date    CAD (coronary artery disease)     mi    Chronic kidney disease     hx of elevated blood levels    Chronic pain     lower back    Diabetes ([facility] [address]     Enlarged prostate     Hypercholesterolemia     Hypertension     Other unknown and unspecified cause of morbidity or mortality     hayfever      Past Surgical History:   Procedure Laterality Date    HX CATARACT REMOVAL Bilateral     HX CHOLECYSTECTOMY      HX LUMBAR LAMINECTOMY  09/2017    HX ORTHOPAEDIC  09/23/2015    back surgery     [place] CARDIAC SURG PROCEDURE UNLIST      bypass(1985), stents(1994, 2004)    [place] COLONOSCOPY FLX DX W/COLLJ SPEC WHEN PFRMD  8/12/2013         [place] EGD TRANSORAL BIOPSY SINGLE/MULTIPLE  6/28/2012         VASCULAR SURGERY PROCEDURE UNLIST  2/1996    left carotid     Prior to Admission medications    Medication Sig Start Date End Date Taking? Authorizing Provider   tamsulosin (FLOMAX) 0.4 mg capsule TAKE 2 CAPSULES BY MOUTH DAILY WITH THE EVENING MEAL 9/1/22   Belinda LAW NP   polyethylene glycol (Miralax) 17 gram/dose powder Take 17 g by mouth daily. 1 tablespoon with 8 oz of water daily 8/22/22   Keshia Bhakta MD   glipiZIDE (GLUCOTROL) 10 mg tablet Take 2 Tablets by mouth two (2) times a day. Take 2 tablets by mouth twice a day.  8/16/22   Belinda Pham D, NP   simvastatin (ZOCOR) 20 mg tablet Take 1 Tablet by mouth nightly. 7/29/22   Shay LAW NP   gabapentin (NEURONTIN) 300 mg capsule TAKE 1 CAPSULE BY MOUTH TWICE DAILY 7/21/22   Shay LAW NP   nitroglycerin (NITROSTAT) 0.4 mg SL tablet DISSOLVE 1 TABLET UNDER THE TONGUE EVERY 5 MINUTES AS NEEDED FOR CHEST PAIN 7/19/22   Shay LAW NP   glucose blood VI test strips (Accu-Chek Guide test strips) strip USE 1 STRIP TO TEST BLOOD SUGAR EVERY DAY Dx E11.9 6/3/22   Shay LAW NP   dilTIAZem ER (CARDIZEM CD) 300 mg capsule TAKE 1 CAPSULE BY MOUTH DAILY 6/3/22   Shay LAW NP   [facility]) 625 mg tablet Take 3 tablets by mouth twice daily 6/3/22   Shay LAW NP   Tradjenta 5 mg tablet TAKE 1 TABLET BY MOUTH DAILY 5/25/22   Shay LAW NP   furosemide (LASIX) 20 mg tablet Take 20 mg by mouth daily. 12/22/21   Provider, Historical   hydrALAZINE (APRESOLINE) 25 mg tablet TAKE 1 TABLET BY MOUTH TWICE DAILY 12/6/21   Shay LAW NP   clotrimazole-betamethasone (LOTRISONE) topical cream APPLY TO THE AFFECTED AREA TWICE DAILY AS NEEDED FOR SKIN IRRITATION 7/9/21   Lorraine Alonzo MD   lancets (Accu-Chek Fastclix Lancet Drum) misc USE AS DIRECTED TO MONITOR BLOOD SUGAR 9/10/20   Lorraine Alonzo MD   cyanocobalamin (VITAMIN B12) 1,000 mcg/mL injection INJECT 1 ML SUBCUTANEOUS EVERY MONTH 2/28/20   Tomasz Baumann MD   fluticasone propionate Annie Crome) 50 mcg/actuation nasal spray  3/15/19   Provider, Historical   Blood-Glucose Meter monitoring kit Use to test blood sugar once daily. 2/28/19   Jones Victor MD   acetaminophen (TYLENOL) 500 mg tablet Take  by mouth every six (6) hours as needed for Pain. Provider, Historical   ACCU-CHEK FASTCLIX misc USE AS DIRECTED TO MONITOR BLOOD SUGAR 10/1/17   Jose Shahid MD   aspirin delayed-release 81 mg tablet Take 81 mg by mouth daily.     Provider, Historical   cholecalciferol (VITAMIN D3) 25 mcg (1,000 unit) cap Take 1,000 Units by mouth daily. Provider, Historical   ferrous sulfate 325 mg (65 mg iron) tablet Take 325 mg by mouth two (2) times a day. Provider, Historical     Allergies   Allergen Reactions    Tetanus Vaccines And Toxoid Swelling     Swelling at the site    Tradjenta [Linagliptin] Diarrhea     headache      Family History   Problem Relation Age of Onset    Heart Disease Mother     Heart Disease Father    . Social History     Socioeconomic History    Marital status:    Tobacco Use    Smoking status: Former     Types: Cigarettes     Quit date: 6/3/1967     Years since quittin.4    Smokeless tobacco: Never   Vaping Use    Vaping Use: Never used   Substance and Sexual Activity    Alcohol use: No    Drug use: No    Sexual activity: Not Currently     Social Determinants of Health     Financial Resource Strain: Low Risk     Difficulty of Paying Living Expenses: Not hard at all   Food Insecurity: No Food Insecurity    Worried About Running Out of Food in the Last Year: Never true    Ran Out of Food in the Last Year: Never true   Transportation Needs: No Transportation Needs    Lack of Transportation (Medical): No    Lack of Transportation (Non-Medical):  No   Physical Activity: Inactive    Days of Exercise per Week: 0 days    Minutes of Exercise per Session: 0 min   Housing Stability: Low Risk     Unable to Pay for Housing in the Last Year: No    Number of Places Lived in the Last Year: 1    Unstable Housing in the Last Year: No   .  Objective:   Physical Exam:     VS as below    Const'l:          Normal body habitus, a&o, no acute distress  Head/Neck:       no cervical/head mass  Eyes:     nonicteric sclera, eom intact  ENT:      auditory acuity grossly intact either with or without device, no nasal deformity  Cardio:           Tachycardic rate regular rhythm  Pulm:     no accessory muscle use  Abd:       S NT ND  Derm:     no rashes, no ulcers, no lesions  Extr:      No edema, no cyanosis, no calf tenderness, no varicosities  Neuro:    cn II-XII intact  Psych:   mood intact, judgement intact    Data Review: All diagnostic labs and studies have been reviewed.

## 2022-11-06 NOTE — PROGRESS NOTES
Occupational Therapy Note  Referral received, chart reviewed and noted patient with acute NSTEMI and awaiting cardiology consult. Patient remains tachycardic. Will allow for additional medical workup prior to initiating therapy evaluations. Will continue to follow. Thank you.   Keri Chaparro, OTR/L

## 2022-11-06 NOTE — PROGRESS NOTES
Physical Therapy  Referral received, chart reviewed and noted patient with acute NSTEMI and awaiting cardiology consult. Patient remains tachycardic. Will allow for additional medical workup prior to initiating therapy evaluations. Will continue to follow.    Nayely Mcgrath, PT, DPT

## 2022-11-06 NOTE — PROGRESS NOTES
Had a brief discussion with the patient's wife regarding CODE STATUS. She does not want to commit to any decision right now, I told her she can take her time and that I will leave him full code for the time being, and when she discusses it with him, if she wants to change her mind we can readdress it at a later time.

## 2022-11-06 NOTE — PROGRESS NOTES
Charge nurse Renetta Pierce, reported critical troponin on patient still in main ED, reported no access to patient until transferred. Once transferred called critical troponin to Dr. Vaughn Anglin. No new orders received.

## 2022-11-06 NOTE — CONSULTS
Pt seen and examined    Full consult dictated      He is 88yo, prior CABG with worsening renal fxn, general decline last several weeks. Ruling in for NSTMI    Recc conservative Rx. Prognosis poor. IV metoprolol for HR. Echo.

## 2022-11-06 NOTE — PROGRESS NOTES
Chart reviewed, case d/w RN. 79 yo M adm for chest pain. Troponin elevation increased, EKG with changes initially concerning for possible ischemia. Started on hep gtt, asa, coreg. I spoke with cardiology this morning and they recommended addition of IV nitro due to persistent chest pain as well as IV lopressor with goal HR 60bpm. Patient level of care escalated to stepdown due to nitro gtt. He is seen in [address], no family present during my encounter. Still with chest pain, states does not feel like it is any better. Says he also has abdominal pain sometimes but none currently. Knows he is in the hospital, thinks it is 65.

## 2022-11-06 NOTE — CONSULTS
[facility]    Name:  Brenda Ambrose  MR#:  [id number]  :  1935  ACCOUNT #:  [de-identified]  DATE OF SERVICE:  2022      REQUESTING PHYSICIAN:  Mel Benavidez DO    REASON FOR CONSULTATION:  Evaluate non-ST elevation MI.    CHIEF COMPLAINT:  Chest pain. HISTORY OF PRESENT ILLNESS:  The patient is an 77-year-old man followed by Dr. Ida Lima with a long history of coronary artery disease. He underwent single-vessel coronary bypass in  with a vein graft to the LAD. He also underwent PTCA of the right coronary artery in . He has apparently been doing reasonably well until fairly recently. His risk factors include type 2 diabetes and dyslipidemia. He has significant renal insufficiency which has been worsening. His previous creatinine was 2.2, but more recently his creatinine has been closer to 2.7. He saw Dr. Candelaria Johnson about 6 weeks ago and was apparently complaining of worsening chest discomfort. He was set up for catheterization by Dr. Rodriguez Nettles, but on arrival for catheterization on 10/13, the patient's creatinine was noted to be significantly elevated and the procedure was canceled with medical therapy recommended. According to the patient's wife in the last several weeks, he has been declining and is less active with poor p.o. intake. His wife says that he does not eat much and just drinks coffee all the time. He has also been confused and for the last several days has been complaining of chest pain. He came into the [address] [facility] ER overnight and was admitted to the hospitalist service. His creatinine was 2.5 and initial potassium was 6.2. He was given medication to correct his potassium and repeat potassium was 5.5. In addition, troponin was initially 322 and subsequently milly to 3258 and now 31355. The patient himself is a very poor historian and is somewhat somnolent. His wife is at the bedside. He says his chest pain is better now. He has been placed on IV heparin and IV nitroglycerin and I was asked to see the patient for evaluation. Most recent ejection fraction by echo in 2017 was reportedly normal.  No recent assessment of EF. PAST MEDICAL HISTORY:  As noted above, chronic kidney disease, peripheral vascular disease with carotid stenosis, history of spinal stenosis. SURGERIES:  1. Prior bypass surgery. 2.  Prior cataract surgery. 3.  Prior lumbar laminectomy. 4.  Prior left carotid endarterectomy. 5.  Prior cholecystectomy. CURRENT MEDICATIONS:  1.  Carvedilol. 2.  Lasix. 3.  Aspirin. 4.  Hydralazine. 5.  Lipitor. 6.  Welchol. 7.  Humalog sliding scale. 8.  IV cefepime. 9.  IV linezolid. 10.  Neurontin. 11.  Pepcid. 12.  Iron sulfate. 13.  Flonase. 14.  MiraLax. 15.  Flomax. SOCIAL HISTORY:  The patient is a former smoker, but quit many years ago. He is retired from [employer]. He lives in [facility]. He is . He and his wife have no children. FAMILY HISTORY:  Positive for heart disease in both parents. REVIEW OF SYSTEMS:  12-point review of systems done on admission was reviewed. No other pertinent positives noted. PHYSICAL EXAMINATION:  GENERAL:  Exam reveals a frail-appearing elderly white male, in no obvious distress. VITAL SIGNS:  Blood pressure 120/78, pulse 111, respirations 17, temperature 98. HEENT:  Pupils are status post cataract surgery. Oropharynx unremarkable. NECK:  No masses or thyromegaly. No obvious cervical or supraclavicular adenopathy. No carotid bruits, no obvious JVD. CHEST:  Clear. No obvious wheezes or crackles. SKIN:  Pale in appearance. No rashes. CARDIAC: Tachycardic rhythm. 2/6 systolic murmur. No gallop or rub heard. ABDOMEN:  Soft, nontender, no masses, organomegaly, bowel sounds positive. EXTREMITIES:  Cool, no clubbing or cyanosis. Distal pulse is not well palpated. NEUROLOGIC:  No obvious gross focal motor deficits.     LABORATORY DATA: Hemoglobin 11.1, creatinine 2.5, potassium 6.2, repeat 5.5, CO2 19, troponin 63154. Procalcitonin 0.05. Chest x-ray shows chronic interstitial opacities. EKG sinus tachycardia, rate of 118 beats per minute, QS pattern in V1-V4, nonspecific ST-T changes. No definite ST elevation. IMPRESSION:  1.  Non-ST segment elevation myocardial infarction. 2.  History of coronary artery disease with remote coronary artery bypass grafting with a vein graft to the left anterior descending in 1985 and prior stenting of the right coronary artery in 2003, prior normal ejection fraction. 3.  Type 2 diabetes. 4.  Dyslipidemia. 5.  Chronic kidney disease stage IV. 6.  Cerebrovascular disease with prior left carotid endarterectomy. 7.  Spinal stenosis. 8.  Hyperkalemia. 9.  Altered mental status and recent general decline. RECOMMENDATIONS:  Favor conservative therapy given the patient's age and comorbidities. He appears to be exhibiting a general decline and there may be other noncardiac issues present. We will start IV metoprolol to try to get his heart rate under 100 and hold carvedilol. Continue IV heparin and IV nitroglycerin for now. We will obtain an echocardiogram as well to assess EF. Thank you for this consultation.         John Joseph MD      [id number]  D:  11/06/2022 10:06  T:  11/06/2022 12:44  JOB #:  [id number]  CC:  MD Nimo Gilliam

## 2022-11-07 PROBLEM — I13.10 CARDIORENAL SYNDROME: Status: ACTIVE | Noted: 2022-01-01

## 2022-11-07 NOTE — HOSPICE
[facility] RN note:  consult noted. Reviewing chart. Discussed pt with Dr Man Araiza. Will address shortly. In to meet with pt, wife Ya Ambrose, niece Deepti Aleman and family . Pt beginning to get some relief from severe agitation post 1 PRN dose of 1mg IV versed and 2mg IV Haldol. Family describes pt as pulling out IV, pulling off cloths, climbing out of bed and hitting staff. Wife expressed relief that pt is more relaxed. Discussed hospice philosophy and services as they relate to inpt hospice given severity of symptoms. All in agreement with admitting to hospice at [facility], willing to discuss alternate location of care should pt stabilize. Approval for inpt admission at [facility] level of care per Dr Cristofer Macias. Consent forms signed by wife of 66years with this RN. Thank you for the opportunity to care for this pt and family. Please contact hospice at [phone] with any questions or concerns.

## 2022-11-07 NOTE — PROGRESS NOTES
Chart reviewed. Spoke to RN who reports that pt is not appropriate for participation in PT evaluation secondary to increased confusion and agitation. Pt currently attempting to hit and kick staff/family members. Will defer however continue to follow. 14:00: Chart reviewed and noted that pt and family have decided to pursue comfort measures with plans for inpt hospice. Will complete PT order at this time.      Ghassan Kendall, PT, DPT

## 2022-11-07 NOTE — PROGRESS NOTES
11/07/22 0743 11/07/22 1210   Vitals   Temp 98.4 °F (36.9 °C) 97.8 °F (36.6 °C)   Temp Source  --  Axillary   Pulse (Heart Rate) (!) 106 92   Heart Rate Source  --  Monitor   Resp Rate 29 20   O2 Sat (%)  --  95 %   Level of Consciousness 0 1   /79 92/73   MAP (Calculated) 91 79   BP 1 Location  --  Left leg   BP 1 Method  --  Automatic   BP Patient Position  --  At rest   Cardiac Rhythm  --  Sinus Rhythm   MEWS Score 3 3   Pain 1   Pain Scale 1  --  Adult Nonverbal Pain Scale   Pain Intensity 1  --  0   Patient Stated Pain Goal  --  0     Patient just placed on comfort care

## 2022-11-07 NOTE — PROGRESS NOTES
adEnd of Shift Note    Bedside shift change report given to Bianca Barkley (oncoming nurse) by Estelita Aguillon RN (offgoing nurse). Report included the following information SBAR, Kardex, and MAR    Shift worked:  0700 - 1930     Shift summary and any significant changes:     Patient arrived to unit with spouse and sitter, unresponsive to verbal commands. Hospice ensured patient was settled and all questions and concerns were answered. Comfort tray delivered for family and visitors. All questions and concerns have been answered at this time. Bed in lowest position, call bell within reach.       Concerns for physician to address:       Zone phone for oncoming shift:                Estelita Aguillon RN

## 2022-11-07 NOTE — PROGRESS NOTES
Pt arrived on unit around 1840 according to day shift. PTT was drawn. 2100 - PTT resulted at 46.7. 1970 unit bolus given and rate increased by 2u/kg/hr. Valeria pharmacist and [address].  New rate at 16u/kg/hr

## 2022-11-07 NOTE — PROGRESS NOTES
Cardiology Progress Note      11/7/2022 9:35 AM    Admit Date: 11/6/2022          Subjective:  Confused and agitated. Pulled out IVs earlier. Creatinine continues to rise          Visit Vitals  BP (P) 115/79 (BP 1 Location: Right leg, BP Patient Position: At rest)   Pulse (!) 106   Temp (P) 98.4 °F (36.9 °C)   Resp 29   Ht 5' 6\" (1.676 m)   Wt 65.8 kg (145 lb)   SpO2 (P) 97%   BMI 23.40 kg/m²     11/05 1901 - 11/07 0700  In: -   Out: 200 [Urine:200]        Objective:      Physical Exam:  VS as above  Chest scattered crackles  Card RRR no obvious gallop     Data Review:   Labs:    Trop 53882  BUN 54  Creat 3.1  PTT 76  Hgb 11.6    Telemetry: SR R 80-90       Assessment:       1. Non-ST segment elevation myocardial infarction. 2.  History of coronary artery disease with remote coronary artery bypass grafting with a vein graft to the left anterior descending in 1985 and prior stenting of the right coronary artery in 2003, prior normal ejection fraction. 3.  Type 2 diabetes. 4.  Dyslipidemia. 5.  FARHAT/Chronic kidney disease stage IV. 6.  Cerebrovascular disease with prior left carotid endarterectomy. 7.  Spinal stenosis. 8.  Hyperkalemia. 9.  Altered mental status and recent general decline. Plan:  Unfortunately not much to offer in this situation. Spoke with wife. Favor hospice/palliative measures.  D/C IV heparin

## 2022-11-07 NOTE — ACP (ADVANCE CARE PLANNING)
Palliative Medicine      Code Status: DNR      Advance Care Planning:  Advance Care Planning 10/13/2022   Patient's Healthcare Decision Maker is: Legal Next of Kin, spouse   Primary Decision Maker Name -Leonie Mendoza   Primary Decision Maker Phone Number -   Primary Decision Maker Relationship to Patient -spouse   Confirm Advance Directive DDNR signed and placed on the chart for scanning. Patient Would Like to Complete Advance Directive unable       Patient / Family Encounter Documentation    Participants (names): Leonie Mendoza, kane Hurtado, pastor Sadie, Palliative team (Dr. Sayda Gill and this writer). Narrative: After review of chart and consulting floor nurse, stopping by to introduce self and observe patient in his room, this writer and Dr. Sayda Gill met with Mrs. Croy Rae and her supporters outside of patient's room. Patient had a sitter at beside for safety due to his agitation and confusion. Education and emotional support were given as team discussed patient's condition and his wishes for QOL and comfort. Dr. Sayda Gill was able to arrange for hospice nurse Kamila to meet with family today and it appears he may be appropriate for GIP. Mrs. Cory Rae signed a DDNR and his code status was changed in his chart to DNR.     Pavithra Hernandez [facility]   [phone] (COPE)

## 2022-11-07 NOTE — H&P
[facility]  [phone]    Patient Name: Jany Edmonds  YOB: 1935    Date of Provider Hospice Visit: 11/07/22    Level of Care:   [x] General Inpatient (GIP)    [] Routine   [] Respite    Current Location of Care:  [] [facility] [] [facility] [x] [facility] [] [address] [place] [address] [] [facility], patient referred from:  [] [facility] [] [facility] [] [facility] [] [address] [place] [address] [] Home [] Other:     [facility] Ad[facility]: 11/7/2022  Hospice Medical Director at time of admission: Dr. Pieter Singer Diagnosis: Cardiorenal syndrome  Diagnoses RELATED to the terminal prognosis: NSTEMI, FARHAT on CKD stage 4  Other Diagnoses: HTN, HLD, CAD     HOSPICE SUMMARY     Jany Edmonds is a 80y.o. year old who was admitted to [address] [place] [address]. HPI and brief course of hospitalization as summarized in Dr. Helder Colon most recent progress note:  Jany Edmonds is a 80 y.o. male with a past history of HTN , DM, CAD( hx of remote CABJ), most recent Echo IN 2017 IS NORMAL CKD stage 4 who was admitted on 11/6/2022 from home with a diagnosis of Acute NSTEMI, Acute hypertensive emergency, questionable pneumonia, FARHAT on CKD stage 4. Patient is delirious /reslstless, pulling I/V,  creatinine continues to rise, patient recently on 10/31 was scheduled for cardiac catheterization but the procedure was cancelled due to significantly elevated creatinine and conservative medical therapy was recommended. Chart review reflects patient has been declining for past several days, less active with poor oral intake . Assessment as summarized in Dr. Bhaskar Merritt most recent cardiology progress note:  Assessment:       1. Non-ST segment elevation myocardial infarction.   2.  History of coronary artery disease with remote coronary artery bypass grafting with a vein graft to the left anterior descending in 1985 and prior stenting of the right coronary artery in 2003, prior normal ejection fraction. 3.  Type 2 diabetes. 4.  Dyslipidemia. 5.  FARHAT/Chronic kidney disease stage IV. 6.  Cerebrovascular disease with prior left carotid endarterectomy. 7.  Spinal stenosis. 8.  Hyperkalemia. 9.  Altered mental status and recent general decline. Plan:  Unfortunately not much to offer in this situation. Spoke with wife. Favor hospice/palliative measures. D/C IV heparin       The patient's principle diagnosis has resulted in altered mental status, anxiety, restlessness, agitation, and nonverbal signs of pain. Functionally, the patient's Karnofsky and/or Palliative Performance Scale has declined over a period of days and is estimated at 20. The patient is dependent on the following ADLs: all    Objective information that support this patients limited prognosis includes: BUN 54 and trending up; Cr 3.05 and trending up  Study Result    Narrative & Impression   EXAM: XR CHEST PA LAT     INDICATION: Chest pain     COMPARISON: 9/12/2022     TECHNIQUE: PA and lateral chest 2 views 2 views     FINDINGS: The cardiac size is within normal limits. The pulmonary vasculature is  within normal limits. Chronic interstitial opacities slightly increased in interval. Increased  airspace opacity in the right midlung zone laterally. The visualized bones and  upper abdomen are age-appropriate. IMPRESSION     Chronic interstitial opacities with some apparent increase which may reflect  superimposed pulmonary edema. There is airspace opacity in the right midlung  zone laterally which may represent pneumonia. The patient/family chose comfort measures with the support of Hospice. HOSPICE DIAGNOSES   Active Symptoms:  1. Altered mental status  2. Restlessness  3. Agitation  4. Anxiety  5. Non verbal signs of pain  6. Debility  7.  Hospice care     PLAN   Admit to GIP level of care as pt is unable to safely take PO/SL medications and requires frequent nursing assessment and administration and titration of parenteral medications to manage symptom burden  Dilaudid 0.5 mg IV Every 15 minutes as needed for signs of pain or air hunger  Lorazepam 1 mg IV every 4 hours for restlessness/agitation  Versed 2 mg IV every hour as needed for breakthrough agitation  All other symptom management medications as needed   and SW to support family needs  Disposition: at risk for continued rapid decline and anticipate death in hospital  Hospice Plan of care was reviewed in detail and agree with current plan of care    Prognosis estimated based on 11/07/22 clinical assessment is:   [x] Hours to Days    [] Days to Weeks    [] Other:    Communicated plan of care with: Hospice Case Manager; Hospice IDT; Care Team     GOALS OF CARE     Patient/Medical POA stated Goal of Care: Comfort care and symptom management. [x] I have reviewed and/or updated ACP information in the Advance Care Planning Navigator. This information is available in the [address] [place] [address] Drive link in the patient's chart header. Primary Decision Maker (Health Care Agent):   Primary Decision Maker (Active): Kristina Babin - [phone]    Resuscitation Status: DNR  If DNR is there a Durable DNR on file? : [] Yes [x] No (If no, complete Durable DNR)    HISTORY     History obtained from: chart review; and discussion with interdisciplinary team    CHIEF COMPLAINT: unable to obtain  The patient is:   [] Verbal  [x] Nonverbal  [] Unresponsive    HPI/SUBJECTIVE:  No family present during provider rounds. Patient care tech team providing incontinence care, turning, and repositioning. Pt is nonverbal, not following commands, and is seemingly resistant to care. Easily agitated and appears restless. Review of notes indicated persistent/worsening agitation and signs of delirium over the last 24 hours. Overnight required one time dose of Benadryl 25 mg IV, Valium 3 mg IV, and Zyprexa 5 mg IM without palliation of symptoms.  Today received Haldol 2 mg IV at 1200, and Versed 1 mg IV at 1300 with initial symptomatic improvement.      REVIEW OF SYSTEMS     The following systems were: [] reviewed  [x] unable to be reviewed    Positive ROS include:  Constitutional: fatigue, weakness, in pain, short of breath  Ears/nose/mouth/throat: increased airway secretions  Respiratory:shortness of breath, wheezing  Gastrointestinal:poor appetite, nausea, vomiting, abdominal pain, constipation, diarrhea  Musculoskeletal:pain, deformities, swelling legs  Neurologic:confusion, hallucinations, weakness  Psychiatric:anxiety, feeling depressed, poor sleep  Endocrine:     Adult Non-Verbal Pain Assessment Score: 5    Face  [] 0   No particular expression or smile  [] 1   Occasional grimace, tearing, frowning, wrinkled forehead  [x] 2   Frequent grimace, tearing, frowning, wrinkled forehead    Activity (movement)  [] 0   Lying quietly, normal position  [] 1   Seeking attention through movement or slow, cautious movement  [x] 2   Restless, excessive activity and/or withdrawal reflexes    Guarding  [] 0   Lying quietly, no positioning of hands over areas of body  [x] 1   Splinting areas of the body, tense  [] 2   Rigid, stiff    Physiology (vital signs)  [x] 0   Stable vital signs  [] 1   Change in any of the following: SBP > 20mm Hg; HR > 20/minute  [] 2   Change in any of the following: SBP > 30mm Hg; HR > 25/minute    Respiratory  [x] 0   Baseline RR/SpO2, compliant with ventilator  [] 1   RR > 10 above baseline, or 5% drop SpO2, mild asynchrony with ventilator  [] 2   RR > 20 above baseline, or 10% drop SpO2, asynchrony with ventilator     FUNCTIONAL ASSESSMENT     Palliative Performance Scale (PPS): 20       PSYCHOSOCIAL/SPIRITUAL ASSESSMENT     Active Problems:    Cardiorenal syndrome (11/7/2022)      Past Medical History:   Diagnosis Date    CAD (coronary artery disease)     mi    Chronic kidney disease     hx of elevated blood levels    Chronic pain     lower back    Diabetes (HCC)     Enlarged prostate     Hypercholesterolemia     Hypertension     Other unknown and unspecified cause of morbidity or mortality     hayfever      Past Surgical History:   Procedure Laterality Date    HX CATARACT REMOVAL Bilateral     HX CHOLECYSTECTOMY      HX LUMBAR LAMINECTOMY  2017    HX ORTHOPAEDIC  2015    back surgery     VT CARDIAC SURG PROCEDURE UNLIST      bypass(), DJMDMX(2004)    VT COLONOSCOPY FLX DX W/COLLJ SPEC WHEN PFRMD  2013         VT EGD TRANSORAL BIOPSY SINGLE/MULTIPLE  2012         VASCULAR SURGERY PROCEDURE UNLIST  1996    left carotid      Social History     Tobacco Use    Smoking status: Former     Types: Cigarettes     Quit date: 6/3/1967     Years since quittin.4    Smokeless tobacco: Never   Substance Use Topics    Alcohol use: No     Family History   Problem Relation Age of Onset    Heart Disease Mother     Heart Disease Father       Allergies   Allergen Reactions    Tetanus Vaccines And Toxoid Swelling     Swelling at the site    Tradjenta [Linagliptin] Diarrhea     headache      Current Facility-Administered Medications   Medication Dose Route Frequency    midazolam (VERSED) injection 2 mg  2 mg IntraVENous Q1H PRN    HYDROmorphone (DILAUDID) injection 0.5 mg  0.5 mg IntraVENous Q15MIN PRN    bisacodyL (DULCOLAX) suppository 10 mg  10 mg Rectal DAILY PRN    ketorolac (TORADOL) injection 30 mg  30 mg IntraVENous Q8H PRN    glycopyrrolate (ROBINUL) injection 0.2 mg  0.2 mg IntraVENous Q4H PRN    LORazepam (ATIVAN) injection 1 mg  1 mg IntraVENous Q4H     Facility-Administered Medications Ordered in Other Encounters   Medication Dose Route Frequency    sodium chloride (NS) flush 5 mL  5 mL IntraVENous PRN        PHYSICAL EXAM     Wt Readings from Last 3 Encounters:   22 65.8 kg (145 lb)   22 65.6 kg (144 lb 9.6 oz)   10/13/22 63.5 kg (140 lb)     Supplemental O2  [] Yes  [x] NO  Last bowel movement:     Currently this patient has:  [x] Peripheral IV [] PICC  [] PORT [] ICD    [] Schwab Catheter [] NG Tube   [] PEG Tube    [] Rectal Tube [] Drain  [] Other:     Constitutional: awake but nonverbal; thin, frail, and ill-appearing  Eyes: closed  ENMT: oral mucosa moist  Cardiovascular: regular; tachycardic at times; distal pulses intact; no peripheral edema  Respiratory: no increased work of breathing  Gastrointestinal: soft  : external catheter  Musculoskeletal: no contractures of gross deformities  Skin: pale; thin/fragile; scattered bruising to bilateral UE  Neurologic: nonverbal; not following commands; moving all extremities  Psychiatric: resistant to exam; appears restless  Other:       Pertinent Lab and or Imaging Tests:  Lab Results   Component Value Date/Time    Sodium 139 11/07/2022 04:00 AM    Potassium 4.4 11/07/2022 04:00 AM    Chloride 110 (H) 11/07/2022 04:00 AM    CO2 19 (L) 11/07/2022 04:00 AM    Anion gap 10 11/07/2022 04:00 AM    Glucose 151 (H) 11/07/2022 04:00 AM    BUN 54 (H) 11/07/2022 04:00 AM    Creatinine 3.05 (H) 11/07/2022 04:00 AM    BUN/Creatinine ratio 18 11/07/2022 04:00 AM    GFR est AA 26 (L) 08/22/2022 02:24 PM    GFR est non-AA 22 (L) 08/22/2022 02:24 PM    Calcium 9.0 11/07/2022 04:00 AM     Lab Results   Component Value Date/Time    Protein, total 7.4 11/07/2022 04:00 AM    Albumin 3.2 (L) 11/07/2022 04:00 AM           Liza BERGER

## 2022-11-07 NOTE — PROGRESS NOTES
Palliative Medicine      Code Status: DNR      Advance Care Planning:  Advance Care Planning 10/13/2022   Patient's Healthcare Decision Maker is: Legal Next of Kin, spouse   Primary Decision Maker Name -Frank Gracia   Primary Decision Maker Phone Number -   Primary Decision Maker Relationship to Patient -spouse   Confirm Advance Directive DDNR signed and placed on the chart for scanning. Patient Would Like to Complete Advance Directive unable       Patient / Family Encounter Documentation    Participants (names): Joesphmarnie Basil, Grisel Handler, kane, Renetta Palm, , Palliative team (Dr. Theodore Bacon and this writer). Narrative: After review of chart and consulting floor nurse, stopping by to introduce self and observe patient in his room, this writer and Dr. Theodore Bacon met with Mrs. Gunnar Bustos and her supporters outside of patient's room. Patient had a sitter at beside for safety due to his agitation and confusion. Education and emotional support were given as team discussed patient's condition and his wishes for QOL and comfort. Dr. Theodore Bacon was able to arrange for hospice nurse Kamila to meet with family today and it appears he may be appropriate for GIP. Mrs. Gunnar Bustos signed a DDNR and his code status was changed in his chart to DNR. Psychosocial Issues Identified/ Resilience Factors: Mrs. Gunnar Bustos and family had been discussing patient's wishes and options for care to include hospice. They have been  for 77 years and have no children, but several nieces, nephews, and she has one sister, who she had just spoken with by phone. They belong to [employer], but have not been able to attend recently due to Mr. Bautista's declining health. Just a few weeks ago patient was cutting grass and was his usual self, \"sharp as a tack\" and \"sweet as sugar\" according to his wife. Caregiver [place]: high  Does the caregiver feel confident administering medication?  No  Does the caregiver need any help connecting with community resources? Yes, she was referred to hospice. Does the caregiver feel confident assisting with activities of daily living? No.    Goals of Care / Plan:   Patient will be safe and comfortable. Patient and spouse will receive appropriate care and support from hospice and hospital staff. Palliative team will continue to provide education and support as appropriate until hospice services are in place. Thank you for including Palliative Medicine in the care of Mr. Darryn Roberts.     Bart Delgadillo, [facility]  [phone])

## 2022-11-07 NOTE — PROGRESS NOTES
Received notification from bedside RN about patient with regards to: needs medication to help with sleep, NPO  VS: /61, HR 96, RR 21, O2 sat 97%     Intervention given: Benadryl 25 mg IV x 1 dose ordered    0120: Notified of patient getting more agitated, confused and pulling out lines  VS: /61, HR 95, RR 20, O2 sat 96%    - Diazepam 3 mg IV x 1 dose ordered    0242: Notified of patient pwersistently being combative despite above intervention    -  ordered    0347: Notified of persistent agitation, will not take anything PO and keeps trying to get out of bed    - Zyprexa 5 mg IM x 1 dose ordered    0630: Still agitated    - bilateral wrist restraints ordered

## 2022-11-07 NOTE — HOSPICE
[facility]  [phone]    Inpatient Nursing Admission   Patient Name: Quan Anderson  YOB: 1935  Age: 80 y.o. Date of Hospice Admission: 11/7/2022  Hospice Attending Elected by Patient: Ave Garvin MD  Primary Care Physician: Paul Koo NP  Admitting RN: Yunior Jaramillo RN  : Ruben Hillman     Level of Care (GIP/Routine/Respite): GIP  Facility of Care: [id number] [place] [address]  Patient Room: [facility]/     HOSPICE SUMMARY   ER Visits/ Hospitalizations in past year:   Hospice Diagnosis: Cardiorenal syndrome [I13.10]  Onset Date of Hospice Diagnosis: 11/7/22  Summary of Disease Progression Leading to Hospice Diagnosis: Dr Lopez Sensor:  Quan Anderson is a 80 y.o. male with a past history of HTN , DM, CAD( hx of remote [facility]), most recent Echo IN 2017 IS NORMAL CKD stage 4 who was admitted on 11/6/2022 from home with a diagnosis of Acute NSTEMI, Acute hypertensive emergency, questionable pneumonia, FARHAT on CKD stage 4. Patient is delirious /reslstless, pulling I/V,  creatinine continues to rise, patient recently on 10/31 was scheduled for cardiac catheterization but the procedure was cancelled due to significantly elevated creatinine . Chart review reflects patient has been declining for past several days, less active with poor oral intake . Current medical issues leading to Palliative Medicine involvement include: Agitated delirium, NSTEMI with cardiorenal failure, cardiology suggesting hospice, we are called in to support care decisions.        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    History of gastritis Z87.19    MCI (mild cognitive impairment) G31.84    Vitamin D deficiency E55.9    CVD (cerebrovascular disease) I67.9    PE (physical exam), annual Z00.00    Anemia in stage 3 chronic kidney disease (HCC) N18.30, D63.1    Type 2 diabetes mellitus with diabetic neuropathy, without long-term current use of insulin (HCC) E11.40    On statin therapy Z79.899    Dilated cardiomyopathy (HCC) I42.0    Dyspnea R06.00    Iron deficiency anemia D50.9    Stage 4 chronic kidney disease due to diabetes mellitus (HCC) E11.22, N18.4    Chest pain R07.9    Cardiorenal syndrome I13.10     Diagnoses RELATED to the terminal prognosis: cardiorenal syndrome  Other Diagnoses: see above    Rationale for a prognosis of life expectancy of 6 months or less if the disease follows its normal course (Disease Specific History):     Alex Marmolejo is a 80 y.o. who was admitted to [address] [place] [address]. The patient's principle diagnosis of cardiorenal syndrome has resulted in severe terminal agitation. Functionally, the patient's Palliative Performance Scale has declined over a period of days and is estimated at 10%. Objective information that support this patients limited prognosis includes:  Cr 3.05 and trending up, troponin 37,050 post acute NSTEMI on admission to [facility]. The patient/family chose comfort measures with the support of Hospice.     Patient meets for GIP LOC as evidenced by severe terminal agitation, non verbal signs of pain    Prognosis estimated based on 11/07/22 clinical assessment is:   [] Few to Many Hours  [x] Hours to Days   [] Few to Many Days   [] Days to Weeks   [] Few to Many Weeks   [] Weeks to Months   [] Few to Many Months    ASSESSMENT    Patient self-reports:  []  Yes    [x] No    SYMPTOMS: sever terminal agitation, non verbal signs of pain    SIGNS/PHYSICAL FINDINGS: pt is unresponsive, pulling out IV, climbing out of bed. KARNOFSKY: 10%    FAST for all dementia:      Learning Assessment:  Patient  N/A  Is patient willing/able to learn? What is the highest level of education completed? Learning preference (written material, demonstration, visual)? Learning barriers (ESOL, [place], poor vision)? Caregiver  Is caregiver willing to learn care for patient? Yes but too frail to manage pt care while agitated  What is the highest level of education completed?  university  Learning preference (written material, demonstration, visual)? demonstration  Learning barriers (ESOL, [place], poor vision)? None expressed    CLINICAL INFORMATION     Wt Readings from Last 3 Encounters:   11/06/22 65.8 kg (145 lb)   11/01/22 65.6 kg (144 lb 9.6 oz)   10/13/22 63.5 kg (140 lb)     Ht Readings from Last 3 Encounters:   11/06/22 5' 6\" (1.676 m)   11/01/22 5' 6\" (1.676 m)   10/13/22 5' 6\" (1.676 m)     There is no height or weight on file to calculate BMI. There were no vitals taken for this visit. LAB VALUES  No results found for this visit on 11/07/22 (from the past 12 hour(s)). No results found for this visit on 11/07/22 (from the past 6 hour(s)). Lab Results   Component Value Date/Time    Protein, total 7.4 11/07/2022 04:00 AM    Albumin 3.2 (L) 11/07/2022 04:00 AM       Currently this patient has:  [] Supplemental O2 [x] Peripheral IV  [] PICC    [] PORT   [] Schwab Catheter [] NG Tube   [] PEG Tube [] Ostomy    [] AICD: Has ICD been deactivated? [] Yes [] No:______    PLAN     1. Admit GIP LOC for terminal agitation  2. Agitation:  some relief yet short lived from 1mg IV versed and 2mg IV haldol, dose increased to versed 2mg IV with initial relief only lasting about an hour. Discussed NP Ree Ash, will initiate scheduled IV lorazepam 1mg IV every 4 hrs with PRN versed 2mg every hour as needed.     3. Pain:  PRN IV dilaudid 0.5mg every 15min for non verbal signs of pain  4. Emotional and spiritual support for frail, emotionally fragile wife of 77 years    Hospice Team Frequency Orders:  Skilled Nurse -   Daily x 7 days /every other day x 7 days  with 5 PRN visits for symptom control. MSW - 1 visit for initial assessment/evaluation for family support and need for volunteer services. [place] - 1 visit for initial assessment/evaluation for spiritual support. ADVANCE CARE PLANNING (Complete in ACP Flow Sheet)   Code Status: DNR  Durable DNR: [x]  Yes  []  No  Code Status Discussed/Confirmed: yes  Preference for Other Life Sustaining Treatment Discussed/Confirmed:  Not life prolonging measures desired  Hospitalization Preference: prefer to remain at [facility] through end of life, wife unable to manage pt at home  [address] 2022   Patient's [address] is: Legal Next of Joel 69   Primary Decision Maker Name -   Primary Decision Maker Phone Number -   Primary Decision Maker Relationship to Patient -   Confirm Advance Directive None   Patient Would Like to Complete Advance Directive -   Does the patient have other document types Do Not Resuscitate        Service: [] Yes  []  No      [] Unknown  Appropriate for Pinning Ceremony:  [] Yes     [x] No  Mandaen: Roman Catholic   Home: TBD    DISCHARGE PLANNING     1. Discharge Plan: Home with hospice should pt stabilize  2. Patient/Family teaching: end of life process  3. Response to patient/family teaching: expressed understanding    SOCIAL/EMOTIONAL/SPIRITUAL NEEDS     Spiritual Issues Identified: family  present at time of hospice discussion, wife feels prayer has led her to this decision for hospice    Psych/ Social/ Emotional Issues Identified: Wife of 77 years, worried about adjustment to life without pt. Had several conversations with pt giving clear direction for a peaceful death.     Caregiver Support:  [] Provided information on End of Life Care   [] Material Provided: Gone From My Sight or Shonda Shon Delgado  contacted, discharge to hospice order received  Dr. Belgica Perry contacted, agrees to serve as attending provider for hospice and provided verbal certification of terminal illness with life expectancy of 6 months or less. Orders for hospice admission, medications and plan of treatment received. Medication reconciliation completed. MEDS: See medication list below  DME: Per hospital  Supplies: Per hospital  IDT communication to include MD, SN, SW, CH and support team    ALLERGIES AND MEDICATIONS     Allergies:    Allergies   Allergen Reactions    Tetanus Vaccines And Toxoid Swelling     Swelling at the site    Tradjenta [Linagliptin] Diarrhea     headache     Current Facility-Administered Medications   Medication Dose Route Frequency    midazolam (VERSED) injection 2 mg  2 mg IntraVENous Q1H PRN    HYDROmorphone (DILAUDID) injection 0.5 mg  0.5 mg IntraVENous Q15MIN PRN    bisacodyL (DULCOLAX) suppository 10 mg  10 mg Rectal DAILY PRN    ketorolac (TORADOL) injection 30 mg  30 mg IntraVENous Q8H PRN    glycopyrrolate (ROBINUL) injection 0.2 mg  0.2 mg IntraVENous Q4H PRN    midazolam (VERSED) injection 1 mg  1 mg IntraVENous ONCE     Facility-Administered Medications Ordered in Other Encounters   Medication Dose Route Frequency    midazolam (VERSED) injection 2 mg  2 mg IntraVENous Q1H PRN    ketorolac (TORADOL) injection 30 mg  30 mg IntraVENous Q8H PRN    glycopyrrolate (ROBINUL) injection 0.2 mg  0.2 mg IntraVENous Q4H PRN    bisacodyL (DULCOLAX) suppository 10 mg  10 mg Rectal DAILY PRN    HYDROmorphone (DILAUDID) injection 0.5 mg  0.5 mg IntraVENous Q15MIN PRN    sodium chloride (NS) flush 5 mL  5 mL IntraVENous PRN

## 2022-11-07 NOTE — PROGRESS NOTES
OT note:  OT reviewed chart and spoke with  nursing and asked to defer due to pt remains agitated and is attempting to hit and kick staff and his wife. Will continued to follow for OT.

## 2022-11-07 NOTE — PROGRESS NOTES
[address] Patient arrived to room. VSS and PTT drawn. Nitro gtt at 10 mcg/min and Hep gtt at 14units/kg/hr. Patient oriented and call bel within reach    End of Shift Note    Bedside shift change report given to [address] and [address] (oncoming nurse) by Ariadne Burnette RN (offgoing nurse). Report included the following information SBAR, Kardex, ED Summary, MAR, Recent Results, and Cardiac Rhythm NSR    Shift worked:  7a to 7p     Shift summary and any significant changes:     See above  PTT sent.  Patient resting comfortably     Concerns for physician to address:  N/A     Zone phone for oncoming shift:           Activity:     Number times ambulated in hallways past shift: 0  Number of times OOB to chair past shift: 0    Cardiac:   Cardiac Monitoring: Yes      Cardiac Rhythm: Sinus Rhythm    Access:  Current line(s): PIV     Genitourinary:   Urinary status: voiding    Respiratory:   O2 Device: None (Room air)  Chronic home O2 use?: NO  Incentive spirometer at bedside: NO       GI:     Current diet:  DIET NPO  Passing flatus: YES  Tolerating current diet: YES       Pain Management:   Patient states pain is manageable on current regimen: YES    Skin:     Interventions: turn team, speciality bed, float heels, increase time out of bed, foam dressing, PT/OT consult, limit briefs, internal/external urinary devices, and nutritional support     Patient Safety:  Fall Score:    Interventions: bed/chair alarm, assistive device (walker, cane, etc), gripper socks, pt to call before getting OOB, stay with me (per policy), and gait belt       Length of Stay:  Expected LOS: - - -  Actual LOS: 0      Ariadne Burnette RN

## 2022-11-07 NOTE — PROGRESS NOTES
[place] [facility]  [phone]    Social Work Admission Note  Patient Name: Roya Lal  YOB: 1935  Age: 80 y.o. Date of Visit: 11/07/22  Facility of Care: [facility]  Patient Room: 2225/01     Hospice Attending: Verito Saleh MD  Hospice Diagnosis: cardiorenal syndrome    Level of Care:    [x]  GIP    []  Respite   []  Routine    Consents/NCD Documentation:     Consents Reviewed:   []  Yes  [x]  No, completed by other hospice staff member. Person Reviewed/Signed with:  []  Patient   []  Pts NOK/MPOA  Name:     Right to NCD Reviewed:   []  Yes  []  No, completed by other hospice staff member. NCD Requested:   []  Yes  []  No    Admission Nurse/Intake Notified NCD was requested:  []  Yes  []  No  []  Not requested    Planned Start of Care Date:     Hospice Witness Representative:    NARRATIVE   Pt is an 60-year-old male admitted to inpatient hospice on 11/7/2022 with a hospice diagnosis of Cardiorenal Disease. Pt was admitted to the hospital on 11/6/2022 for chest pain. Pt's wife Richard Yang was present. At time of visit, pt was very agitated and restless. Pt and Richard Yang have been  for over 77 years. Their neighbor/good friend Ronn Lanes visited briefly. Andrew Lanes clearly provides support to both pt and Richard Yang. Pt and Richard Yang worked together at [employer] for several years before retiring in . Richard Yang reflected on their wonderful life. Richard Yang acknowledged she will have a difficult time coping after pt's passing as she and pt did everything together. LMSW encouraged Richard Yang and Ronn Lanes to play music for pt or talk to him, as these sounds may be comforting for him. LMSW provided emotional support and supportive counseling in processing prognosis. Pt is DNR code status. FH is TBD. LMSW will continue to provide support.     ADVANCE CARE PLANNING    Advance Directive:  []  Yes  [x]  No   []  Would like to complete  Primary MPOA:  Secondary MPOA:   Melissa Darnell, wife  Code Status: DNR  Durable DNR: _ Yes  _X No  Advance Care Planning 2022   Patient's Healthcare Decision Maker is: Legal Next of Joel 69   Primary Decision Maker Name -   Primary Decision Maker Phone Number -   Primary Decision Maker Relationship to Patient -   Confirm Advance Directive None   Patient Would Like to Complete Advance Directive -   Does the patient have other document types Do Not Resuscitate       Relationship Status:  []  Single     [x]        []      []  Domestic Partner     []  /  []  Common Law  []    []  Unknown    If in a relationship, name of partner/spouse: Shirin Rolon  Duration of relationship: 66 years    Zoroastrianism/Spirituality: Muslim  [x]  Active In Zoroastrianism/Spirituality   []  Not Active   []  N/A  Notes:     Home: TBD  Resources Provided:  []  [employer]  []  Information on applying for disability  []  FMLA Paperwork  []  Letters Requested by [facility]   []  Mark Lerma  []  Final Arrangements Resources   []  Outside counseling services (individual or group therapy)  []  Grief resources   []  Jeramie Meraz  []  [employer]   []  [facility]   []  [facility] [employer] [facility]   []  [facility] to [facility]   []  Referral Sent to [address] [place] [address]   []  Referral Sent to [facility]  []  Other  Social Work Initial Assessment     Sex:  male    Pronoun Preference:   [x]  He/Him/His   []  She/Her/Hers   []  They/Them/Theirs   []   Patient Name   []   Decline to Answer  []   Unknown  []   Other   Notes:     Race/Ethnicity: (felix all that apply)  []  [place] ([facility] or [place] Native  []    []  Black or [place] American  []   or   []  [facility] or [facility]  [x]  White  []  Unknown  []  Other     Inpatient Financial Agreement Completed:   []  Yes  [x]  No    Insurance:   [x]  Medicare   []  Medicaid     []  [employer]    []  Self-Pay/Uninsured   Notes: Has pt applied for FAP? []  Needs to Apply  []  Application Completed and Submitted   []  Approved/ Expiration Date:   [x]  N/A  Notes:     Has pt applied for Medicaid? []  Needs to Apply  []  Application Completed and Submitted/Application Number:   [x]  N/A  Notes:     Has a long term care screening ([facility]) been requested? []  Requested   []  Not Requested, Needs follow up  []  Completed  [x]  N/A  Notes:     Does the pt have a long term care insurance policy? []  Yes  [x]  No  []  Yes, Needing assistance with paperwork  Notes:      Service:    []  Yes   [x]  No       []  Unknown    Appropriate for Pinning Ceremony:   []  Yes      [x]  No    Is patient using VA benefits? []  Yes      [x]  No  []  Needs assistance with accessing benefits  Notes:       Work History:   []  Full-Time/Part-Time  [x]  Retired   []  Other  Notes:     Primary Language: English  []   Needed  []   utilized during visit  []   Waived/ form completed    Ability to express thoughts/needs/feelings  []  Expressed thoughts/feelings/needs without difficulty  []  Requires extra time and cuing  []  Speech limited single words  []  Uses only gestures (eye, blinking eye or head movement/pointing)  [x]  Unable to express thoughts/feelings/needs (speech unintelligible or inappropriate)  [x]  Unresponsive  Notes:      Mental Status:  []  Alert-oriented to:     []  Person     []  Place     []  Time  []  Comatose-responds to:    []   Verbal stimuli    []  Tactile stimuli    []  Painful stimuli  []  Forgetful  []  Disoriented/Confused  [x]  Lethargic  [x]  Agitated  [x]  Unresponsive  []  Other (specify):    Notes:      Patients description of Illness/Current Health Status:    [x]  Patient unable to discuss  []  Patient unwilling to discuss  []  (Specify)        Knowledge/Understanding of Disease Process  Patient:    []  Demonstrates knowledge/understanding of disease process   []  Demonstrates knowledge/understanding of treatment plan   []  Demonstrates knowledge/understanding of prognosis   []  Demonstrates acceptance of prognosis   []  Demonstrates knowledge/understanding of resuscitation status   [x]  Other (specify)  Caregiver:   [x]  Demonstrates knowledge/understanding of disease process   [x]  Demonstrates knowledge/understanding of treatment plan   [x]  Demonstrates knowledge/understanding of prognosis   [x]  Demonstrates acceptance of prognosis   [x]  Demonstrates knowledge/understanding of resuscitation status   []  Other (specify)  Notes:      Patients living arrangement/care setting:  Use the PRIOR COLUMN when the PATIENTS current health status necessitated a change in his/her primary residence. Prior Current Response              [x]             []    Patients own home/residence              []             []    Home of family member/friend              []             []    Boarding home/Group Home              []             []    Assisted living facility/snf center              []             []    Hospital/Acute care facility              []             []    Skilled nursing facility              []             []    Long term care facility/Nursing home              []             [x]    Hospice in Patient      Primary Caregiver:  []  No Primary Caregiver  Name of Primary Caregiver: Bo Mejia  Primary Caregiver Phone Number:  [phone]  Relationship or Primary Caregiver:    [x]  Spouse/Significant other       []  Child        []  Step child       []  Sibling   []  Parent   []  Friend/Neighbor   []  Community/[employer] Volunteer   []  Paid help   []  Other (specify):___________  Notes:       Family members/Significant others:  Name:  Relationship:  Phone Number:  Actively involved in care? []  Yes  []  No    Name:  Relationship:  Phone Number:  Actively involved in care?   []  Yes  []  No    Social support systems: (select ONE best description)  [x]  Excellent social support system which includes three or more family members or friends  []  Good social support system which includes two or less members or friends  []  [address] support which includes one family member or friend  []  Poor social support; no family members or friends; basically ALONE  Notes:      Emotional Status: (felix all that apply)    Patient Caregiver Response                 [x]                [x]    Mood/Affect stable and appropriate                   []                []    Angry                 []                []    Anxious                 []                []    Apprehensive                 []                []    Avoidant                 []                []    Clinging                 []                []    Depressed                 []                []    Distraught                 []                []    Fearful                 []                []    Flat Affect                 []                []    Helpless                 []                []    Hostile                 []                []    Impulsive                 []                []    Irritable                 []                []    Labile                 []                []    Manic                 []                []    Restlessness                 []                [x]    Sad                 []                []    Strain/Stress                 []                []    Suspicious                 []                []     Tearful                 []                 []     Withdrawn          Notes:     Coping Skills (strengths/weakness):    Patient: Coping Skills (strength/weakness): good familial support   Family/caregiver (strength/weakness): good familial support, sudden decline     [place] of care upon discharge (felix all that apply):     [x]  No burden evident   []  Family must administer medications   []  Illness causing financial strain   []  Family/Support feels overwhelmed   []  Family/Support sleep disturbed with patients care   []  Patients care causes extra physical stress  of death  []  Illness causes changes in family lifestyle  []  Illness impacting family/support employment  []  Family experiencing increased time demands  []  Patients behavior endangers family  []  Denial of patients illness  []  Concern over outcome of illness/fear  []  Patients behavior embarrassing to family   Notes:      Risk Factors: (felix all that apply):    [x]  No burden evident   []  Alcohol abuse  []  Financial resources inadequate to meet basic needs (food/house/etc)  []  Financial resources inadequate to meet health care needs (supplies/equipment/medications)  []  Food/nutrition resources inadequate  []  Home environment unsafe/inadequate for home care  []  Homicidal risk  []  Lives alone or without concerned relatives  []  Multiple medications/complex schedule  []  Physical limitations increase likelihood of falls  []  Plan of care/treatments complicated  []  Substance use/abuse  []  Suicidal risk  []  Visual impairment threatens safety/ability to perform self-care  []  Other (specify):     Abuse/Neglect (actual/potential risks):  [x]  No signs of abuse/neglect  []  History of abuse/neglect                 []  Physical          []  Sexual  []  History of domestic violence  []  Lacks adequate physical care  []  Lacks emotional nurturing/support  []  Lacks appropriate stimulation/cognitive experiences  []  Left alone inappropriately  []  Lacks necessary supervision  []  Inadequate or delayed medical care  []  Unsafe environment (i.e guns/drug use/history of violence in the home/etc.)  []  Bruising or other physical signs of injury present  []  Refer to child/adult protective services  []  Other (specify):   Notes:      Current Sources of Stress (in Addition to Current Illness):   [x]  None reported  []  Bills/Debt    []  Career/Job change    []   (short term)    []   (long term)    []  Death of a child (recent)    []  Death of a parent (recent)   []  Death of a spouse (recent)   []  Employment status changed   []  Family discord    []  Financial loss/Inadequate inther (specify):come  []  Job loss  []  Legal issues unresolved  []  Lifestyle change  []  Marital discord  []  Marriage within the last year  []  Paperwork (insurance/legal/etc) overwhelming  []  Separation/Divorce  []  Other (specify):  Notes:       Interventions/Plan of Care   [x]  MSW will assess social and emotional factors related to coping with end of life issues  []  MSW will provide community resource planning/referral   []  MSW to assist with relocation to different care setting if/when symptoms stabilize  [x]  Pt/Pts family will receive emotional support. []  Pt/Pts family will share the details surrounding the pts disease progression  []  Pt/Pts Family will show expression of grief by participating in life review. []  Pt/Pts Family will be educated and able to verbalize understanding of mental, emotional, and physical symptoms of grief. []  Pt/Pts Family will be educated and able to identify skills and social support to help cope with grief. []  Pts family will receive support for grief with emphasis on developmentally appropriate language.   [] Other:   Notes:       Discharge Planning   [x]  Home with family member    []  Return back to nursing home/facility  []  Needs assistance with placement/paid caregivers  []  Short Stay under routine level [facility] care at [facility]   []  Other  Notes:     MSW Assessment Completed by: Jenny Godoy LMSW  11/07/22    Time In:1:00 pm       Time Out: 1:45 pm

## 2022-11-07 NOTE — PROGRESS NOTES
11/07/22 0743 11/07/22 1210   Vitals   Temp 98.4 °F (36.9 °C) 97.8 °F (36.6 °C)   Temp Source Axillary  --    Pulse (Heart Rate) (!) 106 92   Heart Rate Source Monitor  --    Resp Rate 29 20   O2 Sat (%) 97 %  --    Level of Consciousness 0 2   /79  --    MAP (Monitor) 93  --    MAP (Calculated) 91  --    BP 1 Location Right leg  --    BP 1 Method Automatic  --    BP Patient Position At rest  --    Cardiac Rhythm Sinus Tachy  --    MEWS Score 3  --      Charge nurse aware. Patient goes between NSR and ST.  Patient is agitated and restless

## 2022-11-07 NOTE — PROGRESS NOTES
0720: Bedside shift change report given to MARIA A Vasquez (oncoming nurse) by Sabiha Cortés RN (offgoing nurse). Report included the following information SBAR, Kardex, Intake/Output, MAR, and Recent Results. 0802: MD notified via [employer] that patient is agitated, restless and hitting staff. 5mg IM haldol one time dose ordered. 0957: Palliative consulted. 1210: Patient changed to comfort measures and code status changed to DNR. Hospice is on board and plans for inpatient hospice. 1615: Called report to Magnus Solorio RN on oncology for patient to go to room [id number].     1623: Called patient's wife and updated her that he will be moving to room 1138.     1624: Put in transport request.     8183:   TRANSFER - OUT REPORT:    Verbal report given to Magnus Solorio RN(name) on Lorena Ruff  being transferred to Oncology (unit) for routine progression of care       Report consisted of patients Situation, Background, Assessment and   Recommendations(SBAR). Information from the following report(s) SBAR, Kardex, Intake/Output, MAR, and Recent Results was reviewed with the receiving nurse. Lines:   Peripheral IV Left Forearm (Active)   Site Assessment Clean, dry, & intact 11/07/22 1210   Phlebitis Assessment 0 11/07/22 1210   Infiltration Assessment 0 11/07/22 1210   Dressing Status Clean, dry, & intact 11/07/22 1210   Dressing Type Transparent;Tape;Brianna 11/07/22 1210   Hub Color/Line Status Pink;Flushed 11/07/22 1210   Action Taken Open ports on tubing capped 11/07/22 0400   Alcohol Cap Used Yes 11/07/22 0400        Opportunity for questions and clarification was provided.       Patient transported with:   [employer]

## 2022-11-07 NOTE — CONSULTS
Palliative Medicine Consult  [place]: [phone])    Patient Name: Loulou Schaeffer  YOB: 1935    Date of Initial Consult:11/7/22  Reason for Consult:  care decisions  Requesting Provider: Matt Leonard MD  Primary Care Physician: Bronson Molina NP     SUMMARY:   Loulou Schaeffer is a 80 y.o. male with a past history of HTN , DM, CAD( hx of remote CABJ), most recent Echo IN 2017 IS NORMAL CKD stage 4 who was admitted on 11/6/2022 from home with a diagnosis of Acute NSTEMI, Acute hypertensive emergency, questionable pneumonia, FARHAT on CKD stage 4. Patient is delirious /reslstless, pulling I/V,  creatinine continues to rise, patient recently on 10/31 was scheduled for cardiac catheterization but the procedure was cancelled due to significantly elevated creatinine . Chart review reflects patient has been declining for past several days, less active with poor oral intake . Current medical issues leading to Palliative Medicine involvement include: Agitated delirium, NSTEMI with cardiorenal failure, cardiology suggesting hospice, we are called in to support care decisions. Social hx : lives with his wife  for 77 years , they have no children their support is niece Ferguson Gentleman , and their  who lives close to them . At base line patient  was independent and active able to mow grass few weeks ago . Legal next of kin is wife Arin Jack:   Agitated delirium   Increased creatinine   Restlessness  Goals of are   End of life care ( prognosis of days )         PLAN:   Met with patient wife Sonia/legal next of kin , their  Jovita Héctorhernan, niece Ferguson Gentleman along side [facility] We reviewed his medical issue, educated on delirium ,. Wife had spoken to cardiologist earlier, she shared, for him quality of life is most important .   Prior to our visit, Linden Bunch has discussed with his  for leaning towards comfort . We discussed hospice /end of life care with goal to control his symptoms and comfort. She is in agreement to stop active treatment for comfort focus care . DNR discussion : they agree for DNAR and no intubation now that the goal is comfort focus care . They understand we will titrate opioods and anxiolytics for symptom control, contributing to sedation. Hospice consult paced. Plan coordinated with hospice [facility]. Comfort care orders placed. We will support until patient is officially under hospice care . Initial consult note routed to primary continuity provider and/or primary health care team members, bed side MARIA A Vasquez . Communicated plan of care with: Palliative IDTLu 192 Team     GOALS OF CARE / TREATMENT PREFERENCES:     GOALS OF CARE:  Patient/Health Care Proxy Stated Goals: Comfort    TREATMENT PREFERENCES:   Code Status: DNR    Patient and family's personal goals include: comfort focus care     Important upcoming milestones or family events: The patient identifies the following as important for living well: unable to tell me       Advance Care Planning:  [x] [facility] [place] [address] Team has updated the ACP Navigator with [address] and Patient Capacity      Primary Decision Maker: Bethany [place] - [phone]  Advance Care Planning 10/13/2022   Patient's Healthcare Decision Maker is: Legal Next of Joel 69   Primary Decision Maker Name -   Primary Decision Maker Phone Number -   Primary Decision Maker Relationship to Patient -   Confirm Advance Directive None   Patient Would Like to Complete Advance Directive No               Other:    As far as possible, the palliative care team has discussed with patient / health care proxy about goals of care / treatment preferences for patient.      HISTORY:     History obtained from: chart , wife     CHIEF COMPLAINT: unresponsive     HPI/SUBJECTIVE:    The patient is:   [] Verbal and participatory  [x] Non-participatory due to: altered mental status. He is restless with sitter , earlier was pulling I/V . Clinical Pain Assessment (nonverbal scale for severity on nonverbal patients):   Clinical Pain Assessment  Severity: 4  Location: unable to tell me beacsue of altered mental status . Character: unable to tell me bcasue of altered mental status  Duration: unable to tell me because of latered mental status  Effect: unable to tell me because of altered mental status  Factors: unabel to tell me because of altered mental status  Frequency: unable to tell me because of altered mental status     Activity (Movement): Restless, excessive activity and/or withdrawal reflexes    Duration: for how long has pt been experiencing pain (e.g., 2 days, 1 month, years)  Frequency: how often pain is an issue (e.g., several times per day, once every few days, constant)     FUNCTIONAL ASSESSMENT:     Palliative Performance Scale (PPS):  PPS: 20       PSYCHOSOCIAL/SPIRITUAL SCREENING:     Palliative IDT has assessed this patient for cultural preferences / practices and a referral made as appropriate to needs (Cultural Services, Patient Advocacy, Ethics, etc.)    Any spiritual / Baptist concerns:  [] Yes /  [x] No   If \"Yes\" to discuss with pastoral care during IDT     Does caregiver feel burdened by caring for their loved one:   [] Yes /  [x] No /  [] No Caregiver Present/Available [] No Caregiver [] Pt [facility]  If \"Yes\" to discuss with social work during IDT    Anticipatory grief assessment:   [x] Normal  / [] Maladaptive     If \"Maladaptive\" to discuss with social work during IDT    ESAS Anxiety:      ESAS Depression:          REVIEW OF SYSTEMS:     Positive and pertinent negative findings in ROS are noted above in HPI. The following systems were [] reviewed / [x] unable to be reviewed as noted in HPI  Other findings are noted below.   Systems: constitutional, ears/nose/mouth/throat, respiratory, gastrointestinal, genitourinary, musculoskeletal, integumentary, neurologic, psychiatric, endocrine. Positive findings noted below. Modified ESAS Completed by: provider           Pain: 4           Dyspnea: 1                    PHYSICAL EXAM:     From RN flowsheet:  Wt Readings from Last 3 Encounters:   11/06/22 145 lb (65.8 kg)   11/01/22 144 lb 9.6 oz (65.6 kg)   10/13/22 140 lb (63.5 kg)     Blood pressure 92/73, pulse 92, temperature 97.8 °F (36.6 °C), resp. rate 20, height 5' 6\" (1.676 m), weight 145 lb (65.8 kg), SpO2 95 %.     Pain Scale 1: Adult Nonverbal Pain Scale  Pain Intensity 1: 0     Pain Location 1: Chest        Pain Intervention(s) 1: Back rub, Emotional support, Repositioned  Last bowel movement, if known:     Constitutional: frail , restless, non verbal, resisting clinical exam .  Eyes: pupils equal.  ENMT: no nasal discharge, moist mucous membranes  Cardiovascular: tachycardic  Respiratory: breathing not labored,  Musculoskeletal: no deformity, no tenderness to palpation  Skin: warm, dry  Neurologic: not following commands, moving all extremities  Psychiatric: restless  Other:       HISTORY:     Active Problems:    Chest pain (11/6/2022)    Past Medical History:   Diagnosis Date    CAD (coronary artery disease)     mi    Chronic kidney disease     hx of elevated blood levels    Chronic pain     lower back    Diabetes (HCC)     Enlarged prostate     Hypercholesterolemia     Hypertension     Other unknown and unspecified cause of morbidity or mortality     hayfever      Past Surgical History:   Procedure Laterality Date    HX CATARACT REMOVAL Bilateral     HX CHOLECYSTECTOMY      HX LUMBAR LAMINECTOMY  09/2017    HX ORTHOPAEDIC  09/23/2015    back surgery     MA CARDIAC SURG PROCEDURE UNLIST      bypass(1985), stents(1994, 2004)    [facility] COLONOSCOPY FLX DX W/COLLJ SPEC WHEN PFRMD  8/12/2013         [facility] EGD TRANSORAL BIOPSY SINGLE/MULTIPLE  6/28/2012         VASCULAR SURGERY PROCEDURE UNLIST  2/1996 left carotid      Family History   Problem Relation Age of Onset    Heart Disease Mother     Heart Disease Father       History reviewed, no pertinent family history.   Social History     Tobacco Use    Smoking status: Former     Types: Cigarettes     Quit date: 6/3/1967     Years since quittin.4    Smokeless tobacco: Never   Substance Use Topics    Alcohol use: No     Allergies   Allergen Reactions    Tetanus Vaccines And Toxoid Swelling     Swelling at the site    Tradjenta [Linagliptin] Diarrhea     headache      Current Facility-Administered Medications   Medication Dose Route Frequency    midazolam (VERSED) injection 1 mg  1 mg IntraVENous Q2H PRN    haloperidol lactate (HALDOL) injection 2 mg  2 mg IntraVENous Q6H    HYDROmorphone (DILAUDID) injection 0.2 mg  0.2 mg IntraVENous Q2H PRN    acetaminophen (TYLENOL) suppository 650 mg  650 mg Rectal Q4H PRN    nitroglycerin (NITROSTAT) tablet 0.4 mg  0.4 mg SubLINGual PRN    tamsulosin (FLOMAX) capsule 0.8 mg  0.8 mg Oral DAILY    polyethylene glycol (MIRALAX) packet 17 g  17 g Oral DAILY    linezolid in dextrose 5% (ZYVOX) IVPB premix in D5W 600 mg  600 mg IntraVENous Q12H    metoprolol (LOPRESSOR) injection 5 mg  5 mg IntraVENous Q6H PRN    metoprolol (LOPRESSOR) injection 5 mg  5 mg IntraVENous Q6H    balsam peru-castor oiL (VENELEX) ointment   Topical BID          LAB AND IMAGING FINDINGS:     Lab Results   Component Value Date/Time    WBC 9.3 2022 04:00 AM    HGB 11.6 (L) 2022 04:00 AM    PLATELET 251 (L)  04:00 AM     Lab Results   Component Value Date/Time    Sodium 139 2022 04:00 AM    Potassium 4.4 2022 04:00 AM    Chloride 110 (H) 2022 04:00 AM    CO2 19 (L) 2022 04:00 AM    BUN 54 (H) 2022 04:00 AM    Creatinine 3.05 (H) 2022 04:00 AM    Calcium 9.0 2022 04:00 AM    Magnesium 2.2 2022 04:00 AM    Phosphorus 4.9 (H) 2022 04:00 AM      Lab Results   Component Value Date/Time Alk. phosphatase 81 11/07/2022 04:00 AM    Protein, total 7.4 11/07/2022 04:00 AM    Albumin 3.2 (L) 11/07/2022 04:00 AM    Globulin 4.2 (H) 11/07/2022 04:00 AM     Lab Results   Component Value Date/Time    INR 1.0 09/07/2017 09:05 AM    Prothrombin time 10.5 09/07/2017 09:05 AM    aPTT 75.8 (H) 11/07/2022 04:00 AM      Lab Results   Component Value Date/Time    Iron 65 01/02/2020 03:58 PM    TIBC 355 01/02/2020 03:58 PM      No results found for: PH, PCO2, PO2  No components found for: [place]   Lab Results   Component Value Date/Time     07/06/2022 09:10 AM                Total time: 70 mins  Counseling / coordination time, spent as noted above: 55 mins  > 50% counseling / coordination?: yes     Prolonged service was provided for  []30 min   []75 min in face to face time in the presence of the patient, spent as noted above. Time Start:   Time End:   Note: this can only be billed with 44027 (initial) or 36203 (follow up). If multiple start / stop times, list each separately.

## 2022-11-07 NOTE — PROGRESS NOTES
2250 - Pt showing signs of increased confusion. Saying \"I lost my wife. I'm lost.\" Order for benadryl 25mg IV ordered by Snehal Fernández NP    8099 - Pt began showing signs of increased agitation. Non verbally redirectable. Pulling on lines, trying to get out of bed. Orders received for Diazepam 3mg IV ordered by Snehal Fernández NP. Also attempted bilateral wrist restraints, however patient was able to easily pull these off.      0230- Pt awake again with increased agitation, hitting and kicking staff with periotic verbal threats. Activity vest placed on patient, but pt removed. Orders obtained by MJ Izaguirre for . 2319 -  in room. Patient continues to remain agitated, attempting to hit and kick staff members. Messaged MJ chandler for orders. Orders obtained for Zyprexa 5mg IV ordered and given in left arm.     0400 - Pt starting to settle down and relax. 0445 - PTT resulted at 75.8. Within normal limits. Verified with Jael SAHA and [address] [place] [address] therapeutic result. Will need recheck at 1045.     0700 - End of Shift Note    Bedside shift change report given to Christina (oncoming nurse) by Nunu Becker RN (offgoing nurse). Report included the following information SBAR, Kardex, Intake/Output, MAR, Recent Results, and Cardiac Rhythm NSR/Sinus Tach    Shift worked:  [id number]     Shift summary and any significant changes:     See above     Concerns for physician to address:  New confusion and agitation. Elevated troponin. Cardiac Cath/Echo?     Zone phone for oncoming shift:   . Activity:  Activity Level:  Up with Assistance  Number times ambulated in hallways past shift: 0  Number of times OOB to chair past shift: 1    Cardiac:   Cardiac Monitoring: Yes      Cardiac Rhythm: Sinus Tachy, Sinus Rhythm    Access:  Current line(s): PIV     Genitourinary:   Urinary status: due to void    Respiratory:   O2 Device: None (Room air)  Chronic home O2 use?: NO  Incentive spirometer at bedside: NO       GI:     Current diet:  DIET NPO  Passing flatus: YES  Tolerating current diet: YES       Pain Management:   Patient states pain is manageable on current regimen: N/A    Skin:  Kosta Score: 15  Interventions: turn team, float heels, increase time out of bed, PT/OT consult, and nutritional support     Patient Safety:  Fall Score:  Total Score: 4  Interventions: bed/chair alarm, gripper socks, pt to call before getting OOB, and sitter at bedside   High Fall Risk: Yes    Length of Stay:  Expected LOS: - - -  Actual LOS: 1      Sanjuana Dean RN

## 2022-11-07 NOTE — PROGRESS NOTES
Problem: Diabetes Self-Management  Goal: *Disease process and treatment process  Description: Define diabetes and identify own type of diabetes; list 3 options for treating diabetes. Outcome: Progressing Towards Goal  Goal: *Incorporating nutritional management into lifestyle  Description: Describe effect of type, amount and timing of food on blood glucose; list 3 methods for planning meals. Outcome: Progressing Towards Goal  Goal: *Incorporating physical activity into lifestyle  Description: State effect of exercise on blood glucose levels. Outcome: Progressing Towards Goal  Goal: *Developing strategies to promote health/change behavior  Description: Define the ABC's of diabetes; identify appropriate screenings, schedule and personal plan for screenings. Outcome: Progressing Towards Goal  Goal: *Using medications safely  Description: State effect of diabetes medications on diabetes; name diabetes medication taking, action and side effects. Outcome: Progressing Towards Goal  Goal: *Monitoring blood glucose, interpreting and using results  Description: Identify recommended blood glucose targets  and personal targets. Outcome: Progressing Towards Goal  Goal: *Prevention, detection, treatment of acute complications  Description: List symptoms of hyper- and hypoglycemia; describe how to treat low blood sugar and actions for lowering  high blood glucose level. Outcome: Progressing Towards Goal  Goal: *Prevention, detection and treatment of chronic complications  Description: Define the natural course of diabetes and describe the relationship of blood glucose levels to long term complications of diabetes.   Outcome: Progressing Towards Goal  Goal: *Developing strategies to address psychosocial issues  Description: Describe feelings about living with diabetes; identify support needed and support network  Outcome: Progressing Towards Goal  Goal: *Insulin pump training  Outcome: Progressing Towards Goal  Goal: *Sick day guidelines  Outcome: Progressing Towards Goal  Goal: *Patient Specific Goal (EDIT GOAL, INSERT TEXT)  Outcome: Progressing Towards Goal     Problem: Falls - Risk of  Goal: *Absence of Falls  Description: Document Mal Fall Risk and appropriate interventions in the flowsheet.   Outcome: Progressing Towards Goal  Note: Fall Risk Interventions:  Mobility Interventions: Assess mobility with egress test, Bed/chair exit alarm, OT consult for ADLs, Patient to call before getting OOB, PT Consult for mobility concerns, PT Consult for assist device competence    Mentation Interventions: Adequate sleep, hydration, pain control, Bed/chair exit alarm, Door open when patient unattended    Medication Interventions: Assess postural VS orthostatic hypotension, Bed/chair exit alarm, Patient to call before getting OOB, Teach patient to arise slowly    Elimination Interventions: Bed/chair exit alarm, Call light in reach, Patient to call for help with toileting needs, Toilet paper/wipes in reach, Toileting schedule/hourly rounds

## 2022-11-08 NOTE — [facility] IDG CHAPLAIN NOTES
Patient: Inés Vidal    Date: 11/08/22  Time: 10:23 AM     initial visit to provide comfort and spiritual guidance to the pt, family and CG as they make their journey towards EOL. Upon entry, patient non-verbal and non-responsive, resting comfortably. Hospice team informed that patient's spouse just left to run errands. Initiated call to spouse: unable to leave a message. Interventions: Offered empathic presence to patient. Led guided meditation on compassion and beauty to enhance acceptance and peace at end of life. Patient began to breathe more deeply and seemed more relaxed following spiritual intervention. Goal for next 2 wks: Will continue to coordinate w/ hospice team to assess patient and family needs and offer support as needed.       Signed by: Willi Valdez

## 2022-11-08 NOTE — PROGRESS NOTES
[facility] to Those in Need  [phone]    Patient Name: Alex Marmolejo  YOB: 1935    Date of Provider Hospice Visit: 11/08/22    Level of Care:   [x] General Inpatient (GIP)    [] Routine   [] Respite    Current Location of Care:  [] [facility] [] [facility] [x] [facility] [] [facility] [] [facility], patient referred from:  [] [facility] [] [facility] [] [facility] [] [facility] [] Home [] Other:     Date of [facility] Admission: 11/7/2022  Hospice Medical Director at time of admission: Dr. Reynaldo Rios Diagnosis: Cardiorenal syndrome  Diagnoses RELATED to the terminal prognosis: NSTEMI, FARHAT on CKD stage 4  Other Diagnoses: HTN, HLD, CAD     HOSPICE SUMMARY     Alex Marmolejo is a 80y.o. year old who was admitted to [facility]. HPI and brief course of hospitalization as summarized in Dr. Sophia Clark most recent progress note:  Alex Marmolejo is a 80 y.o. male with a past history of HTN , DM, CAD( hx of remote CAB), most recent Echo IN 2017 IS NORMAL CKD stage 4 who was admitted on 11/6/2022 from home with a diagnosis of Acute NSTEMI, Acute hypertensive emergency, questionable pneumonia, FARHAT on CKD stage 4. Patient is delirious /reslstless, pulling I/V,  creatinine continues to rise, patient recently on 10/31 was scheduled for cardiac catheterization but the procedure was cancelled due to significantly elevated creatinine and conservative medical therapy was recommended. Chart review reflects patient has been declining for past several days, less active with poor oral intake . Assessment as summarized in Dr. Leon Peterson most recent cardiology progress note:  Assessment:       1. Non-ST segment elevation myocardial infarction.   2.  History of coronary artery disease with remote coronary artery bypass grafting with a vein graft to the left anterior descending in 1985 and prior stenting of the right coronary artery in 2003, prior normal ejection fraction. 3.  Type 2 diabetes. 4.  Dyslipidemia. 5.  FARHAT/Chronic kidney disease stage IV. 6.  Cerebrovascular disease with prior left carotid endarterectomy. 7.  Spinal stenosis. 8.  Hyperkalemia. 9.  Altered mental status and recent general decline. Plan:  Unfortunately not much to offer in this situation. Spoke with wife. Favor hospice/palliative measures. D/C IV heparin       The patient's principle diagnosis has resulted in altered mental status, anxiety, restlessness, agitation, and nonverbal signs of pain. Functionally, the patient's Karnofsky and/or Palliative Performance Scale has declined over a period of days and is estimated at 20. The patient is dependent on the following ADLs: all    Objective information that support this patients limited prognosis includes: BUN 54 and trending up; Cr 3.05 and trending up  Study Result    Narrative & Impression   EXAM: XR CHEST PA LAT     INDICATION: Chest pain     COMPARISON: 9/12/2022     TECHNIQUE: PA and lateral chest 2 views 2 views     FINDINGS: The cardiac size is within normal limits. The pulmonary vasculature is  within normal limits. Chronic interstitial opacities slightly increased in interval. Increased  airspace opacity in the right midlung zone laterally. The visualized bones and  upper abdomen are age-appropriate. IMPRESSION     Chronic interstitial opacities with some apparent increase which may reflect  superimposed pulmonary edema. There is airspace opacity in the right midlung  zone laterally which may represent pneumonia. The patient/family chose comfort measures with the support of Hospice. HOSPICE DIAGNOSES   Active Symptoms:  1. Altered mental status  2. Restlessness  3. Agitation  4. Anxiety  5. Non verbal signs of pain  6. Debility  7. Hospice care  8.   Secretions     PLAN   Continue GIP level care as patient needs frequent nursing assessments, IV medication management, not safe to attempt sublingual/oral medication. Patient appeared to have a very difficult night and concerned this morning about his IV being effective. We also scheduled Dilaudid with the Versed and patient appears much more comfortable this afternoon. Dilaudid 0.5 mg IV every 4 hours scheduled every 15 minutes as needed for pain and or shortness of breath  Versed 2 mg IV every 2 hours scheduled every 15 minutes as needed for restlessness  Comfort meds for all other symptoms  Plan reviewed with bedside nursing team as well as hospice liaison-Maricarmen  No family at the bedside but Lalo Carpenter did talk with family earlier today   and SW to support family needs  Disposition: at risk for continued rapid decline and anticipate death in hospital  Hospice Plan of care was reviewed in detail and agree with current plan of care    Prognosis estimated based on 11/08/22 clinical assessment is:   [x] Hours to Days    [] Days to Weeks    [] Other:    Communicated plan of care with: Hospice Case Manager; Hospice IDT; Care Team     GOALS OF CARE     Patient/Medical POA stated Goal of Care: Comfort care and symptom management. [x] I have reviewed and/or updated ACP information in the Advance Care Planning Navigator. This information is available in the [facility] [address] link in the patient's chart header. Primary Decision Maker (Health Care Agent):   Primary Decision Maker (Active): Jaren Valenzuela - [phone]    Resuscitation Status: DNR  If DNR is there a Durable DNR on file? : [] Yes [x] No (If no, complete Durable DNR)    HISTORY     History obtained from: chart review; and discussion with interdisciplinary team    CHIEF COMPLAINT: unable to obtain  The patient is:   [] Verbal  [x] Nonverbal  [] Unresponsive    HPI/SUBJECTIVE:  No family present during provider rounds. Patient care tech team providing incontinence care, turning, and repositioning. Pt is nonverbal, not following commands, and is seemingly resistant to care.  Easily agitated and appears restless. Review of notes indicated persistent/worsening agitation and signs of delirium over the last 24 hours. Overnight required one time dose of Benadryl 25 mg IV, Valium 3 mg IV, and Zyprexa 5 mg IM without palliation of symptoms. Today received Haldol 2 mg IV at 1200, and Versed 1 mg IV at 1300 with initial symptomatic improvement. 11/8-patient does appear comfortable this afternoon. Apparently very restless and agitated earlier today.      REVIEW OF SYSTEMS     The following systems were: [] reviewed  [x] unable to be reviewed    Positive ROS include:  Constitutional: fatigue, weakness, in pain, short of breath  Ears/nose/mouth/throat: increased airway secretions  Respiratory:shortness of breath, wheezing  Gastrointestinal:poor appetite, nausea, vomiting, abdominal pain, constipation, diarrhea  Musculoskeletal:pain, deformities, swelling legs  Neurologic:confusion, hallucinations, weakness  Psychiatric:anxiety, feeling depressed, poor sleep  Endocrine:     Adult Non-Verbal Pain Assessment Score: 6-prior to medication    Face  [] 0   No particular expression or smile  [] 1   Occasional grimace, tearing, frowning, wrinkled forehead  [x] 2   Frequent grimace, tearing, frowning, wrinkled forehead    Activity (movement)  [] 0   Lying quietly, normal position  [] 1   Seeking attention through movement or slow, cautious movement  [x] 2   Restless, excessive activity and/or withdrawal reflexes    Guarding  [] 0   Lying quietly, no positioning of hands over areas of body  [x] 1   Splinting areas of the body, tense  [] 2   Rigid, stiff    Physiology (vital signs)  [x] 0   Stable vital signs  [] 1   Change in any of the following: SBP > 20mm Hg; HR > 20/minute  [] 2   Change in any of the following: SBP > 30mm Hg; HR > 25/minute    Respiratory  [] 0   Baseline RR/SpO2, compliant with ventilator  [x] 1   RR > 10 above baseline, or 5% drop SpO2, mild asynchrony with ventilator  [] 2   RR > 20 above baseline, or 10% drop SpO2, asynchrony with ventilator     FUNCTIONAL ASSESSMENT     Palliative Performance Scale (PPS): 10       PSYCHOSOCIAL/SPIRITUAL ASSESSMENT     Active Problems:    Cardiorenal syndrome (2022)    Past Medical History:   Diagnosis Date    CAD (coronary artery disease)     mi    Chronic kidney disease     hx of elevated blood levels    Chronic pain     lower back    Diabetes ([address]     Enlarged prostate     Hypercholesterolemia     Hypertension     Other unknown and unspecified cause of morbidity or mortality     hayfever      Past Surgical History:   Procedure Laterality Date    HX CATARACT REMOVAL Bilateral     HX CHOLECYSTECTOMY      HX LUMBAR LAMINECTOMY  2017    HX ORTHOPAEDIC  2015    back surgery     [place] CARDIAC SURG PROCEDURE UNLIST      bypass(), stents(, )    [place] COLONOSCOPY FLX DX W/COLLJ SPEC WHEN PFRMD  2013         [place] EGD TRANSORAL BIOPSY SINGLE/MULTIPLE  2012         VASCULAR SURGERY PROCEDURE UNLIST  1996    left carotid      Social History     Tobacco Use    Smoking status: Former     Types: Cigarettes     Quit date: 6/3/1967     Years since quittin.4    Smokeless tobacco: Never   Substance Use Topics    Alcohol use: No     Family History   Problem Relation Age of Onset    Heart Disease Mother     Heart Disease Father       Allergies   Allergen Reactions    Tetanus Vaccines And Toxoid Swelling     Swelling at the site    Tradjenta [Linagliptin] Diarrhea     headache      Current Facility-Administered Medications   Medication Dose Route Frequency    midazolam (VERSED) injection 2 mg  2 mg IntraVENous Q2H    HYDROmorphone (DILAUDID) injection 0.5 mg  0.5 mg IntraVENous Q4H    glycopyrrolate (ROBINUL) injection 0.2 mg  0.2 mg IntraVENous Q4H    midazolam (VERSED) injection 2 mg  2 mg IntraVENous Q1H PRN    HYDROmorphone (DILAUDID) injection 0.5 mg  0.5 mg IntraVENous Q15MIN PRN    bisacodyL (DULCOLAX) suppository 10 mg  10 mg Rectal DAILY PRN    ketorolac (TORADOL) injection 30 mg  30 mg IntraVENous Q8H PRN     Facility-Administered Medications Ordered in Other Encounters   Medication Dose Route Frequency    sodium chloride (NS) flush 5 mL  5 mL IntraVENous PRN        PHYSICAL EXAM     Wt Readings from Last 3 Encounters:   11/06/22 65.8 kg (145 lb)   11/01/22 65.6 kg (144 lb 9.6 oz)   10/13/22 63.5 kg (140 lb)     Supplemental O2  [] Yes  [x] NO  Last bowel movement:     Currently this patient has:  [x] Peripheral IV [] PICC  [] PORT [] ICD    [] Schwab Catheter [] NG Tube   [] PEG Tube    [] Rectal Tube [] Drain  [] Other:     Constitutional: Minimally responsive, much calmer, no grimacing, no furrowing of the brow  Eyes: closed  ENMT: Slightly dry oral mucosa  Cardiovascular: regular; tachycardic at times; distal pulses intact; no peripheral edema  Respiratory: no increased work of breathing at this time, secretions earlier  Gastrointestinal: soft  : external catheter  Musculoskeletal: no contractures of gross deformities  Skin: pale; thin/fragile; scattered bruising to bilateral UE, almost appears jaundiced  Neurologic: Minimally responsive  Psychiatric: calm now but very agitated and restless earlier  Other:       Pertinent Lab and or Imaging Tests:  Lab Results   Component Value Date/Time    Sodium 139 11/07/2022 04:00 AM    Potassium 4.4 11/07/2022 04:00 AM    Chloride 110 (H) 11/07/2022 04:00 AM    CO2 19 (L) 11/07/2022 04:00 AM    Anion gap 10 11/07/2022 04:00 AM    Glucose 151 (H) 11/07/2022 04:00 AM    BUN 54 (H) 11/07/2022 04:00 AM    Creatinine 3.05 (H) 11/07/2022 04:00 AM    BUN/Creatinine ratio 18 11/07/2022 04:00 AM    GFR est AA 26 (L) 08/22/2022 02:24 PM    GFR est non-AA 22 (L) 08/22/2022 02:24 PM    Calcium 9.0 11/07/2022 04:00 AM     Lab Results   Component Value Date/Time    Protein, total 7.4 11/07/2022 04:00 AM    Albumin 3.2 (L) 11/07/2022 04:00 AM

## 2022-11-08 NOTE — ED PROVIDER NOTES
EMERGENCY DEPARTMENT HISTORY AND PHYSICAL EXAM       Please note that this dictation was completed with [employer], the computer voice recognition software. Quite often unanticipated grammatical, syntax, homophones, and other interpretive errors are inadvertently transcribed by the computer software. Please disregard these errors. Please excuse any errors that have escaped final proofreading. Date: 11/6/2022  Patient Name: Evelyn Granados  Patient Age and Sex: 80 y.o. male    History of Presenting Illness     Chief Complaint   Patient presents with    Chest Pain     Started about 3 hours ago. Went to dr Aditi Corea for a cardiac workup and nothing came back. Has been feeling off the last few days. Chest pain 7/10. History of htn, dm. Nonambulatory to triage room. History Provided By: Patient     Chief Complaint: cp      HPI: Evelyn Granados, is a 80 y.o. male who presents to the ED with substernal cp that started about 3 hours ago and has been constant since. Pain started at rest. He describes it as dull, non-radiating, 7/10. No associated symptoms. He does report that for the past few days he has had increased fatigue and moderate exertional sob and this is unchanged. 4    Pt denies any other alleviating or exacerbating factors. No other associated signs or symptoms. There are no other complaints, changes or physical findings at this time.      PCP: Marilyn Baumann NP    Past History   All documented elements of the PSFH reviewed and verified by me. -Jerl Schlatter, MD    Past Medical History:  Past Medical History:   Diagnosis Date    CAD (coronary artery disease)     mi    Chronic kidney disease     hx of elevated blood levels    Chronic pain     lower back    Diabetes ([facility] [address]     Enlarged prostate     Hypercholesterolemia     Hypertension     Other unknown and unspecified cause of morbidity or mortality     hayfever       Past Surgical History:  Past Surgical History:   Procedure Laterality Date    HX CATARACT REMOVAL Bilateral     HX CHOLECYSTECTOMY      HX LUMBAR LAMINECTOMY  2017    HX ORTHOPAEDIC  2015    back surgery     OR CARDIAC SURG PROCEDURE UNLIST      bypass(), OIMZMX(1937, )    OR COLONOSCOPY FLX DX W/COLLJ SPEC WHEN PFRMD  2013         OR EGD TRANSORAL BIOPSY SINGLE/MULTIPLE  2012         VASCULAR SURGERY PROCEDURE UNLIST  1996    left carotid       Family History:   Family History   Problem Relation Age of Onset    Heart Disease Mother     Heart Disease Father        Social History:  Social History     Tobacco Use    Smoking status: Former     Types: Cigarettes     Quit date: 6/3/1967     Years since quittin.4    Smokeless tobacco: Never   Vaping Use    Vaping Use: Never used   Substance Use Topics    Alcohol use: No    Drug use: No       Current Medications:  No current facility-administered medications on file prior to encounter. Current Outpatient Medications on File Prior to Encounter   Medication Sig Dispense Refill    tamsulosin (FLOMAX) 0.4 mg capsule TAKE 2 CAPSULES BY MOUTH DAILY WITH THE EVENING MEAL 180 Capsule 1    polyethylene glycol (Miralax) 17 gram/dose powder Take 17 g by mouth daily. 1 tablespoon with 8 oz of water daily 235 g 0    glipiZIDE (GLUCOTROL) 10 mg tablet Take 2 Tablets by mouth two (2) times a day. Take 2 tablets by mouth twice a day. 360 Tablet 1    simvastatin (ZOCOR) 20 mg tablet Take 1 Tablet by mouth nightly.  90 Tablet 1    gabapentin (NEURONTIN) 300 mg capsule TAKE 1 CAPSULE BY MOUTH TWICE DAILY 180 Capsule 1    nitroglycerin (NITROSTAT) 0.4 mg SL tablet DISSOLVE 1 TABLET UNDER THE TONGUE EVERY 5 MINUTES AS NEEDED FOR CHEST PAIN 25 Tablet 3    glucose blood VI test strips (Accu-Chek Guide test strips) strip USE 1 STRIP TO TEST BLOOD SUGAR EVERY DAY Dx E11.9 100 Strip 2    dilTIAZem ER (CARDIZEM CD) 300 mg capsule TAKE 1 CAPSULE BY MOUTH DAILY 90 Capsule 1    colesevelam (WELCHOL) 625 mg tablet Take 3 tablets by mouth twice daily 540 Tablet 2    Tradjenta 5 mg tablet TAKE 1 TABLET BY MOUTH DAILY 90 Tablet 1    furosemide (LASIX) 20 mg tablet Take 20 mg by mouth daily. hydrALAZINE (APRESOLINE) 25 mg tablet TAKE 1 TABLET BY MOUTH TWICE DAILY 180 Tablet 3    clotrimazole-betamethasone (LOTRISONE) topical cream APPLY TO THE AFFECTED AREA TWICE DAILY AS NEEDED FOR SKIN IRRITATION 15 g 0    lancets (Accu-Chek Fastclix Lancet Drum) misc USE AS DIRECTED TO MONITOR BLOOD SUGAR 1 Each 6    cyanocobalamin (VITAMIN B12) 1,000 mcg/mL injection INJECT 1 ML SUBCUTANEOUS EVERY MONTH 1 mL 12    fluticasone propionate (FLONASE) 50 mcg/actuation nasal spray       Blood-Glucose Meter monitoring kit Use to test blood sugar once daily. 1 Kit 0    acetaminophen (TYLENOL) 500 mg tablet Take  by mouth every six (6) hours as needed for Pain. ACCU-CHEK FASTCLIX misc USE AS DIRECTED TO MONITOR BLOOD SUGAR 100 Each 3    aspirin delayed-release 81 mg tablet Take 81 mg by mouth daily. cholecalciferol (VITAMIN D3) 25 mcg (1,000 unit) cap Take 1,000 Units by mouth daily. ferrous sulfate 325 mg (65 mg iron) tablet Take 325 mg by mouth two (2) times a day. Allergies: Allergies   Allergen Reactions    Tetanus Vaccines And Toxoid Swelling     Swelling at the site    Tradjenta [Linagliptin] Diarrhea     headache       Review of Systems   All other systems reviewed and negative    Review of Systems   Constitutional:  Negative for fever. HENT:  Negative for congestion. Eyes:  Negative for visual disturbance. Respiratory:  Positive for shortness of breath. Negative for cough. Cardiovascular:  Positive for chest pain. Negative for palpitations. Gastrointestinal:  Negative for abdominal pain and vomiting. Endocrine: Negative. Genitourinary:  Negative for dysuria and hematuria. Musculoskeletal:  Negative for joint swelling. Skin: Negative. Negative for rash. Neurological:  Negative for weakness and numbness.    Psychiatric/Behavioral: Negative. Negative for confusion. All other systems reviewed and are negative. Physical Exam   Reviewed patients vital signs and nursing note    Physical Exam  Vitals and nursing note reviewed. Constitutional:       Appearance: He is not diaphoretic. HENT:      Head: Atraumatic. Nose: Nose normal.      Mouth/Throat:      Mouth: Mucous membranes are moist.   Eyes:      Extraocular Movements: Extraocular movements intact. Conjunctiva/sclera: Conjunctivae normal.      Pupils: Pupils are equal, round, and reactive to light. Cardiovascular:      Rate and Rhythm: Normal rate and regular rhythm. Pulses: Normal pulses. Heart sounds: Normal heart sounds. Pulmonary:      Effort: Pulmonary effort is normal.      Breath sounds: Normal breath sounds. Abdominal:      Palpations: Abdomen is soft. Tenderness: There is no abdominal tenderness. Musculoskeletal:         General: No tenderness. Normal range of motion. Cervical back: Normal range of motion. No rigidity. Skin:     General: Skin is warm and dry. Capillary Refill: Capillary refill takes less than 2 seconds. Neurological:      General: No focal deficit present. Mental Status: He is alert. Psychiatric:         Mood and Affect: Mood normal.         Behavior: Behavior normal.       Diagnostic Study Results     Labs - I have personally reviewed and interpreted all laboratory results. Interpretation of available and pertinent results detailed below in MDM.  Maggy Trejo MD, MSc  Recent Results (from the past 24 hour(s))   MAGNESIUM    Collection Time: 11/07/22  4:00 AM   Result Value Ref Range    Magnesium 2.2 1.6 - 2.4 mg/dL   PHOSPHORUS    Collection Time: 11/07/22  4:00 AM   Result Value Ref Range    Phosphorus 4.9 (H) 2.6 - 4.7 MG/DL   PTT    Collection Time: 11/07/22  4:00 AM   Result Value Ref Range    aPTT 75.8 (H) 22.1 - 31.0 sec    aPTT, therapeutic range     58.0 - 77.0 SECS   CBC W/O DIFF    Collection Time: 11/07/22  4:00 AM   Result Value Ref Range    WBC 9.3 4.1 - 11.1 K/uL    RBC 3.94 (L) 4.10 - 5.70 M/uL    HGB 11.6 (L) 12.1 - 17.0 g/dL    HCT 36.9 36.6 - 50.3 %    MCV 93.7 80.0 - 99.0 FL    MCH 29.4 26.0 - 34.0 PG    MCHC 31.4 30.0 - 36.5 g/dL    RDW 13.2 11.5 - 14.5 %    PLATELET 290 (L) 988 - 400 K/uL    MPV 11.0 8.9 - 12.9 FL    NRBC 0.0 0  WBC    ABSOLUTE NRBC 0.00 0.00 - 9.24 K/uL   METABOLIC PANEL, COMPREHENSIVE    Collection Time: 11/07/22  4:00 AM   Result Value Ref Range    Sodium 139 136 - 145 mmol/L    Potassium 4.4 3.5 - 5.1 mmol/L    Chloride 110 (H) 97 - 108 mmol/L    CO2 19 (L) 21 - 32 mmol/L    Anion gap 10 5 - 15 mmol/L    Glucose 151 (H) 65 - 100 mg/dL    BUN 54 (H) 6 - 20 MG/DL    Creatinine 3.05 (H) 0.70 - 1.30 MG/DL    BUN/Creatinine ratio 18 12 - 20      eGFR 19 (L) >60 ml/min/1.73m2    Calcium 9.0 8.5 - 10.1 MG/DL    Bilirubin, total 0.5 0.2 - 1.0 MG/DL    ALT (SGPT) 31 12 - 78 U/L    AST (SGOT) 91 (H) 15 - 37 U/L    Alk. phosphatase 81 45 - 117 U/L    Protein, total 7.4 6.4 - 8.2 g/dL    Albumin 3.2 (L) 3.5 - 5.0 g/dL    Globulin 4.2 (H) 2.0 - 4.0 g/dL    A-G Ratio 0.8 (L) 1.1 - 2.2     TROPONIN-HIGH SENSITIVITY    Collection Time: 11/07/22  4:00 AM   Result Value Ref Range    Troponin-High Sensitivity 17,806 (HH) 0 - 76 ng/L   GLUCOSE, POC    Collection Time: 11/07/22  7:45 AM   Result Value Ref Range    Glucose (POC) 200 (H) 65 - 117 mg/dL    Performed by Larry Hogan (RADHA RN)    GLUCOSE, POC    Collection Time: 11/07/22 11:32 AM   Result Value Ref Range    Glucose (POC) 73 65 - 117 mg/dL    Performed by Consuelo Guerrero PCT        Radiologic Studies - I have personally reviewed and interpreted (see MDM for brief interpreation of available results) all imaging studies and agree with radiology interpretation and report.  Trudy Sharpe MD, MSc  XR CHEST PA LAT   Final Result      Chronic interstitial opacities with some apparent increase which may reflect   superimposed pulmonary edema. There is airspace opacity in the right midlung   zone laterally which may represent pneumonia. Medical Decision Making   I am the first provider for this patient. Records Reviewed:   I reviewed our electronic medical record system for any past medical records that were available that may contribute to the patient's current condition, including their PMH, surgical history, social and family history. This includes most recent ED visits, any available discharge summaries and prior ECGs, which I have reviewed and interpreted personally. I have summarized most salient findings in my HPI and MDM. Isac Archuleta MD, MSc    I also reviewed the nursing notes and vital signs from today's visit. Nursing notes will be reviewed as they become available in realtime while the pt has been in the ED. Isac Archuleta MD, MSc      Vital Signs-Reviewed the patient's vital signs. Patient Vitals for the past 24 hrs:   Temp Pulse Resp BP SpO2   11/07/22 1210 97.8 °F (36.6 °C) 92 20 92/73 95 %   11/07/22 0743 98.4 °F (36.9 °C) (!) 106 29 115/79 97 %   11/07/22 0626 -- (!) 103 -- 127/76 --   11/07/22 0206 -- 83 -- (!) 94/49 --       Billable EKG reviewed by ED Physician in the absence of a cardiologist: Yes  Rhythm: sinus; and regular . Rate (approx.): 116; QRS interval: nml; ST/T wave: no st elevations; Other intervals: 1st degree av block. No new changes concerning for acute ischemia. This and prior available ECGs have been viewed and interpreted by me personally. Isac Archuleta MD, Msc      Cardiac Monitor: The cardiac monitor revealed sinus rhythm. The cardiac monitor was ordered to monitor patient for signs of cardiac dysrhythmia, which they are at risk for based on their history or risk for cardiovascular disease and/or metabolic abnormalities. Cardiac monitor interpreted by me. Isac Archuleta MD MSc    Pulse ox interpretation: 95% on ra with good pleth. Interpreted by me.  Isac Archuleta MD MSc        Provider Notes and Medical Decision Making:   Ddx: acs. CHF, pe, pulm edema, aortic dissection. Sending full cardiac workup, will provide appropriate pain mgmt and keep patient on monitor. ED Course: The patients presenting problems have been discussed, and they are in agreement with the care plan formulated and outlined with them. I have encouraged them to ask questions as they arise throughout their visit. - trop elevated and c/w nstemi  - given asa already, starting heparin ggt with bolus  - hospitalist admission        Consult Note:  Aron Gaucher, MD spoke with  admitting hospitalist,   Discussed pt's hx, physical exam and available diagnostic and imaging results. Reviewed care plans. Agree with management and plan thus far. No additional recommendations. Will admit to their service. DISPOSITION: ADMIT  Patient is being admitted to the hospital.  Their test results and reasons for admission have been discussed. The patient and/or available family express agreement with and understanding of the need to be admitted and their admission diagnosis. Thank you for resuming the care of this patient. Please don't hesitate to contact me in the emergency department if you  have any additional questions. Aron Gaucher, MD, MSc    CRITICAL CARE NOTE (does not include separately billable procedure time)::    IMPENDING DETERIORATION -Cardiovascular  ASSOCIATED RISK FACTORS - Hypotension, Shock, and Dysrhythmia  MANAGEMENT- Bedside Assessment and Supervision of Care  INTERPRETATION -  Xrays, ECG, and Blood Pressure  INTERVENTIONS - hemodynamic mngmt  CASE REVIEW - Hospitalist/Intensivist and Nursing  TREATMENT RESPONSE -Stable  PERFORMED BY - Self    NOTES   :    I have spent 55 minutes of critical care time involved in lab review, consultations with specialist, family decision- making, bedside attention and documentation.  During this entire length of time I was immediately available to the patient. (This does not include time spent on performing separately billable procedures)    Critical Care: The reason for providing this level of medical care for this critically ill patient was due to a critical illness that impaired one or more vital organ systems, such that there was a high probability of imminent or life threatening deterioration in the patient's condition. This care involved high complexity decision making to assess, manipulate, and support vital system functions, to treat this degree of vital organ system failure, and to prevent further life threatening deterioration of the patients condition. Jennifer Matthews MD      I, Bobo Gann MD, am the attending of record for this patient encounter. Diagnosis     Final Clinical Impression:   1. NSTEMI (non-ST elevated myocardial infarction) ([facility] [address]    2. Unstable angina (HCC)    3.    4.    5.            Attestation:  I personally performed the services described in this documentation on this date 11/6/2022 for patient Rosa Roche.   Jennifer Matthews MD

## 2022-11-08 NOTE — PROGRESS NOTES
initial visit to provide comfort and spiritual guidance to the pt, family and CG as they make their journey towards EOL. Upon entry, patient non-verbal and non-responsive, resting comfortably. Hospice team informed that patient's spouse just left to run errands. Initiated call to spouse: unable to leave a message. Offered empathic presence to patient. Led guided meditation on compassion and beauty to enhance acceptance and peace at end of life. Patient began to breathe more deeply and seemed more relaxed following spiritual intervention. Will continue to coordinate w/ hospice team to assess patient and family needs and offer support as needed.

## 2022-11-09 NOTE — HOSPICE
[place] [facility]  [phone]    GIP Daily Nursing Note   Patient Name: Paulo Patricia  YOB: 1935  Age: 80 y.o. Date of Visit: 11/08/22  Facility of Care: [facility]  Patient Room: [facility]/     Hospice Attending: Claudene Linen, MD  Hospice Diagnosis: Cardiorenal syndrome [I13.10]    Level of Care: GIP    Current GIP Symptoms    1. Pain  2. Agitation/restlessness  3. Secretions  4. Minimally responsive      ASSESSMENT & PLAN     1. Moaning/grimacing/labored breathing. Scheduled IV dilaudid 0.5mg q4hrs with PRN available. 2. Patient pulling off gown, blankets, moving all extremities. Scheduled IV versed 2mg q2hrs with PRN available. 3. LS coarse. Scheduled IV robinul 0.2mg q4hrs. Turn/repostion to help with movement of secretions. 4. Patient requiring frequent assessments by skilled medical staff for non-verbal cues of distress/discomfort. Spiritual Interventions: None at this time. Hospital and [address] available 24/7. Marysol Rock/hospice chaplain made visit today (see Marysol's note)    Psych/Social/Emotional Interventions: SW will continue to be available to support family    Care Coordination Needs: Communication with hospital staff and hospice team     Care plan and New Orders discussed / approved with Abraham Bolivar MD.    Description History and Chart Review     Narrative History of last 24 hours that demonstrates care cannot be provided in another setting:    Pt requiring frequent assessments by skilled medical staff with administration of IV medications along with adjustments in medication dosage. This care cannot be provided outside the inpatient setting. What has been done to control the patient's symptoms in the last 24 hours? Schedule IV dilauid, IV robinul, IV versed    Does the patient currently require IV medications? yes  Does the patient currently require scheduled medications?  yes  Does the patient currently require a PCA? no    List number of doses of PRN medications in last 24 hours:  Medication 1: IV dilaudid  Number of doses: 3    Medication 2: IV versed  Number of doses: 1    Medication 3:   Number of doses:    Supporting documentation for GIP need for pain control:  [x] Frequent evaluation by a doctor, nurse practitioner, nurse   [x] Frequent medication adjustment    [x] IVs that cannot be administered at home   [x] Aggressive pain management   [x] Complicated technical delivery of medications              Supporting documentation for GIP need for symptom control:  [x]  Sudden decline necessitating intensive nursing intervention  []  Uncontrolled / intractable nausea or vomiting   []  Pathological fractures  []  Advanced open wounds requiring frequent skilled care  [] Unmanageable respiratory distress  [] New or worsening delirium   [] Delirium with behavior issues: Is 24 hour caregiver present due to safety concerns with agitation? (yes/no)  [] Imminent death - with skilled nursing needs documented above    DISCHARGE PLANNING     1. Discharge Plan: If patient stables, family will take patient home with hospice services  2. Family teaching: EOL processes  3. Response to family teaching: receptive    ASSESSMENT    KARNOFSKY: 10%    Prognosis estimated based on 11/08/22 clinical assessment is:   [] Few to Many Hours  [x] Hours to Days   [] Few to Many Days   [] Days to Weeks   [] Few to Many Weeks   [] Weeks to Months   [] Few to Many Months    Quality Measure: Patient self-reports:  [] Yes    [x] No    ESAS:   Time of Assessment: 1500  Pain (1-10): 4  Fatigue (1-10):    Shortness of breath (1-10): 5  Nausea (1-10): Appetite (1-10):    Anxiety: (1-10):   Depression: (1-10):   Well-being: (1-10):   Constipation: no  LAST BM:     CLINICAL INFORMATION   Patient Vitals for the past 12 hrs:   Temp Pulse Resp BP SpO2   11/08/22 2101 98.3 °F (36.8 °C) (!) 103 18 97/74 98 %       Currently this patient has:  [] Supplemental O2   [x] IV    [] PICC      [] PORT   [] NG Tube    [] PEG Tube   [] Ostomy     [x] Schwab draining _yellow__ urine  [] Other:     SIGNS/PHYSICAL FINDINGS     Skin (including wound):  [] Warm, dry, supple, intact and color normal for race  [x] Warm   [] Dry   [] Cool     [] Clammy       [] Diaphoretic    Turgor   [] Normal   [x] Decreased  Color:   [] Pink   [x] Pale   [] Cyanotic   [] Erythema   [] Jaundice   [] Normal for Race  []  Wounds:    Neuro:  [] Lethargy  [x] Restlessness / agitation  [] Confusion / delirium  [] Hallucinations  [] Responds to maximal stimulation  [] Unresponsive  [] Seizures     Cardiac:  [] Dyspnea on Exertion  [] JVD  [] Murmur  [] Palpitations  [] Hypotension  [x] Hypertension  [x] Tachycardia  [] Bradycardia  [] Irregular HR  [] Pulses Decreased  [] Pulses Absent  [] Edema:         [] Mottling:        Respiratory:  Breath sounds:    [] Diminished   [] Wheeze   [x] Coarse   [] Rales   [] Even and unlabored  [x] Labored:    28        [] Cough   [] Non Productive   [] Productive    [] Description:           [] Deep suctioned   [] O2 at ___ LPM  [] High flow oxygen greater than 10 LPM  [] Bi-Pap    GI  [x] Abdomen - flat  [] Ascites  [] Nausea  [] Vomiting  [x] Incontinent of bowels  [x] Bowel sounds - hypoactive  [] Diarrhea  [] Constipation (see above including last bowel movement)  [] Checked for impaction  [] Last BM     Nutrition  Diet:__NPO_____  Appetite:   [] Good   [] Fair   [] Poor   [] Tube Feeding       [] Voiding  [] Incontinent   [x] Schwab    Musculoskeletal  [] Balance/[place] Unsteady   [x] Weak   Strength:    [] Normal    [] Limited    [x] Decreasing   Activities:    [] Up as tolerated   [x] Bedridden    [] Specify:    SAFETY  [x] 24 hr. Caregiver   [x] Side rails ? [x] Hospital bed   [x] Reviewed Falls & Safety       ALLERGIES AND MEDICATIONS     Allergies:    Allergies   Allergen Reactions    Tetanus Vaccines And Toxoid Swelling     Swelling at the site    [facility] [Linagliptin] Diarrhea     headache       Current Facility-Administered Medications   Medication Dose Route Frequency    midazolam (VERSED) injection 2 mg  2 mg IntraVENous Q2H    HYDROmorphone (DILAUDID) injection 0.5 mg  0.5 mg IntraVENous Q4H    glycopyrrolate (ROBINUL) injection 0.2 mg  0.2 mg IntraVENous Q4H    midazolam (VERSED) injection 2 mg  2 mg IntraVENous Q1H PRN    HYDROmorphone (DILAUDID) injection 0.5 mg  0.5 mg IntraVENous Q15MIN PRN    bisacodyL (DULCOLAX) suppository 10 mg  10 mg Rectal DAILY PRN    ketorolac (TORADOL) injection 30 mg  30 mg IntraVENous Q8H PRN     Facility-Administered Medications Ordered in Other Encounters   Medication Dose Route Frequency    sodium chloride (NS) flush 5 mL  5 mL IntraVENous PRN          Visit Time In: Multiple visits today  Visit Time Out:

## 2022-11-09 NOTE — PROGRESS NOTES
This LMSW  visited with pt and wife Bladimir Hu. Pt resting in bed appearing to be peaceful. Bladimir Hu reported she is doing okay. Bladimir Hu stated pt warned her pt would die soon and Bladimir Hu finds peace in knowing they will be reunited in [facility]. LMSW provided validation of feelings and supportive counseling.       Amina Wiley, [facility]    [phone]

## 2022-11-09 NOTE — HOSPICE
[address] [facility] Those in [facility]  [phone]    [facility] Daily Nursing Note   Patient Name: Soren Simmons  YOB: 1935  Age: 80 y.o. Date of Visit: 11/09/22  Facility of Care: [id number] [facility]  Patient Room: [facility]/     Hospice Attending: Kuldip Erwin MD  Hospice Diagnosis: Cardiorenal syndrome [I13.10]    Level of Care: GIP    Current GIP Symptoms    1. Pain  2. Agitation/restlessness  3. Secretions  4. Minimally responsive      ASSESSMENT & PLAN   Continue GIP level of care    1. Pain:  improved with newly Scheduled IV dilaudid 0.5mg q4hrs with PRN available. 2. Agitation:  new regimen effective:  Scheduled IV versed 2mg q2hrs with PRN available. 3. Secretions:  Scheduled IV robinul 0.2mg q4hrs. Turn/repostion to help with movement of secretions. 4. Patient requiring frequent assessments by skilled medical staff for non-verbal cues of distress/discomfort. 5. Emotional and spiritual support to pt and family through end of life        Spiritual Interventions: None at this time. Hospital and [address] available 24/7. [facility]    Psych/Social/Emotional Interventions: [facility] [place] will continue to be available to support family    Care Coordination Needs: Communication with hospital staff and hospice team     Care plan and New Orders discussed / approved with NP Trell Gomez. Description History and Chart Review     Narrative History of last 24 hours that demonstrates care cannot be provided in another setting:    Pt requiring frequent assessments by skilled medical staff with administration of IV medications along with adjustments in medication dosage. This care cannot be provided outside the inpatient setting. What has been done to control the patient's symptoms in the last 24 hours? Schedule IV dilauid, IV robinul, IV versed    Does the patient currently require IV medications?  yes  Does the patient currently require scheduled medications? yes  Does the patient currently require a PCA? no    List number of doses of PRN medications in last 24 hours:  Medication 1: IV dilaudid  Number of doses: 0    Medication 2: IV versed  Number of doses: 0    Medication 3:   Number of doses:    Supporting documentation for GIP need for pain control:  [x] Frequent evaluation by a doctor, nurse practitioner, nurse   [x] Frequent medication adjustment    [x] IVs that cannot be administered at home   [x] Aggressive pain management   [x] Complicated technical delivery of medications              Supporting documentation for GIP need for symptom control:  [x]  Sudden decline necessitating intensive nursing intervention  []  Uncontrolled / intractable nausea or vomiting   []  Pathological fractures  []  Advanced open wounds requiring frequent skilled care  [] Unmanageable respiratory distress  [] New or worsening delirium   [] Delirium with behavior issues: Is 24 hour caregiver present due to safety concerns with agitation? (yes/no)  [] Imminent death - with skilled nursing needs documented above    DISCHARGE PLANNING     1. Discharge Plan: If patient stables, family will take patient home with hospice services  2. Family teaching: EOL processes  3. Response to family teaching: receptive    ASSESSMENT    KARNOFSKY: 10%    Prognosis estimated based on 11/09/22 clinical assessment is:   [] Few to Many Hours  [x] Hours to Days   [] Few to Many Days   [] Days to Weeks   [] Few to Many Weeks   [] Weeks to Months   [] Few to Many Months    Quality Measure: Patient self-reports:  [] Yes    [x] No    ESAS:   Time of Assessment: 1500  Pain (1-10): 4  Fatigue (1-10):    Shortness of breath (1-10): 5  Nausea (1-10): Appetite (1-10):    Anxiety: (1-10):   Depression: (1-10):   Well-being: (1-10):   Constipation: no  LAST BM:     CLINICAL INFORMATION   Patient Vitals for the past 12 hrs:   Temp Pulse Resp BP SpO2   11/09/22 0807 98.5 °F (36.9 °C) 96 18 (!) 148/69 96 %         Currently this patient has:  [] Supplemental O2   [x] IV    [] PICC      [] PORT   [] NG Tube    [] PEG Tube   [] Ostomy     [x] Schwab draining _yellow__ urine  [] Other:     SIGNS/PHYSICAL FINDINGS     Skin (including wound):  [] Warm, dry, supple, intact and color normal for race  [x] Warm   [] Dry   [] Cool     [] Clammy       [] Diaphoretic    Turgor   [] Normal   [x] Decreased  Color:   [] Pink   [x] Pale   [] Cyanotic   [] Erythema   [] Jaundice   [] Normal for Race  []  Wounds:    Neuro:  [] Lethargy  [x] Restlessness / agitation  [] Confusion / delirium  [] Hallucinations  [] Responds to maximal stimulation  [] Unresponsive  [] Seizures     Cardiac:  [] Dyspnea on Exertion  [] JVD  [] Murmur  [] Palpitations  [] Hypotension  [x] Hypertension  [x] Tachycardia  [] Bradycardia  [] Irregular HR  [] Pulses Decreased  [] Pulses Absent  [] Edema:         [] Mottling:        Respiratory:  Breath sounds:    [] Diminished   [] Wheeze   [x] Coarse   [] Rales   [] Even and unlabored  [x] Labored:    28        [] Cough   [] Non Productive   [] Productive    [] Description:           [] Deep suctioned   [] O2 at ___ LPM  [] High flow oxygen greater than 10 LPM  [] Bi-Pap    GI  [x] Abdomen - flat  [] Ascites  [] Nausea  [] Vomiting  [x] Incontinent of bowels  [x] Bowel sounds - hypoactive  [] Diarrhea  [] Constipation (see above including last bowel movement)  [] Checked for impaction  [] Last BM     Nutrition  Diet:__NPO_____  Appetite:   [] Good   [] Fair   [] Poor   [] Tube Feeding       [] Voiding  [] Incontinent   [x] Schwab    Musculoskeletal  [] Balance/Reserve Unsteady   [x] Weak   Strength:    [] Normal    [] Limited    [x] Decreasing   Activities:    [] Up as tolerated   [x] Bedridden    [] Specify:    SAFETY  [x] 24 hr. Caregiver   [x] Side rails ? [x] Hospital bed   [x] Reviewed Falls & Safety       ALLERGIES AND MEDICATIONS     Allergies:    Allergies   Allergen Reactions    Tetanus Vaccines And Toxoid Swelling     Swelling at the site    Tradjenta [Linagliptin] Diarrhea     headache       Current Facility-Administered Medications   Medication Dose Route Frequency    midazolam (VERSED) injection 2 mg  2 mg IntraVENous Q2H    HYDROmorphone (DILAUDID) injection 0.5 mg  0.5 mg IntraVENous Q4H    glycopyrrolate (ROBINUL) injection 0.2 mg  0.2 mg IntraVENous Q4H    midazolam (VERSED) injection 2 mg  2 mg IntraVENous Q1H PRN    HYDROmorphone (DILAUDID) injection 0.5 mg  0.5 mg IntraVENous Q15MIN PRN    bisacodyL (DULCOLAX) suppository 10 mg  10 mg Rectal DAILY PRN    ketorolac (TORADOL) injection 30 mg  30 mg IntraVENous Q8H PRN     Facility-Administered Medications Ordered in Other Encounters   Medication Dose Route Frequency    sodium chloride (NS) flush 5 mL  5 mL IntraVENous PRN          Visit Time In: Multiple visits today  Visit Time Out:

## 2022-11-09 NOTE — PROGRESS NOTES
End of Shift Note    Bedside shift change report given to [address] (oncoming nurse) by Teresa Lyon RN (offgoing nurse). Report included the following information SBAR, Kardex, Intake/Output, and MAR    Shift worked:  7a-7p     Shift summary and any significant changes: All scheduled meds given and caring rounding completed. Patient was restless and appeared to be agitated at beginning of shift. Hospice nurse Elisabeth George notified. Patient rested comfortably in afternoon and received 2 doses of PRN pain medication for pain and labored breathing. Schwab care provided. Patient's wife at bedside in morning and evening. CHG bath, linen change, and incontinence care provided.          Teresa Lyon, RN

## 2022-11-09 NOTE — PROGRESS NOTES
Problem: Diabetes Self-Management  Goal: *Disease process and treatment process  Description: Define diabetes and identify own type of diabetes; list 3 options for treating diabetes. Outcome: Progressing Towards Goal  Goal: *Incorporating nutritional management into lifestyle  Description: Describe effect of type, amount and timing of food on blood glucose; list 3 methods for planning meals. Outcome: Progressing Towards Goal  Goal: *Incorporating physical activity into lifestyle  Description: State effect of exercise on blood glucose levels. Outcome: Progressing Towards Goal  Goal: *Developing strategies to promote health/change behavior  Description: Define the ABC's of diabetes; identify appropriate screenings, schedule and personal plan for screenings. Outcome: Progressing Towards Goal  Goal: *Using medications safely  Description: State effect of diabetes medications on diabetes; name diabetes medication taking, action and side effects. Outcome: Progressing Towards Goal  Goal: *Monitoring blood glucose, interpreting and using results  Description: Identify recommended blood glucose targets  and personal targets. Outcome: Progressing Towards Goal  Goal: *Prevention, detection, treatment of acute complications  Description: List symptoms of hyper- and hypoglycemia; describe how to treat low blood sugar and actions for lowering  high blood glucose level. Outcome: Progressing Towards Goal  Goal: *Prevention, detection and treatment of chronic complications  Description: Define the natural course of diabetes and describe the relationship of blood glucose levels to long term complications of diabetes.   Outcome: Progressing Towards Goal  Goal: *Developing strategies to address psychosocial issues  Description: Describe feelings about living with diabetes; identify support needed and support network  Outcome: Progressing Towards Goal  Goal: *Insulin pump training  Outcome: Progressing Towards Goal  Goal: *Sick day guidelines  Outcome: Progressing Towards Goal  Goal: *Patient Specific Goal (EDIT GOAL, INSERT TEXT)  Outcome: Progressing Towards Goal     Problem: Patient Education: Go to Patient Education Activity  Goal: Patient/Family Education  Outcome: Progressing Towards Goal     Problem: Emotional Support Needs  Goal: Patient/family is receiving emotional support  Description: Pt's wife Luz Morton will receive emotional support and supportive counseling in processing pt's prognosis within five days. Outcome: Progressing Towards Goal     Problem: Pressure Injury - Risk of  Goal: *Prevention of pressure injury  Description: Document Kosta Scale and appropriate interventions in the flowsheet.   Outcome: Progressing Towards Goal  Note: Pressure Injury Interventions:  Sensory Interventions: Assess changes in LOC, Keep linens dry and wrinkle-free, Float heels, Pressure redistribution bed/mattress (bed type)    Moisture Interventions: Absorbent underpads, Apply protective barrier, creams and emollients, Check for incontinence Q2 hours and as needed, Internal/External urinary devices    Activity Interventions: Pressure redistribution bed/mattress(bed type)    Mobility Interventions: Pressure redistribution bed/mattress (bed type)    Nutrition Interventions: Document food/fluid/supplement intake    Friction and Shear Interventions: Minimize layers, HOB 30 degrees or less, Lift sheet                Problem: Patient Education: Go to Patient Education Activity  Goal: Patient/Family Education  Outcome: Progressing Towards Goal     Problem: Imminent Death  Goal: Collaborate with patient/family/caregiver/interdisciplinary team to minimize and manage end of life symptoms  Outcome: Progressing Towards Goal     Problem: Hospice Orientation  Goal: Demonstrate understanding of hospice philosophy, plan of care, and home hospice program  Description: The patient/family/caregiver will demonstrate understanding of hospice philosophy, plan of care and the home hospice program as evidenced by participation in meeting the patient's psychosocial, spiritual, medical, and physical needs inclusive of medical supplies/equipment focusing on symptoms. Outcome: Progressing Towards Goal     Problem: Pain  Goal: Assess satisfaction of level of comfort and symptom control  Outcome: Progressing Towards Goal  Goal: *Control of acute pain  Outcome: Progressing Towards Goal     Problem: End of Life Process  Goal: Demonstrate understanding of end of life processes  Description: Patient/caregiver will understand end of life processes. Outcome: Progressing Towards Goal     Problem: Imminent Death  Goal: Collaborate with patient/family/caregiver/interdisciplinary team to minimize and manage end of life symptoms  Outcome: Progressing Towards Goal     Problem: Falls - Risk of  Goal: *Absence of Falls  Description: Document [place] Fall Risk and appropriate interventions in the flowsheet.   Outcome: Progressing Towards Goal     Problem: Patient Education: Go to Patient Education Activity  Goal: Patient/Family Education  Outcome: Progressing Towards Goal     Problem: Dying Process  Goal: Increased peace with dying process, patient coping identified, patient/family expressed gratitude, spiritual distress identified and decreased with visit  Outcome: Progressing Towards Goal

## 2022-11-09 NOTE — PROGRESS NOTES
[facility]  [phone]    Patient Name: Evelyn Granados  YOB: 1935    Date of Provider Hospice Visit: 11/09/22    Level of Care:   [x] General Inpatient (GIP)    [] Routine   [] Respite    Current Location of Care:  [] [facility] [] [facility] [x] [facility] [] [address] [] [facility], patient referred from:  [] [facility] [] [facility] [] [facility] [] [address] [] Home [] Other:     Date of [facility] Admission: 11/7/2022  Hospice Medical Director at time of admission: Dr. Sunita Herman Diagnosis: Cardiorenal syndrome  Diagnoses RELATED to the terminal prognosis: NSTEMI, FARHAT on CKD stage 4  Other Diagnoses: HTN, HLD, CAD     HOSPICE SUMMARY     Evelyn Granados is a 80y.o. year old who was admitted to [facility]. HPI and brief course of hospitalization as summarized in Dr. Alyssia Clinton most recent progress note:  Evelyn Granados is a 80 y.o. male with a past history of HTN , DM, CAD( hx of remote [facility]), most recent Echo IN 2017 IS NORMAL CKD stage 4 who was admitted on 11/6/2022 from home with a diagnosis of Acute NSTEMI, Acute hypertensive emergency, questionable pneumonia, FARHAT on CKD stage 4. Patient is delirious /reslstless, pulling I/V,  creatinine continues to rise, patient recently on 10/31 was scheduled for cardiac catheterization but the procedure was cancelled due to significantly elevated creatinine and conservative medical therapy was recommended. Chart review reflects patient has been declining for past several days, less active with poor oral intake . Assessment as summarized in Dr. Malik Champion most recent cardiology progress note:  Assessment:       1. Non-ST segment elevation myocardial infarction.   2.  History of coronary artery disease with remote coronary artery bypass grafting with a vein graft to the left anterior descending in 1985 and prior stenting of the right coronary artery in 2003, prior normal ejection fraction. 3.  Type 2 diabetes. 4.  Dyslipidemia. 5.  FARHAT/Chronic kidney disease stage IV. 6.  Cerebrovascular disease with prior left carotid endarterectomy. 7.  Spinal stenosis. 8.  Hyperkalemia. 9.  Altered mental status and recent general decline. Plan:  Unfortunately not much to offer in this situation. Spoke with wife. Favor hospice/palliative measures. D/C IV heparin       The patient's principle diagnosis has resulted in altered mental status, anxiety, restlessness, agitation, and nonverbal signs of pain. Functionally, the patient's Karnofsky and/or Palliative Performance Scale has declined over a period of days and is estimated at 20. The patient is dependent on the following ADLs: all    Objective information that support this patients limited prognosis includes: BUN 54 and trending up; Cr 3.05 and trending up  Study Result    Narrative & Impression   EXAM: XR CHEST PA LAT     INDICATION: Chest pain     COMPARISON: 9/12/2022     TECHNIQUE: PA and lateral chest 2 views 2 views     FINDINGS: The cardiac size is within normal limits. The pulmonary vasculature is  within normal limits. Chronic interstitial opacities slightly increased in interval. Increased  airspace opacity in the right midlung zone laterally. The visualized bones and  upper abdomen are age-appropriate. IMPRESSION     Chronic interstitial opacities with some apparent increase which may reflect  superimposed pulmonary edema. There is airspace opacity in the right midlung  zone laterally which may represent pneumonia. The patient/family chose comfort measures with the support of Hospice. HOSPICE DIAGNOSES   Active Symptoms:  1. Altered mental status  2. Restlessness  3. Agitation  4. Anxiety  5. Non verbal signs of pain  6. Debility  7. Hospice care  8.   Secretions     PLAN   Continue GIP level care as patient needs frequent nursing assessments, IV medication management, not safe to attempt sublingual/oral medication. Changes made yesterday to patient's regimen and the IV site replacement have provided much better management of symptoms. A subcutaneous site was placed in case IV access fails again. Continue Dilaudid 0.5 mg IV every 4 hours scheduled  and every 15 minutes as needed for pain and or shortness of breath with no prn doses needed today. Continue Versed 2 mg IV every 2 hours scheduled and every 15 minutes as needed for restlessness. If every two hour dosing is too burdensome for unit staff or escalation of dosing is needed, will consider IV continuous infusion. Comfort meds for all other symptoms  Plan reviewed with [facility] hospice liaison and we saw patient together with Kamila at bedside and me virtually. No family at the bedside during my virtual visit   and SW to support family needs  Disposition: at risk for continued rapid decline and anticipate death in hospital  Hospice Plan of care was reviewed in detail and agree with current plan of care    Prognosis estimated based on 11/09/22 clinical assessment is:   [x] Hours to Days    [] Days to Weeks    [] Other:    Communicated plan of care with: Hospice Case Manager; Hospice IDT; Care Team     GOALS OF CARE     Patient/Medical POA stated Goal of Care: Comfort care and symptom management. [x] I have reviewed and/or updated ACP information in the Advance Care Planning Navigator. This information is available in the [address] link in the patient's chart header.     Primary Decision Maker ([address]):   Primary Decision Maker (Active): Staci Serna - [phone]    Resuscitation Status: DNR  If DNR is there a Durable DNR on file? : [] Yes [x] No (If no, complete Durable DNR)    HISTORY     History obtained from: chart review; and discussion with interdisciplinary team    CHIEF COMPLAINT: unable to obtain  The patient is:   [] Verbal  [x] Nonverbal  [] Unresponsive    HPI/SUBJECTIVE:  No family present during provider rounds. Patient care tech team providing incontinence care, turning, and repositioning. Pt is nonverbal, not following commands, and is seemingly resistant to care. Easily agitated and appears restless. Review of notes indicated persistent/worsening agitation and signs of delirium over the last 24 hours. Overnight required one time dose of Benadryl 25 mg IV, Valium 3 mg IV, and Zyprexa 5 mg IM without palliation of symptoms. Today received Haldol 2 mg IV at 1200, and Versed 1 mg IV at 1300 with initial symptomatic improvement. 11/8-patient does appear comfortable this afternoon. Apparently very restless and agitated earlier today.      REVIEW OF SYSTEMS     The following systems were: [] reviewed  [x] unable to be reviewed    Positive ROS include:  Constitutional: fatigue, weakness, in pain, short of breath  Ears/nose/mouth/throat: increased airway secretions  Respiratory:shortness of breath, wheezing  Gastrointestinal:poor appetite, nausea, vomiting, abdominal pain, constipation, diarrhea  Musculoskeletal:pain, deformities, swelling legs  Neurologic:confusion, hallucinations, weakness  Psychiatric:anxiety, feeling depressed, poor sleep  Endocrine:     Adult Non-Verbal Pain Assessment Score: 6-prior to medication    Face  [] 0   No particular expression or smile  [] 1   Occasional grimace, tearing, frowning, wrinkled forehead  [x] 2   Frequent grimace, tearing, frowning, wrinkled forehead    Activity (movement)  [] 0   Lying quietly, normal position  [] 1   Seeking attention through movement or slow, cautious movement  [x] 2   Restless, excessive activity and/or withdrawal reflexes    Guarding  [] 0   Lying quietly, no positioning of hands over areas of body  [x] 1   Splinting areas of the body, tense  [] 2   Rigid, stiff    Physiology (vital signs)  [x] 0   Stable vital signs  [] 1   Change in any of the following: SBP > 20mm Hg; HR > 20/minute  [] 2   Change in any of the following: SBP > 30mm Hg; HR > 25/minute    Respiratory  [] 0   Baseline RR/SpO2, compliant with ventilator  [x] 1   RR > 10 above baseline, or 5% drop SpO2, mild asynchrony with ventilator  [] 2   RR > 20 above baseline, or 10% drop SpO2, asynchrony with ventilator     FUNCTIONAL ASSESSMENT     Palliative Performance Scale (PPS): 10       PSYCHOSOCIAL/SPIRITUAL ASSESSMENT     Active Problems:    Cardiorenal syndrome (2022)    Past Medical History:   Diagnosis Date    CAD (coronary artery disease)     mi    Chronic kidney disease     hx of elevated blood levels    Chronic pain     lower back    Diabetes ([facility] [address]     Enlarged prostate     Hypercholesterolemia     Hypertension     Other unknown and unspecified cause of morbidity or mortality     hayfever      Past Surgical History:   Procedure Laterality Date    HX CATARACT REMOVAL Bilateral     HX CHOLECYSTECTOMY      HX LUMBAR LAMINECTOMY  2017    HX ORTHOPAEDIC  2015    back surgery     HI CARDIAC SURG PROCEDURE UNLIST      bypass(), stents(, )    HI COLONOSCOPY FLX DX W/COLLJ SPEC WHEN PFRMD  2013         HI EGD TRANSORAL BIOPSY SINGLE/MULTIPLE  2012         VASCULAR SURGERY PROCEDURE UNLIST  1996    left carotid      Social History     Tobacco Use    Smoking status: Former     Types: Cigarettes     Quit date: 6/3/1967     Years since quittin.4    Smokeless tobacco: Never   Substance Use Topics    Alcohol use: No     Family History   Problem Relation Age of Onset    Heart Disease Mother     Heart Disease Father       Allergies   Allergen Reactions    Tetanus Vaccines And Toxoid Swelling     Swelling at the site    Tradjenta [Linagliptin] Diarrhea     headache      Current Facility-Administered Medications   Medication Dose Route Frequency    midazolam (VERSED) injection 2 mg  2 mg IntraVENous Q2H    HYDROmorphone (DILAUDID) injection 0.5 mg  0.5 mg IntraVENous Q4H    glycopyrrolate (ROBINUL) injection 0.2 mg  0.2 mg IntraVENous Q4H    midazolam (VERSED) injection 2 mg  2 mg IntraVENous Q1H PRN    HYDROmorphone (DILAUDID) injection 0.5 mg  0.5 mg IntraVENous Q15MIN PRN    bisacodyL (DULCOLAX) suppository 10 mg  10 mg Rectal DAILY PRN    ketorolac (TORADOL) injection 30 mg  30 mg IntraVENous Q8H PRN     Facility-Administered Medications Ordered in Other Encounters   Medication Dose Route Frequency    sodium chloride (NS) flush 5 mL  5 mL IntraVENous PRN        PHYSICAL EXAM     Wt Readings from Last 3 Encounters:   11/06/22 65.8 kg (145 lb)   11/01/22 65.6 kg (144 lb 9.6 oz)   10/13/22 63.5 kg (140 lb)     Supplemental O2  [] Yes  [x] NO  Last bowel movement:     Currently this patient has:  [x] Peripheral IV [] PICC  [] PORT [] ICD    [] Schwab Catheter [] NG Tube   [] PEG Tube    [] Rectal Tube [] Drain  [] Other:     Constitutional: Minimally responsive, much calmer, no grimacing, no furrowing of the brow  Eyes: closed  ENMT: Slightly dry oral mucosa  Cardiovascular: regular; tachycardic at times; distal pulses intact; no peripheral edema  Respiratory: no increased work of breathing at this time, secretions earlier  Gastrointestinal: soft  : external catheter  Musculoskeletal: no contractures of gross deformities  Skin: pale; thin/fragile; scattered bruising to bilateral UE, almost appears jaundiced  Neurologic: Minimally responsive  Psychiatric: calm now but very agitated and restless earlier  Other:       Pertinent Lab and or Imaging Tests:  Lab Results   Component Value Date/Time    Sodium 139 11/07/2022 04:00 AM    Potassium 4.4 11/07/2022 04:00 AM    Chloride 110 (H) 11/07/2022 04:00 AM    CO2 19 (L) 11/07/2022 04:00 AM    Anion gap 10 11/07/2022 04:00 AM    Glucose 151 (H) 11/07/2022 04:00 AM    BUN 54 (H) 11/07/2022 04:00 AM    Creatinine 3.05 (H) 11/07/2022 04:00 AM    BUN/Creatinine ratio 18 11/07/2022 04:00 AM    GFR est AA 26 (L) 08/22/2022 02:24 PM    GFR est non-AA 22 (L) 08/22/2022 02:24 PM    Calcium 9.0 11/07/2022 04:00 AM     Lab Results Component Value Date/Time    Protein, total 7.4 11/07/2022 04:00 AM    Albumin 3.2 (L) 11/07/2022 04:00 AM         Mar Fallow.  MJ Reese

## 2022-11-09 NOTE — PROGRESS NOTES
TRANSFER - OUT REPORT:    Verbal report given to Nahomy (name) on Josseline Sandoval  being transferred to Oncology/Hospice suit (unit) for routine progression of care       Report consisted of patients Situation, Background, Assessment and   Recommendations(SBAR). Information from the following report(s) SBAR, Kardex, and MAR was reviewed with the receiving nurse. Lines:   Peripheral IV 11/08/22 Posterior;Right Forearm (Active)   Site Assessment Clean, dry, & intact 11/09/22 0818   Phlebitis Assessment 0 11/09/22 0818   Infiltration Assessment 0 11/09/22 0818   Dressing Status Clean, dry, & intact 11/09/22 0818   Dressing Type Tape;Transparent 11/09/22 0818   Hub Color/Line Status Flushed;Patent 11/09/22 0818   Action Taken Open ports on tubing capped 11/09/22 0818   Alcohol Cap Used Yes 11/09/22 0818       Subcutaneous Infusion Line 11/08/22 Posterior;Right;Upper Arm (Active)   Site Assessment Clean, dry, & intact 11/09/22 0818   Dressing Status Clean, dry, & intact 11/09/22 0818   Dressing Type Tape;Transparent 11/09/22 0818   Hub Color/Line Status Flushed;Patent 11/09/22 0818   Action Taken Open ports on tubing capped 11/09/22 0818        Opportunity for questions and clarification was provided.       Patient transported with:   [employer]

## 2022-11-09 NOTE — PROGRESS NOTES
[facility] of Shift Note    Bedside shift change report given to Miguel العلي RN (oncoming nurse) by Jonathan Desir RN (offgoing nurse). Report included the following information SBAR, Kardex, and MAR    Shift worked:  7 am to 7:30 pm     Shift summary and any significant changes:     Patient transferred to [facility]. Patient calm and resting quietly during shift. Patient's wife at bedside. IV's flushed and Moseley output documented. Education for wife and nursing rounds completed. Concerns for physician to address:       Zone phone for oncoming shift:          Activity:  Activity Level: Bed Rest  Number times ambulated in hallways past shift: 0  Number of times OOB to chair past shift: 0    Cardiac:   Cardiac Monitoring: No           Access:  Current line(s): PIV     Genitourinary:   Urinary status: moseley    Respiratory:   O2 Device: None (Room air)  Chronic home O2 use?: NO  Incentive spirometer at bedside: N/A       GI:     Current diet:  DIET ONE TIME MESSAGE  DIET ONE TIME MESSAGE  Passing flatus: NO  Tolerating current diet: NO       Pain Management:   Patient states pain is manageable on current regimen: YES (pt non-verbal; pt remained calm and resting quietly during shift)    Skin:  Kosta Score: 8  Interventions: internal/external urinary devices    Patient Safety:  Fall Score:  Total Score: 2  Interventions: bed/chair alarm  High Fall Risk: Yes    Length of Stay:  Expected LOS: - - -  Actual LOS: [address], RN

## 2022-11-10 NOTE — HOSPICE
[facility] to Those in Need  [phone]    [facility] Daily Nursing Note   Patient Name: Javon Palm  YOB: 1935  Age: 80 y.o. Date of Visit: 11/10/22  Facility of Care: [address] [facility]  Patient Room: [facility]/     Hospice Attending: Benjamin Griffiths MD  Hospice Diagnosis: Cardiorenal syndrome [I13.10]    Level of Care: GIP    Current [facility] Symptoms    1. Pain  2. Labored breathing  3. feverish  4. unresponsive responsive      ASSESSMENT & PLAN   Continue GIP level of care    1. Pain: Increased Scheduled IV dilaudid 1mg q4hrs with PRN available. 2. Agitation:  Scheduled IV versed 2mg q2hrs with PRN available. 3. Secretions:  Scheduled IV robinul 0.2mg q4hrs. Turn/repostion to help with movement of secretions  4. Fevers, prn toradol q6hr   4. Patient requiring frequent assessments by skilled medical staff for non-verbal cues of distress/discomfort. 5. Emotional and spiritual support to pt and family through end of life        Spiritual Interventions: None at this time. Hospital and [address] available 24/7. Marysol Shweta/hospice hospice chaplain following    Psych/Social/Emotional Interventions: SW will continue to be available to support family    Care Coordination Needs: Communication with hospital staff and hospice team     Care plan and New Orders discussed / approved with  Josey Rodgers NP. Description History and Chart Review     Narrative History of last 24 hours that demonstrates care cannot be provided in another setting:    Pt requiring frequent assessments by skilled medical staff with administration of IV medications along with adjustments in medication dosage. This care cannot be provided outside the inpatient setting. What has been done to control the patient's symptoms in the last 24 hours? Schedule IV dilauid, IV robinul, IV versed    Does the patient currently require IV medications? yes  Does the patient currently require scheduled medications? yes  Does the patient currently require a PCA? no    List number of doses of PRN medications in last 24 hours:  Medication 1: IV dilaudid  Number of doses: 0    Medication 2: IV versed  Number of doses: 0    Medication 3:   Number of doses:    Supporting documentation for GIP need for pain control:  [x] Frequent evaluation by a doctor, nurse practitioner, nurse   [x] Frequent medication adjustment    [x] IVs that cannot be administered at home   [x] Aggressive pain management   [x] Complicated technical delivery of medications              Supporting documentation for GIP need for symptom control:  [x]  Sudden decline necessitating intensive nursing intervention  []  Uncontrolled / intractable nausea or vomiting   []  Pathological fractures  []  Advanced open wounds requiring frequent skilled care  [] Unmanageable respiratory distress  [] New or worsening delirium   [] Delirium with behavior issues: Is 24 hour caregiver present due to safety concerns with agitation? (yes/no)  [] Imminent death - with skilled nursing needs documented above    DISCHARGE PLANNING     1. Discharge Plan: If patient stables, family will take patient home with hospice services  2. Family teaching: symptom management  3. Response to family teaching: receptive    ASSESSMENT    KARNOFSKY:     Prognosis estimated based on 11/10/22 clinical assessment is:   [] Few to Many Hours  [x] Hours to Days   [] Few to Many Days   [] Days to Weeks   [] Few to Many Weeks   [] Weeks to Months   [] Few to Many Months    Quality Measure: Patient self-reports:  [] Yes    [x] No    ESAS:   Time of Assessment: 930  Pain (1-10): 4  Fatigue (1-10):    Shortness of breath (1-10): 5  Nausea (1-10): Appetite (1-10):    Anxiety: (1-10):   Depression: (1-10):   Well-being: (1-10):   Constipation: no  LAST BM:     CLINICAL INFORMATION   Patient Vitals for the past 12 hrs:   Temp Pulse Resp BP SpO2   11/10/22 0824 (!) 100.6 °F (38.1 °C) (!) 116 22 (!) 156/72 95 % Currently this patient has:  [] Supplemental O2   [x] IV    [] PICC      [] PORT   [] NG Tube    [] PEG Tube   [] Ostomy     [x] Schwab draining _yellow__ urine  [] Other:     SIGNS/PHYSICAL FINDINGS     Skin (including wound):  [] Warm, dry, supple, intact and color normal for race  [x] Warm   [] Dry   [] Cool     [] Clammy       [] Diaphoretic    Turgor   [] Normal   [x] Decreased  Color:   [] Pink   [x] Pale   [] Cyanotic   [] Erythema   [] Jaundice   [] Normal for Race  []  Wounds:    Neuro:  [x] Lethargy  [] Restlessness / agitation  [] Confusion / delirium  [] Hallucinations  [] Responds to maximal stimulation  [x] Unresponsive  [] Seizures     Cardiac:  [] Dyspnea on Exertion  [] JVD  [] Murmur  [] Palpitations  [] Hypotension  [x] Hypertension  [x] Tachycardia  [] Bradycardia  [] Irregular HR  [] Pulses Decreased  [] Pulses Absent  [] Edema:         [] Mottling:        Respiratory:  Breath sounds:    [] Diminished   [] Wheeze   [] Coarse   [] Rales   [] Even and unlabored  [x] Labored:    26        [] Cough   [] Non Productive   [] Productive    [] Description:           [] Deep suctioned   [] O2 at ___ LPM  [] High flow oxygen greater than 10 LPM  [] Bi-Pap    GI  [x] Abdomen - flat  [] Ascites  [] Nausea  [] Vomiting  [x] Incontinent of bowels  [x] Bowel sounds - hypoactive  [] Diarrhea  [] Constipation (see above including last bowel movement)  [] Checked for impaction  [] Last BM     Nutrition  Diet:__NPO_____  Appetite:   [] Good   [] Fair   [] Poor   [] Tube Feeding       [] Voiding  [] Incontinent   [x] Schwab    Musculoskeletal  [] Balance/[place] Unsteady   [] Weak   Strength:    [] Normal    [] Limited    [] Decreasing   Activities:    [] Up as tolerated   [x] Bedridden    [] Specify:    SAFETY  [x] 24 hr. Caregiver   [x] Side rails ? [x] Hospital bed   [x] Reviewed Falls & Safety       ALLERGIES AND MEDICATIONS     Allergies:    Allergies   Allergen Reactions    Tetanus Vaccines And Toxoid Swelling     Swelling at the site    Tradjenta [Linagliptin] Diarrhea     headache       Current Facility-Administered Medications   Medication Dose Route Frequency    midazolam (VERSED) injection 2 mg  2 mg IntraVENous Q2H    HYDROmorphone (DILAUDID) injection 0.5 mg  0.5 mg IntraVENous Q4H    glycopyrrolate (ROBINUL) injection 0.2 mg  0.2 mg IntraVENous Q4H    midazolam (VERSED) injection 2 mg  2 mg IntraVENous Q1H PRN    HYDROmorphone (DILAUDID) injection 0.5 mg  0.5 mg IntraVENous Q15MIN PRN    bisacodyL (DULCOLAX) suppository 10 mg  10 mg Rectal DAILY PRN     Facility-Administered Medications Ordered in Other Encounters   Medication Dose Route Frequency    sodium chloride (NS) flush 5 mL  5 mL IntraVENous PRN          Visit Time In: Multiple visits today  Visit Time Out:

## 2022-11-10 NOTE — PROGRESS NOTES
Anat of Shift Note    Bedside shift change report given to Deepak Castro RN (oncoming nurse) by Tristan Flores RN (offgoing nurse). Report included the following information SBAR, Kardex, and MAR    Shift worked:  7a - 7:30p     Shift summary and any significant changes:     Scheduled meds given. PRN toradol added to [facility] by NP Jaswant Smith; given x 1 for temp 100.6     Scheduled and prn dilaudid doses increased from 0.5mg to 1mg by NP      Pt resting comfortably     Wife present at bedside, her  visited briefly     Concerns for physician to address:       Zone phone for oncoming shift:   [phone]       Activity:  Activity Level: Bed Rest  Number times ambulated in hallways past shift: 0  Number of times OOB to chair past shift: 0    Cardiac:   Cardiac Monitoring: No           Access:  Current line(s): PIV     Genitourinary:   Urinary status: moseley    Respiratory:   O2 Device: None (Room air)  Chronic home O2 use?: NO  Incentive spirometer at bedside: NO       GI:     Current diet:  DIET ONE TIME MESSAGE  DIET ONE TIME MESSAGE  Passing flatus: YES  Tolerating current diet: NO - Pt not eating currently       Pain Management:   Patient states pain is manageable on current regimen: YES - Pt aphasic but appears to be resting comfortably. Skin:  Kosta Score: 8  Interventions: turn team, speciality bed, float heels, and internal/external urinary devices    Patient Safety:  Fall Score:  Total Score: 2  Interventions: bed/chair alarm, gripper socks, pt to call before getting OOB, and stay with me (per policy)  High Fall Risk: Yes    Length of Stay:  Expected LOS: - - -  Actual LOS: 3      Tristan Flores, RN

## 2022-11-10 NOTE — PROGRESS NOTES
[facility] Help to Those in Need  [phone]    Patient Name: Jose Francisco Roberson  YOB: 1935    Date of Provider Hospice Visit: 11/10/22 VIRTUAL    Level of Care:   [x] General Inpatient (GIP)    [] Routine   [] Respite    Current Location of Care:  [] [facility] [] [facility] [x] [facility] [] [facility] [] [facility], patient referred from:  [] [facility] [] [facility] [] [facility] [] [facility] [] Home [] Other:     Date of [address] [facility] Admission: 11/7/2022  Hospice Medical Director at time of admission: Dr. Cristobal Larson Diagnosis: Cardiorenal syndrome  Diagnoses RELATED to the terminal prognosis: NSTEMI, FARHAT on CKD stage 4  Other Diagnoses: HTN, HLD, CAD     HOSPICE SUMMARY     Jose Francisco Roberson is a 80y.o. year old who was admitted to [facility]. HPI and brief course of hospitalization as summarized in Dr. Karma Teresa most recent progress note:  Jose Francisco Roberson is a 80 y.o. male with a past history of HTN , DM, CAD( hx of remote [facility]), most recent Echo IN 2017 IS NORMAL CKD stage 4 who was admitted on 11/6/2022 from home with a diagnosis of Acute NSTEMI, Acute hypertensive emergency, questionable pneumonia, FARHAT on CKD stage 4. Patient is delirious /reslstless, pulling I/V,  creatinine continues to rise, patient recently on 10/31 was scheduled for cardiac catheterization but the procedure was cancelled due to significantly elevated creatinine and conservative medical therapy was recommended. Chart review reflects patient has been declining for past several days, less active with poor oral intake . Assessment as summarized in Dr. Jeremias Dia most recent cardiology progress note:  Assessment:       1. Non-ST segment elevation myocardial infarction.   2.  History of coronary artery disease with remote coronary artery bypass grafting with a vein graft to the left anterior descending in 1985 and prior stenting of the right coronary artery in 2003, prior normal ejection fraction. 3.  Type 2 diabetes. 4.  Dyslipidemia. 5.  FARHAT/Chronic kidney disease stage IV. 6.  Cerebrovascular disease with prior left carotid endarterectomy. 7.  Spinal stenosis. 8.  Hyperkalemia. 9.  Altered mental status and recent general decline. Plan:  Unfortunately not much to offer in this situation. Spoke with wife. Favor hospice/palliative measures. D/C IV heparin       The patient's principle diagnosis has resulted in altered mental status, anxiety, restlessness, agitation, and nonverbal signs of pain. Functionally, the patient's Karnofsky and/or Palliative Performance Scale has declined over a period of days and is estimated at 20. The patient is dependent on the following ADLs: all    Objective information that support this patients limited prognosis includes: BUN 54 and trending up; Cr 3.05 and trending up  Study Result    Narrative & Impression   EXAM: XR CHEST PA LAT     INDICATION: Chest pain     COMPARISON: 9/12/2022     TECHNIQUE: PA and lateral chest 2 views 2 views     FINDINGS: The cardiac size is within normal limits. The pulmonary vasculature is  within normal limits. Chronic interstitial opacities slightly increased in interval. Increased  airspace opacity in the right midlung zone laterally. The visualized bones and  upper abdomen are age-appropriate. IMPRESSION     Chronic interstitial opacities with some apparent increase which may reflect  superimposed pulmonary edema. There is airspace opacity in the right midlung  zone laterally which may represent pneumonia. The patient/family chose comfort measures with the support of Hospice. HOSPICE DIAGNOSES   Active Symptoms:  1. Altered mental status  2. Restlessness  3. Agitation  4. Anxiety  5. Non verbal signs of pain  6. Debility  7. Hospice care  8. Secretions  9. Fever  10.   Respiratory distress     PLAN   Continue GIP level care as patient needs frequent nursing assessments, IV medication management, not safe to attempt sublingual/oral medication. Patient is restless, febrile and has increased respiratory effort this am.  Making adjustments to his medications for symptom management. Increase Dilaudid to 1 mg  mg IV every 4 hours scheduled  and every 15 minutes as needed for pain and or shortness of breath   Continue Versed 2 mg IV every 2 hours scheduled and every 15 minutes as needed for restlessness. If every two hour dosing is too burdensome for unit staff or escalation of dosing is needed, will consider IV continuous infusion. Ketorolac 30 mg IV every 6 hours prn fever added and a prn dose will be given now. Comfort meds for all other symptoms  Plan reviewed with Olivia Moore hospice liaison and we saw patient together with Nasir Velázquez at bedside and me virtually. No family at the bedside during my virtual visit   and SW to support family needs  Disposition: at risk for continued rapid decline and anticipate death in hospital  Hospice Plan of care was reviewed in detail and agree with current plan of care    Prognosis estimated based on 11/10/22 clinical assessment is:   [x] Hours to Days    [] Days to Weeks    [] Other:    Communicated plan of care with: Hospice Case Manager; Hospice IDT; Care Team     GOALS OF CARE     Patient/Medical POA stated Goal of Care: Comfort care and symptom management. [x] I have reviewed and/or updated ACP information in the Advance Care Planning Navigator. This information is available in the [facility] link in the patient's chart header.     Primary Decision Maker ([address]):   Primary Decision Maker (Active): Sherif Cody - [phone]    Resuscitation Status: DNR  If DNR is there a Durable DNR on file? : [] Yes [x] No (If no, complete Durable DNR)    HISTORY     History obtained from: chart review; and discussion with interdisciplinary team    CHIEF COMPLAINT: unable to obtain  The patient is:   [] Verbal  [x] Nonverbal  [] Unresponsive    HPI/SUBJECTIVE:  No family present during provider rounds. Patient care tech team providing incontinence care, turning, and repositioning. Pt is nonverbal, not following commands, and is seemingly resistant to care. Easily agitated and appears restless. Review of notes indicated persistent/worsening agitation and signs of delirium over the last 24 hours. Overnight required one time dose of Benadryl 25 mg IV, Valium 3 mg IV, and Zyprexa 5 mg IM without palliation of symptoms. Today received Haldol 2 mg IV at 1200, and Versed 1 mg IV at 1300 with initial symptomatic improvement. 11/8-patient does appear comfortable this afternoon. Apparently very restless and agitated earlier today. 11/10/2022. Patient restless this am, febrile, and has increased respiratory effort. Not responsive.        REVIEW OF SYSTEMS     The following systems were: [] reviewed  [x] unable to be reviewed    Positive ROS include:  Constitutional: fatigue, weakness, in pain, short of breath  Ears/nose/mouth/throat: increased airway secretions  Respiratory:shortness of breath, wheezing  Gastrointestinal:poor appetite, nausea, vomiting, abdominal pain, constipation, diarrhea  Musculoskeletal:pain, deformities, swelling legs  Neurologic:confusion, hallucinations, weakness  Psychiatric:anxiety, feeling depressed, poor sleep  Endocrine:     Adult Non-Verbal Pain Assessment Score: 4/10    Face  [] 0   No particular expression or smile  [x] 1   Occasional grimace, tearing, frowning, wrinkled forehead  [] 2   Frequent grimace, tearing, frowning, wrinkled forehead    Activity (movement)  [] 0   Lying quietly, normal position  [x] 1   Seeking attention through movement or slow, cautious movement  [] 2   Restless, excessive activity and/or withdrawal reflexes    Guarding  [] 0   Lying quietly, no positioning of hands over areas of body  [x] 1   Splinting areas of the body, tense  [] 2   Rigid, stiff    Physiology (vital signs)  [x] 0   Stable vital signs  [] 1   Change in any of the following: SBP > 20mm Hg; HR > 20/minute  [] 2   Change in any of the following: SBP > 30mm Hg; HR > 25/minute    Respiratory  [] 0   Baseline RR/SpO2, compliant with ventilator  [x] 1   RR > 10 above baseline, or 5% drop SpO2, mild asynchrony with ventilator  [] 2   RR > 20 above baseline, or 10% drop SpO2, asynchrony with ventilator     FUNCTIONAL ASSESSMENT     Palliative Performance Scale (PPS): 10       PSYCHOSOCIAL/SPIRITUAL ASSESSMENT     Active Problems:    Cardiorenal syndrome (2022)    Past Medical History:   Diagnosis Date    CAD (coronary artery disease)     mi    Chronic kidney disease     hx of elevated blood levels    Chronic pain     lower back    Diabetes ([facility] [place] [address]     Enlarged prostate     Hypercholesterolemia     Hypertension     Other unknown and unspecified cause of morbidity or mortality     hayfever      Past Surgical History:   Procedure Laterality Date    HX CATARACT REMOVAL Bilateral     HX CHOLECYSTECTOMY      HX LUMBAR LAMINECTOMY  2017    HX ORTHOPAEDIC  2015    back surgery     [place] CARDIAC SURG PROCEDURE UNLIST      bypass(), stents(, )    [place] COLONOSCOPY FLX DX W/COLLJ SPEC WHEN PFRMD  2013         [place] EGD TRANSORAL BIOPSY SINGLE/MULTIPLE  2012         VASCULAR SURGERY PROCEDURE UNLIST  1996    left carotid      Social History     Tobacco Use    Smoking status: Former     Types: Cigarettes     Quit date: 6/3/1967     Years since quittin.4    Smokeless tobacco: Never   Substance Use Topics    Alcohol use: No     Family History   Problem Relation Age of Onset    Heart Disease Mother     Heart Disease Father       Allergies   Allergen Reactions    Tetanus Vaccines And Toxoid Swelling     Swelling at the site    Tradjenta [Linagliptin] Diarrhea     headache      Current Facility-Administered Medications   Medication Dose Route Frequency    ketorolac (TORADOL) injection 30 mg  30 mg IntraVENous Q6H PRN    HYDROmorphone (DILAUDID) injection 1 mg  1 mg IntraVENous Q4H    midazolam (VERSED) injection 2 mg  2 mg IntraVENous Q2H    glycopyrrolate (ROBINUL) injection 0.2 mg  0.2 mg IntraVENous Q4H    midazolam (VERSED) injection 2 mg  2 mg IntraVENous Q1H PRN    HYDROmorphone (DILAUDID) injection 0.5 mg  0.5 mg IntraVENous Q15MIN PRN    bisacodyL (DULCOLAX) suppository 10 mg  10 mg Rectal DAILY PRN     Facility-Administered Medications Ordered in Other Encounters   Medication Dose Route Frequency    sodium chloride (NS) flush 5 mL  5 mL IntraVENous PRN        PHYSICAL EXAM     Wt Readings from Last 3 Encounters:   11/06/22 65.8 kg (145 lb)   11/01/22 65.6 kg (144 lb 9.6 oz)   10/13/22 63.5 kg (140 lb)     Supplemental O2  [] Yes  [x] NO  Last bowel movement: N/A    Currently this patient has:  [x] Peripheral IV [] PICC  [] PORT [] ICD    [x] Moseley Catheter [] NG Tube   [] PEG Tube    [] Rectal Tube [] Drain  [] Other:     Constitutional: increased respiratory effort and rate with mouth breathing, + facial grimacing  Eyes: closed  ENMT:  dry oral mucosa  Cardiovascular: no peripheral edema  Respiratory:  increased work of breathing at this time, No audible secretions    Gastrointestinal: no distention  : moseley  Musculoskeletal: no contractures of gross deformities  Skin: no mottling of extremities.   Feet are slightly hot per nurse assisting with exam  Neurologic: unable to assess  Psychiatric: restless intermittently  Other:       Pertinent Lab and or Imaging Tests:  Lab Results   Component Value Date/Time    Sodium 139 11/07/2022 04:00 AM    Potassium 4.4 11/07/2022 04:00 AM    Chloride 110 (H) 11/07/2022 04:00 AM    CO2 19 (L) 11/07/2022 04:00 AM    Anion gap 10 11/07/2022 04:00 AM    Glucose 151 (H) 11/07/2022 04:00 AM    BUN 54 (H) 11/07/2022 04:00 AM    Creatinine 3.05 (H) 11/07/2022 04:00 AM    BUN/Creatinine ratio 18 11/07/2022 04:00 AM    GFR est AA 26 (L) 08/22/2022 02:24 PM    GFR est non-AA 22 (L) 08/22/2022 02:24 PM    Calcium 9.0 11/07/2022 04:00 AM     Lab Results   Component Value Date/Time    Protein, total 7.4 11/07/2022 04:00 AM    Albumin 3.2 (L) 11/07/2022 04:00 AM         Jenae Reese, NP

## 2022-11-10 NOTE — PROGRESS NOTES
Anat of Shift Note    Bedside shift change report given to Paige Carrion (oncoming nurse) by Sherron Luo (offgoing nurse). Report included the following information SBAR, Kardex, and MAR    Shift worked:  7p-7a     Shift summary and any significant changes:     Scheduled medications were given, see MAR.  IV's have been flushed and are patent. Family member was present at the bedside throughout my shift. Patient was repositioned during my shift. Frequent rounding has been done. Concerns for physician to address:       Zone phone for oncoming shift:          Activity:  Activity Level: Bed Rest  Number times ambulated in hallways past shift:   Number of times OOB to chair past shift: 0    Cardiac:   Cardiac Monitoring: No           Access:  Current line(s): PIV     Genitourinary:   Urinary status: moseley    Respiratory:   O2 Device: None (Room air)  Chronic home O2 use?: NO  Incentive spirometer at bedside: NO       GI:     Current diet:  DIET ONE TIME MESSAGE  DIET ONE TIME MESSAGE  Passing flatus: YES  Tolerating current diet: YES       Pain Management:   Patient states pain is manageable on current regimen: YES    Skin:  Kosta Score: 8  Interventions: turn team and internal/external urinary devices    Patient Safety:  Fall Score:  Total Score: 2  Interventions: bed/chair alarm  High Fall Risk: Yes    Length of Stay:  Expected LOS: - - -  Actual LOS: 3      Sherron Luo

## 2022-11-11 NOTE — HOSPICE
[place] [facility] Help to Those in Need  [phone]    [facility] Daily Nursing Note   Patient Name: Alex Marmolejo  YOB: 1935  Age: 80 y.o. Date of Visit: 11/11/22  Facility of Care: [facility]  Patient Room: [facility]/     Hospice Attending: Sheree Leon MD  Hospice Diagnosis: Cardiorenal syndrome [I13.10]    Level of Care: GIP    Current GIP Symptoms    1. Pain  2. Labored breathing  3. feverish  4. unresponsive       ASSESSMENT & PLAN   Continue GIP level of care    1. Pain: Increased Scheduled IV dilaudid 1mg q4hrs with PRN available. 2. Agitation:  Scheduled IV versed 2mg q2hrs with PRN available. 3. Secretions:  Scheduled IV robinul 0.2mg q4hrs. Turn/repostion to help with movement of secretions  4. Fevers, prn toradol q6hr   4. Patient requiring frequent assessments by skilled medical staff for non-verbal cues of distress/discomfort. 5. Emotional and spiritual support to pt and family through end of life        Spiritual Interventions: None at this time. Hospital and [address] available 24/7. Marysol Rock/hospice hospice chaplain following    Psych/Social/Emotional Interventions: SW will continue to be available to support family    Care Coordination Needs: Communication with hospital staff and hospice team     Care plan and New Orders discussed / approved with  Gia Garcia NP. Description History and Chart Review     Narrative History of last 24 hours that demonstrates care cannot be provided in another setting:    Pt requiring frequent assessments by skilled medical staff with administration of IV medications along with adjustments in medication dosage. This care cannot be provided outside the inpatient setting. What has been done to control the patient's symptoms in the last 24 hours? Schedule IV dilauid, IV robinul, IV versed    Does the patient currently require IV medications? yes  Does the patient currently require scheduled medications?  yes  Does the patient currently require a PCA? no    List number of doses of PRN medications in last 24 hours:  Medication 1: toradol  Number of doses: 1    Medication 2: IV versed  Number of doses: 0    Medication 3:   Number of doses:    Supporting documentation for GIP need for pain control:  [x] Frequent evaluation by a doctor, nurse practitioner, nurse   [x] Frequent medication adjustment    [x] IVs that cannot be administered at home   [x] Aggressive pain management   [x] Complicated technical delivery of medications              Supporting documentation for GIP need for symptom control:  [x]  Sudden decline necessitating intensive nursing intervention  []  Uncontrolled / intractable nausea or vomiting   []  Pathological fractures  []  Advanced open wounds requiring frequent skilled care  [] Unmanageable respiratory distress  [] New or worsening delirium   [] Delirium with behavior issues: Is 24 hour caregiver present due to safety concerns with agitation? (yes/no)  [] Imminent death - with skilled nursing needs documented above    DISCHARGE PLANNING     1. Discharge Plan: If patient stables, family will take patient home with hospice services  2. Family teaching: symptom management  3. Response to family teaching: receptive    ASSESSMENT    KARNOFSKY:     Prognosis estimated based on 11/11/22 clinical assessment is:   [] Few to Many Hours  [x] Hours to Days   [] Few to Many Days   [] Days to Weeks   [] Few to Many Weeks   [] Weeks to Months   [] Few to Many Months    Quality Measure: Patient self-reports:  [] Yes    [x] No    ESAS:   Time of Assessment: 930  Pain (1-10): 3  Fatigue (1-10):    Shortness of breath (1-10): 3  Nausea (1-10): Appetite (1-10):    Anxiety: (1-10):   Depression: (1-10):   Well-being: (1-10):   Constipation: no  LAST BM:     CLINICAL INFORMATION   Patient Vitals for the past 12 hrs:   Temp Pulse Resp BP SpO2   11/11/22 0716 96.9 °F (36.1 °C) 80 24 (!) 111/56 93 %         Currently this patient has:  [] Supplemental O2   [x] IV    [] PICC      [] PORT   [] NG Tube    [] PEG Tube   [] Ostomy     [x] Schwab draining _yellow__ urine  [] Other:     SIGNS/PHYSICAL FINDINGS     Skin (including wound):  [] Warm, dry, supple, intact and color normal for race  [x] Warm   [] Dry   [] Cool     [] Clammy       [] Diaphoretic    Turgor   [] Normal   [x] Decreased  Color:   [] Pink   [x] Pale   [] Cyanotic   [] Erythema   [] Jaundice   [] Normal for Race  []  Wounds:    Neuro:  [x] Lethargy  [] Restlessness / agitation  [] Confusion / delirium  [] Hallucinations  [] Responds to maximal stimulation  [x] Unresponsive  [] Seizures     Cardiac:  [] Dyspnea on Exertion  [] JVD  [] Murmur  [] Palpitations  [] Hypotension  [x] Hypertension  [x] Tachycardia  [] Bradycardia  [] Irregular HR  [] Pulses Decreased  [] Pulses Absent  [] Edema:         [] Mottling:        Respiratory:  Breath sounds:    [] Diminished   [] Wheeze   [] Coarse   [] Rales   [] Even and unlabored  [x] Labored:    26        [] Cough   [] Non Productive   [] Productive    [] Description:           [] Deep suctioned   [] O2 at ___ LPM  [] High flow oxygen greater than 10 LPM  [] Bi-Pap    GI  [x] Abdomen - flat  [] Ascites  [] Nausea  [] Vomiting  [x] Incontinent of bowels  [x] Bowel sounds - hypoactive  [] Diarrhea  [] Constipation (see above including last bowel movement)  [] Checked for impaction  [] Last BM     Nutrition  Diet:__NPO_____  Appetite:   [] Good   [] Fair   [] Poor   [] Tube Feeding       [] Voiding  [] Incontinent   [x] Schwab    Musculoskeletal  [] Balance/[place] Unsteady   [] Weak   Strength:    [] Normal    [] Limited    [] Decreasing   Activities:    [] Up as tolerated   [x] Bedridden    [] Specify:    SAFETY  [x] 24 hr. Caregiver   [x] Side rails ? [x] Hospital bed   [x] Reviewed Falls & Safety       ALLERGIES AND MEDICATIONS     Allergies:    Allergies   Allergen Reactions    Tetanus Vaccines And Toxoid Swelling     Swelling at the site    Tradjenta [Linagliptin] Diarrhea     headache       Current Facility-Administered Medications   Medication Dose Route Frequency    ketorolac (TORADOL) injection 30 mg  30 mg IntraVENous Q6H PRN    HYDROmorphone (DILAUDID) injection 1 mg  1 mg IntraVENous Q4H    HYDROmorphone (DILAUDID) injection 1 mg  1 mg IntraVENous Q15MIN PRN    midazolam (VERSED) injection 2 mg  2 mg IntraVENous Q2H    glycopyrrolate (ROBINUL) injection 0.2 mg  0.2 mg IntraVENous Q4H    midazolam (VERSED) injection 2 mg  2 mg IntraVENous Q1H PRN    bisacodyL (DULCOLAX) suppository 10 mg  10 mg Rectal DAILY PRN     Facility-Administered Medications Ordered in Other Encounters   Medication Dose Route Frequency    sodium chloride (NS) flush 5 mL  5 mL IntraVENous PRN          Visit Time In: Multiple visits today  Visit Time Out:

## 2022-11-11 NOTE — PROGRESS NOTES
[facility]  [phone]    Patient Name: Jany Edmonds  YOB: 1935    Date of Provider Hospice Visit: 11/11/22     Level of Care:   [x] General Inpatient (GIP)    [] Routine   [] Respite    Current Location of Care:  [] [facility] [] [facility] [x] [facility] [] [facility] [] [facility], patient referred from:  [] [facility] [] [facility] [] [facility] [] [facility] [] Home [] Other:     Date of [address] [facility] [address] Admission: 11/7/2022  Hospice Medical Director at time of admission: Dr. Pieter Singer Diagnosis: Cardiorenal syndrome  Diagnoses RELATED to the terminal prognosis: NSTEMI, FARHAT on CKD stage 4  Other Diagnoses: HTN, HLD, CAD     HOSPICE SUMMARY     Jany Edmonds is a 80y.o. year old who was admitted to [facility]. HPI and brief course of hospitalization as summarized in Dr. Heldre Colon most recent progress note:  Jany Edmonds is a 80 y.o. male with a past history of HTN , DM, CAD( hx of remote [place]), most recent Echo IN 2017 IS NORMAL CKD stage 4 who was admitted on 11/6/2022 from home with a diagnosis of Acute NSTEMI, Acute hypertensive emergency, questionable pneumonia, FARHAT on CKD stage 4. Patient is delirious /reslstless, pulling I/V,  creatinine continues to rise, patient recently on 10/31 was scheduled for cardiac catheterization but the procedure was cancelled due to significantly elevated creatinine and conservative medical therapy was recommended. Chart review reflects patient has been declining for past several days, less active with poor oral intake . Assessment as summarized in Dr. Bhaskar Merritt most recent cardiology progress note:  Assessment:       1. Non-ST segment elevation myocardial infarction.   2.  History of coronary artery disease with remote coronary artery bypass grafting with a vein graft to the left anterior descending in 1985 and prior stenting of the right coronary artery in 2003, prior normal ejection fraction. 3.  Type 2 diabetes. 4.  Dyslipidemia. 5.  FARHAT/Chronic kidney disease stage IV. 6.  Cerebrovascular disease with prior left carotid endarterectomy. 7.  Spinal stenosis. 8.  Hyperkalemia. 9.  Altered mental status and recent general decline. Plan:  Unfortunately not much to offer in this situation. Spoke with wife. Favor hospice/palliative measures. D/C IV heparin       The patient's principle diagnosis has resulted in altered mental status, anxiety, restlessness, agitation, and nonverbal signs of pain. Functionally, the patient's Karnofsky and/or Palliative Performance Scale has declined over a period of days and is estimated at 20. The patient is dependent on the following ADLs: all    Objective information that support this patients limited prognosis includes: BUN 54 and trending up; Cr 3.05 and trending up  Study Result    Narrative & Impression   EXAM: XR CHEST PA LAT     INDICATION: Chest pain     COMPARISON: 9/12/2022     TECHNIQUE: PA and lateral chest 2 views 2 views     FINDINGS: The cardiac size is within normal limits. The pulmonary vasculature is  within normal limits. Chronic interstitial opacities slightly increased in interval. Increased  airspace opacity in the right midlung zone laterally. The visualized bones and  upper abdomen are age-appropriate. IMPRESSION     Chronic interstitial opacities with some apparent increase which may reflect  superimposed pulmonary edema. There is airspace opacity in the right midlung  zone laterally which may represent pneumonia. The patient/family chose comfort measures with the support of Hospice. HOSPICE DIAGNOSES   Active Symptoms:  1. Unresponsiveness  2. Restlessness  3. Agitation  4. Anxiety  5. Non verbal signs of pain  6. Increased work of breathing  6. Debility  7. Secretions  8. Fever  9.  Hospice care       PLAN   Continue GIP level of care as pt is now unresponsive and unable to safely tolerate PO/SL medications; requires frequent nursing assessment and administration and titration of parenteral medications to manage symptom burden  Continue Dilaudid 1 mg  mg IV every 4 hours with prn dosing available every 15 minutes for signs of breakthrough pain or air hunger  Continue Versed 2 mg IV every 2 hours with prn dosing available hourly for signs of breakthrough anxiety, restlessness, or agitation  Continue Robinul 0.2 mg IV every 4 hours for increased oropharyngeal secretions  Ketorolac 30 mg IV every 6 hours as needed for fever  All other symptom management medications as needed  Spouse present at bedside during provider rounds; she was reading \"Gone from my Sight\" booklet provided by Hospice team and commenting about how helpful and insightful information has been; reviewed assessment findings and current medication regimen, and signs consistent with pt nearing end of life; verbalized understanding and seemingly accepting of limited prognosis   and SW to support family needs  Disposition: anticipate death in hospital  Hospice Plan of care was reviewed in detail and agree with current plan of care    Prognosis estimated based on 11/11/22 clinical assessment is:   [x] Hours to Days    [] Days to Weeks    [] Other:    Communicated plan of care with: Hospice Case Manager; Hospice IDT; Care Team     GOALS OF CARE     Patient/Medical POA stated Goal of Care: Comfort care and symptom management. [x] I have reviewed and/or updated ACP information in the Advance Care Planning Navigator. This information is available in the [address] link in the patient's chart header.     Primary Decision Maker ([address]):   Primary Decision Maker (Active): Sierra Quintanilla - [phone]    Resuscitation Status: DNR  If DNR is there a Durable DNR on file? : [] Yes [x] No (If no, complete Durable DNR)    HISTORY     History obtained from: chart review; and discussion with interdisciplinary team    CHIEF COMPLAINT: unable to obtain  The patient is:   [] Verbal  [] Nonverbal  [x] Unresponsive    HPI/SUBJECTIVE:  No family present during provider rounds. Patient care tech team providing incontinence care, turning, and repositioning. Pt is nonverbal, not following commands, and is seemingly resistant to care. Easily agitated and appears restless. Review of notes indicated persistent/worsening agitation and signs of delirium over the last 24 hours. Overnight required one time dose of Benadryl 25 mg IV, Valium 3 mg IV, and Zyprexa 5 mg IM without palliation of symptoms. Today received Haldol 2 mg IV at 1200, and Versed 1 mg IV at 1300 with initial symptomatic improvement. 11/8-patient does appear comfortable this afternoon. Apparently very restless and agitated earlier today. 11/10/2022. Patient restless this am, febrile, and has increased respiratory effort. Not responsive. 11/11- Spouse present at bedside. Pt unresponsive. Periods of increased work of breathing evidenced by tachypnea but respirations even at this time. Generalized pallor. Dry oral mucosa. Minimal urine output.      REVIEW OF SYSTEMS     The following systems were: [] reviewed  [x] unable to be reviewed    Positive ROS include:  Constitutional: fatigue, weakness, in pain, short of breath  Ears/nose/mouth/throat: increased airway secretions  Respiratory:shortness of breath, wheezing  Gastrointestinal:poor appetite, nausea, vomiting, abdominal pain, constipation, diarrhea  Musculoskeletal:pain, deformities, swelling legs  Neurologic:confusion, hallucinations, weakness  Psychiatric:anxiety, feeling depressed, poor sleep  Endocrine:     Adult Non-Verbal Pain Assessment Score: 1    Face  [x] 0   No particular expression or smile  [] 1   Occasional grimace, tearing, frowning, wrinkled forehead  [] 2   Frequent grimace, tearing, frowning, wrinkled forehead    Activity (movement)  [x] 0   Lying quietly, normal position  [] 1   Seeking attention through movement or slow, cautious movement  [] 2   Restless, excessive activity and/or withdrawal reflexes    Guarding  [x] 0   Lying quietly, no positioning of hands over areas of body  [] 1   Splinting areas of the body, tense  [] 2   Rigid, stiff    Physiology (vital signs)  [x] 0   Stable vital signs  [] 1   Change in any of the following: SBP > 20mm Hg; HR > 20/minute  [] 2   Change in any of the following: SBP > 30mm Hg; HR > 25/minute    Respiratory  [] 0   Baseline RR/SpO2, compliant with ventilator  [x] 1   RR > 10 above baseline, or 5% drop SpO2, mild asynchrony with ventilator  [] 2   RR > 20 above baseline, or 10% drop SpO2, asynchrony with ventilator     FUNCTIONAL ASSESSMENT     Palliative Performance Scale (PPS): 10       PSYCHOSOCIAL/SPIRITUAL ASSESSMENT     Active Problems:    Cardiorenal syndrome (2022)    Past Medical History:   Diagnosis Date    CAD (coronary artery disease)     mi    Chronic kidney disease     hx of elevated blood levels    Chronic pain     lower back    Diabetes ([facility] [address]     Enlarged prostate     Hypercholesterolemia     Hypertension     Other unknown and unspecified cause of morbidity or mortality     hayfever      Past Surgical History:   Procedure Laterality Date    HX CATARACT REMOVAL Bilateral     HX CHOLECYSTECTOMY      HX LUMBAR LAMINECTOMY  2017    HX ORTHOPAEDIC  2015    back surgery     MI CARDIAC SURG PROCEDURE UNLIST      bypass(), stents(, )    [place] COLONOSCOPY FLX DX W/COLLJ SPEC WHEN PFRMD  2013         [place] EGD TRANSORAL BIOPSY SINGLE/MULTIPLE  2012         VASCULAR SURGERY PROCEDURE UNLIST  1996    left carotid      Social History     Tobacco Use    Smoking status: Former     Types: Cigarettes     Quit date: 6/3/1967     Years since quittin.4    Smokeless tobacco: Never   Substance Use Topics    Alcohol use: No     Family History   Problem Relation Age of Onset    Heart Disease Mother     Heart Disease Father       Allergies Allergen Reactions    Tetanus Vaccines And Toxoid Swelling     Swelling at the site    Tradjenta [Linagliptin] Diarrhea     headache      Current Facility-Administered Medications   Medication Dose Route Frequency    ketorolac (TORADOL) injection 30 mg  30 mg IntraVENous Q6H PRN    HYDROmorphone (DILAUDID) injection 1 mg  1 mg IntraVENous Q4H    HYDROmorphone (DILAUDID) injection 1 mg  1 mg IntraVENous Q15MIN PRN    midazolam (VERSED) injection 2 mg  2 mg IntraVENous Q2H    glycopyrrolate (ROBINUL) injection 0.2 mg  0.2 mg IntraVENous Q4H    midazolam (VERSED) injection 2 mg  2 mg IntraVENous Q1H PRN    bisacodyL (DULCOLAX) suppository 10 mg  10 mg Rectal DAILY PRN     Facility-Administered Medications Ordered in Other Encounters   Medication Dose Route Frequency    sodium chloride (NS) flush 5 mL  5 mL IntraVENous PRN        PHYSICAL EXAM     Wt Readings from Last 3 Encounters:   11/06/22 65.8 kg (145 lb)   11/01/22 65.6 kg (144 lb 9.6 oz)   10/13/22 63.5 kg (140 lb)     Supplemental O2  [] Yes  [x] NO  Last bowel movement: N/A    Currently this patient has:  [x] Peripheral IV [] PICC  [] PORT [] ICD    [x] Moseley Catheter [] NG Tube   [] PEG Tube    [] Rectal Tube [] Drain  [] Other:     Constitutional: unresponsive; thin/frail; ill-appearing  Eyes: closed  ENMT:  dry oral mucosa  Cardiovascular: regular; no peripheral edema  Respiratory:  no increased work of breathing; breath sounds diminished   Gastrointestinal: no distention  : moseley  Musculoskeletal: no contractures of gross deformities  Skin: thin; fragile; warm/dry; scattered bruising to bilateral UE  Neurologic: unresponsive  Psychiatric: appears calm   Other:     Pertinent Lab and or Imaging Tests:  Lab Results   Component Value Date/Time    Sodium 139 11/07/2022 04:00 AM    Potassium 4.4 11/07/2022 04:00 AM    Chloride 110 (H) 11/07/2022 04:00 AM    CO2 19 (L) 11/07/2022 04:00 AM    Anion gap 10 11/07/2022 04:00 AM    Glucose 151 (H) 11/07/2022 04:00 AM    BUN 54 (H) 11/07/2022 04:00 AM    Creatinine 3.05 (H) 11/07/2022 04:00 AM    BUN/Creatinine ratio 18 11/07/2022 04:00 AM    GFR est AA 26 (L) 08/22/2022 02:24 PM    GFR est non-AA 22 (L) 08/22/2022 02:24 PM    Calcium 9.0 11/07/2022 04:00 AM     Lab Results   Component Value Date/Time    Protein, total 7.4 11/07/2022 04:00 AM    Albumin 3.2 (L) 11/07/2022 04:00 AM           Aida BROWN-CHELSEY

## 2022-11-11 NOTE — PROGRESS NOTES
Anat of Shift Note    Bedside shift change report given to Yamileth Pappas (oncoming nurse) by Justo Son (offgoing nurse). Report included the following information SBAR, Kardex, and MAR    Shift worked:  7p-7a     Shift summary and any significant changes:     Scheduled medications were given, see MAR. IV has been flushed and is patent. Family is at the bedside throughout my shift. Moseley are was done. Patient was repositioned and frequent rounding has been done. Concerns for physician to address:       Zone phone for oncoming shift:          Activity:  Activity Level: Bed Rest  Number times ambulated in hallways past shift: 0  Number of times OOB to chair past shift: 0    Cardiac:   Cardiac Monitoring: No           Access:  Current line(s): PIV     Genitourinary:   Urinary status: moseley    Respiratory:   O2 Device: None (Room air)  Chronic home O2 use?: NO  Incentive spirometer at bedside: NO       GI:     Current diet:  DIET ONE TIME MESSAGE  DIET ONE TIME MESSAGE  Passing flatus: YES  Tolerating current diet: YES       Pain Management:   Patient states pain is manageable on current regimen: YES    Skin:  Kosta Score: 8  Interventions: turn team and internal/external urinary devices    Patient Safety:  Fall Score:  Total Score: 2  Interventions: bed/chair alarm  High Fall Risk: Yes    Length of Stay:  Expected LOS: - - -  Actual LOS: [address]

## 2022-11-11 NOTE — PROGRESS NOTES
Armond Galo LCSW note: This LCSW visited the room of pt, who appears imminent and his wife Destin Lin. LCSW provided active listening as wife shared \"sweet memories\" of their relationship. LCSW provided validation of her prayers \" he would go first, because he could not manage without her\". LCSW affirmed her care and concern for pt. LCSW encouraged reminiscing in processing relational loss. Wife is a woman of strong duncan. Wife report her niece Suze Han and her  Rebeca Borrero are very supportive. Wife will remain at bedside and drive home in the am. LCSW will continue to assess and monitor pt and family needs.

## 2022-11-12 NOTE — PROGRESS NOTES
adEnd of Shift Note    Bedside shift change report given to Marysol Covarrubias RN (oncoming nurse) by Haile Garcia RN (offgoing nurse). Report included the following information SBAR, Kardex, and MAR    Shift worked:  7a - 7:30p     Shift summary and any significant changes:     Scheduled meds given     PRN dilaudid given x 1      Wife present at bedside     Pt appears to be resting comfortably     Concerns for physician to address:       Zone phone for oncoming shift:   [phone]       Activity:  Activity Level: Bed Rest  Number times ambulated in hallways past shift: 0  Number of times OOB to chair past shift: 0    Cardiac:   Cardiac Monitoring: No           Access:  Current line(s): PIV     Genitourinary:   Urinary status: moseley    Respiratory:   O2 Device: None (Room air)  Chronic home O2 use?: NO  Incentive spirometer at bedside: NO       GI:     Current diet:  DIET ONE TIME MESSAGE  DIET ONE TIME MESSAGE  Passing flatus: YES  Tolerating current diet: NO - Pt not eating in current state. Pain Management:   Patient states pain is manageable on current regimen: N/A - Pt currently aphasic but appears to be resting comfortably. Skin:  Kosta Score: 8  Interventions: turn team, speciality bed, float heels, and internal/external urinary devices    Patient Safety:  Fall Score:  Total Score: 2  Interventions: bed/chair alarm, gripper socks, pt to call before getting OOB, and stay with me (per policy)  High Fall Risk: Yes    Length of Stay:  Expected LOS: - - -  Actual LOS: 4      Haile Garcia RN

## 2022-11-12 NOTE — PROGRESS NOTES
Francisco Armando of Shift Note    Bedside shift change report given to Pavithra SAHA (oncoming nurse) by Cristian Arora RN (offgoing nurse). Report included the following information SBAR, Kardex, and MAR    Shift worked: 7a-7p     Shift summary and any significant changes:    Patient tolerating care fairly well and seems to be resting comfortably. Medications given per MAR. Oral and incontinence care completed. Hourly rounding completed. Patient is resting quietly with family at bedside.             Cristian Arora RN

## 2022-11-12 NOTE — PROGRESS NOTES
Anat of Shift Note    Bedside shift change report given to Elias Iqbal (oncoming nurse) by Sima Chau (offgoing nurse). Report included the following information SBAR, Kardex, and MAR    Shift worked:  7p-7a     Shift summary and any significant changes:     Scheduled medications were given, see MAR. A new IV was established. Family member was present at bedside throughout my shift. NO PRN medications were given. Moseley care and frequent rounding has been done. Concerns for physician to address:       Zone phone for oncoming shift:          Activity:  Activity Level: Bed Rest  Number times ambulated in hallways past shift: 0  Number of times OOB to chair past shift: 0    Cardiac:   Cardiac Monitoring: No           Access:  Current line(s): PIV     Genitourinary:   Urinary status: moseley    Respiratory:   O2 Device: None (Room air)  Chronic home O2 use?: NO  Incentive spirometer at bedside: NO       GI:     Current diet:  DIET ONE TIME MESSAGE  DIET ONE TIME MESSAGE  Passing flatus: YES  Tolerating current diet: YES       Pain Management:   Patient states pain is manageable on current regimen: YES    Skin:  Kosta Score: 8  Interventions: turn team and internal/external urinary devices    Patient Safety:  Fall Score:  Total Score: 2  Interventions: bed/chair alarm  High Fall Risk: Yes    Length of Stay:  Expected LOS: - - -  Actual LOS: [address]

## 2022-11-12 NOTE — HOSPICE
[address] [facility] Help to Those in Need  [phone]    [facility] Daily Nursing Note   Patient Name: Raphael Last  YOB: 1935  Age: 80 y.o. Date of Visit: 11/12/22  Facility of Care: [facility]  Patient Room: [facility]/     Hospice Attending: Isak Mendoza MD  Hospice Diagnosis: Cardiorenal syndrome [I13.10]    Level of Care: [facility]    Current [facility] Symptoms    1. Pain  2  Agitation:  3. Labored breathing  4. feverish  5.  unresponsive       ASSESSMENT & PLAN   Continue [facility] level of care    1. Pain: improved comfort level due to Increase in Scheduled IV dilaudid to 1mg q4hrs with PRN available. 2. Agitation:  Scheduled IV versed 2mg q2hrs with PRN available continues to be effective. 3. Secretions:  Scheduled IV robinul 0.2mg q4hrs. Turn/repostion to help with movement of secretions  4. Fevers, prn toradol q6hr   4. Patient requiring frequent assessments by skilled medical staff for non-verbal cues of distress/discomfort. 5. Emotional and spiritual support to pt and family through end of life        Spiritual Interventions: None at this time. Hospital and [address] available 24/7. [facility]plain following    Psych/Social/Emotional Interventions: SW will continue to be available to support family    Care Coordination Needs: Communication with hospital staff and hospice team     Care plan and New Orders discussed / approved with  Amina Carter MD.    Description History and Chart Review     Narrative History of last 24 hours that demonstrates care cannot be provided in another setting:    Pt requiring frequent assessments by skilled medical staff with administration of IV medications along with adjustments in medication dosage. This care cannot be provided outside the inpatient setting. Pt is unresponsive requiring skilled nursing assessment for appropriate use of PRN medications for optimal symptom management.       What has been done to control the patient's symptoms in the last 24 hours? Schedule IV dilauid, IV robinul, IV versed    Does the patient currently require IV medications? yes  Does the patient currently require scheduled medications? yes  Does the patient currently require a PCA? no    List number of doses of PRN medications in last 24 hours:  Medication 1: toradol  Number of doses:     Medication 2: IV versed  Number of doses: 0    Medication 3: dilaudid  Number of doses: 1    Supporting documentation for GIP need for pain control:  [x] Frequent evaluation by a doctor, nurse practitioner, nurse   [x] Frequent medication adjustment    [x] IVs that cannot be administered at home   [x] Aggressive pain management   [x] Complicated technical delivery of medications              Supporting documentation for GIP need for symptom control:  [x]  Sudden decline necessitating intensive nursing intervention  []  Uncontrolled / intractable nausea or vomiting   []  Pathological fractures  []  Advanced open wounds requiring frequent skilled care  [] Unmanageable respiratory distress  [] New or worsening delirium   [] Delirium with behavior issues: Is 24 hour caregiver present due to safety concerns with agitation? (yes/no)  [] Imminent death - with skilled nursing needs documented above    DISCHARGE PLANNING     1. Discharge Plan: If patient stables, family will take patient home with hospice services  2. Family teaching: symptom management  3. Response to family teaching: receptive    ASSESSMENT    KARNOFSKY: 10%    Prognosis estimated based on 11/12/22 clinical assessment is:   [] Few to Many Hours  [x] Hours to Days   [] Few to Many Days   [] Days to Weeks   [] Few to Many Weeks   [] Weeks to Months   [] Few to Many Months    Quality Measure: Patient self-reports:  [] Yes    [x] No    ESAS:   Time of Assessment: 930  Pain (1-10): 3  Fatigue (1-10):    Shortness of breath (1-10): 3  Nausea (1-10): Appetite (1-10):    Anxiety: (1-10):   Depression: (1-10): Well-being: (1-10):   Constipation: no  LAST BM:     CLINICAL INFORMATION   Patient Vitals for the past 12 hrs:   Temp Pulse Resp BP SpO2   11/12/22 0809 98.8 °F (37.1 °C) 81 14 (!) 119/44 92 %         Currently this patient has:  [] Supplemental O2   [x] IV    [] PICC      [] PORT   [] NG Tube    [] PEG Tube   [] Ostomy     [x] Schwab draining _yellow__ urine  [] Other:     SIGNS/PHYSICAL FINDINGS     Skin (including wound):  [] Warm, dry, supple, intact and color normal for race  [x] Warm   [] Dry   [] Cool     [] Clammy       [] Diaphoretic    Turgor   [] Normal   [x] Decreased  Color:   [] Pink   [x] Pale   [] Cyanotic   [] Erythema   [] Jaundice   [] Normal for Race  []  Wounds:    Neuro:  [x] Lethargy  [] Restlessness / agitation  [] Confusion / delirium  [] Hallucinations  [] Responds to maximal stimulation  [x] Unresponsive  [] Seizures     Cardiac:  [] Dyspnea on Exertion  [] JVD  [] Murmur  [] Palpitations  [] Hypotension  [x] Hypertension  [x] Tachycardia  [] Bradycardia  [] Irregular HR  [] Pulses Decreased  [] Pulses Absent  [] Edema:         [] Mottling:        Respiratory:  Breath sounds:    [] Diminished   [] Wheeze   [] Coarse   [] Rales   [] Even and unlabored  [x] Labored:    26        [] Cough   [] Non Productive   [] Productive    [] Description:           [] Deep suctioned   [] O2 at ___ LPM  [] High flow oxygen greater than 10 LPM  [] Bi-Pap    GI  [x] Abdomen - flat  [] Ascites  [] Nausea  [] Vomiting  [x] Incontinent of bowels  [x] Bowel sounds - hypoactive  [] Diarrhea  [] Constipation (see above including last bowel movement)  [] Checked for impaction  [] Last BM     Nutrition  Diet:__NPO_____  Appetite:   [] Good   [] Fair   [] Poor   [] Tube Feeding       [] Voiding  [] Incontinent   [x] Schwab    Musculoskeletal  [] Balance/[place] Unsteady   [] Weak   Strength:    [] Normal    [] Limited    [] Decreasing   Activities:    [] Up as tolerated   [x] Bedridden    [] Specify:    SAFETY  [x] 24 hr. Caregiver   [x] Side rails ? [x] Hospital bed   [x] Reviewed Falls & Safety       ALLERGIES AND MEDICATIONS     Allergies:    Allergies   Allergen Reactions    Tetanus Vaccines And Toxoid Swelling     Swelling at the site    Tradjenta [Linagliptin] Diarrhea     headache       Current Facility-Administered Medications   Medication Dose Route Frequency    ketorolac (TORADOL) injection 30 mg  30 mg IntraVENous Q6H PRN    HYDROmorphone (DILAUDID) injection 1 mg  1 mg IntraVENous Q4H    HYDROmorphone (DILAUDID) injection 1 mg  1 mg IntraVENous Q15MIN PRN    midazolam (VERSED) injection 2 mg  2 mg IntraVENous Q2H    glycopyrrolate (ROBINUL) injection 0.2 mg  0.2 mg IntraVENous Q4H    midazolam (VERSED) injection 2 mg  2 mg IntraVENous Q1H PRN    bisacodyL (DULCOLAX) suppository 10 mg  10 mg Rectal DAILY PRN     Facility-Administered Medications Ordered in Other Encounters   Medication Dose Route Frequency    sodium chloride (NS) flush 5 mL  5 mL IntraVENous PRN          Visit Time In: Multiple visits today  Visit Time Out:

## 2022-11-13 NOTE — PROGRESS NOTES
Anat of Shift Note    Bedside shift change report given to Jesús Talley (oncoming nurse) by Naya Morales (offgoing nurse). Report included the following information SBAR, Kardex, and MAR    Shift worked:  7p-7a     Shift summary and any significant changes:     Scheduled medications was given, see MAR. No PRN medications were given, see MAR. Moseley care has been done. Family member was present at bedside throughout my shift. Patient teaching and routine rounding has been done. Concerns for physician to address:       Zone phone for oncoming shift:          Activity:  Activity Level: Bed Rest  Number times ambulated in hallways past shift: 0  Number of times OOB to chair past shift: 0    Cardiac:   Cardiac Monitoring: No           Access:  Current line(s): PIV     Genitourinary:   Urinary status: moseley    Respiratory:   O2 Device: None (Room air)  Chronic home O2 use?: NO  Incentive spirometer at bedside: NO       GI:     Current diet:  DIET ONE TIME MESSAGE  DIET ONE TIME MESSAGE  DIET ONE TIME MESSAGE  Passing flatus: YES  Tolerating current diet: YES       Pain Management:   Patient states pain is manageable on current regimen: YES    Skin:  Kosta Score: 8  Interventions: turn team and internal/external urinary devices    Patient Safety:  Fall Score:  Total Score: 2  Interventions: bed/chair alarm  High Fall Risk: Yes    Length of Stay:  Expected LOS: - - -  Actual LOS: [facility]

## 2022-11-13 NOTE — PROGRESS NOTES
[facility] Those in Need  [phone]    Patient Name: Raphael Kan  YOB: 1935    Date of Provider Hospice Visit: 11/13/22     Level of Care:   [x] General Inpatient (GIP)    [] Routine   [] Respite    Current Location of Care:  [] [facility] [] [facility] [x] [address] [] [address] [] Hospice House THE [facility], patient referred from:  [] [facility] [] [facility] [] [address] [] [address] [] Home [] Other:     Date of [address] [facility] Alf Sadler Admission: 11/7/2022  Hospice Medical Director at time of admission: Dr. Nolberto Clark Diagnosis: Cardiorenal syndrome  Diagnoses RELATED to the terminal prognosis: NSTEMI, FARHAT on CKD stage 4  Other Diagnoses: HTN, HLD, CAD     HOSPICE SUMMARY     Raphael Kan is a 80y.o. year old who was admitted to [facility]. HPI and brief course of hospitalization as summarized in Dr. Ez Minor most recent progress note:  Raphael Kan is a 80 y.o. male with a past history of HTN , DM, CAD( hx of remote CABJ), most recent Echo IN 2017 IS NORMAL CKD stage 4 who was admitted on 11/6/2022 from home with a diagnosis of Acute NSTEMI, Acute hypertensive emergency, questionable pneumonia, FARHAT on CKD stage 4. Patient is delirious /reslstless, pulling I/V,  creatinine continues to rise, patient recently on 10/31 was scheduled for cardiac catheterization but the procedure was cancelled due to significantly elevated creatinine and conservative medical therapy was recommended. Chart review reflects patient has been declining for past several days, less active with poor oral intake . Assessment as summarized in Dr. Andrew Mckeon most recent cardiology progress note:  Assessment:       1. Non-ST segment elevation myocardial infarction.   2.  History of coronary artery disease with remote coronary artery bypass grafting with a vein graft to the left anterior descending in 1985 and prior stenting of the right coronary artery in 2003, prior normal ejection fraction. 3.  Type 2 diabetes. 4.  Dyslipidemia. 5.  FARHAT/Chronic kidney disease stage IV. 6.  Cerebrovascular disease with prior left carotid endarterectomy. 7.  Spinal stenosis. 8.  Hyperkalemia. 9.  Altered mental status and recent general decline. Plan:  Unfortunately not much to offer in this situation. Spoke with wife. Favor hospice/palliative measures. D/C IV heparin       The patient's principle diagnosis has resulted in altered mental status, anxiety, restlessness, agitation, and nonverbal signs of pain. Functionally, the patient's Karnofsky and/or Palliative Performance Scale has declined over a period of days and is estimated at 20. The patient is dependent on the following ADLs: all    Objective information that support this patients limited prognosis includes: BUN 54 and trending up; Cr 3.05 and trending up  Study Result    Narrative & Impression   EXAM: XR CHEST PA LAT     INDICATION: Chest pain     COMPARISON: 9/12/2022     TECHNIQUE: PA and lateral chest 2 views 2 views     FINDINGS: The cardiac size is within normal limits. The pulmonary vasculature is  within normal limits. Chronic interstitial opacities slightly increased in interval. Increased  airspace opacity in the right midlung zone laterally. The visualized bones and  upper abdomen are age-appropriate. IMPRESSION     Chronic interstitial opacities with some apparent increase which may reflect  superimposed pulmonary edema. There is airspace opacity in the right midlung  zone laterally which may represent pneumonia. The patient/family chose comfort measures with the support of Hospice. HOSPICE DIAGNOSES   Active Symptoms:  1. Unresponsiveness  2. Restlessness  3. Agitation  4. Anxiety  5. Non verbal signs of pain  6. Increased work of breathing  6. Debility  7. Secretions  8. Fever  9.  Hospice care       PLAN   Continue GIP level of care as pt is now unresponsive and unable to safely tolerate PO/SL medications; requires frequent nursing assessment and administration and titration of parenteral medications to manage symptom burden  Change Dilaudid from 1 mg  mg IV every 4 hours  --> every 3 hours with prn dosing available every 15 minutes for signs of breakthrough pain or air hunger  Was getting Versed 2mg IV every 2hours, for easier medication delivery stopping this and started Versed PCA 1mg/h basal.   Robinul 0.2 mg IV every 4 hours prn  increased oropharyngeal secretions  Ketorolac 30 mg IV every 6 hours as needed for fever  All other symptom management medications as needed  No family at bedside, discussed w/ hospice RN Pratibha Michelle.  and SW to support family needs  Disposition: anticipate death in hospital  Hospice Plan of care was reviewed in detail and agree with current plan of care    Prognosis estimated based on 11/13/22 clinical assessment is:   [x] Hours to Days    [] Days to Weeks    [] Other:    Communicated plan of care with: Hospice Case Manager; Hospice IDT; Care Team     GOALS OF CARE     Patient/Medical POA stated Goal of Care: Comfort care and symptom management. [x] I have reviewed and/or updated ACP information in the Advance Care Planning Navigator. This information is available in the [facility] [address] link in the patient's chart header. Primary Decision Maker (Health Care Agent):   Primary Decision Maker (Active): Juanjose Rasmussen - [phone]    Resuscitation Status: DNR  If DNR is there a Durable DNR on file? : [] Yes [x] No (If no, complete Durable DNR)    HISTORY     History obtained from: chart review; and discussion with interdisciplinary team    CHIEF COMPLAINT: unable to obtain  The patient is:   [] Verbal  [] Nonverbal  [x] Unresponsive    HPI/SUBJECTIVE:  No family present during provider rounds. Patient care tech team providing incontinence care, turning, and repositioning. Pt is nonverbal, not following commands, and is seemingly resistant to care.  Easily agitated and appears restless. Review of notes indicated persistent/worsening agitation and signs of delirium over the last 24 hours. Overnight required one time dose of Benadryl 25 mg IV, Valium 3 mg IV, and Zyprexa 5 mg IM without palliation of symptoms. Today received Haldol 2 mg IV at 1200, and Versed 1 mg IV at 1300 with initial symptomatic improvement. 11/8-patient does appear comfortable this afternoon. Apparently very restless and agitated earlier today. 11/10/2022. Patient restless this am, febrile, and has increased respiratory effort. Not responsive. 11/11- Spouse present at bedside. Pt unresponsive. Periods of increased work of breathing evidenced by tachypnea but respirations even at this time. Generalized pallor. Dry oral mucosa. Minimal urine output. 11/13- Pt calm, some mild accessory muscle use.     REVIEW OF SYSTEMS     The following systems were: [] reviewed  [x] unable to be reviewed    Positive ROS include:  Constitutional: fatigue, weakness, in pain, short of breath  Ears/nose/mouth/throat: increased airway secretions  Respiratory:shortness of breath, wheezing  Gastrointestinal:poor appetite, nausea, vomiting, abdominal pain, constipation, diarrhea  Musculoskeletal:pain, deformities, swelling legs  Neurologic:confusion, hallucinations, weakness  Psychiatric:anxiety, feeling depressed, poor sleep  Endocrine:     Adult Non-Verbal Pain Assessment Score: 1    Face  [x] 0   No particular expression or smile  [] 1   Occasional grimace, tearing, frowning, wrinkled forehead  [] 2   Frequent grimace, tearing, frowning, wrinkled forehead    Activity (movement)  [x] 0   Lying quietly, normal position  [] 1   Seeking attention through movement or slow, cautious movement  [] 2   Restless, excessive activity and/or withdrawal reflexes    Guarding  [x] 0   Lying quietly, no positioning of hands over areas of body  [] 1   Splinting areas of the body, tense  [] 2   Rigid, stiff    Physiology (vital signs)  [x] 0   Stable vital signs  [] 1   Change in any of the following: SBP > 20mm Hg; HR > 20/minute  [] 2   Change in any of the following: SBP > 30mm Hg; HR > 25/minute    Respiratory  [] 0   Baseline RR/SpO2, compliant with ventilator  [x] 1   RR > 10 above baseline, or 5% drop SpO2, mild asynchrony with ventilator  [] 2   RR > 20 above baseline, or 10% drop SpO2, asynchrony with ventilator     FUNCTIONAL ASSESSMENT     Palliative Performance Scale (PPS): 10       PSYCHOSOCIAL/SPIRITUAL ASSESSMENT     Active Problems:    Cardiorenal syndrome (2022)    Past Medical History:   Diagnosis Date    CAD (coronary artery disease)     mi    Chronic kidney disease     hx of elevated blood levels    Chronic pain     lower back    Diabetes ([facility] [address]     Enlarged prostate     Hypercholesterolemia     Hypertension     Other unknown and unspecified cause of morbidity or mortality     hayfever      Past Surgical History:   Procedure Laterality Date    HX CATARACT REMOVAL Bilateral     HX CHOLECYSTECTOMY      HX LUMBAR LAMINECTOMY  2017    HX ORTHOPAEDIC  2015    back surgery     [place] CARDIAC SURG PROCEDURE UNLIST      bypass(), stents(, )    [place] COLONOSCOPY FLX DX W/COLLJ SPEC WHEN PFRMD  2013         [place] EGD TRANSORAL BIOPSY SINGLE/MULTIPLE  2012         VASCULAR SURGERY PROCEDURE UNLIST  1996    left carotid      Social History     Tobacco Use    Smoking status: Former     Types: Cigarettes     Quit date: 6/3/1967     Years since quittin.4    Smokeless tobacco: Never   Substance Use Topics    Alcohol use: No     Family History   Problem Relation Age of Onset    Heart Disease Mother     Heart Disease Father       Allergies   Allergen Reactions    Tetanus Vaccines And Toxoid Swelling     Swelling at the site    Tradjenta [Linagliptin] Diarrhea     headache      Current Facility-Administered Medications   Medication Dose Route Frequency    midazolam (VERSED) 100 mg in 0.9% sodium chloride 100 mL infusion  1 mg/hr IntraVENous CONTINUOUS    glycopyrrolate (ROBINUL) injection 0.2 mg  0.2 mg IntraVENous Q4H PRN    HYDROmorphone (DILAUDID) injection 1 mg  1 mg IntraVENous Q3H    ketorolac (TORADOL) injection 30 mg  30 mg IntraVENous Q6H PRN    HYDROmorphone (DILAUDID) injection 1 mg  1 mg IntraVENous Q15MIN PRN    midazolam (VERSED) injection 2 mg  2 mg IntraVENous Q1H PRN    bisacodyL (DULCOLAX) suppository 10 mg  10 mg Rectal DAILY PRN     Facility-Administered Medications Ordered in Other Encounters   Medication Dose Route Frequency    sodium chloride (NS) flush 5 mL  5 mL IntraVENous PRN        PHYSICAL EXAM     Wt Readings from Last 3 Encounters:   11/06/22 65.8 kg (145 lb)   11/01/22 65.6 kg (144 lb 9.6 oz)   10/13/22 63.5 kg (140 lb)     Supplemental O2  [] Yes  [x] NO  Last bowel movement: N/A    Currently this patient has:  [x] Peripheral IV [] PICC  [] PORT [] ICD    [x] Moseley Catheter [] NG Tube   [] PEG Tube    [] Rectal Tube [] Drain  [] Other:     Constitutional: unresponsive; thin/frail; ill-appearing  Eyes: closed  ENMT:  dry oral mucosa  Cardiovascular: regular; no peripheral edema  Respiratory:  no increased work of breathing; breath sounds diminished   Gastrointestinal: no distention  : moseley  Musculoskeletal: no contractures of gross deformities  Skin: thin; fragile; warm/dry; scattered bruising to bilateral UE  Neurologic: unresponsive  Psychiatric: appears calm       Pertinent Lab and or Imaging Tests:  Lab Results   Component Value Date/Time    Sodium 139 11/07/2022 04:00 AM    Potassium 4.4 11/07/2022 04:00 AM    Chloride 110 (H) 11/07/2022 04:00 AM    CO2 19 (L) 11/07/2022 04:00 AM    Anion gap 10 11/07/2022 04:00 AM    Glucose 151 (H) 11/07/2022 04:00 AM    BUN 54 (H) 11/07/2022 04:00 AM    Creatinine 3.05 (H) 11/07/2022 04:00 AM    BUN/Creatinine ratio 18 11/07/2022 04:00 AM    GFR est AA 26 (L) 08/22/2022 02:24 PM    GFR est non-AA 22 (L) 08/22/2022 02:24 PM Calcium 9.0 11/07/2022 04:00 AM     Lab Results   Component Value Date/Time    Protein, total 7.4 11/07/2022 04:00 AM    Albumin 3.2 (L) 11/07/2022 04:00 AM           Leora BERGER

## 2022-11-13 NOTE — PROGRESS NOTES
End of Shift Note    Bedside shift change report given to Amy Tovar (oncoming nurse) by Mark Muñoz RN (offgoing nurse). Report included the following information SBAR, Kardex, and MAR    Shift worked:  7 am to 7:30 pm     Shift summary and any significant changes:     Scheduled medications administered. No PRN medications administered. Hospice changed IV push Versed to a Versed drip and the IV Dilaudid to every 3 hours. Patient appears comfortable. Family at bedside throughout shift. Moseley care completed and output documented. Rate of Versed drip verified with oncoming night nurse. Nursing rounds and education for family completed. Concerns for physician to address:       Zone phone for oncoming shift:          Activity:  Activity Level: Bed Rest  Number times ambulated in hallways past shift: 0  Number of times OOB to chair past shift: 0    Cardiac:   Cardiac Monitoring: No           Access:  Current line(s): PIV     Genitourinary:   Urinary status: moseley    Respiratory:   O2 Device: None (Room air)  Chronic home O2 use?: NO  Incentive spirometer at bedside: NO       GI:  Last Bowel Movement Date:  (unsure)  Current diet:  DIET ONE TIME MESSAGE  DIET ONE TIME MESSAGE  DIET ONE TIME MESSAGE  Passing flatus: NO  Tolerating current diet: NO       Pain Management:   Patient states pain is manageable on current regimen: YES    Skin:  Kosta Score: 14  Interventions: float heels and internal/external urinary devices    Patient Safety:  Fall Score:  Total Score: 2  Interventions: bed/chair alarm  High Fall Risk: Yes    Length of Stay:  Expected LOS: - - -  Actual LOS: [facility]

## 2022-11-14 NOTE — PROGRESS NOTES
Bedside and Verbal shift change report given to Marilyn Boswell RN (oncoming nurse) by August Mahmood RN (offgoing nurse). Report included the following information SBAR, Kardex, and MAR. Pt remained comfortable with wife at bedside. All medication was given per [facility]. Safety checks complete, no Schwab output.

## 2022-11-14 NOTE — PROGRESS NOTES
adEnd of Shift Note    Bedside shift change report given to Nicole Pulliam RN (oncoming nurse) by Melvina Moran RN (offgoing nurse). Report included the following information SBAR, Kardex, and MAR    Shift worked:  7a - 7:30p     Shift summary and any significant changes:     Scheduled meds given     IV versed dose increased from 1 mg/hr to 2 mg/hr per hospice RN Kamila     IV versed rate verified w/ night shift RN     Pt appears to be resting comfortably     Wife present at bedside     Almost no urine output     Concerns for physician to address:       Zone phone for oncoming shift:   [phone]       Activity:  Activity Level: Bed Rest  Number times ambulated in hallways past shift: 0  Number of times OOB to chair past shift: 0    Cardiac:   Cardiac Monitoring: No           Access:  Current line(s): PIV     Genitourinary:   Urinary status: moseley    Respiratory:   O2 Device: None (Room air)  Chronic home O2 use?: NO  Incentive spirometer at bedside: NO       GI:  Last Bowel Movement Date:  (unsure)  Current diet:  DIET ONE TIME MESSAGE  DIET ONE TIME MESSAGE  DIET ONE TIME MESSAGE  Passing flatus: YES  Tolerating current diet: NO - Pt not eating in current state. Pain Management:   Patient states pain is manageable on current regimen: NO - Pt aphasic currently but appears to be resting comfortably. Skin:  Kosta Score: 9  Interventions: turn team, speciality bed, float heels, and internal/external urinary devices    Patient Safety:  Fall Score:  Total Score: 2  Interventions: bed/chair alarm, gripper socks, and stay with me (per policy)  High Fall Risk: Yes    Length of Stay:  Expected LOS: - - -  Actual LOS: 7      Melvina Moran RN

## 2022-11-14 NOTE — PROGRESS NOTES
Hospice patient. Pt unresponsive. Family at bedside. Nutrition services available for any needs. Nutrition following for family, pt needs.   Madison Rosario RD

## 2022-11-14 NOTE — PROGRESS NOTES
[facility] LCSW note: This LCSW visited the room of pt, wife Madi Hauser, and family friend at bedside. Pt's breathing is shallow, appears imminent. LCSW and wife talked about the weekend, as she remained at bedside. Wife noted \"my love and my heart are here\". LCSW provided affirmation of their 68 year relationship. LCSW provided emotional support in coping with pts lingering and EOl. LCSW will continue to assess and monitor pt and family needs.

## 2022-11-14 NOTE — PROGRESS NOTES
Problem: Emotional Support Needs  Description: Deficit related to emotional support. Goal: Patient/family is receiving emotional support  Description: Pt's wife Katarina Jaimes will receive emotional support and supportive counseling in processing pt's prognosis within five days. Outcome: Progressing Towards Goal     Problem: Imminent Death  Goal: Collaborate with patient/family/caregiver/interdisciplinary team to minimize and manage end of life symptoms  Outcome: Progressing Towards Goal     Problem: Hospice Orientation  Goal: Demonstrate understanding of hospice philosophy, plan of care, and home hospice program  Description: The patient/family/caregiver will demonstrate understanding of hospice philosophy, plan of care and the home hospice program as evidenced by participation in meeting the patient's psychosocial, spiritual, medical, and physical needs inclusive of medical supplies/equipment focusing on symptoms. Outcome: Progressing Towards Goal     Problem: Pain  Goal: *Control of acute pain  Outcome: Progressing Towards Goal     Problem: End of Life Process  Goal: Demonstrate understanding of end of life processes  Description: Patient/caregiver will understand end of life processes.   Outcome: Progressing Towards Goal     Problem: Dying Process  Goal: Increased peace with dying process, patient coping identified, patient/family expressed gratitude, spiritual distress identified and decreased with visit  Outcome: Progressing Towards Goal

## 2022-11-15 NOTE — PROGRESS NOTES
Anat of Shift Note    Bedside shift change report given to Beatrice Billingsley (oncoming nurse) by Indu Ventura (offgoing nurse). Report included the following information SBAR, Kardex, and MAR    Shift worked:  7p-7a     Shift summary and any significant changes:    Scheduled medications were given,  Family is present at bedside. Patient did not get any PRN medications during my shift. IV has been flushed and the patient is getting Versed continuous. Moseley care was done. Frequent rounding has been done. Concerns for physician to address:       Zone phone for oncoming shift:          Activity:  Activity Level: Bed Rest  Number times ambulated in hallways past shift: 0  Number of times OOB to chair past shift: 0    Cardiac:   Cardiac Monitoring: No           Access:  Current line(s): PIV     Genitourinary:   Urinary status: moseley    Respiratory:   O2 Device: None (Room air)  Chronic home O2 use?: NO  Incentive spirometer at bedside: NO       GI:  Last Bowel Movement Date:  (unsure)  Current diet:  DIET ONE TIME MESSAGE  DIET ONE TIME MESSAGE  DIET ONE TIME MESSAGE  Passing flatus: YES  Tolerating current diet: YES       Pain Management:   Patient states pain is manageable on current regimen: YES    Skin:  Kosta Score: 9  Interventions: turn team and internal/external urinary devices    Patient Safety:  Fall Score:  Total Score: 2  Interventions: bed/chair alarm  High Fall Risk: Yes    Length of Stay:  Expected LOS: - - -  Actual LOS: [address]

## 2022-11-15 NOTE — HOSPICE
[address] [facility]  [phone]    [facility] Daily Nursing Note   Patient Name: Quan Anderson  YOB: 1935  Age: 80 y.o. Date of Visit: 11/14/22  Facility of Care: [facility]  Patient Room: [facility]/     Hospice Attending: Ave Garvin MD  Hospice Diagnosis: Cardiorenal syndrome [I13.10]    Level of Care: GIP    Current GIP Symptoms    1. Pain  2. Agitation/restlessness  3. Secretions  4. Respiratory distress  5. Unresponsive responsive      ASSESSMENT & PLAN     1. Pain:  No moaning/groaning but labored breathing. Changed frequency of IV dilaudid 1mg from q4hrs to q3hrs with PRN available. 2. Agitation:  No movement of extremities. PCA continuous IV versed increased to 2mg/hr with PRN available due to myoclonic jerking. 3. Secretions:  robinul originally scheduled, now PRN as issue is resolved. 4. Resp distress:  improved with increased frequency from 1mg every 4 to every 3 hrs with PRN available. 4. Patient requiring frequent assessments by skilled medical staff for non-verbal cues of distress/discomfort. Spiritual Interventions: Per wife, not at this time. Hospital and [address] available 24/7. Psych/Social/Emotional Interventions:  Brea monreal in to support wife who is beginning to take time away from the hospital, tend to some personal tasks. Care Coordination Needs: Communication with hospital staff and hospice team     Care plan and New Orders discussed / approved with [facility] NP. Description History and Chart Review     Narrative History of last 24 hours that demonstrates care cannot be provided in another setting:    Pt requiring frequent assessments by skilled medical staff with administration of IV medications along with adjustments in medication dosage. This care cannot be provided outside the inpatient setting.    Pt is unresponsive requiring skilled nursing assessment for appropriate use of PRN medications for optimal symptom management. What has been done to control the patient's symptoms in the last 24 hours? Versed changed to continuous infusion and increased to 2mg/hr due to myoclonic jerking. Changed IV dilaudid frequency from q4hrs to q3hrs    Does the patient currently require IV medications? yes  Does the patient currently require scheduled medications? yes  Does the patient currently require a PCA? yes    List number of doses of PRN medications in last 24 hours:  Medication 1:   Number of doses:     Medication 2:  Number of doses:     Medication 3:   Number of doses:    Supporting documentation for GIP need for pain control:  [x] Frequent evaluation by a doctor, nurse practitioner, nurse   [x] Frequent medication adjustment    [x] IVs that cannot be administered at home   [x] Aggressive pain management   [x] Complicated technical delivery of medications              Supporting documentation for GIP need for symptom control:  [x]  Sudden decline necessitating intensive nursing intervention  []  Uncontrolled / intractable nausea or vomiting   []  Pathological fractures  []  Advanced open wounds requiring frequent skilled care  [] Unmanageable respiratory distress  [] New or worsening delirium   [] Delirium with behavior issues: Is 24 hour caregiver present due to safety concerns with agitation? (yes/no)  [x] Imminent death - with skilled nursing needs documented above    DISCHARGE PLANNING     1. Discharge Plan: If patient stables, family will take patient home with hospice services  2. Family teaching: EOL processes  3.  Response to family teaching: receptive    ASSESSMENT    KARNOFSKY: 10%    Prognosis estimated based on 11/15/22 clinical assessment is:   [] Few to Many Hours  [x] Hours to Days   [] Few to Many Days   [] Days to Weeks   [] Few to Many Weeks   [] Weeks to Months   [] Few to Many Months    Quality Measure: Patient self-reports:  [] Yes    [x] No    ESAS:   Time of Assessment: 1130  Pain (1-10): 4  Fatigue (1-10):    Shortness of breath (1-10): 5  Nausea (1-10): Appetite (1-10):    Anxiety: (1-10):   Depression: (1-10):   Well-being: (1-10):   Constipation: no  LAST BM:     CLINICAL INFORMATION   Patient Vitals for the past 12 hrs:   Temp Pulse Resp BP SpO2   11/15/22 0800 98.8 °F (37.1 °C) (!) 102 18 (!) 120/45 (!) 88 %         Currently this patient has:  [] Supplemental O2   [x] IV    [] PICC      [] PORT   [] NG Tube    [] PEG Tube   [] Ostomy     [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 draining _yellow__ urine--no urinary output  [] Other:     SIGNS/PHYSICAL FINDINGS     Skin (including wound):  [] Warm, dry, supple, intact and color normal for race  [x] Warm   [] Dry   [] Cool     [] Clammy       [] Diaphoretic    Turgor   [] Normal   [x] Decreased  Color:   [] Pink   [x] Pale   [] Cyanotic   [] Erythema   [] Jaundice   [] Normal for Race  []  Wounds:    Neuro:  [] Lethargy  [] Restlessness / agitation  [] Confusion / delirium  [] Hallucinations  [] Responds to maximal stimulation  [x] Unresponsive  [] Seizures     Cardiac:  [] Dyspnea on Exertion  [] JVD  [] Murmur  [] Palpitations  [] Hypotension  [x] Hypertension  [] Tachycardia  [] Bradycardia  [] Irregular HR  [] Pulses Decreased  [] Pulses Absent  [] Edema:         [] Mottling:        Respiratory:  Breath sounds:    [x] Diminished/clear   [] Wheeze   [] Coarse   [] Rales   [] Even and unlabored  [x] Labored:   14         [] Cough   [] Non Productive   [] Productive    [] Description:           [] Deep suctioned   [] O2 at ___ LPM  [] High flow oxygen greater than 10 LPM  [] Bi-Pap    GI  [x] Abdomen - flat  [] Ascites  [] Nausea  [] Vomiting  [x] Incontinent of bowels  [x] Bowel sounds - absent  [] Diarrhea  [] Constipation (see above including last bowel movement)  [] Checked for impaction  [] Last BM     Nutrition  Diet:__NPO_____  Appetite:   [] Good   [] Fair   [] Poor   [] Tube Feeding       [] Voiding  [] Incontinent   [x] Schwab    Musculoskeletal  [] Balance/Sealevel Unsteady   [x] No movement  Strength:    [] Normal    [] Limited    [] Decreasing   Activities:    [] Up as tolerated   [x] Bedridden    [] Specify:    SAFETY  [x] 24 hr. Caregiver   [x] Side rails ? [x] Hospital bed   [x] Reviewed Falls & Safety (with wife at bedside)      ALLERGIES AND MEDICATIONS     Allergies:    Allergies   Allergen Reactions    Tetanus Vaccines And Toxoid Swelling     Swelling at the site    Tradjenta [Linagliptin] Diarrhea     headache       Current Facility-Administered Medications   Medication Dose Route Frequency    midazolam (VERSED) 100 mg in 0.9% sodium chloride 100 mL infusion  2 mg/hr IntraVENous CONTINUOUS    glycopyrrolate (ROBINUL) injection 0.2 mg  0.2 mg IntraVENous Q4H PRN    HYDROmorphone (DILAUDID) injection 1 mg  1 mg IntraVENous Q3H    ketorolac (TORADOL) injection 30 mg  30 mg IntraVENous Q6H PRN    HYDROmorphone (DILAUDID) injection 1 mg  1 mg IntraVENous Q15MIN PRN    midazolam (VERSED) injection 2 mg  2 mg IntraVENous Q1H PRN    bisacodyL (DULCOLAX) suppository 10 mg  10 mg Rectal DAILY PRN     Facility-Administered Medications Ordered in Other Encounters   Medication Dose Route Frequency    sodium chloride (NS) flush 5 mL  5 mL IntraVENous PRN          Visit Time In: Multiple visits today  Visit Time Out:

## 2022-11-15 NOTE — PROGRESS NOTES
adEnd of Shift Note    Bedside shift change report given to Rama Lazar RN (oncoming nurse) by Jonny Ordaz RN (offgoing nurse). Report included the following information SBAR, Kardex, and MAR    Shift worked:  7a - 7:30p     Shift summary and any significant changes:     Scheduled meds given     IV versed rate verified w/ night shift RN. Dose 2 mg/hr. Wife present at bedside     Concerns for physician to address:       Zone phone for oncoming shift:   [phone]       Activity:  Activity Level: Bed Rest  Number times ambulated in hallways past shift: 0  Number of times OOB to chair past shift: 0    Cardiac:   Cardiac Monitoring: No           Access:  Current line(s): PIV     Genitourinary:   Urinary status: moseley    Respiratory:   O2 Device: None (Room air)  Chronic home O2 use?: NO  Incentive spirometer at bedside: NO       GI:  Last Bowel Movement Date:  (unsure)  Current diet:  DIET ONE TIME MESSAGE  DIET ONE TIME MESSAGE  DIET ONE TIME MESSAGE  Passing flatus: YES  Tolerating current diet: YES       Pain Management:   Patient states pain is manageable on current regimen: NO. Pt currently aphasic but appears to be resting comfortably. Skin:  Kosta Score: 11  Interventions: turn team, speciality bed, float heels, foam dressing, and internal/external urinary devices    Patient Safety:  Fall Score:  Total Score: 2  Interventions: bed/chair alarm, gripper socks, pt to call before getting OOB, and stay with me (per policy)  High Fall Risk: Yes    Length of Stay:  Expected LOS: - - -  Actual LOS: [address], RN

## 2022-11-15 NOTE — PROGRESS NOTES
[facility] Help to Those in Need  [phone]    Patient Name: Evelyn Granados  YOB: 1935    Date of Provider Hospice Visit: 11/14/2022     Level of Care:   [x] General Inpatient (GIP)    [] Routine   [] Respite    Current Location of Care:  [] [facility] [] [facility] [x] [address] [] [facility] [] [facility], patient referred from:  [] [facility] [] [facility] [] [address] [] [facility] [] Home [] Other:     Date of [address] [facility] Admission: 11/7/2022  Hospice Medical Director at time of admission: Dr. Sunita Herman Diagnosis: Cardiorenal syndrome  Diagnoses RELATED to the terminal prognosis: NSTEMI, FARHAT on CKD stage 4  Other Diagnoses: HTN, HLD, CAD     HOSPICE SUMMARY     Evelyn Granados is a 80y.o. year old who was admitted to [facility]. HPI and brief course of hospitalization as summarized in Dr. Alyssia Clinton most recent progress note:  Evelyn Granados is a 80 y.o. male with a past history of HTN , DM, CAD( hx of remote [facility]), most recent Echo IN 2017 IS NORMAL CKD stage 4 who was admitted on 11/6/2022 from home with a diagnosis of Acute NSTEMI, Acute hypertensive emergency, questionable pneumonia, FARHAT on CKD stage 4. Patient is delirious /reslstless, pulling I/V,  creatinine continues to rise, patient recently on 10/31 was scheduled for cardiac catheterization but the procedure was cancelled due to significantly elevated creatinine and conservative medical therapy was recommended. Chart review reflects patient has been declining for past several days, less active with poor oral intake . Assessment as summarized in Dr. Malik Champion most recent cardiology progress note:  Assessment:       1. Non-ST segment elevation myocardial infarction.   2.  History of coronary artery disease with remote coronary artery bypass grafting with a vein graft to the left anterior descending in 1985 and prior stenting of the right coronary artery in 2003, prior normal ejection fraction. 3.  Type 2 diabetes. 4.  Dyslipidemia. 5.  FARHAT/Chronic kidney disease stage IV. 6.  Cerebrovascular disease with prior left carotid endarterectomy. 7.  Spinal stenosis. 8.  Hyperkalemia. 9.  Altered mental status and recent general decline. Plan:  Unfortunately not much to offer in this situation. Spoke with wife. Favor hospice/palliative measures. D/C IV heparin       The patient's principle diagnosis has resulted in altered mental status, anxiety, restlessness, agitation, and nonverbal signs of pain. Functionally, the patient's Karnofsky and/or Palliative Performance Scale has declined over a period of days and is estimated at 20. The patient is dependent on the following ADLs: all    Objective information that support this patients limited prognosis includes: BUN 54 and trending up; Cr 3.05 and trending up  Study Result    Narrative & Impression   EXAM: XR CHEST PA LAT     INDICATION: Chest pain     COMPARISON: 9/12/2022     TECHNIQUE: PA and lateral chest 2 views 2 views     FINDINGS: The cardiac size is within normal limits. The pulmonary vasculature is  within normal limits. Chronic interstitial opacities slightly increased in interval. Increased  airspace opacity in the right midlung zone laterally. The visualized bones and  upper abdomen are age-appropriate. IMPRESSION     Chronic interstitial opacities with some apparent increase which may reflect  superimposed pulmonary edema. There is airspace opacity in the right midlung  zone laterally which may represent pneumonia. The patient/family chose comfort measures with the support of Hospice. HOSPICE DIAGNOSES   Active Symptoms:  1. Unresponsiveness  2. Restlessness  3. Agitation  4. Anxiety  5. Non verbal signs of pain  6. Increased work of breathing  6. Debility  7. Secretions  8. Fever  9. Hospice care  10.   Myoclonic jerks       PLAN   Continue GIP level of care as pt is now unresponsive and unable to safely tolerate PO/SL medications; requires frequent nursing assessment and administration and titration of parenteral medications to manage symptom burden  Continue Dilaudi 1 mg   every 3 hours with prn dosing available every 15 minutes for signs of breakthrough pain or air hunger  PCA of versed increased to 2 mg/hour for myoclonic jerks today and prn is available 2 mg IV every hour prn myoclonic jerks, respiratory distress or restlessness/agitation  Robinul 0.2 mg IV every 4 hours prn oropharyngeal secretions  Ketorolac 30 mg IV every 6 hours as needed for fever  All other symptom management medications as needed  Wife and friend at bedside--updated them on patient status, provided support   and SW to support family needs  Disposition: anticipate death in hospital  Hospice Plan of care was reviewed in detail and agree with current plan of care    Prognosis estimated based on 11/15/22 clinical assessment is:   [x] Hours to Days    [] Days to Weeks    [] Other:    Communicated plan of care with: Hospice Case Manager; Hospice IDT; Care Team     GOALS OF CARE     Patient/Medical POA stated Goal of Care: Comfort care and symptom management. [x] I have reviewed and/or updated ACP information in the Advance Care Planning Navigator. This information is available in the [address] link in the patient's chart header. Primary Decision Maker (Health Care Agent):   Primary Decision Maker (Active): Jaren Valenzuela - [phone]    Resuscitation Status: DNR  If DNR is there a Durable DNR on file? : [] Yes [x] No (If no, complete Durable DNR)    HISTORY     History obtained from: chart review; and discussion with interdisciplinary team    CHIEF COMPLAINT: unable to obtain  The patient is:   [] Verbal  [] Nonverbal  [x] Unresponsive    HPI/SUBJECTIVE:  No family present during provider rounds. Patient care tech team providing incontinence care, turning, and repositioning.  Pt is nonverbal, not following commands, and is seemingly resistant to care. Easily agitated and appears restless. Review of notes indicated persistent/worsening agitation and signs of delirium over the last 24 hours. Overnight required one time dose of Benadryl 25 mg IV, Valium 3 mg IV, and Zyprexa 5 mg IM without palliation of symptoms. Today received Haldol 2 mg IV at 1200, and Versed 1 mg IV at 1300 with initial symptomatic improvement. 11/8-patient does appear comfortable this afternoon. Apparently very restless and agitated earlier today. 11/10/2022. Patient restless this am, febrile, and has increased respiratory effort. Not responsive. 11/11- Spouse present at bedside. Pt unresponsive. Periods of increased work of breathing evidenced by tachypnea but respirations even at this time. Generalized pallor. Dry oral mucosa. Minimal urine output. 11/13- Pt calm, some mild accessory muscle use. 11/14--wife and her friend at bedside. Wife pointed out jerking motions of shoulders that at times raise his off the bed a little. Otherwise she reports patient is unresponsive and seems comfortable.      REVIEW OF SYSTEMS     The following systems were: [] reviewed  [x] unable to be reviewed    Positive ROS include:  Constitutional: fatigue, weakness, in pain, short of breath  Ears/nose/mouth/throat: increased airway secretions  Respiratory:shortness of breath, wheezing  Gastrointestinal:poor appetite, nausea, vomiting, abdominal pain, constipation, diarrhea  Musculoskeletal:pain, deformities, swelling legs  Neurologic:confusion, hallucinations, weakness  Psychiatric:anxiety, feeling depressed, poor sleep  Endocrine:     Adult Non-Verbal Pain Assessment Score: 1    Face  [x] 0   No particular expression or smile  [] 1   Occasional grimace, tearing, frowning, wrinkled forehead  [] 2   Frequent grimace, tearing, frowning, wrinkled forehead    Activity (movement)  [x] 0   Lying quietly, normal position  [] 1   Seeking attention through movement or slow, cautious movement  [] 2   Restless, excessive activity and/or withdrawal reflexes    Guarding  [x] 0   Lying quietly, no positioning of hands over areas of body  [] 1   Splinting areas of the body, tense  [] 2   Rigid, stiff    Physiology (vital signs)  [x] 0   Stable vital signs  [] 1   Change in any of the following: SBP > 20mm Hg; HR > 20/minute  [] 2   Change in any of the following: SBP > 30mm Hg; HR > 25/minute    Respiratory  [] 0   Baseline RR/SpO2, compliant with ventilator  [x] 1   RR > 10 above baseline, or 5% drop SpO2, mild asynchrony with ventilator  [] 2   RR > 20 above baseline, or 10% drop SpO2, asynchrony with ventilator     FUNCTIONAL ASSESSMENT     Palliative Performance Scale (PPS): 10       PSYCHOSOCIAL/SPIRITUAL ASSESSMENT     Active Problems:    Cardiorenal syndrome (2022)    Past Medical History:   Diagnosis Date    CAD (coronary artery disease)     mi    Chronic kidney disease     hx of elevated blood levels    Chronic pain     lower back    Diabetes (HCC)     Enlarged prostate     Hypercholesterolemia     Hypertension     Other unknown and unspecified cause of morbidity or mortality     hayfever      Past Surgical History:   Procedure Laterality Date    HX CATARACT REMOVAL Bilateral     HX CHOLECYSTECTOMY      HX LUMBAR LAMINECTOMY  2017    HX ORTHOPAEDIC  2015    back surgery     OK CARDIAC SURG PROCEDURE UNLIST      bypass(), stents(, )    OK COLONOSCOPY FLX DX W/COLLJ SPEC WHEN PFRMD  2013         OK EGD TRANSORAL BIOPSY SINGLE/MULTIPLE  2012         VASCULAR SURGERY PROCEDURE UNLIST  1996    left carotid      Social History     Tobacco Use    Smoking status: Former     Types: Cigarettes     Quit date: 6/3/1967     Years since quittin.4    Smokeless tobacco: Never   Substance Use Topics    Alcohol use: No     Family History   Problem Relation Age of Onset    Heart Disease Mother     Heart Disease Father       Allergies Allergen Reactions    Tetanus Vaccines And Toxoid Swelling     Swelling at the site    Tradjenta [Linagliptin] Diarrhea     headache      Current Facility-Administered Medications   Medication Dose Route Frequency    midazolam (VERSED) 100 mg in 0.9% sodium chloride 100 mL infusion  2 mg/hr IntraVENous CONTINUOUS    glycopyrrolate (ROBINUL) injection 0.2 mg  0.2 mg IntraVENous Q4H PRN    HYDROmorphone (DILAUDID) injection 1 mg  1 mg IntraVENous Q3H    ketorolac (TORADOL) injection 30 mg  30 mg IntraVENous Q6H PRN    HYDROmorphone (DILAUDID) injection 1 mg  1 mg IntraVENous Q15MIN PRN    midazolam (VERSED) injection 2 mg  2 mg IntraVENous Q1H PRN    bisacodyL (DULCOLAX) suppository 10 mg  10 mg Rectal DAILY PRN     Facility-Administered Medications Ordered in Other Encounters   Medication Dose Route Frequency    sodium chloride (NS) flush 5 mL  5 mL IntraVENous PRN        PHYSICAL EXAM     Wt Readings from Last 3 Encounters:   11/06/22 65.8 kg (145 lb)   11/01/22 65.6 kg (144 lb 9.6 oz)   10/13/22 63.5 kg (140 lb)     Supplemental O2  [] Yes  [x] NO  Last bowel movement: N/A    Currently this patient has:  [x] Peripheral IV [] PICC  [] PORT [] ICD    [x] Moseley Catheter [] NG Tube   [] PEG Tube    [] Rectal Tube [] Drain  [] Other:     Constitutional: unresponsive; thin/frail; ill-appearing, occasional upper body myoclonic jerks  Eyes: closed  ENMT:  dry oral mucosa, + mouth breathing  Cardiovascular: regular; no peripheral edema  Respiratory:  no increased work of breathing; breath sounds diminished   Gastrointestinal: no distention  : moseley, minimal dark star output under 20 cc's  Musculoskeletal: no contractures or gross deformities  Skin: thin; fragile; warm/dry; scattered bruising to bilateral UE  Neurologic: unresponsive  Psychiatric: unresponsive      Pertinent Lab and or Imaging Tests:  Lab Results   Component Value Date/Time    Sodium 139 11/07/2022 04:00 AM    Potassium 4.4 11/07/2022 04:00 AM Chloride 110 (H) 11/07/2022 04:00 AM    CO2 19 (L) 11/07/2022 04:00 AM    Anion gap 10 11/07/2022 04:00 AM    Glucose 151 (H) 11/07/2022 04:00 AM    BUN 54 (H) 11/07/2022 04:00 AM    Creatinine 3.05 (H) 11/07/2022 04:00 AM    BUN/Creatinine ratio 18 11/07/2022 04:00 AM    GFR est AA 26 (L) 08/22/2022 02:24 PM    GFR est non-AA 22 (L) 08/22/2022 02:24 PM    Calcium 9.0 11/07/2022 04:00 AM     Lab Results   Component Value Date/Time    Protein, total 7.4 11/07/2022 04:00 AM    Albumin 3.2 (L) 11/07/2022 04:00 AM           Dionisio Reese, NP

## 2022-11-15 NOTE — PROGRESS NOTES
[facility] Help to Those in Need  [phone]    Patient Name: Jany Edmonds  YOB: 1935    Date of Provider Hospice Visit: VIRTUAL VISIT 11/15/2022     Level of Care:   [x] General Inpatient (GIP)    [] Routine   [] Respite    Current Location of Care:  [] [facility] [] [facility] [x] [address] [] [facility] [] Hospice House [facility], patient referred from:  [] [facility] [] [facility] [] [address] [] [facility] [] Home [] Other:     Date of [address]  [place] Admission: 11/7/2022  Hospice Medical Director at time of admission: Dr. Pieter Singer Diagnosis: Cardiorenal syndrome  Diagnoses RELATED to the terminal prognosis: NSTEMI, FARHAT on CKD stage 4  Other Diagnoses: HTN, HLD, CAD     HOSPICE SUMMARY     Jany Edmonds is a 80y.o. year old who was admitted to [facility]. HPI and brief course of hospitalization as summarized in Dr. Helder Colon most recent progress note:  Jany Edmonds is a 80 y.o. male with a past history of HTN , DM, CAD( hx of remote CABJ), most recent Echo IN 2017 IS NORMAL CKD stage 4 who was admitted on 11/6/2022 from home with a diagnosis of Acute NSTEMI, Acute hypertensive emergency, questionable pneumonia, FARHAT on CKD stage 4. Patient is delirious /reslstless, pulling I/V,  creatinine continues to rise, patient recently on 10/31 was scheduled for cardiac catheterization but the procedure was cancelled due to significantly elevated creatinine and conservative medical therapy was recommended. Chart review reflects patient has been declining for past several days, less active with poor oral intake . Assessment as summarized in Dr. Bhaskar Merritt most recent cardiology progress note:  Assessment:       1. Non-ST segment elevation myocardial infarction.   2.  History of coronary artery disease with remote coronary artery bypass grafting with a vein graft to the left anterior descending in 1985 and prior stenting of the right coronary artery in 2003, prior normal ejection fraction. 3.  Type 2 diabetes. 4.  Dyslipidemia. 5.  FARHAT/Chronic kidney disease stage IV. 6.  Cerebrovascular disease with prior left carotid endarterectomy. 7.  Spinal stenosis. 8.  Hyperkalemia. 9.  Altered mental status and recent general decline. Plan:  Unfortunately not much to offer in this situation. Spoke with wife. Favor hospice/palliative measures. D/C IV heparin       The patient's principle diagnosis has resulted in altered mental status, anxiety, restlessness, agitation, and nonverbal signs of pain. Functionally, the patient's Karnofsky and/or Palliative Performance Scale has declined over a period of days and is estimated at 20. The patient is dependent on the following ADLs: all    Objective information that support this patients limited prognosis includes: BUN 54 and trending up; Cr 3.05 and trending up  Study Result    Narrative & Impression   EXAM: XR CHEST PA LAT     INDICATION: Chest pain     COMPARISON: 9/12/2022     TECHNIQUE: PA and lateral chest 2 views 2 views     FINDINGS: The cardiac size is within normal limits. The pulmonary vasculature is  within normal limits. Chronic interstitial opacities slightly increased in interval. Increased  airspace opacity in the right midlung zone laterally. The visualized bones and  upper abdomen are age-appropriate. IMPRESSION     Chronic interstitial opacities with some apparent increase which may reflect  superimposed pulmonary edema. There is airspace opacity in the right midlung  zone laterally which may represent pneumonia. The patient/family chose comfort measures with the support of Hospice. HOSPICE DIAGNOSES   Active Symptoms:  1. Unresponsiveness  2. Restlessness  3. Agitation  4. Anxiety  5. Non verbal signs of pain  6. Increased work of breathing  6. Debility  7. Secretions  8. Fever  9. Hospice care  10.   Myoclonic jerks       PLAN   Continue GIP level of care as pt unresponsive and unable to safely tolerate PO/SL medications; requires frequent nursing assessment and administration and titration of parenteral medications to manage symptom burden  Continue Dilaudi 1 mg every 3 hours with prn dosing available every 15 minutes for signs of breakthrough pain or air hunger. No prn doses in last 24 hours  PCA of versed increased to 2 mg/hour for myoclonic jerks today and prn is available 2 mg IV every hour prn myoclonic jerks, respiratory distress or restlessness/agitation. Myoclonic jerks not seen by staff today since increase in versed rate. Robinul 0.2 mg IV every 4 hours prn oropharyngeal secretions  Ketorolac 30 mg IV every 6 hours as needed for fever  All other symptom management medications as needed  Schwab care. Patient anuric today.  and SW to support family needs  Disposition: anticipate death in hospital  Hospice Plan of care was reviewed in detail and agree with current plan of care    Prognosis estimated based on 11/15/22 clinical assessment is:   [x] Hours to Days    [] Days to Weeks    [] Other:    Communicated plan of care with: Hospice Case Manager; Hospice IDT; Care Team     GOALS OF CARE     Patient/Medical POA stated Goal of Care: Comfort care and symptom management. [x] I have reviewed and/or updated ACP information in the Advance Care Planning Navigator. This information is available in the [address] link in the patient's chart header. Primary Decision Maker (Health Care Agent):   Primary Decision Maker (Active): Jayro Hunt - [phone]    Resuscitation Status: DNR  If DNR is there a Durable DNR on file? : [] Yes [x] No (If no, complete Durable DNR)    HISTORY     History obtained from: chart review; and discussion with interdisciplinary team    CHIEF COMPLAINT: unable to obtain  The patient is:   [] Verbal  [] Nonverbal  [x] Unresponsive    HPI/SUBJECTIVE:  No family present during provider rounds.  Patient care tech team providing incontinence care, turning, and repositioning. Pt is nonverbal, not following commands, and is seemingly resistant to care. Easily agitated and appears restless. Review of notes indicated persistent/worsening agitation and signs of delirium over the last 24 hours. Overnight required one time dose of Benadryl 25 mg IV, Valium 3 mg IV, and Zyprexa 5 mg IM without palliation of symptoms. Today received Haldol 2 mg IV at 1200, and Versed 1 mg IV at 1300 with initial symptomatic improvement. 11/8-patient does appear comfortable this afternoon. Apparently very restless and agitated earlier today. 11/10/2022. Patient restless this am, febrile, and has increased respiratory effort. Not responsive. 11/11- Spouse present at bedside. Pt unresponsive. Periods of increased work of breathing evidenced by tachypnea but respirations even at this time. Generalized pallor. Dry oral mucosa. Minimal urine output. 11/13- Pt calm, some mild accessory muscle use. 11/14--wife and her friend at bedside. Wife pointed out jerking motions of shoulders that at times raise his off the bed a little. Otherwise she reports patient is unresponsive and seems comfortable. 11/15--No family at bedside currently. Patient has been resting comfortably with no myoclonic jerks noted today since versed drip increased yesterday. Anuric today.      REVIEW OF SYSTEMS     The following systems were: [] reviewed  [x] unable to be reviewed    Positive ROS include:  Constitutional: fatigue, weakness, in pain, short of breath  Ears/nose/mouth/throat: increased airway secretions  Respiratory:shortness of breath, wheezing  Gastrointestinal:poor appetite, nausea, vomiting, abdominal pain, constipation, diarrhea  Musculoskeletal:pain, deformities, swelling legs  Neurologic:confusion, hallucinations, weakness  Psychiatric:anxiety, feeling depressed, poor sleep  Endocrine:     Adult Non-Verbal Pain Assessment Score: 1    Face  [x] 0   No particular expression or smile  [] 1   Occasional grimace, tearing, frowning, wrinkled forehead  [] 2   Frequent grimace, tearing, frowning, wrinkled forehead    Activity (movement)  [x] 0   Lying quietly, normal position  [] 1   Seeking attention through movement or slow, cautious movement  [] 2   Restless, excessive activity and/or withdrawal reflexes    Guarding  [x] 0   Lying quietly, no positioning of hands over areas of body  [] 1   Splinting areas of the body, tense  [] 2   Rigid, stiff    Physiology (vital signs)  [x] 0   Stable vital signs  [] 1   Change in any of the following: SBP > 20mm Hg; HR > 20/minute  [] 2   Change in any of the following: SBP > 30mm Hg; HR > 25/minute    Respiratory  [] 0   Baseline RR/SpO2, compliant with ventilator  [x] 1   RR > 10 above baseline, or 5% drop SpO2, mild asynchrony with ventilator  [] 2   RR > 20 above baseline, or 10% drop SpO2, asynchrony with ventilator     FUNCTIONAL ASSESSMENT     Palliative Performance Scale (PPS): 10       PSYCHOSOCIAL/SPIRITUAL ASSESSMENT     Active Problems:    Cardiorenal syndrome (11/7/2022)    Past Medical History:   Diagnosis Date    CAD (coronary artery disease)     mi    Chronic kidney disease     hx of elevated blood levels    Chronic pain     lower back    Diabetes ([address]     Enlarged prostate     Hypercholesterolemia     Hypertension     Other unknown and unspecified cause of morbidity or mortality     hayfever      Past Surgical History:   Procedure Laterality Date    HX CATARACT REMOVAL Bilateral     HX CHOLECYSTECTOMY      HX LUMBAR LAMINECTOMY  09/2017    HX ORTHOPAEDIC  09/23/2015    back surgery     [place] CARDIAC SURG PROCEDURE UNLIST      bypass(1985), stents(1994, 2004)    [place] COLONOSCOPY FLX DX W/COLLJ SPEC WHEN PFRMD  8/12/2013         [place] EGD TRANSORAL BIOPSY SINGLE/MULTIPLE  6/28/2012         VASCULAR SURGERY PROCEDURE UNLIST  2/1996    left carotid      Social History     Tobacco Use    Smoking status: Former     Types: Cigarettes Quit date: 6/3/1967     Years since quittin.4    Smokeless tobacco: Never   Substance Use Topics    Alcohol use: No     Family History   Problem Relation Age of Onset    Heart Disease Mother     Heart Disease Father       Allergies   Allergen Reactions    Tetanus Vaccines And Toxoid Swelling     Swelling at the site    Tradjenta [Linagliptin] Diarrhea     headache      Current Facility-Administered Medications   Medication Dose Route Frequency    midazolam (VERSED) 100 mg in 0.9% sodium chloride 100 mL infusion  2 mg/hr IntraVENous CONTINUOUS    glycopyrrolate (ROBINUL) injection 0.2 mg  0.2 mg IntraVENous Q4H PRN    HYDROmorphone (DILAUDID) injection 1 mg  1 mg IntraVENous Q3H    HYDROmorphone (DILAUDID) injection 1 mg  1 mg IntraVENous Q15MIN PRN    midazolam (VERSED) injection 2 mg  2 mg IntraVENous Q1H PRN    bisacodyL (DULCOLAX) suppository 10 mg  10 mg Rectal DAILY PRN     Facility-Administered Medications Ordered in Other Encounters   Medication Dose Route Frequency    sodium chloride (NS) flush 5 mL  5 mL IntraVENous PRN        PHYSICAL EXAM     Wt Readings from Last 3 Encounters:   22 65.8 kg (145 lb)   22 65.6 kg (144 lb 9.6 oz)   10/13/22 63.5 kg (140 lb)     Supplemental O2  [] Yes  [x] NO  Last bowel movement: N/A    Currently this patient has:  [x] Peripheral IV [] PICC  [] PORT [] ICD    [x] Moseley Catheter [] NG Tube   [] PEG Tube    [] Rectal Tube [] Drain  [] Other:     Constitutional: unresponsive; thin/frail; ill-appearing, no facial grimacing  Eyes: closed  ENMT:  dry oral mucosa, + mouth breathing  Cardiovascular: regular; no peripheral edema  Respiratory:  no increased work of breathing, RR 22  Gastrointestinal: no distention  : moseley with no output  Musculoskeletal: no contractures or gross deformities  Skin: no visible skin breakdown  Neurologic: unresponsive  Psychiatric: unresponsive      Pertinent Lab and or Imaging Tests:  Lab Results   Component Value Date/Time Sodium 139 11/07/2022 04:00 AM    Potassium 4.4 11/07/2022 04:00 AM    Chloride 110 (H) 11/07/2022 04:00 AM    CO2 19 (L) 11/07/2022 04:00 AM    Anion gap 10 11/07/2022 04:00 AM    Glucose 151 (H) 11/07/2022 04:00 AM    BUN 54 (H) 11/07/2022 04:00 AM    Creatinine 3.05 (H) 11/07/2022 04:00 AM    BUN/Creatinine ratio 18 11/07/2022 04:00 AM    GFR est AA 26 (L) 08/22/2022 02:24 PM    GFR est non-AA 22 (L) 08/22/2022 02:24 PM    Calcium 9.0 11/07/2022 04:00 AM     Lab Results   Component Value Date/Time    Protein, total 7.4 11/07/2022 04:00 AM    Albumin 3.2 (L) 11/07/2022 04:00 AM           Isaiah Reese, NP

## 2022-11-15 NOTE — PROGRESS NOTES
[address] [facility] LCSW note: This LCSW visited the room of pt, who remains unresponsive and imminent. Wife Pablito Born at bedside. LCSW provided active listening for wife as she talked about their life together and few times they spent apart. LCSW provided emotional support to wife she talked about his lingering. LCSW and wife talked about her leaving the hospital today to meet with their , discussed the possibility pt could pass while she was away. Wife is a woman of duncan and \" trusts\" in God's will if that is the case. LCSW and wife discussed the support she has from her nephew and his wife. LCSW and wife talked about [address] [place] [address] Bereavement support as well. LCSW will continue to assess and monitor pt and family needs.

## 2022-11-16 NOTE — PROGRESS NOTES
1202: Family notified this RN that patient had stopped breathing. Patient found to have no heart sounds, no breathing and no pulse. Versed drip stopped at this time. Hospice notified. 1212: Notified nursing supervisor and . 1236: Left an automated message for [employer].  at bedside.

## 2022-11-16 NOTE — PROGRESS NOTES
Routine follow up visit to provide comfort and spiritual guidance to the patient, family and caregiver as they make their journey towards end of life. Upon entry, patient non-verbal and non-responsive resting comfortably. Spouse Milta Media present and supportive bedside. Offered empathic presence engaging in active listening processing patient's illness journey. Spouse reflected that she is processing anticipatory grief as her  nears end of life alongside her younger sister who also is declining/dying from Parkinson's. Validated feelings and reviewed coping resources: spouse has good support network from [employer], former , friends, family (nephew and niece) and neighbors. Explored areas of meaning via life review: spouse shared how they met and were  in early 25s; they found fulfillment together in engaging good works in community and Religion, caring for pets (especially cats) and spending time together. Offered prayer bedside for spouse's patient and sister Leta Nice. During visit, spouse shifted from a sense of anxiety about being alone to peace/meaning as she recalled the community of loving support and the legacy of her loving marriage to patient. Family welcomes follow up visits from  and also is visited regularly by community clergy.

## 2022-11-16 NOTE — PROGRESS NOTES
Responded to page for death on Oncology. Reviewed chart prior to visit on unit. Hospice Chaplain Ruma Escobar has been with family this morning providing spiritual presence and pastoral support. Visited with Mrs Rayray Gu and her niece. Offered condolences and assurance of prayer. Consulted with nursing prior to leaving unit.        MARICEL Bronson, [facility], Staff [address]    [address] Paging Service  [phone]

## 2022-11-16 NOTE — PROGRESS NOTES
Armond Galo LCSW note: This LCSW in to meet with pts wife Madi Hauser. Pt remains unresponsive, appears imminent. LCSW provided support to wife as she talked about her time at bedside with pt. Wife reminisced about her journey with pt, places they lived, homes they built and worked on, and their life together. Wife reports she is at peace, she knows \" God will take care of care\" when  passes. Wife has good support with nephew Heidi Haynes and his wife Pete Green who are like her children. LCSW will continue to support.

## 2022-11-16 NOTE — HOSPICE
[place]  Help to Those in Need  [phone]    Patient Name: Natali Dowd  YOB: 1935  Age: 80 y.o. [facility]  Death Visit Note:    Narrative: This LCSW met with pt's wife Miguel A Arvizu at bedside upon his passing. Wife grieving, provided support.    Final Arrangements:[facility]  Resources Needed/Provided: Checklist BR information  Bereavement Services Explained: Yes       Brea Azevedo hospitalsKEITH  [facility]    [phone]

## 2022-11-16 NOTE — HOSPICE
[facility] RN note: called to pt room at [facility]. Wife at bedside notifying family/friends, tearfully accepting. [facility] to serve. Pt noted to be: Without responsive to voice or touch  Without spontaneous pulse or respiration after one minute of auscultation  Pupils fixed and dilated  TOD: 12:06pm        [facility]  [facility] Help to Those in Need  [phone]    Discharge/Death Nursing Note   Patient Name: Grabiel Harvey  YOB: 1935  Age: 80 y.o. Date of Death: 22  Admitted Date: 2022  Time of Death: 12:06pm    Facility of Care: [facility]  Level of Care: [facility]  Patient Room: [facility]/     Hospice Attending: Mel Rodriguez MD  Hospice Diagnosis: Cardiorenal syndrome [I13.10]    Death Pronouncement   Pronouncement of death completed by: Yoandy Arrington for Dr Froilan Cochran staff present at the time of death    At the time of death the patient was documented as:  Pt noted to be:   Without responsive to voice or touch  Without spontaneous pulse or respiration after one minute of auscultation  Pupils fixed and dilated      The pt  within [address]    The following were notified of the patient's death: hospice team,     Medications were disposed of per facility protocol     Discharge Summary   Discharge Reason: Death    Summary of Care Provided:    [x] Post mortem care provided by staff RN  [x] Notification of  home by nursing supervisor  [x] Referrals/Community resources provided:   [x] Goals completed  [] Durable Medical Equipment vendor notified     Disciplines involved: [x] RN [x] SW []  [] HA [] Vol [] PT [] OT [] ST [] BC    [] IDT communication/notification    Attending Physician, Lelo chung NP , notified of death    Bereaved   Verify bereaved identified with name, address, telephone number and risk level      Advance Care Planning 2022   Patient's Healthcare Decision Maker is: -   Primary Decision Maker Name -   Primary Decision Maker Phone Number -   Primary Decision Maker Relationship to Patient -   Confirm Advance Directive None   Patient Would Like to Complete Advance Directive No   Does the patient have other document types Do Not Resuscitate

## 2022-11-16 NOTE — HOSPICE
[place] [facility] Those [facility]  [phone]    [facility] Daily Nursing Note   Patient Name: Helene Daily  YOB: 1935  Age: 80 y.o. Date of Visit: 11/15/22  Facility of Care: [id number] [facility]  Patient Room: [facility]/     Hospice Attending: Aviva Chi MD  Hospice Diagnosis: Cardiorenal syndrome [I13.10]    Level of Care: GIP    Current GIP Symptoms    1. Pain  2. Agitation/restlessness  3. Secretions  4. Respiratory distress  5. Myoclonic jerking      ASSESSMENT & PLAN     1. Pain:  No moaning/groaning but labored breathing. Changed frequency of IV dilaudid 1mg from q4hrs to q3hrs with PRN available. 2. Agitation:  No movement of extremities. PCA continuous IV versed increased to 2mg/hr with PRN available due to myoclonic jerking. 3. Secretions:  robinul originally scheduled, now PRN as issue is resolved. 4. Resp distress:  improved with increased frequency from 1mg every 4 to every 3 hrs with PRN available. 5.myoclonic jerking:  jerking ceased after increase in versed to 2mg /hr      Spiritual Interventions: Per wife, not at this time. Hospital and [address] available 24/7. Psych/Social/Emotional Interventions:  Brea monreal in to support wife who is beginning to take time away from the hospital, tend to some personal tasks. Care Coordination Needs: Communication with hospital staff and hospice team     Care plan and New Orders discussed / approved with Jf Barber NP. Description History and Chart Review     Narrative History of last 24 hours that demonstrates care cannot be provided in another setting:    Pt requiring frequent assessments by skilled medical staff with administration of IV medications along with adjustments in medication dosage. This care cannot be provided outside the inpatient setting. Pt is unresponsive requiring skilled nursing assessment for appropriate use of PRN medications for optimal symptom management.         What has been done to control the patient's symptoms in the last 24 hours? Versed changed to continuous infusion and increased to 2mg/hr due to myoclonic jerking. Changed IV dilaudid frequency from q4hrs to q3hrs    Does the patient currently require IV medications? yes  Does the patient currently require scheduled medications? yes  Does the patient currently require a PCA? yes    List number of doses of PRN medications in last 24 hours: no PRN medications required on current scheduled regimen  Medication 1:   Number of doses:     Medication 2:  Number of doses:     Medication 3:   Number of doses:    Supporting documentation for GIP need for pain control:  [x] Frequent evaluation by a doctor, nurse practitioner, nurse   [x] Frequent medication adjustment    [x] IVs that cannot be administered at home   [x] Aggressive pain management   [x] Complicated technical delivery of medications              Supporting documentation for GIP need for symptom control:  [x]  Sudden decline necessitating intensive nursing intervention  []  Uncontrolled / intractable nausea or vomiting   []  Pathological fractures  []  Advanced open wounds requiring frequent skilled care  [] Unmanageable respiratory distress  [] New or worsening delirium   [] Delirium with behavior issues: Is 24 hour caregiver present due to safety concerns with agitation? (yes/no)  [x] Imminent death - with skilled nursing needs documented above    DISCHARGE PLANNING     1. Discharge Plan: If patient stables, family will take patient home with hospice services  2. Family teaching: EOL processes  3.  Response to family teaching: receptive    ASSESSMENT    KARNOFSKY: 10%    Prognosis estimated based on 11/15/22 clinical assessment is:   [] Few to Many Hours  [x] Hours to Days   [] Few to Many Days   [] Days to Weeks   [] Few to Many Weeks   [] Weeks to Months   [] Few to Many Months    Quality Measure: Patient self-reports:  [] Yes    [x] No    ESAS:   Time of Assessment: 1130  Pain (1-10): 4  Fatigue (1-10):    Shortness of breath (1-10): 5  Nausea (1-10): Appetite (1-10):    Anxiety: (1-10):   Depression: (1-10):   Well-being: (1-10):   Constipation: no  LAST BM:     CLINICAL INFORMATION   Patient Vitals for the past 12 hrs:   Temp Pulse Resp BP SpO2   11/15/22 0800 98.8 °F (37.1 °C) (!) 102 18 (!) 120/45 (!) 88 %         Currently this patient has:  [] Supplemental O2   [x] IV    [] PICC      [] PORT   [] NG Tube    [] PEG Tube   [] Ostomy     [x] Schwab draining _yellow__ urine--no urinary output  [] Other:     SIGNS/PHYSICAL FINDINGS     Skin (including wound):  [] Warm, dry, supple, intact and color normal for race  [x] Warm   [] Dry   [] Cool     [] Clammy       [] Diaphoretic    Turgor   [] Normal   [x] Decreased  Color:   [] Pink   [x] Pale   [] Cyanotic   [] Erythema   [] Jaundice   [] Normal for Race  []  Wounds:    Neuro:  [] Lethargy  [] Restlessness / agitation  [] Confusion / delirium  [] Hallucinations  [] Responds to maximal stimulation  [x] Unresponsive  [] Seizures     Cardiac:  [] Dyspnea on Exertion  [] JVD  [] Murmur  [] Palpitations  [] Hypotension  [x] Hypertension  [] Tachycardia  [] Bradycardia  [] Irregular HR  [] Pulses Decreased  [] Pulses Absent  [] Edema:         [] Mottling:        Respiratory:  Breath sounds:    [x] Diminished/clear   [] Wheeze   [] Coarse   [] Rales   [] Even and unlabored  [x] Labored:   14         [] Cough   [] Non Productive   [] Productive    [] Description:           [] Deep suctioned   [] O2 at ___ LPM  [] High flow oxygen greater than 10 LPM  [] Bi-Pap    GI  [x] Abdomen - flat  [] Ascites  [] Nausea  [] Vomiting  [x] Incontinent of bowels  [x] Bowel sounds - absent  [] Diarrhea  [] Constipation (see above including last bowel movement)  [] Checked for impaction  [] Last BM     Nutrition  Diet:__NPO_____  Appetite:   [] Good   [] Fair   [] Poor   [] Tube Feeding       [] Voiding  [] Incontinent   [x] Scwhab--anuric today 11/15    Musculoskeletal  [] Balance/[place] Unsteady   [x] No movement  Strength:    [] Normal    [] Limited    [] Decreasing   Activities:    [] Up as tolerated   [x] Bedridden    [] Specify:    SAFETY  [x] 24 hr. Caregiver   [x] Side rails ? [x] Hospital bed   [x] Reviewed Falls & Safety (with wife at bedside)      ALLERGIES AND MEDICATIONS     Allergies:    Allergies   Allergen Reactions    Tetanus Vaccines And Toxoid Swelling     Swelling at the site    Tradjenta [Linagliptin] Diarrhea     headache       Current Facility-Administered Medications   Medication Dose Route Frequency    midazolam (VERSED) 100 mg in 0.9% sodium chloride 100 mL infusion  2 mg/hr IntraVENous CONTINUOUS    glycopyrrolate (ROBINUL) injection 0.2 mg  0.2 mg IntraVENous Q4H PRN    HYDROmorphone (DILAUDID) injection 1 mg  1 mg IntraVENous Q3H    HYDROmorphone (DILAUDID) injection 1 mg  1 mg IntraVENous Q15MIN PRN    midazolam (VERSED) injection 2 mg  2 mg IntraVENous Q1H PRN    bisacodyL (DULCOLAX) suppository 10 mg  10 mg Rectal DAILY PRN     Facility-Administered Medications Ordered in Other Encounters   Medication Dose Route Frequency    sodium chloride (NS) flush 5 mL  5 mL IntraVENous PRN          Visit Time In: Multiple visits today  Visit Time Out:

## 2022-11-16 NOTE — PROGRESS NOTES
Anat of Shift Note    Bedside shift change report given to Nahomy (oncoming nurse) by Altagracia Tabares (offgoing nurse). Report included the following information SBAR, Kardex, and MAR    Shift worked:  7p-7a     Shift summary and any significant changes:    Scheduled medications have been given, see MAR. No PRN medications were given. IV has been flushed and the patient is getting continuous Versed. Family is present at bedside. Schwab care was done. Frequent rounding has been done. Concerns for physician to address:       Zone phone for oncoming shift:          Activity:  Activity Level: Bed Rest  Number times ambulated in hallways past shift: 0  Number of times OOB to chair past shift: 0    Cardiac:   Cardiac Monitoring: No           Access:  Current line(s): PIV     Genitourinary:   Urinary status: voiding    Respiratory:   O2 Device: None (Room air)  Chronic home O2 use?: NO  Incentive spirometer at bedside: NO       GI:  Last Bowel Movement Date:  (unsure)  Current diet:  DIET ONE TIME MESSAGE  DIET ONE TIME MESSAGE  DIET ONE TIME MESSAGE  Passing flatus: YES  Tolerating current diet: YES       Pain Management:   Patient states pain is manageable on current regimen: YES    Skin:  Kosta Score: 11  Interventions: turn team and internal/external urinary devices    Patient Safety:  Fall Score:  Total Score: 2  Interventions: bed/chair alarm  High Fall Risk: Yes    Length of Stay:  Expected LOS: - - -  Actual LOS: [address]

## 2022-11-17 NOTE — HOSPICE
[facility] RN note:  bereavement call to wife Corinne Powell, states that she is prepared and doing ok but very sad. Encouraged to utilize bereavement support, contact information provided. Very appreciative of hospice support through hospitalization, \"I can't say enough good things about all you have done. \"    Thank you for the opportunity to care for this pt and family. Please contact hospice at [phone] with any questions or concerns.

## 2022-11-18 NOTE — HOSPICE
[facility]  [facility] to Those in Need  [phone]      [address] Bereavement/Condolence Call: This LCSW called pts wife Val Corwin to offer condolences and support. LCSW and wife talked about her coping and support. She will want BR support. LCSW shared contact information. LCSW offered [address] information for ongoing support.        [facility]    [phone]

## 2022-11-24 DIAGNOSIS — E11.40 TYPE 2 DIABETES MELLITUS WITH DIABETIC NEUROPATHY, WITHOUT LONG-TERM CURRENT USE OF INSULIN (HCC): ICD-10-CM

## 2022-11-24 PROCEDURE — 74011250636 HC RX REV CODE- 250/636: Performed by: FAMILY MEDICINE

## 2022-11-24 PROCEDURE — 74011250636 HC RX REV CODE- 250/636: Performed by: NURSE PRACTITIONER

## 2022-11-24 PROCEDURE — 74011250636 HC RX REV CODE- 250/636: Performed by: PHYSICAL MEDICINE & REHABILITATION

## 2022-11-24 PROCEDURE — 74011000250 HC RX REV CODE- 250: Performed by: NURSE PRACTITIONER

## 2022-11-24 RX ORDER — LINAGLIPTIN 5 MG/1
5 TABLET, FILM COATED ORAL DAILY
Qty: 90 TABLET | Refills: 1 | OUTPATIENT
Start: 2022-11-24

## 2022-11-25 PROCEDURE — 74011000250 HC RX REV CODE- 250: Performed by: NURSE PRACTITIONER

## 2022-11-25 PROCEDURE — 74011250636 HC RX REV CODE- 250/636: Performed by: NURSE PRACTITIONER

## 2022-11-25 PROCEDURE — 74011250636 HC RX REV CODE- 250/636: Performed by: FAMILY MEDICINE

## 2023-11-06 NOTE — PROGRESS NOTES
After obtaining consent, and per orders of Dr. Lana Walker, injection of B-12 given by Caesar Nelson LPN. Patient instructed to remain in clinic for 20 minutes afterwards, and to report any adverse reaction to me immediately.
none

## 2024-01-04 NOTE — HOSPICE
[facility] Help to Those in Need  [phone]    [facility] Daily Nursing Note   Patient Name: Rayna Colon  YOB: 1935  Age: 80 y.o. Date of Visit: 11/13/22  Facility of Care: [facility]  Patient Room: [facility]/     Hospice Attending: Annie Sierra MD  Hospice Diagnosis: Cardiorenal syndrome [I13.10]    Level of Care: GIP    Current GIP Symptoms    1. Pain  2. Agitation/restlessness  3. Secretions  4. Respiratory distress  5. Unresponsive responsive      ASSESSMENT & PLAN     1. No moaning/groaning but labored breathing. Changed frequency of IV dilaudid 1mg from q4hrs to q3hrs with PRN available. 2. No movement of extremities. Continue with PCA continuous IV versed 1mg/hr with PRN available  3. LS clear. Changed IV robinul 0.2mg q4hrs to PRN. 4. RR labored. Changed frequency of IV dilaudid 1mg from q4hrs to q3hrs with PRN available. 4. Patient requiring frequent assessments by skilled medical staff for non-verbal cues of distress/discomfort. Spiritual Interventions: Per wife, not at this time. Hospital and [address] available 24/7. Psych/Social/Emotional Interventions: Per wife, not at this time. Informed Sonia/wife who is at bedside that SW assistance is available 24/7    Care Coordination Needs: Communication with hospital staff and hospice team     Care plan and New Orders discussed / approved with Francisco J Mckeon MD.    Description History and Chart Review     Narrative History of last 24 hours that demonstrates care cannot be provided in another setting:    Pt requiring frequent assessments by skilled medical staff with administration of IV medications along with adjustments in medication dosage. This care cannot be provided outside the inpatient setting. What has been done to control the patient's symptoms in the last 24 hours? Changed IV dilaudid frequency from q4hrs to q3hrs    Does the patient currently require IV medications?  yes  Does the Positioned the atrial lead. patient currently require scheduled medications? yes  Does the patient currently require a PCA? no    List number of doses of PRN medications in last 24 hours:  Medication 1:   Number of doses:     Medication 2:  Number of doses:     Medication 3:   Number of doses:    Supporting documentation for GIP need for pain control:  [x] Frequent evaluation by a doctor, nurse practitioner, nurse   [x] Frequent medication adjustment    [x] IVs that cannot be administered at home   [x] Aggressive pain management   [x] Complicated technical delivery of medications              Supporting documentation for GIP need for symptom control:  [x]  Sudden decline necessitating intensive nursing intervention  []  Uncontrolled / intractable nausea or vomiting   []  Pathological fractures  []  Advanced open wounds requiring frequent skilled care  [] Unmanageable respiratory distress  [] New or worsening delirium   [] Delirium with behavior issues: Is 24 hour caregiver present due to safety concerns with agitation? (yes/no)  [x] Imminent death - with skilled nursing needs documented above    DISCHARGE PLANNING     1. Discharge Plan: If patient stables, family will take patient home with hospice services  2. Family teaching: EOL processes  3. Response to family teaching: receptive    ASSESSMENT    KARNOFSKY: 10%    Prognosis estimated based on 11/13/22 clinical assessment is:   [] Few to Many Hours  [x] Hours to Days   [] Few to Many Days   [] Days to Weeks   [] Few to Many Weeks   [] Weeks to Months   [] Few to Many Months    Quality Measure: Patient self-reports:  [] Yes    [x] No    ESAS:   Time of Assessment: 1130  Pain (1-10): 4  Fatigue (1-10):    Shortness of breath (1-10): 5  Nausea (1-10): Appetite (1-10):    Anxiety: (1-10):   Depression: (1-10):   Well-being: (1-10):   Constipation: no  LAST BM:     CLINICAL INFORMATION   Patient Vitals for the past 12 hrs:   Temp Pulse Resp BP SpO2   11/13/22 0821 98.3 °F (36.8 °C) 90 14 138/67 91 %         Currently this patient has:  [] Supplemental O2   [x] IV    [] PICC      [] PORT   [] NG Tube    [] PEG Tube   [] Ostomy     [x] Schwab draining _yellow__ urine  [] Other:     SIGNS/PHYSICAL FINDINGS     Skin (including wound):  [] Warm, dry, supple, intact and color normal for race  [x] Warm   [] Dry   [] Cool     [] Clammy       [] Diaphoretic    Turgor   [] Normal   [x] Decreased  Color:   [] Pink   [x] Pale   [] Cyanotic   [] Erythema   [] Jaundice   [] Normal for Race  []  Wounds:    Neuro:  [] Lethargy  [] Restlessness / agitation  [] Confusion / delirium  [] Hallucinations  [] Responds to maximal stimulation  [x] Unresponsive  [] Seizures     Cardiac:  [] Dyspnea on Exertion  [] JVD  [] Murmur  [] Palpitations  [] Hypotension  [x] Hypertension  [] Tachycardia  [] Bradycardia  [] Irregular HR  [] Pulses Decreased  [] Pulses Absent  [] Edema:         [] Mottling:        Respiratory:  Breath sounds:    [x] Diminished/clear   [] Wheeze   [] Coarse   [] Rales   [] Even and unlabored  [x] Labored:   14         [] Cough   [] Non Productive   [] Productive    [] Description:           [] Deep suctioned   [] O2 at ___ LPM  [] High flow oxygen greater than 10 LPM  [] Bi-Pap    GI  [x] Abdomen - flat  [] Ascites  [] Nausea  [] Vomiting  [x] Incontinent of bowels  [x] Bowel sounds - absent  [] Diarrhea  [] Constipation (see above including last bowel movement)  [] Checked for impaction  [] Last BM     Nutrition  Diet:__NPO_____  Appetite:   [] Good   [] Fair   [] Poor   [] Tube Feeding       [] Voiding  [] Incontinent   [x] Schwab    Musculoskeletal  [] Balance/[place] Unsteady   [x] No movement  Strength:    [] Normal    [] Limited    [] Decreasing   Activities:    [] Up as tolerated   [x] Bedridden    [] Specify:    SAFETY  [x] 24 hr. Caregiver   [x] Side rails ? [x] Hospital bed   [x] Reviewed Falls & Safety (with wife at bedside)      ALLERGIES AND MEDICATIONS     Allergies:    Allergies   Allergen Reactions    Tetanus Vaccines And Toxoid Swelling     Swelling at the site    Tradjenta [Linagliptin] Diarrhea     headache       Current Facility-Administered Medications   Medication Dose Route Frequency    midazolam (VERSED) 100 mg in 0.9% sodium chloride 100 mL infusion  1 mg/hr IntraVENous CONTINUOUS    glycopyrrolate (ROBINUL) injection 0.2 mg  0.2 mg IntraVENous Q4H PRN    HYDROmorphone (DILAUDID) injection 1 mg  1 mg IntraVENous Q3H    ketorolac (TORADOL) injection 30 mg  30 mg IntraVENous Q6H PRN    HYDROmorphone (DILAUDID) injection 1 mg  1 mg IntraVENous Q15MIN PRN    midazolam (VERSED) injection 2 mg  2 mg IntraVENous Q1H PRN    bisacodyL (DULCOLAX) suppository 10 mg  10 mg Rectal DAILY PRN     Facility-Administered Medications Ordered in Other Encounters   Medication Dose Route Frequency    sodium chloride (NS) flush 5 mL  5 mL IntraVENous PRN          Visit Time In: Multiple visits today  Visit Time Out:

## 2024-03-05 NOTE — PROCEDURES
[facility]  *** FINAL REPORT ***    Name: Livia Prabhakar  MRN: [id number]    Outpatient  : 16 May 1935  HIS Order #: [id number]  [address] [facility] Visit #: [id number]  Date: 06 Sep 2018    TYPE OF TEST: Peripheral Arterial Testing    REASON FOR TEST  PVD    Right Leg  Segmentals: Noncompressible                     mmHg  Brachial         169  High thigh  Low thigh        190  Calf  Posterior tibial  Dorsalis pedis  Peroneal  Metatarsal  Toe pressure      70  PVR:        Abnormal  Ankle/Brachial:    Left Leg  Segmentals: Noncompressible                     mmHg  Brachial         165  High thigh  Low thigh        180  Calf  Posterior tibial  Dorsalis pedis  Peroneal  Metatarsal  Toe pressure      96  PVR:        Abnormal  Ankle/Brachial:    INTERPRETATION/FINDINGS  PROCEDURE:  Multi-level lower extremity arterial segmental pressures,  PVR waveforms and digital PPG waveforms were performed. 1. Abnormal results suggestive of mild peripheral arterial disease at  rest in both legs. 2. Pulse Volume Recording (PVR) waveforms are mildly abnormal  bilaterally. 3. Segmental pressures in both legs are not measurable due to  extensive arterial calcification. The ankle/brachial indexes are  therefore unreliable predictors of distal arterial perfusion. 4. The right great toe/brachial index is 0.41 and the left great  toe/brachial index is 0.57. ADDITIONAL COMMENTS    I have personally reviewed the data relevant to the interpretation of  this  study. TECHNOLOGIST: Chiquita Jackson RVT  Signed: 2018 01:01 PM    PHYSICIAN: Yonathan Hebert.  Kevin Rock MD  Signed: 2018 12:36 PM actual

## 2024-12-20 NOTE — DISCHARGE SUMMARY
Hospitalist Discharge Summary     Patient ID:  Rayna Colon  [id number]  80 y.o.  1935 11/7/2022    PCP on record: Hector Guerrero NP    Admit date: 11/7/2022  Discharge date and time: 11/7/2022    DISCHARGE DIAGNOSIS:    NSTEMI/HTN emergency/PNA/Pulmonary edema    CONSULTATIONS:  None    Excerpted HPI from H&P of Denys Rapp MD:  80 y.o. male presents with several hours duration of abrupt onset, worsening, severe, constant, 7 out of 10, sharp, substernal, nonradiating chest pain that is also associated with mild shortness of breath, remitted by nothing, exacerbated by nothing. 80 y.o. male with pertinent medical hx of hypertension, diabetes, CAD presented with acute chest pain     VS revealed tachycardia, mild hypertension now but presented with a hypertension of 190/100     Physical exam revealed nothing really remarkable     Labs revealed creatinine around baseline, initial potassium of 5.5, initial troponin of 322 that bumped up to 2258     Imaging revealed:      Chronic interstitial opacities with some apparent increase which may reflect  superimposed pulmonary edema. There is airspace opacity in the right midlung  zone laterally which may represent pneumonia. Active Problems:    Chest pain (11/6/2022)     ? Pulmonary edema  ? Pneumonia  ______________________________________________________________________  DISCHARGE SUMMARY/HOSPITAL COURSE:  for full details see H&P, daily progress notes, labs, consult notes. Patient was admitted to [facility] for further evaluation and management, was evaluated by Cardiology, who recommended evaluation by Palliative/Hospice care, following which after evaluation by Palliative/Hospice services, patient was discharged to inpatient hospice services        _______________________________________________________________________  Patient seen and examined by me on discharge day.   Pertinent Findings:  Gen:    Not in distress  Chest: Clear lungs  CVS:   Regular rhythm, s1/s2 no m/r/g  No edema  Abd:  Soft, not distended, not tender  Neuro:  Alert, Oriented at baseline  _______________________________________________________________________  DISCHARGE MEDICATIONS:   Current Discharge Medication List            Patient Follow Up Instructions: Activity: Activity as tolerated  Diet: Comfort feeding  Wound Care: As directed    Follow-up with As per hospice  Follow-up Information    None       ________________________________________________________________    Risk of deterioration: High    Condition at Discharge:  Stable  __________________________________________________________________    Disposition  IP Hospice    ____________________________________________________________________    Code Status: DNR/DNI  ___________________________________________________________________      Total time in minutes spent coordinating this discharge (includes going over instructions, follow-up, prescriptions, and preparing report for sign off to her PCP) :  45 minutes    Signed:   Yamilet Noble MD none

## 2025-01-30 NOTE — PROGRESS NOTES
Subjective:   Tracy Guzman is a 80 y.o. male      Chief Complaint   Patient presents with    Hypertension     follow up    Diabetes     follow up        History of present illness:  Mr. Jake Willoughby returns for a three month followup. He has done well since his last visit. We last saw him with an ingrown toenail, which we treated and it seems to be growing out appropriately. He has not noted problems with increasing shortness of breath, chest pain, GI or  symptoms. He feels his blood sugars have been right around 125 or below. He is not experiencing increasing lower extremity edema, orthopnea or PND. He has been compliant with his medications. He is due for six month follow-up labs regarding his diabetes, chronic anemia, hypertension and hyperlipidemia. We will get the appropriate studies and call him about the results. He will follow up in three months' time pending the development of any new problems.              Patient Active Problem List    Diagnosis Date Noted    Type II diabetes mellitus with nephropathy ([facility] 12/20/2018     Priority: 1 - One    Type 2 diabetes mellitus with diabetic neuropathy ([facility] 03/14/2018     Priority: 1 - One    Hypertension 09/24/2015     Priority: 1 - One    Hypercholesterolemia 09/24/2015     Priority: 1 - One    Anemia 12/14/2017     Priority: 2 - Two    ASCVD (arteriosclerotic cardiovascular disease) 09/28/2017     Priority: 2 - Two    CKD (chronic kidney disease) stage 3, GFR 30-59 ml/min ([facility] 09/24/2015     Priority: 2 - Two    CAD (coronary artery disease) 09/24/2015     Priority: 2 - Two    Long-term current use of high risk medication other than anticoagulant 12/19/2018     Priority: 3 - Three    B12 deficiency 09/28/2017     Priority: 3 - Three    History of gastritis 09/28/2017     Priority: 3 - Three    CVD (cerebrovascular disease) 09/28/2017     Priority: 3 - Three    Benign non-nodular prostatic hyperplasia with lower urinary tract symptoms 2015     Priority: 3 - Three    Adrenal adenoma 2017     Priority: 4 - Four    MCI (mild cognitive impairment) 2017     Priority: 4 - Four    Allergic rhinitis 2017     Priority: 5 - Five    Black hairy tongue 2017     Priority: 5 - Five    Burning mouth syndrome 2017     Priority: 5 - Five    Vitamin D deficiency 2017     Priority: 5 - Five    Heart murmur 2018    PE (physical exam), annual 2017      Past Surgical History:   Procedure Laterality Date    CARDIAC SURG PROCEDURE UNLIST      bypass(), [id number], )    HX CATARACT REMOVAL Bilateral     HX CHOLECYSTECTOMY      HX LUMBAR LAMINECTOMY  2017    HX ORTHOPAEDIC  2015    back surgery     MI COLONOSCOPY FLX DX W/COLLJ SPEC WHEN PFRMD  2013         MI EGD TRANSORAL BIOPSY SINGLE/MULTIPLE  2012         VASCULAR SURGERY PROCEDURE UNLIST  1996    left carotid      Allergies   Allergen Reactions    Tetanus Vaccines And Toxoid Swelling     Swelling at the site    Tradjenta [Linagliptin] Diarrhea     headache      Family History   Problem Relation Age of Onset    Heart Disease Mother     Heart Disease Father       Social History     Socioeconomic History    Marital status:      Spouse name: Not on file    Number of children: Not on file    Years of education: Not on file    Highest education level: Not on file   Social Needs    Financial resource strain: Not on file    Food insecurity - worry: Not on file    Food insecurity - inability: Not on file   "[employer] needs - medical: Not on file   "[employer] needs - non-medical: Not on file   Occupational History    Not on file   Tobacco Use    Smoking status: Former Smoker     Last attempt to quit: 6/3/1967     Years since quittin.5    Smokeless tobacco: Never Used   Substance and Sexual Activity    Alcohol use: No    Drug use: No    Sexual activity: Not on file   Other Topics Concern  Not on file   Social History Narrative    Not on file     Outpatient Medications Marked as Taking for the 12/20/18 encounter (Office Visit) with Anjelica Amaya MD   Medication Sig Dispense Refill    colesevelam (WELCHOL) 625 mg tablet TAKE 3 TABLETS BY MOUTH TWICE DAILY 180 Tab 0    cyanocobalamin (VITAMIN B12) 1,000 mcg/mL injection INJECT 1 ML SUBCUTANEOUS EVERY MONTH 90 mL 0    TRUE METRIX GLUCOSE TEST STRIP strip USE 1 STRIP TO TEST BLOOD SUGAR EVERY  Strip 0    ACCU-CHEK FASTCLIX LANCET DRUM misc USE AS DIRECTED TO MONITOR BLOOD SUGAR 1 Each 5    colesevelam (WELCHOL) 625 mg tablet TAKE 3 TABLETS BY MOUTH TWICE DAILY 180 Tab 1    gabapentin (NEURONTIN) 300 mg capsule TAKE 1 CAPSULE BY MOUTH TWICE DAILY 180 Cap 1    hydrALAZINE (APRESOLINE) 25 mg tablet TAKE 1 TABLET BY MOUTH TWICE DAILY 180 Tab 1    glipiZIDE (GLUCOTROL) 10 mg tablet TAKE 1 AND 1/2 TABLETS BY MOUTH TWICE DAILY 270 Tab 0    ONGLYZA 2.5 mg tablet TAKE 1 TABLET BY MOUTH EVERY DAY 90 Tab 3    tamsulosin (FLOMAX) 0.4 mg capsule Take 2 Caps by mouth daily. 180 Cap 3    nitroglycerin (NITROSTAT) 0.4 mg SL tablet 1 Tab by SubLINGual route every five (5) minutes as needed for Chest Pain. 25 Tab 3    acetaminophen (TYLENOL EXTRA STRENGTH) 500 mg tablet Take  by mouth every six (6) hours as needed for Pain.  clotrimazole-betamethasone (LOTRISONE) 1-0.05 % lotion Apply  to affected area two (2) times a day.  Lancets misc Use with Fastclix lancing device daily to test blood sugar. 100 Each 3    ACCU-CHEK FASTCLIX misc USE AS DIRECTED TO MONITOR BLOOD SUGAR 100 Each 3    aspirin delayed-release 81 mg tablet Take 81 mg by mouth daily.  polyethylene glycol (MIRALAX) 17 gram/dose powder Take 17 g by mouth daily as needed.  simvastatin (ZOCOR) 20 mg tablet Take 20 mg by mouth nightly.  Cholecalciferol, Vitamin D3, (VITAMIN D3) 1,000 unit cap Take 1,000 Units by mouth daily.       ferrous sulfate 325 mg (65 mg iron) tablet Take 325 mg by mouth two (2) times a day.  diltiazem CD (DILT-CD) 300 mg ER capsule Take 300 mg by mouth daily. Review of Systems              Constitutional:  He denies fever, weight loss, sweats or fatigue. HEENT:  No blurred or double vision, headache or dizziness. No difficulty with swallowing, taste, speech or smell. Respiratory:  No cough, wheezing or shortness of breath. No sputum production. Cardiac:  Denies chest pain, palpitations, unexplained indigestion, syncope, edema, PND or orthopnea. GI:  No changes in bowel movements, no abdominal pain, no bloating, anorexia, nausea, vomiting or heartburn. :  No frequency or dysuria. Denies incontinence. Extremities:  No joint pain, stiffness or swelling. Skin:  No recent rashes or mole changes. Neurological:  No numbness, tingling, burning paresthesias or loss of motor strength. No syncope, dizziness, frequent headaches or memory loss. Objective:     Vitals:    12/20/18 1449 12/20/18 1458   BP: 142/68 136/72   Pulse: 82    SpO2: 95%    Weight: 164 lb (74.4 kg)    Height: 5' 5\" (1.651 m)    PainSc:   0 - No pain        Body mass index is 27.29 kg/m². Physical Examination:              General Appearance:  Well-developed, well-nourished, no acute  distress. HEENT:     Ears:  The TMs and ear canals were clear. Eyes:  The pupillary responses were normal.  Extraocular muscle function intact. Lids and conjunctiva not injected. Neck:  Supple without thyromegaly or adenopathy. No JVD noted. Lungs:  Clear to auscultation and percussion. Cardiac:  Regular rate and rhythm without murmur. GI: nontender w/o mass. Normal BS's. Extremities:  No clubbing, cyanosis or edema. Skin:  No rash or unusual mole changes noted. Neurological:  Grossly normal.            Assessment/Plan:   Impressions:      ICD-10-CM ICD-9-CM    1.  Type 2 diabetes mellitus with diabetic neuropathy, without long-term current use of insulin (HCC) E11.40 250.60 AMB POC COMPREHENSIVE METABOLIC PANEL     395.9    2. Essential hypertension I10 401.9 AMB POC COMPREHENSIVE METABOLIC PANEL   3. Hypercholesterolemia E78.00 272.0 AMB POC COMPREHENSIVE METABOLIC PANEL      AMB POC LIPID PROFILE   4. Type II diabetes mellitus with nephropathy (HCC) E11.21 250.40 AMB POC COMPREHENSIVE METABOLIC PANEL     735.14 AMB POC HEMOGLOBIN A1C   5. Anemia, unspecified type D64.9 285.9 AMB POC COMPLETE CBC,AUTOMATED ENTER   6. Long-term current use of high risk medication other than anticoagulant Z79.899 V58.69 AMB POC COMPLETE CBC,AUTOMATED ENTER      AMB POC COMPREHENSIVE METABOLIC PANEL      AMB POC CK (CPK)        Plan:  1. Continue present meds  2. Discussed lifestyle modifications including Na restriction, low carb/fat diet, weight reduction and exercise (at least a walking program). Follow-up Disposition:  Return in about 3 months (around 3/20/2019).       Orders Placed This Encounter    AMB POC COMPLETE CBC,AUTOMATED ENTER    AMB POC COMPREHENSIVE METABOLIC PANEL    AMB POC LIPID PROFILE    AMB POC CK (CPK)    AMB POC HEMOGLOBIN A1C       Lavinia Balbuena MD Additional Notes: #6 Detail Level: Generalized Render Risk Assessment In Note?: no

## (undated) DEVICE — SKIN MARKER,REGULAR TIP WITH RULER AND LABELS: Brand: DEVON

## (undated) DEVICE — NEEDLE HYPO 25GA L1.5IN BVL ORIENTED ECLIPSE

## (undated) DEVICE — COVER,MAYO STAND,STERILE: Brand: MEDLINE

## (undated) DEVICE — NEEDLE SPNL 20GA L3.5IN YEL HUB S STL REG WALL FIT STYL W/

## (undated) DEVICE — CATHETER IV 20GA L1.25IN FEP STR HUB TEF INTROCAN SFTY

## (undated) DEVICE — NEEDLE HYPO 18GA L1.5IN PNK S STL HUB POLYPR SHLD REG BVL

## (undated) DEVICE — INTENDED FOR TISSUE SEPARATION, AND OTHER PROCEDURES THAT REQUIRE A SHARP SURGICAL BLADE TO PUNCTURE OR CUT.: Brand: BARD-PARKER ® CARBON RIB-BACK BLADES

## (undated) DEVICE — SYRINGE MED 20ML STD CLR PLAS LUERLOCK TIP N CTRL DISP

## (undated) DEVICE — INFECTION CONTROL KIT SYS

## (undated) DEVICE — SUTURE VCRL SZ 3-0 L18IN ABSRB UD CP-2 L26MM 1/2 CIR REV J761D

## (undated) DEVICE — DEVON™ KNEE AND BODY STRAP 60" X 3" (1.5 M X 7.6 CM): Brand: DEVON

## (undated) DEVICE — LAMINECTOMY RICHMOND-LF: Brand: MEDLINE INDUSTRIES, INC.

## (undated) DEVICE — Z CONVERTED USE 2107985 COVER FLROSCP W36XL28IN 4 SIDE ADH

## (undated) DEVICE — TRAY PREP DRY W/ PREM GLV 2 APPL 6 SPNG 2 UNDPD 1 OVERWRAP

## (undated) DEVICE — PILLOW POS AD L7IN R FOAM HD REST INTUB SLOT DISP

## (undated) DEVICE — WATERPROOF, BACTERIA PROOF DRESSING WITH ABSORBENT SEE THROUGH PAD: Brand: OPSITE POST-OP VISIBLE 15X10CM CTN 20

## (undated) DEVICE — HANDLE LT SNAP ON ULT DURABLE LENS FOR TRUMPF ALC DISPOSABLE

## (undated) DEVICE — FLOSEAL MATRIX IS INDICATED IN SURGICAL PROCEDURES (OTHER THAN IN OPHTHALMIC) AS AN ADJUNCT TO HEMOSTASIS WHEN CONTROL OF BLEEDING BY LIGATURE OR CONVENTIONAL PROCEDURES IS INEFFECTIVE OR IMPRACTICAL.: Brand: FLOSEAL HEMOSTATIC MATRIX

## (undated) DEVICE — TOOL 14BA50 LEGEND 14CM 5MM BA: Brand: MIDAS REX ™

## (undated) DEVICE — MEDI-VAC NON-CONDUCTIVE SUCTION TUBING: Brand: CARDINAL HEALTH

## (undated) DEVICE — 3000CC GUARDIAN II: Brand: GUARDIAN

## (undated) DEVICE — CORD ELECSURG BPLR 12 FT DISP [810T818750] [ADLER INSTRUMENT CO]

## (undated) DEVICE — SUTURE VCRL SZ 0 L45CM ABSRB VLT OS-6 L36.4MM 1/2 CIR REV J711T

## (undated) DEVICE — SUT PROL 6-0 18IN BV1 DA BLU --

## (undated) DEVICE — SOLUTION IV 1000ML 0.9% SOD CHL

## (undated) DEVICE — DEVICE TRNSF SPIK STL 2008S] MICROTEK MEDICAL INC]

## (undated) DEVICE — KENDALL SCD EXPRESS SLEEVES, KNEE LENGTH, MEDIUM: Brand: KENDALL SCD

## (undated) DEVICE — SOLUTION IRRIG 1000ML H2O STRL BLT

## (undated) DEVICE — BONE WAX WHITE: Brand: BONE WAX WHITE

## (undated) DEVICE — TRAY CATH 16F DRN BG LTX -- CONVERT TO ITEM 363158

## (undated) DEVICE — SUTURE MCRYL SZ 4-0 L27IN ABSRB UD L19MM PS-2 1/2 CIR PRIM Y426H

## (undated) DEVICE — STERILE POLYISOPRENE POWDER-FREE SURGICAL GLOVES: Brand: PROTEXIS

## (undated) DEVICE — DRAPE FLD WRM W44XL66IN C6L FOR INTRATEMP SYS THERMABASIN